# Patient Record
Sex: MALE | Race: WHITE | NOT HISPANIC OR LATINO | Employment: FULL TIME | URBAN - METROPOLITAN AREA
[De-identification: names, ages, dates, MRNs, and addresses within clinical notes are randomized per-mention and may not be internally consistent; named-entity substitution may affect disease eponyms.]

---

## 2017-01-03 ENCOUNTER — PATIENT MESSAGE (OUTPATIENT)
Dept: INTERNAL MEDICINE | Facility: CLINIC | Age: 57
End: 2017-01-03

## 2017-01-05 ENCOUNTER — TELEPHONE (OUTPATIENT)
Dept: INTERNAL MEDICINE | Facility: CLINIC | Age: 57
End: 2017-01-05

## 2017-01-05 RX ORDER — ZOLPIDEM TARTRATE 10 MG/1
TABLET ORAL
Qty: 30 TABLET | Refills: 3 | Status: SHIPPED | OUTPATIENT
Start: 2017-01-05 | End: 2017-05-08 | Stop reason: SDUPTHER

## 2017-01-05 RX ORDER — ZOLPIDEM TARTRATE 10 MG/1
TABLET ORAL
Qty: 30 TABLET | Refills: 3 | Status: CANCELLED | OUTPATIENT
Start: 2017-01-05

## 2017-01-05 NOTE — TELEPHONE ENCOUNTER
----- Message from Norma Guerra sent at 1/5/2017  2:39 PM CST -----  Contact: Self/ [phone]   Type: Rx    Name of medication(s): zolpidem (AMBIEN) 10 mg Tab    Is this a refill? New rx? New     Who prescribed medication? Dr. Estrada     Pharmacy Name, Phone, & Location: [facility] on file     Comments: pt is calling to receive a refill on the medication above. Please call and advise     Thank you

## 2017-01-05 NOTE — TELEPHONE ENCOUNTER
----- Message from Kalen Muir sent at 1/5/2017 10:01 AM CST -----  Contact: Pt [phone]  Pt requesting a call back regarding script zolpidem (AMBIEN) 10 mg Tab. Please call/advise.

## 2017-03-08 ENCOUNTER — PATIENT MESSAGE (OUTPATIENT)
Dept: INTERNAL MEDICINE | Facility: CLINIC | Age: 57
End: 2017-03-08

## 2017-03-08 DIAGNOSIS — E34.9 HYPOTESTOSTERONISM: ICD-10-CM

## 2017-03-08 DIAGNOSIS — Z00.00 ANNUAL PHYSICAL EXAM: Primary | ICD-10-CM

## 2017-03-08 DIAGNOSIS — E78.00 PURE HYPERCHOLESTEROLEMIA: ICD-10-CM

## 2017-03-08 DIAGNOSIS — I10 ESSENTIAL HYPERTENSION: ICD-10-CM

## 2017-03-09 NOTE — TELEPHONE ENCOUNTER
pt is asking if there are any labs or test that need a to be done prior to his apt 3/31/17, if so please order labs so i can schedule pt apt.  please advise.    Thanks

## 2017-03-10 ENCOUNTER — PATIENT MESSAGE (OUTPATIENT)
Dept: INTERNAL MEDICINE | Facility: CLINIC | Age: 57
End: 2017-03-10

## 2017-03-17 ENCOUNTER — LAB VISIT (OUTPATIENT)
Dept: LAB | Facility: HOSPITAL | Age: 57
End: 2017-03-17
Attending: INTERNAL MEDICINE
Payer: COMMERCIAL

## 2017-03-17 DIAGNOSIS — E34.9 HYPOTESTOSTERONISM: ICD-10-CM

## 2017-03-17 DIAGNOSIS — Z00.00 ANNUAL PHYSICAL EXAM: ICD-10-CM

## 2017-03-17 DIAGNOSIS — E78.00 PURE HYPERCHOLESTEROLEMIA: ICD-10-CM

## 2017-03-17 DIAGNOSIS — I10 ESSENTIAL HYPERTENSION: ICD-10-CM

## 2017-03-17 LAB
ALBUMIN SERPL BCP-MCNC: 4 G/DL
ALP SERPL-CCNC: 44 U/L
ALT SERPL W/O P-5'-P-CCNC: 40 U/L
ANION GAP SERPL CALC-SCNC: 10 MMOL/L
AST SERPL-CCNC: 27 U/L
BASOPHILS # BLD AUTO: 0.06 K/UL
BASOPHILS NFR BLD: 1.1 %
BILIRUB SERPL-MCNC: 0.7 MG/DL
BUN SERPL-MCNC: 21 MG/DL
CALCIUM SERPL-MCNC: 9.5 MG/DL
CHLORIDE SERPL-SCNC: 106 MMOL/L
CHOLEST/HDLC SERPL: 5.3 {RATIO}
CO2 SERPL-SCNC: 26 MMOL/L
COMPLEXED PSA SERPL-MCNC: 0.5 NG/ML
CREAT SERPL-MCNC: 1 MG/DL
DIFFERENTIAL METHOD: ABNORMAL
EOSINOPHIL # BLD AUTO: 0.7 K/UL
EOSINOPHIL NFR BLD: 11.8 %
ERYTHROCYTE [DISTWIDTH] IN BLOOD BY AUTOMATED COUNT: 11.6 %
EST. GFR  (AFRICAN AMERICAN): >60 ML/MIN/1.73 M^2
EST. GFR  (NON AFRICAN AMERICAN): >60 ML/MIN/1.73 M^2
GLUCOSE SERPL-MCNC: 96 MG/DL
HCT VFR BLD AUTO: 46.8 %
HDL/CHOLESTEROL RATIO: 19 %
HDLC SERPL-MCNC: 252 MG/DL
HDLC SERPL-MCNC: 48 MG/DL
HGB BLD-MCNC: 16.4 G/DL
LDLC SERPL CALC-MCNC: 151.2 MG/DL
LYMPHOCYTES # BLD AUTO: 1.9 K/UL
LYMPHOCYTES NFR BLD: 34.2 %
MCH RBC QN AUTO: 32.7 PG
MCHC RBC AUTO-ENTMCNC: 35 %
MCV RBC AUTO: 93 FL
MONOCYTES # BLD AUTO: 0.6 K/UL
MONOCYTES NFR BLD: 11.6 %
NEUTROPHILS # BLD AUTO: 2.3 K/UL
NEUTROPHILS NFR BLD: 41.3 %
NONHDLC SERPL-MCNC: 204 MG/DL
PLATELET # BLD AUTO: 173 K/UL
PMV BLD AUTO: 11.4 FL
POTASSIUM SERPL-SCNC: 4.4 MMOL/L
PROT SERPL-MCNC: 7 G/DL
RBC # BLD AUTO: 5.02 M/UL
SODIUM SERPL-SCNC: 142 MMOL/L
TESTOST SERPL-MCNC: 293 NG/DL
TRIGL SERPL-MCNC: 264 MG/DL
TSH SERPL DL<=0.005 MIU/L-ACNC: 2.12 UIU/ML
WBC # BLD AUTO: 5.52 K/UL

## 2017-03-17 PROCEDURE — 84443 ASSAY THYROID STIM HORMONE: CPT

## 2017-03-17 PROCEDURE — 84153 ASSAY OF PSA TOTAL: CPT

## 2017-03-17 PROCEDURE — 84403 ASSAY OF TOTAL TESTOSTERONE: CPT

## 2017-03-17 PROCEDURE — 85025 COMPLETE CBC W/AUTO DIFF WBC: CPT

## 2017-03-17 PROCEDURE — 80053 COMPREHEN METABOLIC PANEL: CPT

## 2017-03-17 PROCEDURE — 36415 COLL VENOUS BLD VENIPUNCTURE: CPT

## 2017-03-17 PROCEDURE — 80061 LIPID PANEL: CPT

## 2017-03-31 ENCOUNTER — OFFICE VISIT (OUTPATIENT)
Dept: INTERNAL MEDICINE | Facility: CLINIC | Age: 57
End: 2017-03-31
Payer: COMMERCIAL

## 2017-03-31 VITALS
HEART RATE: 62 BPM | SYSTOLIC BLOOD PRESSURE: 118 MMHG | HEIGHT: 77 IN | WEIGHT: 276 LBS | DIASTOLIC BLOOD PRESSURE: 82 MMHG | BODY MASS INDEX: 32.59 KG/M2

## 2017-03-31 DIAGNOSIS — I10 ESSENTIAL HYPERTENSION: ICD-10-CM

## 2017-03-31 DIAGNOSIS — R07.2 PRECORDIAL PAIN: ICD-10-CM

## 2017-03-31 DIAGNOSIS — M47.816 SPONDYLOSIS OF LUMBAR REGION WITHOUT MYELOPATHY OR RADICULOPATHY: ICD-10-CM

## 2017-03-31 DIAGNOSIS — E29.1 MALE HYPOGONADISM: ICD-10-CM

## 2017-03-31 DIAGNOSIS — D72.10 EOSINOPHILIA: ICD-10-CM

## 2017-03-31 DIAGNOSIS — E78.2 MIXED HYPERLIPIDEMIA: ICD-10-CM

## 2017-03-31 DIAGNOSIS — R13.10 DYSPHAGIA, UNSPECIFIED TYPE: ICD-10-CM

## 2017-03-31 DIAGNOSIS — J30.2 SEASONAL ALLERGIC RHINITIS, UNSPECIFIED ALLERGIC RHINITIS TRIGGER: ICD-10-CM

## 2017-03-31 DIAGNOSIS — Z00.00 ANNUAL PHYSICAL EXAM: Primary | ICD-10-CM

## 2017-03-31 PROCEDURE — 3074F SYST BP LT 130 MM HG: CPT | Mod: S$GLB,,, | Performed by: INTERNAL MEDICINE

## 2017-03-31 PROCEDURE — 3079F DIAST BP 80-89 MM HG: CPT | Mod: S$GLB,,, | Performed by: INTERNAL MEDICINE

## 2017-03-31 PROCEDURE — 99396 PREV VISIT EST AGE 40-64: CPT | Mod: S$GLB,,, | Performed by: INTERNAL MEDICINE

## 2017-03-31 PROCEDURE — 99999 PR PBB SHADOW E&M-EST. PATIENT-LVL III: CPT | Mod: PBBFAC,,, | Performed by: INTERNAL MEDICINE

## 2017-03-31 RX ORDER — ROSUVASTATIN CALCIUM 10 MG/1
10 TABLET, COATED ORAL DAILY
Qty: 30 TABLET | Refills: 11 | Status: SHIPPED | OUTPATIENT
Start: 2017-03-31 | End: 2017-03-31 | Stop reason: SDUPTHER

## 2017-03-31 RX ORDER — ROSUVASTATIN CALCIUM 10 MG/1
10 TABLET, COATED ORAL DAILY
Qty: 30 TABLET | Refills: 11 | Status: SHIPPED | OUTPATIENT
Start: 2017-03-31 | End: 2017-05-22

## 2017-03-31 NOTE — MR AVS SNAPSHOT
[facility] [address]  [address]  [address]  [facility] [address]  Phone: [phone]                  Stanley Stewart   3/31/2017 8:00 AM   Office Visit    Description:  Male : 1960   Provider:  Kadeem Estrada MD   Department:  [facility] [address]           Reason for Visit     Annual Exam           Diagnoses this Visit        Comments    Annual physical exam    -  Primary     Essential hypertension         Mixed hyperlipidemia         Precordial pain         Dysphagia, unspecified type         Male hypogonadism         Spondylosis of lumbar region without myelopathy or radiculopathy         Seasonal allergic rhinitis, unspecified allergic rhinitis trigger         Eosinophilia                To Do List           Goals (5 Years of Data)     None       These Medications        Disp Refills Start End    rosuvastatin (CRESTOR) 10 MG tablet 30 tablet 11 3/31/2017 3/31/2018    Take 1 tablet (10 mg total) by mouth once daily. - Oral    Pharmacy: [facility] [address] [place] [address] [address] AT [facility] Ph #: [phone]         [facility]     [facility] Nurse Care Line - 24 Assistance  Unless otherwise directed by your provider, please contact [facility], our nurse care line that is available for / assistance.     Registered nurses in the [facility] provide: appointment scheduling, clinical advisement, health education, and other advisory services.  Call: [phone] (toll free)               Medications           Message regarding Medications     Verify the changes and/or additions to your medication regime listed below are the same as discussed with your clinician today.  If any of these changes or additions are incorrect, please notify your healthcare provider.        START taking these NEW medications        Refills    rosuvastatin (CRESTOR) 10 MG tablet 11    Sig: Take 1  "tablet (10 mg total) by mouth once daily.    Class: Normal    Route: Oral      STOP taking these medications     atorvastatin (LIPITOR) 20 MG tablet Take 1 tablet (20 mg total) by mouth once daily.           Verify that the below list of medications is an accurate representation of the medications you are currently taking.  If none reported, the list may be blank. If incorrect, please contact your healthcare provider. Carry this list with you in case of emergency.           Current Medications     CIALIS 5 mg tablet TK 1 T PO  D FOR ERECTILE DYSFUNCTION    lisinopril-hydrochlorothiazide (PRINZIDE,ZESTORETIC) 20-12.5 mg per tablet Take 1 tablet by mouth once daily.    vardenafil (LEVITRA) 20 MG tablet Take 1 tablet (20 mg total) by mouth daily as needed for Erectile Dysfunction. Take 1 hour before intercourse.    zolpidem (AMBIEN) 10 mg Tab TAKE 1 TABLET BY MOUTH EVERY DAY AT BEDTIME AS NEEDED    rosuvastatin (CRESTOR) 10 MG tablet Take 1 tablet (10 mg total) by mouth once daily.           Clinical Reference Information           Your Vitals Were     BP Pulse Height Weight BMI    118/82 62 6' 5" (1.956 m) 125.2 kg (276 lb) 32.73 kg/m2      Blood Pressure          Most Recent Value    BP  118/82      Allergies as of 3/31/2017     Shellfish Containing Products      Immunizations Administered on Date of Encounter - 3/31/2017     None      Orders Placed During Today's Visit      Normal Orders This Visit    Ambulatory Referral to Allergy     Case request GI: ESOPHAGOGASTRODUODENOSCOPY (EGD)     Future Labs/Procedures Expected by Expires    EKG 12-LEAD - Muse  As directed 3/31/2018    Exercise stress echo  As directed 3/31/2018      Language Assistance Services     ATTENTION: Language assistance services are available, free of charge. Please call [phone].      ATENCIÓN: Si habla moe, tiene a bowen disposición servicios gratuitos de asistencia lingüística. Llame al [phone].     COLIN Ý: N?u b?n nói Ti?ng Vi?t, " có các d?ch v? h? tr? ngôn ng? mi?n phí dành cho b?n. G?i s? [phone].         [facility] [address] complies with applicable Federal civil rights laws and does not discriminate on the basis of race, color, national origin, age, disability, or sex.

## 2017-03-31 NOTE — PROGRESS NOTES
REASON FOR VISIT:  This is a 56-year-old male who comes in for annual routine   visit.  There are concerns that he has.  1. He has been feeling a knife-like pain in his left lower chest that will come   on randomly.  At times, it is under circumstances of maybe being stressed. It   will occur in the evening, maybe last 20-40 minutes.  No other symptoms at that   time and the pain is localized.  2. Within a year he had bouts of dysphagia where the food may get stuck in his   lower chest, but eventually it passes.  It does not happen all the time, but   will be with solid food and not liquids.  He is not having heartburn or   indigestion.  No regurgitation.  3. He is being followed by Dr. Keysha Trevino, outside neurosurgeon, regarding   lumbar degenerative disk disease, particularly at L5-S1, where there might be   facet arthropathy.  The use of Mobic however, has kept things in check.    Occasionally, he will have low back pain.  No radicular symptoms.  4. He has been taking the medicine atorvastatin for a while, but then within   this past eight months he noticed that his elbows were hurting for no other   reason.  He then just stopped the Lipitor and afterwards he has not had the   pain.    PAST MEDICAL HISTORY:  Hypertension.  Hyperlipidemia.  Lumbar degenerative disk disease.  Hypotestosterone, followed by Urology.  Appendectomy.  Arthroscopic knee surgery twice on the left and one on the right.  Intussusception at age 6 and then recently in September 2014 where he did   require surgery and a partial colon resection.  Prior diagnosis of fatty liver.  Sleep apnea.    SOCIAL HISTORY:  Tobacco use, none.  Alcohol use: maybe one glass of wine a   night.  , has three children.  He works as a teacher at [employer].  Exercise   is walking a mile and a half every day and occasionally doing a spinning class.    FAMILY HISTORY:  Father is alive, hypertension, hyperlipidemia, coronary artery   disease.  Mother is  , chronic hepatitis.  Two brothers living but one   with a history of heart attack at age 50.    SCREENING:  Normal colonoscopy in year .    MEDICATIONS:  Lisinopril/hydrochlorothiazide 20/12.5 mg a day.  Testosterone pellets every six months.  Ambien 10 mg at night.   The use of Cialis and Levitra as needed.    RECENT LABS:  PSA 0.50.  CBC normal other than eosinophil count being 11.8.    Comprehensive metabolic profile was normal.  Cholesterol 252, triglycerides 264.    TSH normal.    REVIEW OF SYSTEMS:  Overall, his breathing is fine.  No palpitations, no   abdominal pain.  Regular bowel function.  No difficulty urinating.  Nocturia x2.    No arthralgias or headaches.    PHYSICAL EXAMINATION:  VITAL SIGNS:  His weight is 256 pounds, pulse 64, blood pressure today 132/80.  HEENT:  Tympanic membranes normal.  Nasal mucosa is clear.  Oropharynx, no   abnormal findings.  NECK:  No thyromegaly.  LUNGS:  Clear breath sounds.  HEART:  Regular rate and rhythm.  ABDOMEN:  Active bowel sounds, soft, nontender.  No hepatosplenomegaly or   abdominal masses.  Midline and right lower quadrant scar.  PULSES:  2+ carotid, 2+ pedal pulses.  No bruits.  EXTREMITIES:  No edema.  LYMPH GLAND:  No palpable adenopathy.  GENITALIA:  No scrotal masses, no hernias.  MUSCULOSKELETAL:  Bilateral knee crepitation.  RECTAL:  Stool is brown, heme negative.  Prostate feels normal.    IMPRESSION:  1.  General exam.  2.  Hypertension.  3.  Hyperlipidemia.  4.  Chest pain.  5.  Dysphagia.  6.  Hypotestosterone.  7.  Lumbar degenerative disk disease.  8.  Allergic rhinitis with nasal congestion.    PLAN:    We will be arranging for an upper endoscopy and a stress 2D echo.    Take Claritin as needed.   Phone review to follow up.    In addition, we will recommend starting Crestor 10 mg a day.    /ls [id number] review        JAM/HN  dd: 2017 08:50:50 (CDT)  td: 2017 03:39:00 (CDT)  Doc ID   #[id number]  Job ID #[id number]    CC:

## 2017-04-04 ENCOUNTER — TELEPHONE (OUTPATIENT)
Dept: ENDOSCOPY | Facility: HOSPITAL | Age: 57
End: 2017-04-04

## 2017-04-10 RX ORDER — LISINOPRIL AND HYDROCHLOROTHIAZIDE 12.5; 2 MG/1; MG/1
TABLET ORAL
Qty: 90 TABLET | Refills: 3 | Status: SHIPPED | OUTPATIENT
Start: 2017-04-10 | End: 2018-02-24 | Stop reason: SDUPTHER

## 2017-04-16 ENCOUNTER — PATIENT MESSAGE (OUTPATIENT)
Dept: UROLOGY | Facility: CLINIC | Age: 57
End: 2017-04-16

## 2017-04-17 ENCOUNTER — PATIENT MESSAGE (OUTPATIENT)
Dept: UROLOGY | Facility: CLINIC | Age: 57
End: 2017-04-17

## 2017-04-21 ENCOUNTER — ANESTHESIA EVENT (OUTPATIENT)
Dept: ENDOSCOPY | Facility: HOSPITAL | Age: 57
End: 2017-04-21
Payer: COMMERCIAL

## 2017-04-21 ENCOUNTER — HOSPITAL ENCOUNTER (OUTPATIENT)
Dept: CARDIOLOGY | Facility: CLINIC | Age: 57
Discharge: HOME | End: 2017-04-21
Payer: COMMERCIAL

## 2017-04-21 ENCOUNTER — HOSPITAL ENCOUNTER (OUTPATIENT)
Facility: HOSPITAL | Age: 57
Discharge: HOME | End: 2017-04-21
Attending: INTERNAL MEDICINE | Admitting: INTERNAL MEDICINE
Payer: COMMERCIAL

## 2017-04-21 ENCOUNTER — SURGERY (OUTPATIENT)
Age: 57
End: 2017-04-21

## 2017-04-21 ENCOUNTER — ANESTHESIA (OUTPATIENT)
Dept: ENDOSCOPY | Facility: HOSPITAL | Age: 57
End: 2017-04-21
Payer: COMMERCIAL

## 2017-04-21 VITALS
HEIGHT: 77 IN | BODY MASS INDEX: 32.47 KG/M2 | OXYGEN SATURATION: 95 % | HEART RATE: 57 BPM | TEMPERATURE: 99 F | DIASTOLIC BLOOD PRESSURE: 95 MMHG | WEIGHT: 275 LBS | RESPIRATION RATE: 18 BRPM | SYSTOLIC BLOOD PRESSURE: 161 MMHG

## 2017-04-21 VITALS — RESPIRATION RATE: 19 BRPM

## 2017-04-21 DIAGNOSIS — R07.2 PRECORDIAL PAIN: ICD-10-CM

## 2017-04-21 DIAGNOSIS — R13.19 ESOPHAGEAL DYSPHAGIA: Primary | ICD-10-CM

## 2017-04-21 DIAGNOSIS — I10 ESSENTIAL HYPERTENSION: ICD-10-CM

## 2017-04-21 DIAGNOSIS — E78.2 MIXED HYPERLIPIDEMIA: ICD-10-CM

## 2017-04-21 DIAGNOSIS — R13.10 DYSPHAGIA: ICD-10-CM

## 2017-04-21 LAB
DIASTOLIC DYSFUNCTION: NO
ESTIMATED PA SYSTOLIC PRESSURE: 17.81
RETIRED EF AND QEF - SEE NOTES: 60 (ref 55–65)

## 2017-04-21 PROCEDURE — 27201012 HC FORCEPS, HOT/COLD, DISP: Performed by: INTERNAL MEDICINE

## 2017-04-21 PROCEDURE — 93321 DOPPLER ECHO F-UP/LMTD STD: CPT | Mod: S$GLB,,, | Performed by: INTERNAL MEDICINE

## 2017-04-21 PROCEDURE — 63600175 PHARM REV CODE 636 W HCPCS: Performed by: NURSE ANESTHETIST, CERTIFIED REGISTERED

## 2017-04-21 PROCEDURE — 37000009 HC ANESTHESIA EA ADD 15 MINS: Performed by: INTERNAL MEDICINE

## 2017-04-21 PROCEDURE — 37000008 HC ANESTHESIA 1ST 15 MINUTES: Performed by: INTERNAL MEDICINE

## 2017-04-21 PROCEDURE — 88305 TISSUE EXAM BY PATHOLOGIST: CPT | Performed by: PATHOLOGY

## 2017-04-21 PROCEDURE — D9220A PRA ANESTHESIA: Mod: CRNA,,, | Performed by: NURSE ANESTHETIST, CERTIFIED REGISTERED

## 2017-04-21 PROCEDURE — 43239 EGD BIOPSY SINGLE/MULTIPLE: CPT | Mod: ,,, | Performed by: INTERNAL MEDICINE

## 2017-04-21 PROCEDURE — 93000 ELECTROCARDIOGRAM COMPLETE: CPT | Mod: S$GLB,,, | Performed by: INTERNAL MEDICINE

## 2017-04-21 PROCEDURE — 25000003 PHARM REV CODE 250: Performed by: NURSE ANESTHETIST, CERTIFIED REGISTERED

## 2017-04-21 PROCEDURE — 25000003 PHARM REV CODE 250: Performed by: INTERNAL MEDICINE

## 2017-04-21 PROCEDURE — 88305 TISSUE EXAM BY PATHOLOGIST: CPT | Mod: 26,,, | Performed by: PATHOLOGY

## 2017-04-21 PROCEDURE — 43239 EGD BIOPSY SINGLE/MULTIPLE: CPT | Performed by: INTERNAL MEDICINE

## 2017-04-21 PROCEDURE — 93351 STRESS TTE COMPLETE: CPT | Mod: S$GLB,,, | Performed by: INTERNAL MEDICINE

## 2017-04-21 PROCEDURE — D9220A PRA ANESTHESIA: Mod: ANES,,, | Performed by: ANESTHESIOLOGY

## 2017-04-21 RX ORDER — SODIUM CHLORIDE 9 MG/ML
INJECTION, SOLUTION INTRAVENOUS CONTINUOUS
Status: DISCONTINUED | OUTPATIENT
Start: 2017-04-21 | End: 2017-04-21 | Stop reason: HOSPADM

## 2017-04-21 RX ORDER — LIDOCAINE HCL/PF 100 MG/5ML
SYRINGE (ML) INTRAVENOUS
Status: DISCONTINUED | OUTPATIENT
Start: 2017-04-21 | End: 2017-04-21

## 2017-04-21 RX ORDER — PROPOFOL 10 MG/ML
VIAL (ML) INTRAVENOUS
Status: DISCONTINUED | OUTPATIENT
Start: 2017-04-21 | End: 2017-04-21

## 2017-04-21 RX ORDER — PROPOFOL 10 MG/ML
VIAL (ML) INTRAVENOUS CONTINUOUS PRN
Status: DISCONTINUED | OUTPATIENT
Start: 2017-04-21 | End: 2017-04-21

## 2017-04-21 RX ADMIN — PROPOFOL 100 MG: 10 INJECTION, EMULSION INTRAVENOUS at 02:04

## 2017-04-21 RX ADMIN — LIDOCAINE HYDROCHLORIDE 50 MG: 20 INJECTION, SOLUTION INTRAVENOUS at 02:04

## 2017-04-21 RX ADMIN — SODIUM CHLORIDE: 0.9 INJECTION, SOLUTION INTRAVENOUS at 02:04

## 2017-04-21 RX ADMIN — PROPOFOL 175 MCG/KG/MIN: 10 INJECTION, EMULSION INTRAVENOUS at 02:04

## 2017-04-21 NOTE — TRANSFER OF CARE
"Anesthesia Transfer of Care Note    Patient: Stanley Stewart    Procedure(s) Performed: Procedure(s) (LRB):  ESOPHAGOGASTRODUODENOSCOPY (EGD) (N/A)    Patient location: GI    Anesthesia Type: general    Transport from OR: Transported from OR on room air with adequate spontaneous ventilation    Post pain: adequate analgesia    Post assessment: no apparent anesthetic complications and tolerated procedure well    Post vital signs: stable    Level of consciousness: awake, alert and oriented    Nausea/Vomiting: no nausea/vomiting    Complications: none          Last vitals:   Visit Vitals    BP (!) 160/81 (BP Location: Left arm, Patient Position: Lying, BP Method: Automatic)    Pulse 64    Temp 36.9 °C (98.4 °F) (Oral)    Resp 14    Ht 6' 5" (1.956 m)    Wt 124.7 kg (275 lb)    SpO2 99%    BMI 32.61 kg/m2     "

## 2017-04-21 NOTE — H&P (VIEW-ONLY)
REASON FOR VISIT:  This is a 56-year-old male who comes in for annual routine   visit.  There are concerns that he has.  1. He has been feeling a knife-like pain in his left lower chest that will come   on randomly.  At times, it is under circumstances of maybe being stressed. It   will occur in the evening, maybe last 20-40 minutes.  No other symptoms at that   time and the pain is localized.  2. Within a year he had bouts of dysphagia where the food may get stuck in his   lower chest, but eventually it passes.  It does not happen all the time, but   will be with solid food and not liquids.  He is not having heartburn or   indigestion.  No regurgitation.  3. He is being followed by Dr. Keysha Trevino, outside neurosurgeon, regarding   lumbar degenerative disk disease, particularly at L5-S1, where there might be   facet arthropathy.  The use of Mobic however, has kept things in check.    Occasionally, he will have low back pain.  No radicular symptoms.  4. He has been taking the medicine atorvastatin for a while, but then within   this past eight months he noticed that his elbows were hurting for no other   reason.  He then just stopped the Lipitor and afterwards he has not had the   pain.    PAST MEDICAL HISTORY:  Hypertension.  Hyperlipidemia.  Lumbar degenerative disk disease.  Hypotestosterone, followed by Urology.  Appendectomy.  Arthroscopic knee surgery twice on the left and one on the right.  Intussusception at age 6 and then recently in September 2014 where he did   require surgery and a partial colon resection.  Prior diagnosis of fatty liver.  Sleep apnea.    SOCIAL HISTORY:  Tobacco use, none.  Alcohol use: maybe one glass of wine a   night.  , has three children.  He works as a teacher at [employer].  Exercise   is walking a mile and a half every day and occasionally doing a spinning class.    FAMILY HISTORY:  Father is alive, hypertension, hyperlipidemia, coronary artery   disease.  Mother is  , chronic hepatitis.  Two brothers living but one   with a history of heart attack at age 50.    SCREENING:  Normal colonoscopy in year .    MEDICATIONS:  Lisinopril/hydrochlorothiazide 20/12.5 mg a day.  Testosterone pellets every six months.  Ambien 10 mg at night.   The use of Cialis and Levitra as needed.    RECENT LABS:  PSA 0.50.  CBC normal other than eosinophil count being 11.8.    Comprehensive metabolic profile was normal.  Cholesterol 252, triglycerides 264.    TSH normal.    REVIEW OF SYSTEMS:  Overall, his breathing is fine.  No palpitations, no   abdominal pain.  Regular bowel function.  No difficulty urinating.  Nocturia x2.    No arthralgias or headaches.    PHYSICAL EXAMINATION:  VITAL SIGNS:  His weight is 256 pounds, pulse 64, blood pressure today 132/80.  HEENT:  Tympanic membranes normal.  Nasal mucosa is clear.  Oropharynx, no   abnormal findings.  NECK:  No thyromegaly.  LUNGS:  Clear breath sounds.  HEART:  Regular rate and rhythm.  ABDOMEN:  Active bowel sounds, soft, nontender.  No hepatosplenomegaly or   abdominal masses.  Midline and right lower quadrant scar.  PULSES:  2+ carotid, 2+ pedal pulses.  No bruits.  EXTREMITIES:  No edema.  LYMPH GLAND:  No palpable adenopathy.  GENITALIA:  No scrotal masses, no hernias.  MUSCULOSKELETAL:  Bilateral knee crepitation.  RECTAL:  Stool is brown, heme negative.  Prostate feels normal.    IMPRESSION:  1.  General exam.  2.  Hypertension.  3.  Hyperlipidemia.  4.  Chest pain.  5.  Dysphagia.  6.  Hypotestosterone.  7.  Lumbar degenerative disk disease.  8.  Allergic rhinitis with nasal congestion.    PLAN:    We will be arranging for an upper endoscopy and a stress 2D echo.    Take Claritin as needed.   Phone review to follow up.    In addition, we will recommend starting Crestor 10 mg a day.    /ls [id number] review        JAM/HN  dd: 2017 08:50:50 (CDT)  td: 2017 03:39:00 (CDT)  Doc ID   #[id number]  Job ID #[id number]    CC:

## 2017-04-21 NOTE — ANESTHESIA PREPROCEDURE EVALUATION
04/21/2017  Stanley Stewart is a 56 y.o., male.    Anesthesia Evaluation    I have reviewed the Patient Summary Reports.    I have reviewed the Nursing Notes.   I have reviewed the Medications.     Review of Systems      Physical Exam  General:  Obesity    Airway/Jaw/Neck:  Airway Findings: Mouth Opening: Normal Tongue: Normal  General Airway Assessment: Adult  Mallampati: II  TM Distance: Normal, at least 6 cm       Chest/Lungs:  Chest/Lungs Findings: Clear to auscultation, Normal Respiratory Rate     Heart/Vascular:  Heart Findings: Rate: Normal  Rhythm: Regular Rhythm             Anesthesia Plan  Type of Anesthesia, risks & benefits discussed:  Anesthesia Type:  general, MAC  Patient's Preference:   Intra-op Monitoring Plan:   Intra-op Monitoring Plan Comments:   Post Op Pain Control Plan:   Post Op Pain Control Plan Comments:   Induction:   IV  Beta Blocker:  Patient is not currently on a Beta-Blocker (No further documentation required).       Informed Consent: Patient understands risks and agrees with Anesthesia plan.  Questions answered. Anesthesia consent signed with patient.  ASA Score: 2     Day of Surgery Review of History & Physical:            Ready For Surgery From Anesthesia Perspective.     Patient Active Problem List   Diagnosis    Male hypogonadism    Hypertension    Mixed hyperlipidemia    Erectile dysfunction    Dysphagia

## 2017-04-21 NOTE — DISCHARGE INSTRUCTIONS

## 2017-04-21 NOTE — PATIENT INSTRUCTIONS
Discharge Summary/Instructions for after EGD with Biopsy  Patient Name: Stanley Stewart  Patient MRN: [id number]  Patient YOB: 1960 Friday, April 21, 2017    Ramirez Trevino MD  RESTRICTIONS ON ACTIVITY:  - DO NOT drive a car or operate machinery until the day after the   procedure.  - Following Day:  Return to full activities including work.  - Diet:  Eat and drink normally unless instructed otherwise.  TREATMENT FOR COMMON SIDE EFFECTS:  - Sore Throat - treat with throat lozenges, gargle with warm salt water.  - Mild abdominal pain & bloating - rest and take liquids only.  SYMPTOMS TO WATCH FOR AND REPORT TO YOUR PHYSICIAN:  1.  Chills or fever occurring within 24 hours after procedure.  2.  Pain in chest.  3.  SEVERE abdominal pain or bloating.  4.  Rectal bleeding which would show as maroon or black stools.  Your doctor recommends these additional instructions:  If any biopsies were performed, my office will call you in 5 to 6 business   days with any results.  - Patient has a contact number available for emergencies.  The signs and   symptoms of potential delayed complications were discussed with the   patient.  Return to normal activities tomorrow.  Written discharge   instructions were provided to the patient.   - Discharge patient to home.   - Resume previous diet.   - Continue present medications.   - Await pathology results.   - Limit use of ibuprofen, naproxen, or other non-steroidal anti-inflammatory   drugs.   You have a contact number available for emergencies.  The signs and symptoms   of potential delayed complications were discussed with you.  You may return   to normal activities tomorrow.  Written discharge instructions were   provided to you.   You are being discharged to home.   Resume your previous diet.   Continue your present medications.   We are waiting for your pathology results.   Limit use of ibuprofen (including Advil, Motrin or Nuprin), naproxen   (including Aleve), or any  other non-steroidal anti-inflammatory drugs.  None  If you have any questions or problems, please call your physician.  EMERGENCY PHONE NUMBER: [phone]  LAB RESULTS: [phone]         Ramirez Trevino MD  4/21/2017 2:50:12 PM  This report has been verified and signed electronically.

## 2017-04-21 NOTE — INTERVAL H&P NOTE
The patient has been examined and the H&P has been reviewed:    There is no interval changes since last encounter.    EGD: Intermittent solid food dysphagia  Sedation: GA  ASA: Per anesthesia  Mallampati: Per anesthesia    Endoscopy risks, benefits and alternative options discussed and understood by patient/family.          Active Hospital Problems    Diagnosis  POA    Dysphagia [R13.10]  Yes      Resolved Hospital Problems    Diagnosis Date Resolved POA   No resolved problems to display.

## 2017-04-21 NOTE — ANESTHESIA POSTPROCEDURE EVALUATION
"Anesthesia Post Evaluation    Patient: Stanley Stewart    Procedure(s) Performed: Procedure(s) (LRB):  ESOPHAGOGASTRODUODENOSCOPY (EGD) (N/A)    Final Anesthesia Type: general  Patient location during evaluation: PACU  Patient participation: Yes- Able to Participate  Level of consciousness: awake and alert  Post-procedure vital signs: reviewed and stable  Pain management: adequate  Airway patency: patent  PONV status at discharge: No PONV  Anesthetic complications: no      Cardiovascular status: blood pressure returned to baseline  Respiratory status: unassisted  Hydration status: euvolemic  Follow-up not needed.        Visit Vitals    BP (!) 147/73 (BP Location: Left arm, Patient Position: Sitting, BP Method: Automatic)    Pulse 76    Temp 37 °C (98.6 °F) (Oral)    Resp 14    Ht 6' 5" (1.956 m)    Wt 124.7 kg (275 lb)    SpO2 (!) 93%    BMI 32.61 kg/m2       Pain/Rui Score: Pain Assessment Performed: Yes (4/21/2017  2:52 PM)  Presence of Pain: denies (4/21/2017  2:52 PM)  Rui Score: 10 (4/21/2017  2:52 PM)      "

## 2017-04-22 ENCOUNTER — DOCUMENTATION ONLY (OUTPATIENT)
Dept: INTERNAL MEDICINE | Facility: CLINIC | Age: 57
End: 2017-04-22

## 2017-04-22 ENCOUNTER — PATIENT MESSAGE (OUTPATIENT)
Dept: INTERNAL MEDICINE | Facility: CLINIC | Age: 57
End: 2017-04-22

## 2017-04-22 DIAGNOSIS — M47.816 SPONDYLOSIS OF LUMBAR REGION WITHOUT MYELOPATHY OR RADICULOPATHY: ICD-10-CM

## 2017-04-22 DIAGNOSIS — E78.00 PURE HYPERCHOLESTEROLEMIA: Primary | ICD-10-CM

## 2017-04-22 NOTE — PROGRESS NOTES
EGD NOTED    RECOMMEND HOLDING OFF ON MOBIC WHICH HE IS TAKING FOR LOW BACK PAIN    STRESS ECHO IS NORMAL    CONTINUE WITH CRUZ    REFER TO HEALTHY BACK     LIPID IN 3 MONTHS

## 2017-04-24 ENCOUNTER — OFFICE VISIT (OUTPATIENT)
Dept: UROLOGY | Facility: CLINIC | Age: 57
End: 2017-04-24
Payer: COMMERCIAL

## 2017-04-24 VITALS
BODY MASS INDEX: 32.93 KG/M2 | WEIGHT: 278.88 LBS | HEIGHT: 77 IN | DIASTOLIC BLOOD PRESSURE: 89 MMHG | HEART RATE: 56 BPM | SYSTOLIC BLOOD PRESSURE: 130 MMHG

## 2017-04-24 DIAGNOSIS — N52.01 ERECTILE DYSFUNCTION DUE TO ARTERIAL INSUFFICIENCY: ICD-10-CM

## 2017-04-24 DIAGNOSIS — E29.1 MALE HYPOGONADISM: Primary | ICD-10-CM

## 2017-04-24 PROCEDURE — S0189 TESTOSTERONE PELLET 75 MG: HCPCS | Mod: S$GLB,,, | Performed by: UROLOGY

## 2017-04-24 PROCEDURE — 3079F DIAST BP 80-89 MM HG: CPT | Mod: S$GLB,,, | Performed by: UROLOGY

## 2017-04-24 PROCEDURE — 99999 PR PBB SHADOW E&M-EST. PATIENT-LVL III: CPT | Mod: PBBFAC,,, | Performed by: UROLOGY

## 2017-04-24 PROCEDURE — 3075F SYST BP GE 130 - 139MM HG: CPT | Mod: S$GLB,,, | Performed by: UROLOGY

## 2017-04-24 PROCEDURE — 11980 IMPLANT HORMONE PELLET(S): CPT | Mod: S$GLB,,, | Performed by: UROLOGY

## 2017-04-24 PROCEDURE — 1160F RVW MEDS BY RX/DR IN RCRD: CPT | Mod: S$GLB,,, | Performed by: UROLOGY

## 2017-04-24 PROCEDURE — 99214 OFFICE O/P EST MOD 30 MIN: CPT | Mod: 25,S$GLB,, | Performed by: UROLOGY

## 2017-04-24 RX ORDER — VARDENAFIL HYDROCHLORIDE 20 MG/1
20 TABLET ORAL DAILY PRN
Qty: 30 TABLET | Refills: 11 | Status: SHIPPED | OUTPATIENT
Start: 2017-04-24 | End: 2019-06-06

## 2017-04-24 RX ORDER — SILDENAFIL CITRATE 20 MG/1
TABLET ORAL
Qty: 50 TABLET | Refills: 11 | Status: SHIPPED | OUTPATIENT
Start: 2017-04-24 | End: 2017-11-27

## 2017-04-24 RX ORDER — TADALAFIL 5 MG/1
5 TABLET ORAL DAILY
Qty: 30 TABLET | Refills: 11 | Status: SHIPPED | OUTPATIENT
Start: 2017-04-24 | End: 2019-06-06

## 2017-04-24 NOTE — MR AVS SNAPSHOT
[facility] - Urology 4th Floor  [address] [address]  Phone: [phone]                  Stanley Stewart   2017 11:00 AM   Office Visit    Description:  Male : 1960   Provider:  Jaydon Morin MD   Department:  [facility] - Urology 4th Floor           Reason for Visit     Other           Diagnoses this Visit        Comments    Male hypogonadism    -  Primary     Erectile dysfunction due to arterial insufficiency                To Do List           Future Appointments        Provider Department Dept Phone    2017 12:30 PM ANI VARGAS [facility] [phone]    2017 1:00 PM Acacia Ramirez MD [facility] [phone] 9:20 AM ISHA Nick III, MD [facility] - Allergy/ Immunology [phone]    2017 7:10 AM LAB, APPOINTMENT [place] [facility] [phone]      Goals (5 Years of Data)     None       These Medications        Disp Refills Start End    vardenafil (LEVITRA) 20 MG tablet 30 tablet 11 2017     Take 1 tablet (20 mg total) by mouth daily as needed for Erectile Dysfunction. Take 1 hour before intercourse. - Oral    Pharmacy: [facility] [place] [address] AT [address]  Ph #: [phone]       tadalafil (CIALIS) 5 MG tablet 30 tablet 11 2017    Take 1 tablet (5 mg total) by mouth once daily. - Oral    Pharmacy: [facility] [address] AT [address]  Ph #: [phone]       sildenafil (REVATIO) 20 mg Tab 50 tablet 11 2017     Take 5 po 1 hour before sexual activity    Pharmacy: [facility] [address] AT [address]  Ph #: [phone]         [facility]     [facility] -  Assistance  Unless otherwise directed by your provider, please contact [facility],  our nurse care line that is available for  assistance.     Registered nurses in the [facility] provide: appointment scheduling, clinical advisement, health education, and other advisory services.  Call: [phone] (toll free)               Medications           Message regarding Medications     Verify the changes and/or additions to your medication regime listed below are the same as discussed with your clinician today.  If any of these changes or additions are incorrect, please notify your healthcare provider.        START taking these NEW medications        Refills    tadalafil (CIALIS) 5 MG tablet 11    Sig: Take 1 tablet (5 mg total) by mouth once daily.    Class: Normal    Route: Oral    sildenafil (REVATIO) 20 mg Tab 11    Sig: Take 5 po 1 hour before sexual activity    Class: Normal      These medications were administered today        Dose Freq    testosterone Pllt 12 Pellet 12 Pellet Clinic/HOD 1 time    Starting on: 10/1/2017    Si Pellet by Implant route one time.    Class: Normal    Route: Implant           Verify that the below list of medications is an accurate representation of the medications you are currently taking.  If none reported, the list may be blank. If incorrect, please contact your healthcare provider. Carry this list with you in case of emergency.           Current Medications     lisinopril-hydrochlorothiazide (PRINZIDE,ZESTORETIC) 20-12.5 mg per tablet Take 1 tablet by mouth once daily    rosuvastatin (CRESTOR) 10 MG tablet Take 1 tablet (10 mg total) by mouth once daily.    vardenafil (LEVITRA) 20 MG tablet Take 1 tablet (20 mg total) by mouth daily as needed for Erectile Dysfunction. Take 1 hour before intercourse.    zolpidem (AMBIEN) 10 mg Tab TAKE 1 TABLET BY MOUTH EVERY DAY AT BEDTIME AS NEEDED    sildenafil (REVATIO) 20 mg Tab Take 5 po 1 hour before sexual activity    tadalafil (CIALIS) 5 MG tablet Take 1 tablet (5 mg total) by mouth once daily.          "  Clinical Reference Information           Your Vitals Were     BP Pulse Height Weight BMI    130/89 56 6' 5" (1.956 m) 126.5 kg (278 lb 14.1 oz) 33.07 kg/m2      Blood Pressure          Most Recent Value    BP  130/89      Allergies as of 4/24/2017     Shellfish Containing Products      Immunizations Administered on Date of Encounter - 4/24/2017     None      Orders Placed During Today's Visit      Normal Orders This Visit    Prior Authorization Order     Future Labs/Procedures Expected by Expires    CBC with Auto Differential  10/21/2017 6/23/2018    Hepatic Function Panel  10/21/2017 6/23/2018    Lipid Panel  10/21/2017 6/23/2018    PSA  10/21/2017 6/23/2018    Testosterone  10/21/2017 6/23/2018      Administrations This Visit     testosterone Pllt 12 Pellet     Admin Date Action Dose Route Administered By             04/24/2017 Given 12 Pellet Implant Jaydon Morin MD                      Language Assistance Services     ATTENTION: Language assistance services are available, free of charge. Please call [phone].      ATENCIÓN: Si habla español, tiene a bowen disposición servicios gratuitos de asistencia lingüística. Llame al [phone].     CHÚ Ý: N?u b?n nói Ti?ng Vi?t, có các d?ch v? h? tr? ngôn ng? mi?n phí dành cho b?n. G?i s? [phone].         [facility] 4th Floor complies with applicable Federal civil rights laws and does not discriminate on the basis of race, color, national origin, age, disability, or sex.        "

## 2017-04-24 NOTE — PROGRESS NOTES
CHIEF COMPLAINT:    Mr. Stewart is a 56 y.o. male presenting with hypogonadism.    PRESENTING ILLNESS:    Mr. Stewart is a 56 y.o. male with a history of hypogonadism.  This has been present for > 1 year.  He is on Testopel.  He is here for placement.  While on TRT, he has more energy and a stronger libido.    He also has ED.  This has been present for > 1 year.  He has been using Cialis, but it is not effective.  He's currently using levitra with good results.  However, he c/o the cost.  He would like something cheaper.    He voids well.  No hematuria.  No dysuria.    REVIEW OF SYSTEMS:    Patient denies bleeding diathesis, fever, flank pain, frequency or urgency, hematuria, stones, stress or urgency incontinence, TB or genitourinary trauma and urethral discharge.    DANIELITO  1. 2  2. 3  3. 3  4. 3  5. 5     PATIENT HISTORY:    Past Medical History:   Diagnosis Date    Degenerative disc disease     Hyperlipidemia     Hypertension     Male hypogonadism 7/19/2012       Past Surgical History:   Procedure Laterality Date    APPENDECTOMY      arthroscopic knee surg      left X 2, right X 1    INTUSSUSCEPTION REPAIR      KNEE SURGERY         Family History   Problem Relation Age of Onset    Hyperlipidemia Father     Hypertension Father     Heart disease Father     Liver disease Mother     Heart disease Brother     Heart disease Maternal Grandmother     Heart disease Paternal Grandfather        Social History     Social History    Marital status:      Spouse name: N/A    Number of children: 3    Years of education: N/A     Occupational History     [employer]     Social History Main Topics    Smoking status: Former Smoker     Quit date: 7/19/1987    Smokeless tobacco: Never Used    Alcohol use 4.2 oz/week     7 Glasses of wine per week    Drug use: No    Sexual activity: Yes     Partners: Female     Other Topics Concern    Not on file     Social History Narrative        Allergies:  Shellfish containing products    Medications:    Current Outpatient Prescriptions:     CIALIS 5 mg tablet, TK 1 T PO  D FOR ERECTILE DYSFUNCTION, Disp: , Rfl: 11    lisinopril-hydrochlorothiazide (PRINZIDE,ZESTORETIC) 20-12.5 mg per tablet, Take 1 tablet by mouth once daily, Disp: 90 tablet, Rfl: 3    rosuvastatin (CRESTOR) 10 MG tablet, Take 1 tablet (10 mg total) by mouth once daily., Disp: 30 tablet, Rfl: 11    vardenafil (LEVITRA) 20 MG tablet, Take 1 tablet (20 mg total) by mouth daily as needed for Erectile Dysfunction. Take 1 hour before intercourse., Disp: 30 tablet, Rfl: 11    zolpidem (AMBIEN) 10 mg Tab, TAKE 1 TABLET BY MOUTH EVERY DAY AT BEDTIME AS NEEDED, Disp: 30 tablet, Rfl: 3    Current Facility-Administered Medications:     testosterone Pllt 12 Pellet, 12 Pellet, Implant, 1 time in Clinic/HOD, Jaydon Morin MD    PHYSICAL EXAMINATION:    The patient generally appears in good health, is appropriately interactive, and is in no apparent distress.    Skin: No lesions.    Mental: Cooperative with normal affect.    Neuro: Grossly intact.    HEENT: Normal. No evidence of lymphadenopathy.    Heart: Regular rhythm.  No murmurs    Abdomen: BS active. Soft, non-tender. No masses or organomegaly. Bladder is not palpable. No evidence of flank discomfort. No evidence of inguinal hernia.    Genitourinary: The penis is circumcised with no evidence of plaques or induration. The urethral meatus is normal. The testes, epididymides, and cord structures are normal in size and contour bilaterally. The scrotum is normal in size and contour.    Normal anal sphincter tone. No rectal mass.    The prostate is 35 g. Normal landmarks. Lateral sulci. Median furrow intact.  No nodularity or induration. Seminal vesicles are normal.    Extremities: No clubbing, cyanosis, or edema      LABS:    Lab Results   Component Value Date    PSA 0.50 03/17/2017    PSA 0.47 07/18/2013    PSA 0.71 03/15/2013     PSADIAG 0.46 11/03/2016    PSADIAG 0.40 05/16/2016    PSADIAG 0.80 10/16/2015        IMPRESSION:    Encounter Diagnoses   Name Primary?    Male hypogonadism Yes    Erectile dysfunction due to arterial insufficiency    HTN, controlled  Hyperlipidemia, controlled    PLAN:      1. Will try generic sildenafil for his ED to see if it is cheaper. Side effects discussed.  A new Rx was given.  He will also see if Cialis is cheaper.  2. Testopel pellets were placed.  A time out was performed, and the site of testopel marked.  Verbal consent was obtained after discussing the risks and benefits of testopel.  The patient was given the chance to ask questions.  Using standard sterile technique a total of 12 testopel pellets were placed in the patients L gluteal region in the standard fashion.  He can RTC 6 months with T, psa, cbc, hepatic panel lipid panel.        Copy to:

## 2017-04-27 ENCOUNTER — TELEPHONE (OUTPATIENT)
Dept: INTERNAL MEDICINE | Facility: CLINIC | Age: 57
End: 2017-04-27

## 2017-04-27 ENCOUNTER — CLINICAL SUPPORT (OUTPATIENT)
Dept: REHABILITATION | Facility: OTHER | Age: 57
End: 2017-04-27
Attending: PHYSICAL MEDICINE & REHABILITATION
Payer: COMMERCIAL

## 2017-04-27 VITALS
HEART RATE: 66 BPM | BODY MASS INDEX: 33.56 KG/M2 | DIASTOLIC BLOOD PRESSURE: 84 MMHG | HEIGHT: 77 IN | SYSTOLIC BLOOD PRESSURE: 150 MMHG | WEIGHT: 284.19 LBS

## 2017-04-27 DIAGNOSIS — M54.50 CHRONIC RIGHT-SIDED LOW BACK PAIN WITHOUT SCIATICA: ICD-10-CM

## 2017-04-27 DIAGNOSIS — M51.36 DDD (DEGENERATIVE DISC DISEASE), LUMBAR: Primary | ICD-10-CM

## 2017-04-27 DIAGNOSIS — M47.816 SPONDYLOSIS OF LUMBAR REGION WITHOUT MYELOPATHY OR RADICULOPATHY: ICD-10-CM

## 2017-04-27 DIAGNOSIS — G89.29 CHRONIC RIGHT-SIDED LOW BACK PAIN WITHOUT SCIATICA: ICD-10-CM

## 2017-04-27 PROCEDURE — 99204 OFFICE O/P NEW MOD 45 MIN: CPT | Mod: ,,, | Performed by: PHYSICAL MEDICINE & REHABILITATION

## 2017-04-27 RX ORDER — MELOXICAM 15 MG/1
TABLET ORAL
COMMUNITY
Start: 2017-04-26 | End: 2019-02-11 | Stop reason: SDUPTHER

## 2017-04-27 NOTE — PROGRESS NOTES
Subjective:      Patient ID: Stanley Stewart is a 56 y.o. male.    Chief Complaint: No chief complaint on file.    Referred by: Kadeem Estrada MD     HPI Comments: Mr Stewart is a 57 yo male sent by Dr. Estrada for evaluation for the healthy back lumbar program.  he has had low back pain for 15 years, and he feels like it is gradually worsening.  5 years ago, he had an acute flare of left back pain that got better, but has continued to have right sided low back pain.  He is being followed by Dr. Keysha Trevino, outside neurosurgeon, regarding lumbar degenerative disk disease, particularly at L4-5, he has sacralization of L5, where there might be facet arthropathy. The pain is right low back dominant, no leg pain on right.  There is no numbness and no tingling.  The pain stays in one spot.  The pain is constant, ranging from 1-9/10 with mobic, 5-9/10 without mobic.  He is trying to stop due to GI issues.  He does not want to be on it forever.  It is worse with standing, bending, sitting, or lying on his back.  It is worse with changing position, the transition.  It is better, lying on his stomach, but never goes away.  There is no change with walking.  He feels better when he is heavier, and worse when thinner. In the past he has done chiropractor and PT with relief on left 5 years ago.  He felt INO helped in 2012 on left side.  He has discussed surgery with Dr. Trevino.  His goals are to be able to sit, sleep, and move with decreased pain.  Pattern 1    Past Medical History:  No date: Degenerative disc disease  No date: Hyperlipidemia  No date: Hypertension  7/19/2012: Male hypogonadism    Past Surgical History:  No date: APPENDECTOMY  No date: arthroscopic knee surg      Comment: left X 2, right X 1  No date: INTUSSUSCEPTION REPAIR  No date: KNEE SURGERY    Review of patient's family history indicates:    Hyperlipidemia                 Father                    Hypertension                   Father                     Heart disease                  Father                    Liver disease                  Mother                    Heart disease                  Brother                   Heart disease                  Maternal Grandmother      Heart disease                  Paternal Grandfather        Social History    Marital status:              Spouse name:                       Years of education:                 Number of children: 3             Occupational History  Occupation          Employer            Comment                                   [employer]     Social History Main Topics    Smoking status: Former Smoker                                                                Packs/day: 0.00      Years: 0.00           Quit date: 7/19/1987    Smokeless status: Never Used                        Alcohol use: Yes           4.2 oz/week       7 Glasses of wine per week    Drug use: No              Sexual activity: Yes               Partners with: Female        Current Outpatient Prescriptions:  lisinopril-hydrochlorothiazide (PRINZIDE,ZESTORETIC) 20-12.5 mg per tablet, Take 1 tablet by mouth once daily, Disp: 90 tablet, Rfl: 3  meloxicam (MOBIC) 15 MG tablet, , Disp: , Rfl:   rosuvastatin (CRESTOR) 10 MG tablet, Take 1 tablet (10 mg total) by mouth once daily., Disp: 30 tablet, Rfl: 11  sildenafil (REVATIO) 20 mg Tab, Take 5 po 1 hour before sexual activity, Disp: 50 tablet, Rfl: 11  tadalafil (CIALIS) 5 MG tablet, Take 1 tablet (5 mg total) by mouth once daily., Disp: 30 tablet, Rfl: 11  vardenafil (LEVITRA) 20 MG tablet, Take 1 tablet (20 mg total) by mouth daily as needed for Erectile Dysfunction. Take 1 hour before intercourse., Disp: 30 tablet, Rfl: 11  zolpidem (AMBIEN) 10 mg Tab, TAKE 1 TABLET BY MOUTH EVERY DAY AT BEDTIME AS NEEDED, Disp: 30 tablet, Rfl: 3    Current Facility-Administered Medications:  (START ON 10/1/2017) testosterone Pllt 12 Pellet, 12 Pellet, Implant, 1 time in Clinic/HOD, Jaydon L.  MD Mikel        Review of patient's allergies indicates:   -- Shellfish containing products     --  Other reaction(s): Hives          Review of Systems   Constitution: Negative for weight gain and weight loss.   Cardiovascular: Negative for chest pain.   Respiratory: Negative for shortness of breath.    Musculoskeletal: Positive for back pain. Negative for joint pain and joint swelling.   Gastrointestinal: Negative for abdominal pain and bowel incontinence.   Genitourinary: Negative for bladder incontinence.   Neurological: Negative for numbness.           Objective:          General    Vitals reviewed.  Constitutional: He is oriented to person, place, and time. He appears well-developed and well-nourished.   HENT:   Head: Normocephalic and atraumatic.   Pulmonary/Chest: Effort normal.   Neurological: He is alert and oriented to person, place, and time.   Psychiatric: He has a normal mood and affect. His behavior is normal. Judgment and thought content normal.     General Musculoskeletal Exam   Gait: normal     Right Ankle/Foot Exam     Tests   Heel Walk: able to perform  Tiptoe Walk: able to perform    Left Ankle/Foot Exam     Tests   Heel Walk: able to perform  Tiptoe Walk: able to perform  Back (L-Spine & T-Spine) / Neck (C-Spine) Exam     Tenderness Right paramedian tenderness of the Lower L-Spine and Sacrum.     Back (L-Spine & T-Spine) Range of Motion   Extension: 20   Flexion: 70 (with pain)   Lateral Bend Right: 20   Lateral Bend Left: 20   Rotation Right: 40   Rotation Left: 40     Spinal Sensation   Right Side Sensation  C-Spine Level: normal   L-Spine Level: normal  S-Spine Level: normal  Left Side Sensation  C-Spine Level: normal  L-Spine Level: normal  S-Spine Level: normal    Back (L-Spine & T-Spine) Tests   Right Side Tests  Straight leg raise:      Sitting SLR: > 70 degrees      Left Side Tests  Straight leg raise:     Sitting SLR: > 70 degrees          Other He has no scoliosis .  Spinal  Kyphosis:  Absent    Comments:  Pos leatha on right with right back pain.        Muscle Strength   Right Upper Extremity   Biceps: 5/5/5   Deltoid:  5/5  Triceps:  5/5  Wrist Extension: 5/5/5   Finger Flexors:  5/5  Left Upper Extremity  Biceps: 5/5/5   Deltoid:  5/5  Triceps:  5/5  Wrist Extension: 5/5/5   Finger Flexors:  5/5  Right Lower Extremity   Hip Flexion: 5/5   Quadriceps:  5/5   Anterior tibial:  5/5/5  EHL:  5/5  Left Lower Extremity   Hip Flexion: 5/5   Quadriceps:  5/5   Anterior tibial:  5/5/5   EHL:  5/5    Reflexes     Left Side  Biceps:  2+  Triceps:  2+  Brachioradialis:  2+  Quadriceps:  2+  Achilles:  2+  Left Dickson's Sign:  Absent  Babinski Sign:  absent    Right Side   Biceps:  2+  Triceps:  2+  Brachioradialis:  2+  Quadriceps:  2+  Achilles:  2+  Right Dickson's Sign:  absent  Babinski Sign:  absent    Vascular Exam     Right Pulses        Carotid:                  2+    Left Pulses        Carotid:                  2+        Assessment:       Encounter Diagnoses   Name Primary?    DDD (degenerative disc disease), lumbar Yes    Spondylosis of lumbar region without myelopathy or radiculopathy     Chronic right-sided low back pain without sciatica          Plan:       Diagnoses and all orders for this visit:    DDD (degenerative disc disease), lumbar  -     Ambulatory Referral to Physical/Occupational Therapy    Spondylosis of lumbar region without myelopathy or radiculopathy  -     Ambulatory Referral to Physical/Occupational Therapy    Chronic right-sided low back pain without sciatica  -     Ambulatory Referral to Physical/Occupational Therapy       The patient has had a history of low back pain with limitations in there activities of Daily living    Previous treatment has not provided relief.    The situation was discussed at length with the patient.  More than 50% of the total time of 45 minutes was spent in counseling.  We discussed different causes of back pain and different  treatment options.  We discussed the importance of stretching and strengthening.  We discussed posture.  We discussed the pros and cons of further diagnostic testing, alternative treatment and Medications    Based on the history, physical exam, and functional index, an active physical therapy program is recommended.  The goal is to restore the patients function and reduce pain.  A program of progressive resistance exercises, biomechanical, and mobility maneuvers, instructions in proper body mechanics, aerobic conditioning and HEP will be utilized. The program will continue as long as making improvements.    An assessment of patients progress will be made at each visit to document change in status.    The patient will be actively involved in there own treatment, and responsible for appointments and home program    The patient's lumbar isometric strength will be tested and they will be placed in a program of isolated strength training based on 50% of their total functional torque and advanced as clinically appropriate.      Directional preference of pain will further influence the patients active rehabilitation program    The patient was instructed there might be an initial increase in discomfort    They are enrolled with good prognosis  Pattern 1

## 2017-04-27 NOTE — PROGRESS NOTES
Health  met with patient to complete initial outcomes for the Healthy Back lumbar program.  Questions were reviewed with patient and discussed with Dr. Ramirez. The patient will meet with Dr. Ramirez to determine program enrollment.   HealthyBack Questionnaire  4/26/2017   Please select the location of your worst pain:  Low back   Please select the location of any additional pain: (hold down the control key, and click to select multiple responses) Low back   Did any event trigger your pain?  No   If yes, please describe:  DDD at L5 and S1   How long has this pain been going on?  15+ years   Please indicate how the pain is changing.  Worsening   What is the WORST level of pain that you have experienced in the last week?  9   What is the LEAST level of pain that you have experienced in the past week?  1 - with mobic   5 without mobic   If you have any comments about your pain level, please provide below:  Comstant, nagging, worse after sitting on hard benches, etc., depressing   What can you NOT do not that you used to be able to do?  Free movements, of ease of transition of sitting, standing, laying on my back. I travel a lot and plane rides are horrible   Are your limitations mainly due to your pain?  Yes   What are your additional complaints, if any? Weakness   Is there ever a time during the day when your pain stops, even for a brief moment, before returning? Yes   If yes, when your pain stops, does it disappear completely? Is it totally gone? No   Does bending forward make your typical pain worse? Yes   Morning: Worse during   Afternoon: Better during   Evening:  Worse during   Nighttime: Worse during   Standing:  Worse   Lying on stomach: Better   Lying on back: Worse   Sitting:  Worse   Walking: Same   Climbing stairs: Same   In the last seven days, have you had any changes in your bowel and/or bladder habits? No   Have all of your attempts to treat your back/leg pain resulted in failure?  Yes   Do you  "believe your doctor(s) do NOT understand how much pain you have?  No   Do you believe that you have one or more conditions that have not been diagnosed by your doctor(s)?  No   Do you believe that you deserve more understanding from others including your family than you are getting?  No   Do you feel relatively calm about how your back/leg pain has impacted your life?  No   Are you OK with treatment taking a very long time (even years) before you feel relief from your back/leg pain?  Yes   Do you believe that your pain has caused you to be more forgetful?  No   Do you feel that you have not received enough treatment for your pain?  Yes   Do you believe that your doctor(s) do not have the right training to treat your back/leg pain effectively?  No   Do you believe you should not be allowed to work or attend school because of your back/leg pain?  No   When did this pain begin?  10 years ago   Did the pain begin after an injury or an accident? No   Is the pain work related?  No   Please gerri which of the following over-the-counter medicines you take. (hold down the control key, and click to select multiple responses) Ibuprofen   If you chose "other," please specify:  Mobic via prescription   Please gerri which of the following prescription medicines you take. (hold down the control key, and click to select multiple responses) NSAIDS   Emergency department  No   Health care providers (hold down the control key, and click to select multiple responses) Neurosurgeon, Chiropractor   Investigations done (hold down the control key, and click to select multiple responses) MRI   Procedures (hold down the control key, and click to select multiple responses) Epidural steroid injections (INO)   Emergency department  No   Health care providers (hold down the control key, and click to select multiple responses) Neurosurgeon   Investigations done (hold down the control key, and click to select multiple responses) MRI   Procedures " (hold down the control key, and click to select multiple responses) Epidural steroid injections (INO), Chiropractor   Have you had any surgery on your back?  No   Please gerri what you are experiencing. (hold down the control key, and click to select multiple responses) None   First activity you would like to perform better:  Sleeping   Current ability score to perform first activity: 4   Second activity you would like to perform better: Sitting   Current ability score to perform second activity: 2   Third activity you would like to perform better: General movement   Current ability score to perform third activity: 3   Pain intensity:  The pain is severe and does not vary much.   Personal care (washing, dressing, etc.):  I do not normally change my way of washing or dressing even though it causes some pain.   Lifting: Pain prevents me from lifting heavy weights off the floor, but I can manage if conveniently positioned, e.g., a table.   Walking: I have some pain walking, but it does not increase with distance.   Sitting: Pain prevents me from sitting more than one hour.   Standing: I have some pain while standing, but it does not increase with time.   Sleeping: Because of my pain, my normal night sleep is reduced by less than quarter.   Social life: Pain has no significant effect on my social life apart from limiting my more eregetic interests, e.g., dancing.   Traveling: I get extra pain while traveling, but it does not compel me to seek alternative forms of travel.   Changing degree of pain: My pain is neither getting better nor worse.   Do you need any help looking after yourself? I need no help at all.   When doing household tasks, e.g., preparing food, gardening, using the video recorder, radio, telephone, or washing the car: I need no help at all.   Thinking about how easily you can get around your home and community: I get around my home and community by myself without any difficulty.   Because of your health,  your relationships, e.g., your friends, partner or parents, generally: Are very close and warm   Thinking about your relationships with other people: I have plenty of friends and am never lonely.   Thinking about your health and my relationship with my family:  My role in the family is unaffected by my health.   Thinking about your vision, including when using your glasses or contact lenses if needed: I see normally.   Thinking about your hearing, including using your hearing aid if needed: I hear normally.   When you communicate with others, e.g., talking, listening, writing, or signing: I have no trouble speaking to them or understanding what they are saying.   Thinking about how you sleep: My sleep is interrupted most nights, but I am usually able to go back to sleep without difficulty.   Thinking about how you generally feel: I do not feel anxious, worried or depressed.   How much pain or discomfort do you experience? I have moderate pain.   Little interest or pleasure in doing things Not at all   Feeling down, depressed or hopeless Not at all   What is your occupation?     How do you spend your leisure time? What are your hobbies? Walking, TV, visiting family, cooking   What is your highest level of education? Advanced/graduate   What is your work status? Employed   How did you hear about the Healthyback program?  Physician   When did this pain begin?  10 years ago

## 2017-04-28 ENCOUNTER — TELEPHONE (OUTPATIENT)
Dept: ENDOSCOPY | Facility: HOSPITAL | Age: 57
End: 2017-04-28

## 2017-05-02 ENCOUNTER — TELEPHONE (OUTPATIENT)
Dept: GASTROENTEROLOGY | Facility: CLINIC | Age: 57
End: 2017-05-02

## 2017-05-02 NOTE — TELEPHONE ENCOUNTER
----- Message from Ramirez Trevino MD sent at 5/1/2017  1:10 PM CDT -----  Please notify patient, the stomach biopsies did not reveal any H.pylori.

## 2017-05-03 ENCOUNTER — TELEPHONE (OUTPATIENT)
Dept: GASTROENTEROLOGY | Facility: CLINIC | Age: 57
End: 2017-05-03

## 2017-05-03 NOTE — TELEPHONE ENCOUNTER
----- Message from Leelee Marx sent at 5/2/2017  3:19 PM CDT -----  Contact: self - [phone]  Uriel - returning  Your call re test results - please call patient at

## 2017-05-04 ENCOUNTER — OFFICE VISIT (OUTPATIENT)
Dept: ALLERGY | Facility: CLINIC | Age: 57
End: 2017-05-04
Attending: ALLERGY & IMMUNOLOGY
Payer: COMMERCIAL

## 2017-05-04 VITALS
TEMPERATURE: 98 F | DIASTOLIC BLOOD PRESSURE: 88 MMHG | SYSTOLIC BLOOD PRESSURE: 138 MMHG | BODY MASS INDEX: 34.23 KG/M2 | WEIGHT: 281.06 LBS | HEIGHT: 76 IN | RESPIRATION RATE: 20 BRPM

## 2017-05-04 DIAGNOSIS — Z91.018 FOOD ALLERGY: ICD-10-CM

## 2017-05-04 DIAGNOSIS — H10.403 CHRONIC CONJUNCTIVITIS OF BOTH EYES, UNSPECIFIED CHRONIC CONJUNCTIVITIS TYPE: ICD-10-CM

## 2017-05-04 DIAGNOSIS — D72.10 EOSINOPHILIA: ICD-10-CM

## 2017-05-04 DIAGNOSIS — E78.2 MIXED HYPERLIPIDEMIA: ICD-10-CM

## 2017-05-04 DIAGNOSIS — J31.0 CHRONIC RHINITIS: Primary | ICD-10-CM

## 2017-05-04 DIAGNOSIS — I10 ESSENTIAL HYPERTENSION: ICD-10-CM

## 2017-05-04 PROCEDURE — 99999 PR PBB SHADOW E&M-EST. PATIENT-LVL III: CPT | Mod: PBBFAC,,, | Performed by: ALLERGY & IMMUNOLOGY

## 2017-05-04 PROCEDURE — 99244 OFF/OP CNSLTJ NEW/EST MOD 40: CPT | Mod: S$GLB,,, | Performed by: ALLERGY & IMMUNOLOGY

## 2017-05-04 NOTE — LETTER
May 4, 2017      Kadeem Estrada MD  [address]  [facility] [address]           [facility] - Allergy/ Immunology  [address]  [facility] [address]  Phone: [phone]  Fax: [phone]          Patient: Stanley Stewart   MR Number: [id number]   YOB: 1960   Date of Visit: 5/4/2017       Dear Dr. Kadeem Estrada:    Thank you for referring Stanley Stewart to me for evaluation. Attached you will find relevant portions of my assessment and plan of care.    If you have questions, please do not hesitate to call me. I look forward to following Stanley Stewart along with you.    Sincerely,    ISHA Nick III, MD    Enclosure  CC:  No Recipients    If you would like to receive this communication electronically, please contact [email or web address] or [phone] to request more information on [facility] access.    For providers and/or their staff who would like to refer a patient to [facility], please contact us through our one-stop-shop provider referral line, [facility], at [phone].    If you feel you have received this communication in error or would no longer like to receive these types of communications, please e-mail [email or web address]

## 2017-05-04 NOTE — PROGRESS NOTES
Stanley Stewart is referred by Dr. Kadeem Estrada for a consult regarding chronic rhinitis and eosinophilia. He is here alone.    He has had mild recurrent chronic rhinitis and conjunctivitis with pruritus of the nose, eyes, ears, and throat, eye tearing and redness, sneezing, clear rhinorrhea, and postnasal drip off and on for many years. The symptoms are worse in spring. He takes OTC antihistamines and maybe decongestant with improvement. He does not take any topical nasal steroids.    He denies any cough, wheezing, or shortness of breath. He denies any history of asthma.    When he was living in [place], [place] he ate crab in 2007. He developed swelling of his tongue. He has not had since. He also had swelling of the face taking glucosamine and discontinued this.    He can eat crawfish, lobster, and shrimp without any difficulty.    He has had a persistent mild eosinophilia noted on his CBC.    He has recently had some dysphagia and had an EGD done on April 21, 2017. He has had 2 friends that have developed esophageal cancer recently. The pathology was normal without any eosinophilia. He denies any reflux.    He has hypertension and takes lisinopril-hydrochlorothiazide. He thinks he started that on first moving back to [place]. When he was living in [place] he was taking Diovan. He was not on ACE inhibitor.    He takes Crestor for hyperlipidemia.    He has a congenital fusion of L5-S1. He has recurrent back pain and takes Mobic as needed. He is completed the Healthy Back program.    [facility] PEQ ALLERGY QUESTIONNAIRE LONG 5/3/2017   Do you have symptoms in your head, eyes, ears, nose, or sinuses? Yes   Head or facial pain: No symptoms   Eyes: Itching, Tearing, Redness   Do you have difficulty wearing contacts, if applicable?  No   Which kind of eye drops do you use, if applicable? Visine   Ears: No symptoms   Nose: Post nasal drip, Sneezing, Runny nose, Snoring   If you had a blocked nose, was it blocked on  both sides equally? Yes   Throat: No symptoms   Sinuses: No symptoms   Do you have symptoms in your lungs?  No   Lungs: No symptoms   Was your last chest x-ray normal or abnormal, if applicable? Normal   Have you ever has a tuberculosis skin test?  Yes   When did you have a tuberculosis skin test, if applicable? 2010   Was your tuberculosis skin test positive or negative, if applicable? Negative   Have you ever had a lung-function test? No   Have you had a flu shot this year? No   Have you had the pneumonia vaccine?  No   Do you have any known problems with your immune system? No   Do you suspect you may have problems with your immune system? No   Do you have frequent infections? No   Do you have skin symptoms? No   Skin: No symptoms   Body location most commonly affected by swelling: Tongue   Are you taking this medication regularly? No   Have you associated the hives or swelling with any of the following? Not applicable   Have you had any other associated symptoms with the hives or swelling such as: Not applicable   When did these symptoms first occur? In the last few years himanshu noticed i do get scratchy itchy eyes and post nasal drip around high pollen season. Dr Estrada also noted an elevated white blood cell sub set on my March 30 2017 blood work   Are they getting worse or better? Better   How often do these symptoms occur? Seasonal   When do these symptoms occur? Pollen season   Do they occur year round? No   If there is any seasonal variation in your symptoms, when are they worse? No   Is there a particular time of the day or night when the symptoms are worse? No   Is there anything you have identified, which can cause symptoms or make them worse? (such as dust, grass, plant or animal products, mold, heat, cold, strong odors, exercise) Over the counter meds   Is there anything you have identified, which can make symptoms better?  No   What medications have you tried in the past to help control these symptoms?   Over the counter meds   Please list all the vitamins or herbal medications you are taking. One daily mens over 50 one-a-day   Please list all the other medications you are taking, including over-the-counter medications. Lisinpril. Cholestoral. Mobic.    Have you ever seen an allergist for these symptoms? No   Have you ever had skin tests? No   Have you ever had any other type of allergy testing? No   Have you ever had allergy shots? No   Do you have food allergies? Yes   Please list the food(s), type of reaction(s) and last date of reaction(s) Crabs can sometimes cause a swelling (uncomfortable) of the sides of my tongue towards the back of my mouth. This does not happen with any other shell fish (shrimp, lobster, etc)   Do you have drug allergies? Yes   Please list the drug(s), type of reaction(s), last date of reaction(s), and if you have used the medication since discovering you are allergic.  I always list shell fish as a suspected allergy. A few years ago i took a glucocimine supplement following knee orthoscopic surgery. The reaction of swelling tongue was significnat and  gave me steroid shoots which help immediatlely. Yr 2008    Do you have insect allergies? No   Do you have latex allergies? No   Constitution: No symptoms   Cardiovascular: No symptoms   Gastrointestinal: Abdominal bloating   Genital/ urinary No symptoms   Musculoskeletal: No symptoms   Endocrine: No symptoms   Hematologic: No symptoms   Please note which family members have allergies or asthma and specify which they have. Mother - penicillian. Maternal grandmother iodine. Unknown other family members   How long have you lived at your current address? 5 years   Has your residence ever had water or flood damage? No   Is there any evidence of mold in the house? No   Does your house have: Central air conditioning, Gas heat   Does your bedroom have: Ceiling fan   What type of pillow do you have, for example feather, foam and fiberfill?  Foam    Do you have pets? No   Does anyone in the house smoke? No   What is your occupation?    Did you find this questionnaire helpful in addressing your symptoms?  Yes     Physical Examination:  General: Well-developed, well-nourished, no acute distress.  Head: No sinus tenderness.  Eyes: Conjunctivae:  No bulbar or palpebral conjunctival injection.  Ears: EAC's clear.  TM's clear.  No pre-auricular nodes.  Nose: Nasal Mucosa:  Pink.  Septum: No apparent deviation.  Turbinates:  No significant edema.  Polyps/Mass:  None visible.  Teeth/Gums:  No bleeding noted.  Oropharynx: No exudates.  Neck: Supple without thyromegaly. No cervical lymphadenopathy.    Respiratory/Chest: Effort: Good.  Auscultation:  Clear bilaterally.  Cardiovascular:  No murmur, rubs, or gallop heard.   GI:  Non-tender.  No masses.  No organomegaly.  Extremities:  No swelling.  No cyanosis, clubbing, or edema.  Skin: Good turgor.  No urticaria or angioedema.  Neuro/Psych: Oriented x 3.    Laboratory 3/17/2017:  CBC: WBC 5520 with 11.8% eosinophils.    Assessment:  1. Chronic rhinitis, consider allergic.  2. Chronic conjunctivitis, consider allergic.  3. Consider food allergy.  4. Eosinophilia probably secondary to atopic status.  5. Hypertension on lisinopril-hydrochlorothiazide.  6. Hyperlipidemia on Crestor.  7. Congenital fusion L5-S1.    Recommendations:  1. Laboratory as ordered.  2. Consider skin testing off antihistamines if needed.  3. OTC antihistamines as needed.  4. Return to clinic in 3 weeks.

## 2017-05-05 ENCOUNTER — PATIENT MESSAGE (OUTPATIENT)
Dept: INTERNAL MEDICINE | Facility: CLINIC | Age: 57
End: 2017-05-05

## 2017-05-08 RX ORDER — ZOLPIDEM TARTRATE 10 MG/1
TABLET ORAL
Qty: 30 TABLET | Refills: 3 | Status: SHIPPED | OUTPATIENT
Start: 2017-05-08 | End: 2017-08-10 | Stop reason: SDUPTHER

## 2017-05-17 ENCOUNTER — CLINICAL SUPPORT (OUTPATIENT)
Dept: REHABILITATION | Facility: OTHER | Age: 57
End: 2017-05-17
Attending: PHYSICAL MEDICINE & REHABILITATION
Payer: COMMERCIAL

## 2017-05-17 DIAGNOSIS — M51.36 DDD (DEGENERATIVE DISC DISEASE), LUMBAR: Primary | ICD-10-CM

## 2017-05-17 PROCEDURE — 97110 THERAPEUTIC EXERCISES: CPT | Performed by: PHYSICAL MEDICINE & REHABILITATION

## 2017-05-17 PROCEDURE — G8979 MOBILITY GOAL STATUS: HCPCS | Mod: CJ | Performed by: PHYSICAL MEDICINE & REHABILITATION

## 2017-05-17 PROCEDURE — G8978 MOBILITY CURRENT STATUS: HCPCS | Mod: CK | Performed by: PHYSICAL MEDICINE & REHABILITATION

## 2017-05-17 PROCEDURE — 97162 PT EVAL MOD COMPLEX 30 MIN: CPT | Performed by: PHYSICAL MEDICINE & REHABILITATION

## 2017-05-17 NOTE — PROGRESS NOTES
[facility] - PHYSICAL THERAPY EVALUATION     Name: Stanley Stewart  Clinic Number: [id number]    Stanley is a 57 y.o. male evaluated on 05/17/2017.   Time In: 12:30 pm  Time out: 2:00 pm    Diagnosis:   Encounter Diagnosis   Name Primary?    DDD (degenerative disc disease), lumbar Yes     Physician: Acacia Ramirez, *  Treatment Orders: PT Eval and Treat    Past Medical History:   Diagnosis Date    Allergy     Degenerative disc disease     Hyperlipidemia     Hypertension     Male hypogonadism 7/19/2012     Current Outpatient Prescriptions   Medication Sig    lisinopril-hydrochlorothiazide (PRINZIDE,ZESTORETIC) 20-12.5 mg per tablet Take 1 tablet by mouth once daily    meloxicam (MOBIC) 15 MG tablet     rosuvastatin (CRESTOR) 10 MG tablet Take 1 tablet (10 mg total) by mouth once daily.    sildenafil (REVATIO) 20 mg Tab Take 5 po 1 hour before sexual activity    tadalafil (CIALIS) 5 MG tablet Take 1 tablet (5 mg total) by mouth once daily.    vardenafil (LEVITRA) 20 MG tablet Take 1 tablet (20 mg total) by mouth daily as needed for Erectile Dysfunction. Take 1 hour before intercourse.    zolpidem (AMBIEN) 10 mg Tab TAKE 1 TABLET BY MOUTH EVERY DAY AT BEDTIME AS NEEDED     Current Facility-Administered Medications   Medication    [START ON 10/1/2017] testosterone Pllt 12 Pellet     Review of patient's allergies indicates:   Allergen Reactions    Shellfish containing products      Other reaction(s): Hives     Precautions: Blood pressure/syncope     Visit # authorized: 20  Authorization period: 12/31/17  Plan of care Expiration:  Sent 5/17/17  POC due     Eval 5/17/17  Assessment due: 6/17/17      HISTORY   History of Present Illness: Mr Stewart is a 57 yo male sent by Dr. Estrada for evaluation for the healthy back lumbar program. he has had low back pain for 15 years, and he feels like it is gradually worsening. 5 years ago, he had an acute flare of left back pain that got better, but has  continued to have right sided low back pain. He is being followed by Dr. Keysha Trevino, outside neurosurgeon, regarding lumbar degenerative disk disease, particularly at L4-5, he has sacralization of L5, where there might be facet arthropathy. The pain is right low back dominant, no leg pain on right. There is no numbness and no tingling. The pain stays in one spot. The pain is constant, ranging from 1-9/10 with mobic, 5-9/10 without mobic. He is trying to stop due to GI issues. He does not want to be on it forever. It is worse with standing, bending, sitting, or lying on his back. It is worse with changing position, the transition. It is better, lying on his stomach, but never goes away. There is no change with walking. He feels better when he is heavier, and worse when thinner. In the past he has done chiropractor and PT with relief on left 5 years ago. He felt INO helped in 2012 on left side. He has discussed surgery with Dr. Trevino. His goals are to be able to sit, sleep, and move with decreased pain. Pattern 1        Diagnostic Tests: From EPIC no xrays     Pain Scale: Stanley rates pain on a scale of 0-10 to be 9 at worst; 1 currently; 1 at best using VAS.   Pain location: right back    Aggravating factors: . It is worse with standing, bending, sitting, or lying on his back.  It is worse with changing position, the transition.  Yard work.  Sitting in planes.  Easing Factors: . It is better, lying on his stomach, but never goes away.  Disturbed Sleep: no    Pattern of pain questions:  1.  Where is your pain the worst? back  2.  Is your pain constant or intermittent?  constant  3.  Does bending forward make your typical pain worse? yes  4.  Since the start of your back pain, has there been a change in your bowel or bladder? no  5.  What can't you do now that you use to be able to do? 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, plane travel  Leisure: walk 2 miles a day                  Pts goals:  Reduce pain    Red Flag Screening:   Cough  Sneeze  Strain: neg  Bladder/ bowel: neg  Falls: no  General health: good  Night pain: no  Unexplained weight loss: no    OBJECTIVE     POSTURE  Posture Alignment :forward head  Postural examination/scapula alignment: Rounded shoulder and Head forward  Joint integrity: Firm end feeling  Skin integrity: good  Edema: neg  Sitting: poor posture  Correction of posture: better with lumbar roll    MOVEMENT LOSS    ROM Loss   Flexion minimal loss   Extension moderate loss   Side bending Right minimal loss   Side bending Left minimal loss   Rotation Right minimal loss   Rotation Left minimal loss     Lower Extremity Strength  Right LE  Left LE    Hip flexion: 5/5 Hip flexion: 5/5   Hip extension:  5/5 Hip extension: 5/5   Hip abduction: 5/5 Hip abduction: 5/5   Hip adduction:  5/5 Hip adduction:  5/5   Knee Flexion 5/5 Knee Flexion 5/5   Knee Extension 5/5 Knee Extension 5/5   Ankle dorsiflexion: 5/5 Ankle dorsiflexion: 5/5   Ankle plantarflexion: 5/5 Ankle plantarflexion: 5/5       GAIT:  Assistive Device used: no assistive device  Level of Assistance: independent    Special Tests:   Test Name  Test Result   Prone Instability Test (--)   SI Joint Provocation Test (--)   Straight Leg Raise (--)   Neural Tension Test (--)   Crossed Straight Leg Raise (--)   Walking on toes (--)   Walking on heels  (--)       NEUROLOGICAL SCREENING     Sensory deficit: intact including saddle    Reflexes:    Left Right   Patella Tendon 1+ 1+   Achilles Tendon 1+ 1+   Babinski  (--) (--)   Clonus (--) (--)     REPEATED TEST MOVEMENTS:  Repeated Flexion in Standing pain during motion  no worse   Repeated Extension in Standing end range pain  no worse or possibly better   Repeated Flexion in lying end range pain  pain  during motion  no worse   Repeated Extension in lying  end range pain  no effect or possibly better           Baseline Isometric Testing on Med X equipment: Testing administered by PT       [facility] 2017   Visit Number 1   VAS Pain Rating 4   Extension in Lying 10   Extension in Standing 10   Lumbar Extension Seat Pad 0   Femur Restraint 7   Top Dead Center 24   Counterweight 408   Lumbar Flexion 30   Lumbar Extension 0   Lumbar Peak Torque 122   Ice - Z Lie (in min.) 10       Baseline IM Testing Results:   Date of testin17  ROM 0-30   Max Peak Torque 122 ft lbs   Min Peak Torque 28  ft lbs    Flex/Ext Ratio 4.4/1   % below normative data -83%          FOTO: Focus on Therapeutic Outcomes   Category: lumbar   % Impaired: 51 %  Current Score  = CK = at least 40% but < 60% impaired, limited or restricted  Goal at Discharge Score = CJ = at least 20% but < 40% impaired, limited or restricted    Score interpretation is as follows:     TEST SCORE  Modifier  Impairment Limitation Restriction    0/50  CH  0 % impaired, limited or restricted   1-9/50  CI  @ least 1% but less than 20% impaired, limited or restricted   10-19/50  CJ  @ least 20%<40% impaired, limited or restricted   20-29/50  CK  @ least 40%<60% impaired, limited or restricted   30-39/50  CL  @ least 60% <80% impaired, limited or restricted   40-49/50  CM  @ least 80%<100% impaired limited or restricted   50/50  CN  100% impaired, limited or restricted       Treatment   Time In: 1:30 pm  Time Out: 2:00 pm    PT Evaluation Completed? Yes  Discussed Plan of Care with patient: Yes      Written Home Exercises Provided:   Handouts were given to the patient. Pt demo good understanding of the education provided. Stanley demonstrated good return demonstration of activities.     - Patient received education regarding proper posture and body mechanics.    - Ginny roll tried, recommended, and purchase information was provided.    - Patient received  a handout regarding anticipated muscular soreness following the isometric test and strategies for management were reviewed with patient including stretching, using ice and scheduled rest.     HEP  -ext in standing 10 reps 3/day  -ext in lie 2/day 10 reps  -z lie  -continue walking 2 miles/day  -top 10 back care tips handout given    Pt was instructed in and performed the following:   Cardiovascular exercise and therapeutic exercise to improve posture, lumbar/cervical ROM, strength, and muscular endurance as follows:     Stanley received therapeutic exercises to develop/improve posture, lumbar/cervical ROM, strength and muscular endurance for 30 minutes including the following exercises:     Assessment   This is a 57 y.o. male referred to [facility] and presents with a medical diagnosis of   Encounter Diagnosis   Name Primary?    DDD (degenerative disc disease), lumbar Yes    and demonstrates limitations as described below in the problem list. Pt rehab potential is Excellent. Pt presents with low back pain, reduced back movement, poor back strength, poor ergonomics, reduced ability to function comfortably and hesitant to participate in activities    Pain Pattern: 1 PEP, trial ext in standing and lie started -note effect       Patient received education on the Healthy [facility] program, purpose of the isometric test, progression of back strengthening as well as wellness approach and systemic strengthening.  Details of the program were discussed.  Reviewed that patient should feel support/pressure from med ex restraints but no pain or discomfort and patient expressed understanding.    Based on the above history and physical examination an active physical therapy program is recommended.  Pt will continue to benefit from skilled outpatient physical therapy to address the deficits listed below in the chart, provide pt/family education and to maximize pt's level of independence in the home and community environment. .      No environmental, cultural, spiritual, developmental or education needs expressed or noted    Medical necessity is demonstrated by the following problem list.    Pt presents with the following impairments:   History  Co-morbidities and personal factors that may impact the plan of care Examination  Body Structures and Functions, activity limitations and participation restrictions that may impact the plan of care Clinical Presentation   Decision Making/ Complexity Score   Co-morbidities:   Prior knee sx        Recurring back pain   lifestyle Body Regions:   back  lower extremities    Body Systems:   ROM  strength  gross coordinated movement  motor control  motor learning    Activity limitations:   Learning and applying knowledge  no deficits    General Tasks and Commands  no deficits    Communication  no deficits    Mobility  using transportation (bus, train, plane, car)    Self care  caring for body parts (brushing teeth, shaving, grooming)    Domestic Life  doing house work (cleaning house, washing dishes, laundry)    Interactions/Relationships  no deficits    Life Areas  no deficits    Community and Social Life  no deficits    Participation Restrictions:   travel     evolving clinical presentation with changing clinical characteristics   mod         GOALS: Pt is in agreement with the following goals.    Short term goals:  6 weeks or 10 visits   1.  Pt will demonstrate increased lumbar ROM by at least 3 degrees from the initial ROM value with improvements noted in functional ROM and ability to perform ADLs  2.  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  Patient report a reduction in worst pain score by 1-2 points for improved tolerance during work and recreational activities  4.  Pt able to perform HEP correctly with minimal cueing or supervision for therapist      Long term goals: 13 weeks or 20 visits   1. Pt will demonstrate  "increased lumbar ROM by at least 12 degrees from initial ROM value, resulting in improved ability to perform functional fwd bending while standing and sitting.   2. Pt will demonstrate increased maximum isometric torque value by 40% when compared to the initial value resulting in improved ability to perform bending, lifting, and carrying activities safely, confidently.  3. Pt to demonstrate ability to independently control and reduce their pain through posture positioning and mechanical movements throughout a typical day.  4.  Patient will demonstrate improved overall function per FOTO Survey to CJ = at least 20% but < 40% impaired, limited or restricted score or less.      Plan   Outpatient physical therapy 2x week for 13 weeks or 20 visits to include the following:   - Patient education  - Therapeutic exercise  - Manual therapy  - Performance testing   - Neuromuscular Re-education  - Therapeutic activity   - Modalities    Pt may be seen by PTA as part of the rehabilitation team.     Therapist: Abby Dunn, PT  5/17/2017    "I certify the need for these services furnished under this plan of treatment and while under my care."    ____________________________________  Physician/Referring Practitioner    _______________  Date of Signature        "

## 2017-05-17 NOTE — PLAN OF CARE
[facility] - PHYSICAL THERAPY EVALUATION     Name: Stanley Stewart  Clinic Number: [id number]    Stanley is a 57 y.o. male evaluated on 05/17/2017.   Time In: 12:30 pm  Time out: 2:00 pm    Diagnosis:   Encounter Diagnosis   Name Primary?    DDD (degenerative disc disease), lumbar Yes     Physician: Acacia Ramirez, *  Treatment Orders: PT Eval and Treat    Past Medical History:   Diagnosis Date    Allergy     Degenerative disc disease     Hyperlipidemia     Hypertension     Male hypogonadism 7/19/2012     Current Outpatient Prescriptions   Medication Sig    lisinopril-hydrochlorothiazide (PRINZIDE,ZESTORETIC) 20-12.5 mg per tablet Take 1 tablet by mouth once daily    meloxicam (MOBIC) 15 MG tablet     rosuvastatin (CRESTOR) 10 MG tablet Take 1 tablet (10 mg total) by mouth once daily.    sildenafil (REVATIO) 20 mg Tab Take 5 po 1 hour before sexual activity    tadalafil (CIALIS) 5 MG tablet Take 1 tablet (5 mg total) by mouth once daily.    vardenafil (LEVITRA) 20 MG tablet Take 1 tablet (20 mg total) by mouth daily as needed for Erectile Dysfunction. Take 1 hour before intercourse.    zolpidem (AMBIEN) 10 mg Tab TAKE 1 TABLET BY MOUTH EVERY DAY AT BEDTIME AS NEEDED     Current Facility-Administered Medications   Medication    [START ON 10/1/2017] testosterone Pllt 12 Pellet     Review of patient's allergies indicates:   Allergen Reactions    Shellfish containing products      Other reaction(s): Hives     Precautions: Blood pressure/syncope     Visit # authorized: 20  Authorization period: 12/31/17  Plan of care Expiration:  Sent 5/17/17  POC due     Eval 5/17/17  Assessment due: 6/17/17      HISTORY   History of Present Illness: Mr Stewart is a 55 yo male sent by Dr. Estrada for evaluation for the healthy back lumbar program. he has had low back pain for 15 years, and he feels like it is gradually worsening. 5 years ago, he had an acute flare of left back pain that got better, but has  continued to have right sided low back pain. He is being followed by Dr. Keysha Trevino, outside neurosurgeon, regarding lumbar degenerative disk disease, particularly at L4-5, he has sacralization of L5, where there might be facet arthropathy. The pain is right low back dominant, no leg pain on right. There is no numbness and no tingling. The pain stays in one spot. The pain is constant, ranging from 1-9/10 with mobic, 5-9/10 without mobic. He is trying to stop due to GI issues. He does not want to be on it forever. It is worse with standing, bending, sitting, or lying on his back. It is worse with changing position, the transition. It is better, lying on his stomach, but never goes away. There is no change with walking. He feels better when he is heavier, and worse when thinner. In the past he has done chiropractor and PT with relief on left 5 years ago. He felt INO helped in 2012 on left side. He has discussed surgery with Dr. Trevino. His goals are to be able to sit, sleep, and move with decreased pain. Pattern 1        Diagnostic Tests: From EPIC no xrays     Pain Scale: Stanley rates pain on a scale of 0-10 to be 9 at worst; 1 currently; 1 at best using VAS.   Pain location: right back    Aggravating factors: . It is worse with standing, bending, sitting, or lying on his back.  It is worse with changing position, the transition.  Yard work.  Sitting in planes.  Easing Factors: . It is better, lying on his stomach, but never goes away.  Disturbed Sleep: no    Pattern of pain questions:  1.  Where is your pain the worst? back  2.  Is your pain constant or intermittent?  constant  3.  Does bending forward make your typical pain worse? yes  4.  Since the start of your back pain, has there been a change in your bowel or bladder? no  5.  What can't you do now that you use to be able to do? 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, plane travel  Leisure: walk 2 miles a day                  Pts goals:  Reduce pain    Red Flag Screening:   Cough  Sneeze  Strain: neg  Bladder/ bowel: neg  Falls: no  General health: good  Night pain: no  Unexplained weight loss: no    OBJECTIVE     POSTURE  Posture Alignment :forward head  Postural examination/scapula alignment: Rounded shoulder and Head forward  Joint integrity: Firm end feeling  Skin integrity: good  Edema: neg  Sitting: poor posture  Correction of posture: better with lumbar roll    MOVEMENT LOSS    ROM Loss   Flexion minimal loss   Extension moderate loss   Side bending Right minimal loss   Side bending Left minimal loss   Rotation Right minimal loss   Rotation Left minimal loss     Lower Extremity Strength  Right LE  Left LE    Hip flexion: 5/5 Hip flexion: 5/5   Hip extension:  5/5 Hip extension: 5/5   Hip abduction: 5/5 Hip abduction: 5/5   Hip adduction:  5/5 Hip adduction:  5/5   Knee Flexion 5/5 Knee Flexion 5/5   Knee Extension 5/5 Knee Extension 5/5   Ankle dorsiflexion: 5/5 Ankle dorsiflexion: 5/5   Ankle plantarflexion: 5/5 Ankle plantarflexion: 5/5       GAIT:  Assistive Device used: no assistive device  Level of Assistance: independent    Special Tests:   Test Name  Test Result   Prone Instability Test (--)   SI Joint Provocation Test (--)   Straight Leg Raise (--)   Neural Tension Test (--)   Crossed Straight Leg Raise (--)   Walking on toes (--)   Walking on heels  (--)       NEUROLOGICAL SCREENING     Sensory deficit: intact including saddle    Reflexes:    Left Right   Patella Tendon 1+ 1+   Achilles Tendon 1+ 1+   Babinski  (--) (--)   Clonus (--) (--)     REPEATED TEST MOVEMENTS:  Repeated Flexion in Standing pain during motion  no worse   Repeated Extension in Standing end range pain  no worse or possibly better   Repeated Flexion in lying end range pain  pain  during motion  no worse   Repeated Extension in lying  end range pain  no effect or possibly better           Baseline Isometric Testing on Med X equipment: Testing administered by PT       [facility] 2017   Visit Number 1   VAS Pain Rating 4   Extension in Lying 10   Extension in Standing 10   Lumbar Extension Seat Pad 0   Femur Restraint 7   Top Dead Center 24   Counterweight 408   Lumbar Flexion 30   Lumbar Extension 0   Lumbar Peak Torque 122   Ice - Z Lie (in min.) 10       Baseline IM Testing Results:   Date of testin17  ROM 0-30   Max Peak Torque 122 ft lbs   Min Peak Torque 28  ft lbs    Flex/Ext Ratio 4.4/1   % below normative data -83%          FOTO: Focus on Therapeutic Outcomes   Category: lumbar   % Impaired: 51 %  Current Score  = CK = at least 40% but < 60% impaired, limited or restricted  Goal at Discharge Score = CJ = at least 20% but < 40% impaired, limited or restricted    Score interpretation is as follows:     TEST SCORE  Modifier  Impairment Limitation Restriction    0/50  CH  0 % impaired, limited or restricted   1-9/50  CI  @ least 1% but less than 20% impaired, limited or restricted   10-19/50  CJ  @ least 20%<40% impaired, limited or restricted   20-29/50  CK  @ least 40%<60% impaired, limited or restricted   30-39/50  CL  @ least 60% <80% impaired, limited or restricted   40-49/50  CM  @ least 80%<100% impaired limited or restricted   50/50  CN  100% impaired, limited or restricted       Treatment   Time In: 1:30 pm  Time Out: 2:00 pm    PT Evaluation Completed? Yes  Discussed Plan of Care with patient: Yes      Written Home Exercises Provided:   Handouts were given to the patient. Pt demo good understanding of the education provided. Stanley demonstrated good return demonstration of activities.     - Patient received education regarding proper posture and body mechanics.    - Ginny roll tried, recommended, and purchase information was provided.    - Patient received  a handout regarding anticipated muscular soreness following the isometric test and strategies for management were reviewed with patient including stretching, using ice and scheduled rest.     HEP  -ext in standing 10 reps 3/day  -ext in lie 2/day 10 reps  -z lie  -continue walking 2 miles/day  -top 10 back care tips handout given    Pt was instructed in and performed the following:   Cardiovascular exercise and therapeutic exercise to improve posture, lumbar/cervical ROM, strength, and muscular endurance as follows:     Stanley received therapeutic exercises to develop/improve posture, lumbar/cervical ROM, strength and muscular endurance for 30 minutes including the following exercises:     Assessment   This is a 57 y.o. male referred to [facility] and presents with a medical diagnosis of   Encounter Diagnosis   Name Primary?    DDD (degenerative disc disease), lumbar Yes    and demonstrates limitations as described below in the problem list. Pt rehab potential is Excellent. Pt presents with low back pain, reduced back movement, poor back strength, poor ergonomics, reduced ability to function comfortably and hesitant to participate in activities    Pain Pattern: 1 PEP, trial ext in standing and lie started -note effect       Patient received education on the [facility] program, purpose of the isometric test, progression of back strengthening as well as wellness approach and systemic strengthening.  Details of the program were discussed.  Reviewed that patient should feel support/pressure from med ex restraints but no pain or discomfort and patient expressed understanding.    Based on the above history and physical examination an active physical therapy program is recommended.  Pt will continue to benefit from skilled outpatient physical therapy to address the deficits listed below in the chart, provide pt/family education and to maximize pt's level of independence in the home and community environment. .      No environmental, cultural, spiritual, developmental or education needs expressed or noted    Medical necessity is demonstrated by the following problem list.    Pt presents with the following impairments:   History  Co-morbidities and personal factors that may impact the plan of care Examination  Body Structures and Functions, activity limitations and participation restrictions that may impact the plan of care Clinical Presentation   Decision Making/ Complexity Score   Co-morbidities:   Prior knee sx        Recurring back pain   lifestyle Body Regions:   back  lower extremities    Body Systems:   ROM  strength  gross coordinated movement  motor control  motor learning    Activity limitations:   Learning and applying knowledge  no deficits    General Tasks and Commands  no deficits    Communication  no deficits    Mobility  using transportation (bus, train, plane, car)    Self care  caring for body parts (brushing teeth, shaving, grooming)    Domestic Life  doing house work (cleaning house, washing dishes, laundry)    Interactions/Relationships  no deficits    Life Areas  no deficits    Community and Social Life  no deficits    Participation Restrictions:   travel     evolving clinical presentation with changing clinical characteristics   mod         GOALS: Pt is in agreement with the following goals.    Short term goals:  6 weeks or 10 visits   1.  Pt will demonstrate increased lumbar ROM by at least 3 degrees from the initial ROM value with improvements noted in functional ROM and ability to perform ADLs  2.  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  Patient report a reduction in worst pain score by 1-2 points for improved tolerance during work and recreational activities  4.  Pt able to perform HEP correctly with minimal cueing or supervision for therapist      Long term goals: 13 weeks or 20 visits   1. Pt will demonstrate  "increased lumbar ROM by at least 12 degrees from initial ROM value, resulting in improved ability to perform functional fwd bending while standing and sitting.   2. Pt will demonstrate increased maximum isometric torque value by 40% when compared to the initial value resulting in improved ability to perform bending, lifting, and carrying activities safely, confidently.  3. Pt to demonstrate ability to independently control and reduce their pain through posture positioning and mechanical movements throughout a typical day.  4.  Patient will demonstrate improved overall function per FOTO Survey to CJ = at least 20% but < 40% impaired, limited or restricted score or less.      Plan   Outpatient physical therapy 2x week for 13 weeks or 20 visits to include the following:   - Patient education  - Therapeutic exercise  - Manual therapy  - Performance testing   - Neuromuscular Re-education  - Therapeutic activity   - Modalities    Pt may be seen by PTA as part of the rehabilitation team.     Therapist: Abby Dunn, PT  5/17/2017    "I certify the need for these services furnished under this plan of treatment and while under my care."    ____________________________________  Physician/Referring Practitioner    _______________  Date of Signature          "

## 2017-05-22 ENCOUNTER — OFFICE VISIT (OUTPATIENT)
Dept: ALLERGY | Facility: CLINIC | Age: 57
End: 2017-05-22
Payer: COMMERCIAL

## 2017-05-22 ENCOUNTER — CLINICAL SUPPORT (OUTPATIENT)
Dept: REHABILITATION | Facility: OTHER | Age: 57
End: 2017-05-22
Attending: PHYSICAL MEDICINE & REHABILITATION
Payer: COMMERCIAL

## 2017-05-22 VITALS
BODY MASS INDEX: 33.71 KG/M2 | DIASTOLIC BLOOD PRESSURE: 90 MMHG | WEIGHT: 285.5 LBS | SYSTOLIC BLOOD PRESSURE: 130 MMHG | HEIGHT: 77 IN | HEART RATE: 64 BPM

## 2017-05-22 DIAGNOSIS — J30.1 SEASONAL ALLERGIC RHINITIS DUE TO POLLEN: Primary | ICD-10-CM

## 2017-05-22 DIAGNOSIS — E78.2 MIXED HYPERLIPIDEMIA: ICD-10-CM

## 2017-05-22 DIAGNOSIS — H10.13 ALLERGIC CONJUNCTIVITIS, BILATERAL: ICD-10-CM

## 2017-05-22 DIAGNOSIS — I10 ESSENTIAL HYPERTENSION: ICD-10-CM

## 2017-05-22 DIAGNOSIS — M51.36 DDD (DEGENERATIVE DISC DISEASE), LUMBAR: Primary | ICD-10-CM

## 2017-05-22 DIAGNOSIS — D72.10 EOSINOPHILIA: ICD-10-CM

## 2017-05-22 PROCEDURE — 95004 PERQ TESTS W/ALRGNC XTRCS: CPT | Mod: S$GLB,,, | Performed by: ALLERGY & IMMUNOLOGY

## 2017-05-22 PROCEDURE — 3075F SYST BP GE 130 - 139MM HG: CPT | Mod: S$GLB,,, | Performed by: ALLERGY & IMMUNOLOGY

## 2017-05-22 PROCEDURE — 99999 PR PBB SHADOW E&M-EST. PATIENT-LVL III: CPT | Mod: PBBFAC,,, | Performed by: ALLERGY & IMMUNOLOGY

## 2017-05-22 PROCEDURE — 97110 THERAPEUTIC EXERCISES: CPT

## 2017-05-22 PROCEDURE — 1160F RVW MEDS BY RX/DR IN RCRD: CPT | Mod: S$GLB,,, | Performed by: ALLERGY & IMMUNOLOGY

## 2017-05-22 PROCEDURE — 99213 OFFICE O/P EST LOW 20 MIN: CPT | Mod: 25,S$GLB,, | Performed by: ALLERGY & IMMUNOLOGY

## 2017-05-22 PROCEDURE — 3080F DIAST BP >= 90 MM HG: CPT | Mod: S$GLB,,, | Performed by: ALLERGY & IMMUNOLOGY

## 2017-05-22 NOTE — PROGRESS NOTES
[facility] Physical Therapy Treatment      Name: Stanley Stewart  Clinic Number: [id number]  Date of Treatment: 2017   Diagnosis: No diagnosis found.  Physician: Acacia Ramirez, *    Pain pattern determined: 1 PEP  Plan of care signed: 2017  Time in: 4:35  Time Out: 5:35  Total Treatment time: 40  Precautions: Blood pressure/syncope  Visit #: 2      Subjective   Stanley reports improvement of symptoms.  He states that his exercises that he is doing at home are going well.  He states that they have helped and he does not have as much pain as last time.  He states that morning pain has lessened as well.  He states that he did step into a pothole the other day and it really aggravated his symptoms.     Patient reports their pain to be 2/10 on a 0-10 scale with 0 being no pain and 10 being the worst pain imaginable.  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, plane travel  Leisure: walk 2 miles a day  Pt goals: Reduce back pain    Objective     Baseline IM Testing Results:   Date of testin2017  ROM 0-30 deg   Max Peak Torque 122    Min Peak Torque 28    Flex/Ext Ratio 4.4:1   % below normative data -83%       Treatment    Pt was instructed in and performed the following:     Stanley received therapeutic exercises to develop/improved posture, cardiovascular endurance, muscular endurance, lumbar/cervical ROM, strength and muscular endurance for 40 minutes including the following exercises: lumbar med-x machine, torso rotation, knee curl, knee extension, chest press and seated row.     [facility] 2017   Visit Number 2   VAS Pain Rating 2   Treadmill Time (in min.) 10   Speed 2   Extension in Lying 10   Extension in Standing 10   Lumbar Extension Seat Pad -   Femur Restraint -   Top Dead Center -   Counterweight -   Lumbar Flexion -   Lumbar Extension -   Lumbar Peak Torque -   Lumbar Weight 60   Repetitions  15   Rating of Perceived Exertion 2   Ice - Z Lie (in min.) 10       Written Home Exercises Provided:   Handouts were given to the patient. Pt demo good understanding of the education provided. Stanley demonstrated good return demonstration of activities.   EIS 10x  EIL 10x  LTR 10x  Supine HSS c strap 10s/h 3x    Lumbar roll use compliance: yes  Additional exercises taught this treatment session: LTR and supine HSS c strap    Assessment     Patient tolerated tx well with no increases in symptoms throughout.  Patient was able to tolerate new weight on lumbar med-x machine based upon initial strength measurements.  Review HEP with patient.  Patient can perform same weight next visit depending on pain level.  Patient was able to perform exercises with moderate vc's for form.  Patient performed all peripheral resistance machines with no increases in symptoms.  Patient finished with ice in z-lie position.  Patient reported decreased symptoms upon leaving.     Patient is making good progress towards established goals.  Pt will continue to benefit from skilled outpatient physical therapy to address the deficits stated in the impairment chart, provide pt/family education and to maximize pt's level of independence in the home and community environment.       Pt's spiritual, cultural and educational needs considered and pt agreeable to plan of care and goals as stated below:     Medical necessity is demonstrated by the following IMPAIRMENTS/PROBLEMS:  Decreased lumbar strength  Decreased lumbar ROM  Poor posture      Goals:   Short term goals:  6 weeks or 10 visits   1.  Pt will demonstrate increased lumbar ROM by at least 3 degrees from the initial ROM value with improvements noted in functional ROM and ability to perform ADLs (progressing, not met)  2.  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  Patient report a  reduction in worst pain score by 1-2 points for improved tolerance during work and recreational activities (progressing, not met)  4.  Pt able to perform HEP correctly with minimal cueing or supervision for therapist (progressing, not met)        Long term goals: 13 weeks or 20 visits   1. Pt will demonstrate increased lumbar ROM by at least 12 degrees from initial ROM value, resulting in improved ability to perform functional fwd bending while standing and sitting.   2. Pt will demonstrate increased maximum isometric torque value by 40% when compared to the initial value resulting in improved ability to perform bending, lifting, and carrying activities safely, confidently.  3. Pt to demonstrate ability to independently control and reduce their pain through posture positioning and mechanical movements throughout a typical day.  4.  Patient will demonstrate improved overall function per FOTO Survey to CJ = at least 20% but < 40% impaired, limited or restricted score or less.    FOTO: Focus on Therapeutic Outcomes   Category: lumbar   % Impaired: 51 %    Plan   Continue with established Plan of Care towards established PT goals.

## 2017-05-22 NOTE — PROGRESS NOTES
Stanley Stewart returns to clinic today for continued evaluation of chronic rhinitis and eosinophilia. He is here alone. He was last seen on May 4, 2017.    Since his last visit, he has done well.    He has not had any rhinitis or conjunctivitis. He has not had any need for antihistamines.    He has not had any cough, wheezing, or shortness of breath.    He eats crawfish, lobster, and shrimp without any difficulty. He had an episode of tongue swelling associated around the time of crab ingestion. He does not desire to eat this in the future. He was not taking any ACE inhibitor at the time of this episode.    [facility] PEQ ALLERGY QUESTIONNAIRE SHORT 5/22/2017   Are you taking any new medications since your last visit? No   Constitution: No symptoms   Head or facial pain: No symptoms   Eyes: No symptoms   Ears: No symptoms   Nose: No symptoms   Throat: No symptoms   Sinuses: No symptoms   Lungs: No symptoms   Skin: No symptoms   Cardiovascular: High blood pressue   Gastrointestinal: No symptoms   Genital/ urinary No symptoms   Musculoskeletal: No symptoms   Neurologic: No symptoms   Endocrine: No symptoms   Hematologic: No symptoms     Physical Examination:  General: Well-developed, well-nourished, no acute distress.  Head: No sinus tenderness.  Eyes: Conjunctivae:  No bulbar or palpebral conjunctival injection.  Ears: EAC's clear.  TM's clear.  No pre-auricular nodes.  Nose: Nasal Mucosa:  Pink.  Septum: No apparent deviation.  Turbinates:  No significant edema.  Polyps/Mass:  None visible.  Teeth/Gums:  No bleeding noted.  Oropharynx: No exudates.  Neck: Supple without thyromegaly. No cervical lymphadenopathy.    Respiratory/Chest: Effort: Good.  Auscultation:  Clear bilaterally.  Skin: Good turgor.  No urticaria or angioedema.  Neuro/Psych: Oriented x 3.    Laboratory 3/17/2017:  CBC: WBC 5520 with 11.8% eosinophils.    Laboratory 5/4/2017:  IgE level: 36.  ImmunoCAP: Negative.  Shellfish: Negative.    Inhalant and  crustacea skin tests prick #63 5/22/2017:    3+ histamine control.   3+ akhil only. (This was repeated).  Shrimp, crab, and lobster were all negative.    Assessment:  1. Allergic rhinitis.  2. Allergic conjunctivitis.  3. Angioedema, probably not food allergy.  4. Eosinophilia probably secondary to atopic status.  5. Hypertension on lisinopril-hydrochlorothiazide.  6. Hyperlipidemia on Crestor.  7. Congenital fusion L5-S1.    Recommendations:  1. Antihistamines as needed.  2. Return to clinic in February 2018 or sooner if symptoms return.  3. Discuss mechanisms on return to clinic.  4. Monitor eosinophilia for now.

## 2017-05-29 ENCOUNTER — CLINICAL SUPPORT (OUTPATIENT)
Dept: REHABILITATION | Facility: OTHER | Age: 57
End: 2017-05-29
Attending: PHYSICAL MEDICINE & REHABILITATION
Payer: COMMERCIAL

## 2017-05-29 DIAGNOSIS — M51.36 DDD (DEGENERATIVE DISC DISEASE), LUMBAR: Primary | ICD-10-CM

## 2017-05-29 PROCEDURE — 97110 THERAPEUTIC EXERCISES: CPT | Performed by: PHYSICAL THERAPIST

## 2017-05-29 NOTE — PROGRESS NOTES
[facility]      Name: Stanley Stewart  Clinic Number: [id number]  Date of Treatment: 2017   Diagnosis:   Encounter Diagnosis   Name Primary?    DDD (degenerative disc disease), lumbar Yes     Physician: Acacia Ramirez, *    Pain pattern determined: 1 PEP  Plan of care signed: 2017  Time in: 230  Time Out: 330  Total Treatment time: 40  Precautions: Blood pressure/syncope  Visit #: 3      Subjective   Stanley reports improvement of symptoms.  He states that his exercises that he is doing at home are going well.  He states that they have helped and he does not have as much pain as last time.  He states that morning pain has lessened as well.     Patient reports their pain to be 3/10 on a 0-10 scale with 0 being no pain and 10 being the worst pain imaginable.  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, plane travel  Leisure: walk 2 miles a day  Pt goals: Reduce back pain    Objective     Baseline IM Testing Results:   Date of testin2017  ROM 0-30 deg   Max Peak Torque 122    Min Peak Torque 28    Flex/Ext Ratio 4.4:1   % below normative data -83%       Treatment    Pt was instructed in and performed the following:     [facility] 2017   Visit Number 3   VAS Pain Rating 3   Treadmill Time (in min.) 10   Speed 2   Extension in Lying 10   Extension in Standing 10   Lumbar Weight 60   Repetitions 20   Rating of Perceived Exertion 2   Ice - Z Lie (in min.) 10         Stanley received therapeutic exercises to develop/improved posture, cardiovascular endurance, muscular endurance, lumbar/cervical ROM, strength and muscular endurance for 40 minutes including the following exercises: lumbar med-x machine, torso rotation, knee curl, knee extension, chest press and seated row, triceps extension, biceps curls, leg press and hip abduction.     Written Home Exercises Provided:   Handouts  were given to the patient. Pt demo good understanding of the education provided. Stanley demonstrated good return demonstration of activities.   EIS 10x  EIL 10x  LTR 10x  Supine HSS c strap 10s/h 3x    Lumbar roll use compliance: yes  Additional exercises taught this treatment session: LTR and supine HSS c strap    Assessment     Patient tolerated tx well with no increases in symptoms throughout.  Patient was able to tolerate weight on lumbar med-x machine with low RPE.  When pt returns from his trip may be able to do a 10% increase.    Review HEP with patient.  Patient was able to perform exercises with moderate vc's for form.  Patient performed all peripheral resistance machines with no increases in symptoms.  Patient finished with ice in z-lie position.  Patient reported decreased symptoms upon leaving.     Patient is making good progress towards established goals.  Pt will continue to benefit from skilled outpatient physical therapy to address the deficits stated in the impairment chart, provide pt/family education and to maximize pt's level of independence in the home and community environment.       Pt's spiritual, cultural and educational needs considered and pt agreeable to plan of care and goals as stated below:     Medical necessity is demonstrated by the following IMPAIRMENTS/PROBLEMS:  Decreased lumbar strength  Decreased lumbar ROM  Poor posture      Goals:   Short term goals:  6 weeks or 10 visits   1.  Pt will demonstrate increased lumbar ROM by at least 3 degrees from the initial ROM value with improvements noted in functional ROM and ability to perform ADLs (progressing, not met)  2.  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  Patient report a reduction in worst pain score by 1-2 points for improved tolerance during work and recreational activities (progressing, not met)  4.  Pt able to perform  HEP correctly with minimal cueing or supervision for therapist (progressing, not met)        Long term goals: 13 weeks or 20 visits   1. Pt will demonstrate increased lumbar ROM by at least 12 degrees from initial ROM value, resulting in improved ability to perform functional fwd bending while standing and sitting.   2. Pt will demonstrate increased maximum isometric torque value by 40% when compared to the initial value resulting in improved ability to perform bending, lifting, and carrying activities safely, confidently.  3. Pt to demonstrate ability to independently control and reduce their pain through posture positioning and mechanical movements throughout a typical day.  4.  Patient will demonstrate improved overall function per FOTO Survey to CJ = at least 20% but < 40% impaired, limited or restricted score or less.    FOTO: Focus on Therapeutic Outcomes   Category: lumbar   % Impaired: 51 %    Plan   Continue with established Plan of Care towards established PT goals. Pt will be out of town travelling to [place] and will return in 1 and 1/2 weeks

## 2017-06-09 ENCOUNTER — CLINICAL SUPPORT (OUTPATIENT)
Dept: REHABILITATION | Facility: OTHER | Age: 57
End: 2017-06-09
Attending: PHYSICAL MEDICINE & REHABILITATION
Payer: COMMERCIAL

## 2017-06-09 DIAGNOSIS — M51.36 DDD (DEGENERATIVE DISC DISEASE), LUMBAR: Primary | ICD-10-CM

## 2017-06-09 PROCEDURE — 97110 THERAPEUTIC EXERCISES: CPT

## 2017-06-09 NOTE — PROGRESS NOTES
[facility]    Increase weight 10 % next session.      Name: Stanley Stewart  Clinic Number: [id number]  Date of Treatment: 2017   Diagnosis:   Encounter Diagnosis   Name Primary?    DDD (degenerative disc disease), lumbar Yes     Physician: Acacia Ramirez, *    Pain pattern determined: 1 PEP  Plan of care signed: 2017  Time in: 10:00  Time Out: 11:00  Total Treatment time: 40  Precautions: Blood pressure/syncope  Visit #: 4  Face to face conference with Physical Therapist was done with PTA to discuss patients progress with PT intervention and reviewed all matters to change goals or POC.        Subjective   Stanley reports improvement of symptoms.  He states that his exercises that he is doing at home are going well. No LBP today, although did have pain and soreness post last session. He gets pain when he sits to long.     Patient reports their pain to be 0/10 on a 0-10 scale with 0 being no pain and 10 being the worst pain imaginable.  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, plane travel  Leisure: walk 2 miles a day  Pt goals: Reduce back pain    Objective     Baseline IM Testing Results:   Date of testin2017  ROM 0-30 deg   Max Peak Torque 122    Min Peak Torque 28    Flex/Ext Ratio 4.4:1   % below normative data -83%       Treatment    Pt was instructed in and performed the following:     [facility] 2017   Visit Number 4   VAS Pain Rating 0   Treadmill Time (in min.) 10   Speed 2   Extension in Lying 10   Extension in Standing 10   Lumbar Extension Seat Pad -   Femur Restraint -   Top Dead Center -   Counterweight -   Lumbar Flexion -   Lumbar Extension -   Lumbar Peak Torque -   Lumbar Weight 63 increase weight 10% next visit   Repetitions 20   Rating of Perceived Exertion 3   Ice - Z Lie (in min.) 10         Stanley received therapeutic exercises to develop/improved  posture, cardiovascular endurance, muscular endurance, lumbar/cervical ROM, strength and muscular endurance for 40 minutes including the following exercises: lumbar med-x machine, torso rotation, knee curl, knee extension, chest press and seated row, triceps extension, biceps curls, leg press and hip abduction.     Written Home Exercises Provided:   Handouts were given to the patient. Pt demo good understanding of the education provided. Stanley demonstrated good return demonstration of activities.   EIS 10x  EIL 10x  LTR 10x  Supine HSS c strap 10s/h 3x    Lumbar roll use compliance: yes  Additional exercises taught this treatment session: LTR and supine HSS c strap    Assessment     Patient tolerated tx well with no increases in symptoms throughout.  Patient was able to tolerate a weight increase on lumbar med-x machine with no c/o LBP. Increase weight 10 % next session.    Review HEP with patient.  Patient was able to perform exercises with moderate vc's for form.Educate pt to not sit to long and to stand and do standing ext. He understood.   Patient performed all peripheral resistance machines with no increases in symptoms.  Patient finished with ice in z-lie position.  Patient reported decreased symptoms upon leaving.     Patient is making good progress towards established goals.  Pt will continue to benefit from skilled outpatient physical therapy to address the deficits stated in the impairment chart, provide pt/family education and to maximize pt's level of independence in the home and community environment.       Pt's spiritual, cultural and educational needs considered and pt agreeable to plan of care and goals as stated below:     Medical necessity is demonstrated by the following IMPAIRMENTS/PROBLEMS:  Decreased lumbar strength  Decreased lumbar ROM  Poor posture      Goals:   Short term goals:  6 weeks or 10 visits   1.  Pt will demonstrate increased lumbar ROM by at least 3 degrees from the initial ROM  value with improvements noted in functional ROM and ability to perform ADLs (progressing, not met)  2.  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  Patient report a reduction in worst pain score by 1-2 points for improved tolerance during work and recreational activities (progressing, not met)  4.  Pt able to perform HEP correctly with minimal cueing or supervision for therapist (progressing, not met)        Long term goals: 13 weeks or 20 visits   1. Pt will demonstrate increased lumbar ROM by at least 12 degrees from initial ROM value, resulting in improved ability to perform functional fwd bending while standing and sitting.   2. Pt will demonstrate increased maximum isometric torque value by 40% when compared to the initial value resulting in improved ability to perform bending, lifting, and carrying activities safely, confidently.  3. Pt to demonstrate ability to independently control and reduce their pain through posture positioning and mechanical movements throughout a typical day.  4.  Patient will demonstrate improved overall function per FOTO Survey to CJ = at least 20% but < 40% impaired, limited or restricted score or less.    FOTO: Focus on Therapeutic Outcomes   Category: lumbar   % Impaired: 51 %    Plan   Continue with established Plan of Care towards established PT goals.

## 2017-06-12 ENCOUNTER — CLINICAL SUPPORT (OUTPATIENT)
Dept: REHABILITATION | Facility: OTHER | Age: 57
End: 2017-06-12
Attending: PHYSICAL MEDICINE & REHABILITATION
Payer: COMMERCIAL

## 2017-06-12 PROCEDURE — 97110 THERAPEUTIC EXERCISES: CPT

## 2017-06-12 NOTE — PROGRESS NOTES
[facility]    Increase weight 10 % next session.      Name: Stanley Stewart  Clinic Number: [id number]  Date of Treatment: 2017   Diagnosis:   No diagnosis found.  Physician: Acacia Ramirez, *    Pain pattern determined: 1 PEP  Plan of care signed: 2017  Time in: 10:00  Time Out: 11:00  Total Treatment time: 40  Precautions: Blood pressure/syncope  Visit #: 5  Face to face conference with Physical Therapist was done with PTA to discuss patients progress with PT intervention and reviewed all matters to change goals or POC.        Subjective   Stanley reports improvement of symptoms.  He states that his exercises that he is doing at home are going well. No LBP today. No soreness post last session. He gets pain when he sits to long.     Patient reports their pain to be 0/10 on a 0-10 scale with 0 being no pain and 10 being the worst pain imaginable.  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, plane travel  Leisure: walk 2 miles a day  Pt goals: Reduce back pain    Objective     Baseline IM Testing Results:   Date of testin2017  ROM 0-30 deg   Max Peak Torque 122    Min Peak Torque 28    Flex/Ext Ratio 4.4:1   % below normative data -83%       Treatment    Pt was instructed in and performed the following:   [facility] 2017   Visit Number 5   VAS Pain Rating 0   Treadmill Time (in min.) 11   Speed 2   Extension in Lying 10   Extension in Standing 10   Lumbar Weight 73   Repetitions 20   Rating of Perceived Exertion 2   Ice - Z Lie (in min.) 10         Stanley received therapeutic exercises to develop/improved posture, cardiovascular endurance, muscular endurance, lumbar/cervical ROM, strength and muscular endurance for 40 minutes including the following exercises: lumbar med-x machine, torso rotation, knee curl, knee extension, chest press and seated row, triceps extension,  biceps curls, leg press and hip abduction.     Written Home Exercises Provided:   Handouts were given to the patient. Pt demo good understanding of the education provided. Stanley demonstrated good return demonstration of activities.   EIS 10x  EIL 10x  LTR 10x  Supine HSS c strap 10s/h 3x    Lumbar roll use compliance: yes  Additional exercises taught this treatment session: LTR and supine HSS c strap    Assessment     Patient tolerated tx well with no increases in symptoms throughout.  Patient was able to tolerate a 10% weight increase on lumbar med-x machine with no c/o LBP. APE was a 2,so recommend another 10% weight increase next session.    Review HEP with patient.  Patient was able to perform exercises with moderate vc's for form.Educate pt to not sit to long and to stand and do standing ext. He understood.   Patient performed all peripheral resistance machines with no increases in symptoms.  Patient finished with ice in z-lie position.  Patient reported decreased symptoms upon leaving.     Patient is making good progress towards established goals.  Pt will continue to benefit from skilled outpatient physical therapy to address the deficits stated in the impairment chart, provide pt/family education and to maximize pt's level of independence in the home and community environment.       Pt's spiritual, cultural and educational needs considered and pt agreeable to plan of care and goals as stated below:     Medical necessity is demonstrated by the following IMPAIRMENTS/PROBLEMS:  Decreased lumbar strength  Decreased lumbar ROM  Poor posture      Goals:   Short term goals:  6 weeks or 10 visits   1.  Pt will demonstrate increased lumbar ROM by at least 3 degrees from the initial ROM value with improvements noted in functional ROM and ability to perform ADLs (progressing, not met)  2.  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  Patient report a reduction in worst pain score by 1-2 points for improved tolerance during work and recreational activities (progressing, not met)  4.  Pt able to perform HEP correctly with minimal cueing or supervision for therapist (progressing, not met)        Long term goals: 13 weeks or 20 visits   1. Pt will demonstrate increased lumbar ROM by at least 12 degrees from initial ROM value, resulting in improved ability to perform functional fwd bending while standing and sitting.   2. Pt will demonstrate increased maximum isometric torque value by 40% when compared to the initial value resulting in improved ability to perform bending, lifting, and carrying activities safely, confidently.  3. Pt to demonstrate ability to independently control and reduce their pain through posture positioning and mechanical movements throughout a typical day.  4.  Patient will demonstrate improved overall function per FOTO Survey to CJ = at least 20% but < 40% impaired, limited or restricted score or less.    FOTO: Focus on Therapeutic Outcomes   Category: lumbar   % Impaired: 51 %    Plan   Continue with established Plan of Care towards established PT goals.

## 2017-06-14 ENCOUNTER — CLINICAL SUPPORT (OUTPATIENT)
Dept: REHABILITATION | Facility: OTHER | Age: 57
End: 2017-06-14
Attending: PHYSICAL MEDICINE & REHABILITATION
Payer: COMMERCIAL

## 2017-06-14 DIAGNOSIS — M51.36 DDD (DEGENERATIVE DISC DISEASE), LUMBAR: Primary | ICD-10-CM

## 2017-06-14 PROCEDURE — 97110 THERAPEUTIC EXERCISES: CPT | Performed by: PHYSICAL MEDICINE & REHABILITATION

## 2017-06-14 NOTE — PROGRESS NOTES
[facility]          Name: Stanley Stewart  Clinic Number: [id number]  Date of Treatment: 2017   Diagnosis:   No diagnosis found.  Physician: Acacia Ramirez, *    Pain pattern determined: 1 PEP  Plan of care signed: 2017, due 17    Time in: 10:00  Time Out: 11:00    Total Treatment time: 40  Precautions: Blood pressure/syncope    Visit #: 6  Face to face conference with Physical Therapist was done with PTA to discuss patients progress with PT intervention and reviewed all matters to change goals or POC.    Assessment 17  Assessment due: 17    Subjective   Stanley reports improvement of symptoms.   He feels stronger.  He does note he still has pain unless he takes mobic daily.  He stretches at least 2/day.  He sits most of the day.  Encouraged more consistent stretching, especially ext in standing at work.  He uses lumbar roll.   He states that his exercises that he is doing at home are going well. No LBP today. No soreness post last session. He gets pain when he sits to long.     Patient reports their pain to be 0/10 on a 0-10 scale with 0 being no pain and 10 being the worst pain imaginable.  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  Pt goals: Reduce back pain    Objective     Baseline IM Testing Results:   Date of testin2017  ROM 0-30 deg increased to 0-42 and tolerated well visit 6   Max Peak Torque 122    Min Peak Torque 28    Flex/Ext Ratio 4.4:1   % below normative data -83%     ROM back:  17  Fingers 15 inches from floor, reduced lumbar curve and tight hamstrings ( double knee to chest added and he is doing hamstring stretch)  Ext in lie min loss, improved        Treatment    Pt was instructed in and performed the following:     Hamstrings stretching with band 10 reps 5 sec  Flexion in lie added to HEP 2/day 10  reps  Ext in lie 10  Ext in standing 10  LTR 10    [facility] 6/14/2017   Visit Number 6   VAS Pain Rating 0   Treadmill Time (in min.) 11   Speed 2   Extension in Lying 10   Extension in Standing 10   Flexion in Lying 10   Lumbar Extension Seat Pad -   Femur Restraint -   Top Dead Center -   Counterweight -   Lumbar Flexion 42   Lumbar Extension 0   Lumbar Peak Torque -   Lumbar Weight 77   Repetitions 20   Rating of Perceived Exertion 3   Ice - Z Lie (in min.) 10         Stanley received therapeutic exercises to develop/improved posture, cardiovascular endurance, muscular endurance, lumbar/cervical ROM, strength and muscular endurance for 40 minutes including the following exercises: lumbar med-x machine, torso rotation, knee curl, knee extension, chest press and seated row, triceps extension, biceps curls, leg press and hip abduction.     Written Home Exercises Provided:   Handouts were given to the patient. Pt demo good understanding of the education provided. Stanley demonstrated good return demonstration of activities.   EIS 10x  EIL 10x  LTR 10x  Supine HSS c strap 10s/h 3x  Flexion in lie 2/day 10 reps    Lumbar roll use compliance: yes  Additional exercises taught this treatment session:  Flexion in lie    Assessment     Patient tolerated tx well with no  symptoms throughout.  Patient was able to tolerate a  weight increase on lumbar med-x machine with no c/o LBP and increase in ROM on lumbar med ex.  Lumbar exertion was a 3-4,so recommend another 10% weight increase next session.    Review HEP with patient.  Patient was able to perform exercises with moderate vc's for form.Educate pt to not sit to long and to stand and do standing ext. He understood.   Patient performed all peripheral resistance machines with no increases in symptoms.  Patient finished with ice in z-lie position.  Patient no symptoms upon leaving.     Patient is making good progress towards established goals.  Pt will continue to  benefit from skilled outpatient physical therapy to address the deficits stated in the impairment chart, provide pt/family education and to maximize pt's level of independence in the home and community environment.       Pt's spiritual, cultural and educational needs considered and pt agreeable to plan of care and goals as stated below:     Medical necessity is demonstrated by the following IMPAIRMENTS/PROBLEMS:  Decreased lumbar strength  Decreased lumbar ROM  Poor posture      Goals:   Short term goals:  6 weeks or 10 visits   1.  Pt will demonstrate increased lumbar ROM by at least 3 degrees from the initial ROM value with improvements noted in functional ROM and ability to perform ADLs (progressing, not met)  2.  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  Patient report a reduction in worst pain score by 1-2 points for improved tolerance during work and recreational activities (progressing, not met)  4.  Pt able to perform HEP correctly with minimal cueing or supervision for therapist (progressing, not met)        Long term goals: 13 weeks or 20 visits   1. Pt will demonstrate increased lumbar ROM by at least 12 degrees from initial ROM value, resulting in improved ability to perform functional fwd bending while standing and sitting.   2. Pt will demonstrate increased maximum isometric torque value by 40% when compared to the initial value resulting in improved ability to perform bending, lifting, and carrying activities safely, confidently.  3. Pt to demonstrate ability to independently control and reduce their pain through posture positioning and mechanical movements throughout a typical day.  4.  Patient will demonstrate improved overall function per FOTO Survey to CJ = at least 20% but < 40% impaired, limited or restricted score or less.    FOTO: Focus on Therapeutic Outcomes   Category: lumbar   %  Impaired: 51 %    Plan   Continue with established Plan of Care towards established PT goals.

## 2017-06-22 ENCOUNTER — CLINICAL SUPPORT (OUTPATIENT)
Dept: REHABILITATION | Facility: OTHER | Age: 57
End: 2017-06-22
Attending: PHYSICAL MEDICINE & REHABILITATION
Payer: COMMERCIAL

## 2017-06-22 DIAGNOSIS — M51.36 DDD (DEGENERATIVE DISC DISEASE), LUMBAR: Primary | ICD-10-CM

## 2017-06-22 PROCEDURE — 97110 THERAPEUTIC EXERCISES: CPT

## 2017-06-22 NOTE — PROGRESS NOTES
[facility]          Name: Stanley Stewart  Clinic Number: [id number]  Date of Treatment: 2017   Diagnosis:   No diagnosis found.  Physician: Acacia Ramirez, *    Pain pattern determined: 1 PEP  Plan of care signed: 2017, due 17    Time in: 8:00  Time Out: 9:00    Total Treatment time: 45  Precautions: Blood pressure/syncope    Visit #: 7      Assessment 17  Assessment due: 17    Subjective   Stanley reports improvement of symptoms.   He feels stronger.  He does note he still has pain unless he takes mobic daily.  He stretches at least 2/day.  He sits most of the day.  But his apple watch cues him to stretch every hour. Encouraged more consistent stretching, especially ext in standing at work.  He uses lumbar roll.   He states that his exercises that he is doing at home are going well. No LBP today. He notes some mild soreness post last session.     Patient reports their pain to be 0/10 on a 0-10 scale with 0 being no pain and 10 being the worst pain imaginable.  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, plane travel  Leisure: walk 2 miles a day  Pt goals: Reduce back pain    Objective     Baseline IM Testing Results:   Date of testin2017  ROM 0-30 deg increased to 0-42 and tolerated well visit 6   Max Peak Torque 122    Min Peak Torque 28    Flex/Ext Ratio 4.4:1   % below normative data -83%     ROM back:  17  Fingers 15 inches from floor, reduced lumbar curve and tight hamstrings ( double knee to chest added and he is doing hamstring stretch)  Ext in lie min loss, improved    Treatment    Pt was instructed in and performed the following:     Flexion in lie 10x reps (Mild v/c to relax neck and to let go of legs in between stretches with good stretch response)   Ext in lie 10  Ext in standing 10  LTR 10  Supine Active HS stretch 30 sec holdx 4   Seated Active HS  stretch 30 sec holdx 4    [facility] 6/22/2017   Visit Number 7   VAS Pain Rating 0   Treadmill Time (in min.) 12   Speed 2   Extension in Lying -   Extension in Standing 10   Flexion in Lying 10   Lumbar Extension Seat Pad -   Femur Restraint -   Top Dead Center -   Counterweight -   Lumbar Flexion -   Lumbar Extension -   Lumbar Peak Torque -   Min Torque -   Percent From Norm -   Lumbar Weight 82   Repetitions 20   Rating of Perceived Exertion 3   Ice - Z Lie (in min.) 10       Stanley received therapeutic exercises to develop/improved posture, cardiovascular endurance, muscular endurance, lumbar/cervical ROM, strength and muscular endurance for 40 minutes including the following exercises: lumbar med-x machine, torso rotation, knee curl, knee extension, chest press and seated row, triceps extension, biceps curls, leg press and hip abduction.     Written Home Exercises Provided:   Handouts were given to the patient. Pt demo good understanding of the education provided. Stanley demonstrated good return demonstration of activities.   EIS 10x  EIL 10x  LTR 10x  Supine HSS c strap 10s/h 3x  Flexion in lie 2/day 10 reps    Lumbar roll use compliance: yes  Additional exercises taught this treatment session:    Supine Active HS stretch 30 sec holdx 4, 2x daily  Seated Active HS stretch 30 sec holdx 4, 2x daily    Assessment     Patient tolerated tx well with no  symptoms throughout.  Patient was able to tolerate a 10% weight increase on lumbar med-x machine with no c/o LBP and increase in ROM on lumbar med ex.  Lumbar exertion was a 3,so recommend another 10% weight increase next session if pt not too sore after today's session.    Review HEP with patient.  Patient was able to perform exercises with mild- moderate vc's for form. Added seated and supine active HS stretch which pt liked because he doesn't need a strap. Added trial to HEP. Pt educated on safe stretching techniques to point of gentle stretch and not to  the point of strain or discomfort. He understood.   Patient performed all peripheral resistance machines with no increases in symptoms.  Patient finished with ice in z-lie position.  Patient no symptoms upon leaving.     Patient is making good progress towards established goals.  Pt will continue to benefit from skilled outpatient physical therapy to address the deficits stated in the impairment chart, provide pt/family education and to maximize pt's level of independence in the home and community environment.       Pt's spiritual, cultural and educational needs considered and pt agreeable to plan of care and goals as stated below:     Medical necessity is demonstrated by the following IMPAIRMENTS/PROBLEMS:  Decreased lumbar strength  Decreased lumbar ROM  Poor posture      Goals:   Short term goals:  6 weeks or 10 visits   1.  Pt will demonstrate increased lumbar ROM by at least 3 degrees from the initial ROM value with improvements noted in functional ROM and ability to perform ADLs (progressing, not met)  2.  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  Patient report a reduction in worst pain score by 1-2 points for improved tolerance during work and recreational activities (progressing, not met)  4.  Pt able to perform HEP correctly with minimal cueing or supervision for therapist (progressing, not met)        Long term goals: 13 weeks or 20 visits   1. Pt will demonstrate increased lumbar ROM by at least 12 degrees from initial ROM value, resulting in improved ability to perform functional fwd bending while standing and sitting.   2. Pt will demonstrate increased maximum isometric torque value by 40% when compared to the initial value resulting in improved ability to perform bending, lifting, and carrying activities safely, confidently.  3. Pt to demonstrate ability to independently control and reduce  their pain through posture positioning and mechanical movements throughout a typical day.  4.  Patient will demonstrate improved overall function per FOTO Survey to CJ = at least 20% but < 40% impaired, limited or restricted score or less.    FOTO: Focus on Therapeutic Outcomes   Category: lumbar   % Impaired: 51 %    Plan   Continue with established Plan of Care towards established PT goals.

## 2017-06-30 ENCOUNTER — CLINICAL SUPPORT (OUTPATIENT)
Dept: REHABILITATION | Facility: OTHER | Age: 57
End: 2017-06-30
Attending: PHYSICAL MEDICINE & REHABILITATION
Payer: COMMERCIAL

## 2017-06-30 DIAGNOSIS — M51.36 DDD (DEGENERATIVE DISC DISEASE), LUMBAR: Primary | ICD-10-CM

## 2017-06-30 PROCEDURE — 97110 THERAPEUTIC EXERCISES: CPT

## 2017-06-30 NOTE — PROGRESS NOTES
[facility]          Name: Stanley Stewart  Clinic Number: [id number]  Date of Treatment: 2017   Diagnosis:   Encounter Diagnosis   Name Primary?    DDD (degenerative disc disease), lumbar Yes     Physician: Acacia Ramirez, *    Pain pattern determined: 1 PEP  Plan of care signed: 2017, due 17    Time in: 10:30  Time Out: 11:30    Total Treatment time: 45  Precautions: Blood pressure/syncope    Visit #: 8      Assessment 17  Assessment due: 17    Subjective   Stanley reports improvement of symptoms.   He feels stronger, but felt a little tight in her LB today.   He does note he still has pain unless he takes mobic daily.  He stretches at least 2/day.  He sits most of the day.  But his apple watch cues him to stretch every hour. Encouraged more consistent stretching, especially ext in standing at work.  He uses lumbar roll.   He states that his exercises that he is doing at home are going well. No LBP today. He notes some mild soreness post last session.     Patient reports their pain to be 0/10 on a 0-10 scale with 0 being no pain and 10 being the worst pain imaginable.  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, plane travel  Leisure: walk 2 miles a day  Pt goals: Reduce back pain    Objective     Baseline IM Testing Results:   Date of testin2017  ROM 0-30 deg increased to 0-42 and tolerated well visit 6   Max Peak Torque 122    Min Peak Torque 28    Flex/Ext Ratio 4.4:1   % below normative data -83%     ROM back:  17  Fingers 15 inches from floor, reduced lumbar curve and tight hamstrings ( double knee to chest added and he is doing hamstring stretch)  Ext in lie min loss, improved    Treatment    Pt was instructed in and performed the following:     Flexion in lie 10x reps (Mild v/c to relax neck and to let go of legs in between stretches with good stretch  response)   Ext in lie 10  Ext in standing 10  LTR 10  Supine Active HS stretch 30 sec holdx 4   Seated Active HS stretch 30 sec holdx 4    Stanley received therapeutic exercises to develop/improved posture, cardiovascular endurance, muscular endurance, lumbar/cervical ROM, strength and muscular endurance for 40 minutes including the following exercises: lumbar med-x machine, torso rotation, knee curl, knee extension, chest press and seated row, triceps extension, biceps curls, leg press and hip abduction.     Written Home Exercises Provided:   Handouts were given to the patient. Pt demo good understanding of the education provided. Stanley demonstrated good return demonstration of activities.     [facility] 6/30/2017   Visit Number 8   VAS Pain Rating 0   Treadmill Time (in min.) 12   Speed 2   Extension in Lying -   Extension in Standing 10   Flexion in Lying 10   Lumbar Weight 86   Repetitions 20   Rating of Perceived Exertion 3   Ice - Z Lie (in min.) 10       EIS 10x  EIL 10x  LTR 10x  Supine HSS c strap 10s/h 3x  Flexion in lie 2/day 10 reps    Lumbar roll use compliance: yes  Additional exercises taught this treatment session:    Supine Active HS stretch 30 sec holdx 4, 2x daily  Seated Active HS stretch 30 sec holdx 4, 2x daily    Assessment     Patient tolerated tx well with no  symptoms throughout.  Patient was able to tolerate a weight increase on lumbar med-x machine.  Review HEP with patient.  Patient was able to perform exercises with min vc's for form.  Pt educated on safe stretching techniques to point of gentle stretch and not to the point of strain or discomfort. He understood.   Patient performed all peripheral resistance machines with no increases in symptoms.  Patient finished with ice in z-lie position.  Patient no symptoms upon leaving.     Patient is making good progress towards established goals.  Pt will continue to benefit from skilled outpatient physical therapy to address the  deficits stated in the impairment chart, provide pt/family education and to maximize pt's level of independence in the home and community environment.       Pt's spiritual, cultural and educational needs considered and pt agreeable to plan of care and goals as stated below:     Medical necessity is demonstrated by the following IMPAIRMENTS/PROBLEMS:  Decreased lumbar strength  Decreased lumbar ROM  Poor posture      Goals:   Short term goals:  6 weeks or 10 visits   1.  Pt will demonstrate increased lumbar ROM by at least 3 degrees from the initial ROM value with improvements noted in functional ROM and ability to perform ADLs (progressing, not met)  2.  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  Patient report a reduction in worst pain score by 1-2 points for improved tolerance during work and recreational activities (progressing, not met)  4.  Pt able to perform HEP correctly with minimal cueing or supervision for therapist (progressing, not met)        Long term goals: 13 weeks or 20 visits   1. Pt will demonstrate increased lumbar ROM by at least 12 degrees from initial ROM value, resulting in improved ability to perform functional fwd bending while standing and sitting.   2. Pt will demonstrate increased maximum isometric torque value by 40% when compared to the initial value resulting in improved ability to perform bending, lifting, and carrying activities safely, confidently.  3. Pt to demonstrate ability to independently control and reduce their pain through posture positioning and mechanical movements throughout a typical day.  4.  Patient will demonstrate improved overall function per FOTO Survey to CJ = at least 20% but < 40% impaired, limited or restricted score or less.    FOTO: Focus on Therapeutic Outcomes   Category: lumbar   % Impaired: 51 %    Plan   Continue with established Plan of Care  towards established PT goals.

## 2017-07-03 ENCOUNTER — CLINICAL SUPPORT (OUTPATIENT)
Dept: REHABILITATION | Facility: OTHER | Age: 57
End: 2017-07-03
Attending: PHYSICAL MEDICINE & REHABILITATION
Payer: COMMERCIAL

## 2017-07-03 DIAGNOSIS — M51.36 DDD (DEGENERATIVE DISC DISEASE), LUMBAR: Primary | ICD-10-CM

## 2017-07-03 PROCEDURE — 97110 THERAPEUTIC EXERCISES: CPT

## 2017-07-03 NOTE — PROGRESS NOTES
[facility]          Name: Stanley Stewatr  Clinic Number: [id number]  Date of Treatment: 2017   Diagnosis:   Encounter Diagnosis   Name Primary?    DDD (degenerative disc disease), lumbar Yes     Physician: Acacia Ramirez, *    Pain pattern determined: 1 PEP  Plan of care signed: 2017, due 17    Time in: 10:00  Time Out: 11:00    Total Treatment time: 45  Precautions: Blood pressure/syncope    Visit #: 9      Assessment 17  Assessment due: 17    Subjective   Stanley reports improvement of symptoms.   He feels stronger, but still has the same LBP pain.    He does note he still has pain unless he takes mobic daily.  He stretches at least 2/day.  He sits most of the day.  But his apple watch cues him to stretch every hour. Encouraged more consistent stretching, especially ext in standing at work.  He uses lumbar roll.   He notes some mild soreness post last session.      Patient reports their pain to be 3/10 on a 0-10 scale with 0 being no pain and 10 being the worst pain imaginable.  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, plane travel  Leisure: walk 2 miles a day  Pt goals: Reduce back pain    Objective     Baseline IM Testing Results:   Date of testin2017  ROM 0-30 deg increased to 0-42 and tolerated well visit 6   Max Peak Torque 122    Min Peak Torque 28    Flex/Ext Ratio 4.4:1   % below normative data -83%     ROM back:  17  Fingers 15 inches from floor, reduced lumbar curve and tight hamstrings ( double knee to chest added and he is doing hamstring stretch)  Ext in lie min loss, improved    Treatment    Pt was instructed in and performed the following:   [facility] 7/3/2017   Visit Number 9   VAS Pain Rating 3   Treadmill Time (in min.) 12   Speed 2   Extension in Standing 10   Flexion in Lying 10   Lumbar Weight 90   Repetitions 20   Rating of  Perceived Exertion 3   Ice - Z Lie (in min.) 10       Flexion in lie 10x reps (Mild v/c to relax neck and to let go of legs in between stretches with good stretch response)   Ext in lie 10  Ext in standing 10  LTR 10  Supine Active HS stretch 30 sec holdx 4   Seated Active HS stretch 30 sec holdx 4    Stanley received therapeutic exercises to develop/improved posture, cardiovascular endurance, muscular endurance, lumbar/cervical ROM, strength and muscular endurance for 40 minutes including the following exercises: lumbar med-x machine, torso rotation, knee curl, knee extension, chest press and seated row, triceps extension, biceps curls, leg press and hip abduction.     Written Home Exercises Provided:   Handouts were given to the patient. Pt demo good understanding of the education provided. Stanley demonstrated good return demonstration of activities.       EIS 10x  EIL 10x  LTR 10x  Supine HSS c strap 10s/h 3x  Flexion in lie 2/day 10 reps  Supine Active HS stretch 30 sec holdx 4, 2x daily  Seated Active HS stretch 30 sec holdx 4, 2x daily    Lumbar roll use compliance: yes  Additional exercises taught this treatment session:    Tennis ball wall massage daily    Assessment     Patient tolerated tx well with a decrease in his LB symptoms.  Patient was able to tolerate a weight increase on lumbar med-x machine.  Review HEP with patient.  Patient was able to perform exercises with min vc's for form. Introduced wall tennis ball trigger point massage to decrease his LBP. He will do at home.   Patient performed all peripheral resistance machines with no increases in symptoms.  Patient finished with ice in z-lie position.  Patient no symptoms upon leaving. Patient is making good progress towards established goals.  Pt will continue to benefit from skilled outpatient physical therapy to address the deficits stated in the impairment chart, provide pt/family education and to maximize pt's level of independence in the home and  community environment.       Pt's spiritual, cultural and educational needs considered and pt agreeable to plan of care and goals as stated below:     Medical necessity is demonstrated by the following IMPAIRMENTS/PROBLEMS:  Decreased lumbar strength  Decreased lumbar ROM  Poor posture      Goals:   Short term goals:  6 weeks or 10 visits   1.  Pt will demonstrate increased lumbar ROM by at least 3 degrees from the initial ROM value with improvements noted in functional ROM and ability to perform ADLs (progressing, not met)  2.  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  Patient report a reduction in worst pain score by 1-2 points for improved tolerance during work and recreational activities (progressing, not met)  4.  Pt able to perform HEP correctly with minimal cueing or supervision for therapist (progressing, not met)        Long term goals: 13 weeks or 20 visits   1. Pt will demonstrate increased lumbar ROM by at least 12 degrees from initial ROM value, resulting in improved ability to perform functional fwd bending while standing and sitting.   2. Pt will demonstrate increased maximum isometric torque value by 40% when compared to the initial value resulting in improved ability to perform bending, lifting, and carrying activities safely, confidently.  3. Pt to demonstrate ability to independently control and reduce their pain through posture positioning and mechanical movements throughout a typical day.  4.  Patient will demonstrate improved overall function per FOTO Survey to CJ = at least 20% but < 40% impaired, limited or restricted score or less.    FOTO: Focus on Therapeutic Outcomes   Category: lumbar   % Impaired: 51 %    Plan   Continue with established Plan of Care towards established PT goals.

## 2017-07-10 ENCOUNTER — CLINICAL SUPPORT (OUTPATIENT)
Dept: REHABILITATION | Facility: OTHER | Age: 57
End: 2017-07-10
Attending: PHYSICAL MEDICINE & REHABILITATION
Payer: COMMERCIAL

## 2017-07-10 DIAGNOSIS — M51.36 DDD (DEGENERATIVE DISC DISEASE), LUMBAR: ICD-10-CM

## 2017-07-10 PROBLEM — M51.369 DDD (DEGENERATIVE DISC DISEASE), LUMBAR: Status: ACTIVE | Noted: 2017-07-10

## 2017-07-10 PROCEDURE — G8978 MOBILITY CURRENT STATUS: HCPCS | Mod: CK | Performed by: PHYSICAL MEDICINE & REHABILITATION

## 2017-07-10 PROCEDURE — G8979 MOBILITY GOAL STATUS: HCPCS | Mod: CJ | Performed by: PHYSICAL MEDICINE & REHABILITATION

## 2017-07-10 PROCEDURE — 97750 PHYSICAL PERFORMANCE TEST: CPT | Performed by: PHYSICAL MEDICINE & REHABILITATION

## 2017-07-10 PROCEDURE — 97110 THERAPEUTIC EXERCISES: CPT | Performed by: PHYSICAL MEDICINE & REHABILITATION

## 2017-07-10 NOTE — PROGRESS NOTES
[facility]          Name: Stanley Stewart  Clinic Number: [id number]  Date of Treatment: 07/10/2017   Diagnosis:   Encounter Diagnosis   Name Primary?    DDD (degenerative disc disease), lumbar      Physician: Acacia Ramirez, *    Pain pattern determined: 1 PEP  Plan of care signed: 2017, due 17    Time in: 10:00  Time Out: 11:00    Total Treatment time: 45  Precautions: Blood pressure/syncope    Visit #: 10, IM retest      Assessment 17  Assessment due: 17 done 7/10/17  Assessment due: 8/10/17    Subjective   Stanley reports minimal  improvement of symptoms.   He feels stronger, but still has the same LBP pain.    He does note he still has pain unless he takes mobic daily.  He stretches at least 2/day.  He sits most of the day.  But his apple watch cues him to stretch every hour. Encouraged more consistent stretching, especially ext in standing at work.  He uses lumbar roll.       Patient reports their pain to be 3/10 on a 0-10 scale with 0 being no pain and 10 being the worst pain imaginable.  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 Multi -segmental flexion   DN due to reduced lumbar flexion and tight hamstrings -he has flexion stretches for low back and hamstring stretching in HEP  7. Multi -segmental extension  DN due  to strength issues, he is performing program and prone multifidus ex added to HEP today  8. Multi segmental Rotation     FN  9.  Single leg stance                  FN   10.  Over head deep squat     DN due to  tight calves, added calf stretching to HEP      Abbreviations:  FN - functional non painful  FP - functional painful  DP - dysfunctional painful  DN - dysfunctional non painful    Prone multifidus test  Poor contraction contralateral with right leg raise        ROM back:  7/10/17  Fingers 15 inches from floor, reduced lumbar curve and tight hamstrings ( double knee to chest added and he is doing hamstring stretch)  Ext in lie min loss, improved    Treatment    Pt was instructed in and performed the following:     Stanley received therapeutic exercises to develop/improved posture, cardiovascular endurance, muscular endurance, lumbar/cervical ROM, strength and muscular endurance for 40 minutes including the following exercises: lumbar med-x machine,      Flexion in lie 10x reps (Mild v/c to relax neck and to let go of legs in between stretches with good stretch response)   Ext in lie 10  Ext in standing 10  LTR 10  Supine Active HS stretch 30 sec holdx 4   Seated Active HS stretch 30 sec holdx 4    Based on SFMA test and breakdown added:  Calf stretching against wall 5 reps 10 sec 2/day  Prone lie multifidus activation and SLR 10 reps daily    [facility] 7/10/2017   Visit Number 10   VAS Pain Rating 3   Treadmill Time (in min.) 10   Speed 3   Extension in Lying -   Extension in Standing 10   Flexion in Lying 10   Lumbar Extension Seat Pad -   Femur Restraint -   Top Dead Center -   Counterweight -   Lumbar Flexion 48   Lumbar Extension 0   Lumbar Peak Torque 167   Min Torque 95   Percent From Norm 60   Percent Change from Initial 111   Lumbar Weight -   Repetitions -   Rating of Perceived Exertion -   Ice - Z Lie (in min.) 10           Peripheral muscle strengthening which included 1 set of 15-20  repetitions at a slow, controlled 7 second per rep pace focused on strengthening supporting musculature for improved body mechanics and functional mobility.  Pt and therapist focused on proper form during treatment to ensure optimal strengthening of each targeted muscle group.  Machines were utilized including torso rotation, leg extension, leg curl, chest press, upright row, tricep extension, bicep curl, leg press, and hip abduction.       torso rotation, knee curl, knee extension, chest press and seated row, triceps extension, biceps curls, leg press and hip abduction.     Written Home Exercises Provided:   Handouts were given to the patient. Pt demo good understanding of the education provided. Stanley demonstrated good return demonstration of activities.       EIS 10x  EIL 10x  LTR 10x  Supine HSS c strap 10s/h 3x  Flexion in lie 2/day 10 reps  Supine Active HS stretch 30 sec holdx 4, 2x daily  Seated Active HS stretch 30 sec holdx 4, 2x daily    Based on SFMA test and breakdown added:  Calf stretching against wall 5 reps 10 sec 2/day  Prone lie multifidus activation and SLR 10 reps daily      Lumbar roll use compliance: yes  Additional exercises taught this treatment session:      Based on SFMA test and breakdown added:  Calf stretching against wall 5 reps 10 sec 2/day  Prone lie multifidus activation and SLR 10 reps daily      Assessment       Patient has attended 10 visits at [facility] which included MD evaluation, PT evaluation with isometric testing, and physical therapy treatment including HEP instruction, education, aerobic work, dynamic strengthening on med ex equipment for the spine, and whole body strengthening on med ex equipment with increasing weight loads.  Patient  is demonstrating increased ability to reduce symptoms, improved posture, improved lumbar  ROM, and improved   strength on med ex test by  111% average. Reassessment and SFMA breakdown reveals continued lack of back strength,  reduced flexion ROM, tight hamstrings, tight calves, and reduced multifidus contraction.  He will continue to attend therapy to address these issues.  Patient tolerated IM retest and there ex well.   Patient performed all peripheral resistance machines with no increases in symptoms.  Patient finished with ice in z-lie position.  Patient no symptoms upon leaving. Patient is making good progress towards established goals.  Pt will continue to benefit from skilled outpatient physical therapy to address the deficits stated in the impairment chart, provide pt/family education and to maximize pt's level of independence in the home and community environment.       Pt's spiritual, cultural and educational needs considered and pt agreeable to plan of care and goals as stated below:         Patient retested at visit 10  and shows improvement in:  Improved posture, using lumbar roll  Improved lumbar/   ROM,  initially on med ex test 0-30  and   Currently 0-48  Improved strength at each test point on lumbar med ex IM test with 111%  average improvement noted with Reduced pain  Noted by patient  Initial outcome tool score 51% limited  and current outcome tool score 50%        Goals:   Short term goals:  6 weeks or 10 visits   1.  Pt will demonstrate increased lumbar ROM by at least 3 degrees from the initial ROM value with improvements noted in functional ROM and ability to perform ADLs (  met)  2.  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  Patient report a reduction in worst pain score by 1-2 points for improved tolerance during work and recreational activities (progressing, not met)  4.  Pt able to perform HEP correctly with minimal cueing or supervision for therapist (progressing, not met)        Long term goals: 13 weeks or 20 visits   1. Pt will demonstrate increased lumbar ROM by at least 12 degrees from initial ROM value,  resulting in improved ability to perform functional fwd bending while standing and sitting.   2. Pt will demonstrate increased maximum isometric torque value by 40% when compared to the initial value resulting in improved ability to perform bending, lifting, and carrying activities safely, confidently.  3. Pt to demonstrate ability to independently control and reduce their pain through posture positioning and mechanical movements throughout a typical day.  4.  Patient will demonstrate improved overall function per FOTO Survey to CJ = at least 20% but < 40% impaired, limited or restricted score or less.    FOTO: Focus on Therapeutic Outcomes   Category: lumbar   % Impaired: 51 %  % limited 50 %    At visit 10    Plan   Continue with established Plan of Care towards established PT goals.

## 2017-07-13 ENCOUNTER — CLINICAL SUPPORT (OUTPATIENT)
Dept: REHABILITATION | Facility: OTHER | Age: 57
End: 2017-07-13
Attending: PHYSICAL MEDICINE & REHABILITATION
Payer: COMMERCIAL

## 2017-07-13 DIAGNOSIS — M51.36 DDD (DEGENERATIVE DISC DISEASE), LUMBAR: Primary | ICD-10-CM

## 2017-07-13 PROCEDURE — 97110 THERAPEUTIC EXERCISES: CPT

## 2017-07-13 NOTE — PROGRESS NOTES
[facility]          Name: Stanley Stewart  Clinic Number: [id number]  Date of Treatment: 2017   Diagnosis:   Encounter Diagnosis   Name Primary?    DDD (degenerative disc disease), lumbar Yes     Physician: Acacia Ramirez, *    Pain pattern determined: 1 PEP  Plan of care signed: 2017, due 17    Time in: 8:40 (Pt 10 mins late)  Time Out: 9:40    Total Treatment time: 45  Precautions: Blood pressure/syncope    Visit #: 11    Assessment 17  Assessment due: 17 done 7/10/17  Assessment due: 8/10/17    Subjective   Stanley reports minimal  improvement of symptoms since the PT and his wife has done over pressure when he does his EIL.    He feels stronger, but his pain still lingers.    He does note he still has pain unless he takes mobic daily.  He stretches at least 2/day.  He sits most of the day.  But his apple watch cues him to stretch every hour. Encouraged more consistent stretching, especially ext in standing at work.  He uses lumbar roll.       Patient reports their pain to be 3/10 on a 0-10 scale with 0 being no pain and 10 being the worst pain imaginable.  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 Multi -segmental flexion   DN due to reduced lumbar flexion and tight hamstrings -he has flexion stretches for low back  and hamstring stretching in HEP  7. Multi -segmental extension  DN due to strength issues, he is performing program and prone multifidus ex added to HEP today  8. Multi segmental Rotation     FN  9.  Single leg stance                  FN   10.  Over head deep squat     DN due to  tight calves, added calf stretching to HEP      Abbreviations:  FN - functional non painful  FP - functional painful  DP - dysfunctional painful  DN - dysfunctional non painful    Prone multifidus test  Poor contraction contralateral with right leg raise        ROM back:  7/10/17  Fingers 15 inches from floor, reduced lumbar curve and tight hamstrings ( double knee to chest added and he is doing hamstring stretch)  Ext in lie min loss, improved    Treatment    Pt was instructed in and performed the following:     Stanley received therapeutic exercises to develop/improved posture, cardiovascular endurance, muscular endurance, lumbar/cervical ROM, strength and muscular endurance for 40 minutes including the following exercises: lumbar med-x machine,    [facility] 7/13/2017   Visit Number 11   VAS Pain Rating 3   Treadmill Time (in min.) 10   Speed 3   Extension in Lying -   Extension in Standing 10   Flexion in Lying 10   Lumbar Weight 95   Repetitions 20   Rating of Perceived Exertion 3   Ice - Z Lie (in min.) 10       Flexion in lie 10x reps (Mild v/c to relax neck and to let go of legs in between stretches with good stretch response)   Ext in lie 10  Ext in standing 10  LTR 10  Supine Active HS stretch 30 sec holdx 4   Seated Active HS stretch 30 sec holdx 4    Based on SFMA test and breakdown added:  Calf stretching against wall 5 reps 10 sec 2/day  Prone lie multifidus activation and SLR 10 reps daily        Peripheral muscle strengthening which included 1 set of 15-20 repetitions at a slow, controlled 7 second per rep pace focused on strengthening supporting musculature for improved body mechanics and functional mobility.  Pt and  therapist focused on proper form during treatment to ensure optimal strengthening of each targeted muscle group.  Machines were utilized including torso rotation, leg extension, leg curl, chest press, upright row, tricep extension, bicep curl, leg press, and hip abduction.       torso rotation, knee curl, knee extension, chest press and seated row, triceps extension, biceps curls, leg press and hip abduction.     Written Home Exercises Provided:   Handouts were given to the patient. Pt demo good understanding of the education provided. Stanley demonstrated good return demonstration of activities.       EIS 10x  EIL 10x  LTR 10x  Supine HSS c strap 10s/h 3x  Flexion in lie 2/day 10 reps  Supine Active HS stretch 30 sec holdx 4, 2x daily  Seated Active HS stretch 30 sec holdx 4, 2x daily    Based on SFMA test and breakdown added:  Calf stretching against wall 5 reps 10 sec 2/day  Prone lie multifidus activation and SLR 10 reps daily      Lumbar roll use compliance: yes  Additional exercises taught this treatment session:      Based on SFMA test and breakdown added:  Calf stretching against wall 5 reps 10 sec 2/day  Prone lie multifidus activation and SLR 10 reps daily      Assessment       Patient has attended with minimal LBP that did decrease during and post session. Pt performed his HEP well with min (A) with overpressure during EIL. Pt tolerated lumbar medx machine with a weight ^ with no c/o pain.  Patient performed all peripheral resistance machines with no increases in symptoms.  Patient finished with ice in z-lie position.  Patient no symptoms upon leaving. Patient is making good progress towards established goals.  Pt will continue to benefit from skilled outpatient physical therapy to address the deficits stated in the impairment chart, provide pt/family education and to maximize pt's level of independence in the home and community environment.       Pt's spiritual, cultural and educational needs considered  and pt agreeable to plan of care and goals as stated below:         Patient retested at visit 10  and shows improvement in:  Improved posture, using lumbar roll  Improved lumbar/   ROM,  initially on med ex test 0-30  and   Currently 0-48  Improved strength at each test point on lumbar med ex IM test with 111%  average improvement noted with Reduced pain  Noted by patient  Initial outcome tool score 51% limited  and current outcome tool score 50%        Goals:   Short term goals:  6 weeks or 10 visits   1.  Pt will demonstrate increased lumbar ROM by at least 3 degrees from the initial ROM value with improvements noted in functional ROM and ability to perform ADLs (  met)  2.  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  Patient report a reduction in worst pain score by 1-2 points for improved tolerance during work and recreational activities (progressing, not met)  4.  Pt able to perform HEP correctly with minimal cueing or supervision for therapist (progressing, not met)        Long term goals: 13 weeks or 20 visits   1. Pt will demonstrate increased lumbar ROM by at least 12 degrees from initial ROM value, resulting in improved ability to perform functional fwd bending while standing and sitting.   2. Pt will demonstrate increased maximum isometric torque value by 40% when compared to the initial value resulting in improved ability to perform bending, lifting, and carrying activities safely, confidently.  3. Pt to demonstrate ability to independently control and reduce their pain through posture positioning and mechanical movements throughout a typical day.  4.  Patient will demonstrate improved overall function per FOTO Survey to CJ = at least 20% but < 40% impaired, limited or restricted score or less.    FOTO: Focus on Therapeutic Outcomes   Category: lumbar   % Impaired: 51 %  % limited 50 %    At visit 10    Plan    Continue with established Plan of Care towards established PT goals.

## 2017-07-17 ENCOUNTER — CLINICAL SUPPORT (OUTPATIENT)
Dept: REHABILITATION | Facility: OTHER | Age: 57
End: 2017-07-17
Attending: PHYSICAL MEDICINE & REHABILITATION
Payer: COMMERCIAL

## 2017-07-17 DIAGNOSIS — M51.36 DDD (DEGENERATIVE DISC DISEASE), LUMBAR: Primary | ICD-10-CM

## 2017-07-17 PROCEDURE — 97110 THERAPEUTIC EXERCISES: CPT

## 2017-07-17 NOTE — PROGRESS NOTES
[facility]          Name: Stanley Stewart  Clinic Number: [id number]  Date of Treatment: 2017   Diagnosis:   Encounter Diagnosis   Name Primary?    DDD (degenerative disc disease), lumbar Yes     Physician: Acacia Ramirez, *    Pain pattern determined: 1 PEP  Plan of care signed: 2017, due 17    Time in: 1:10 (Pt 10 mins late)  Time Out: 2:10    Total Treatment time: 45  Precautions: Blood pressure/syncope    Visit #: 12    Assessment 17  Assessment due: 17 done 7/10/17  Assessment due: 8/10/17    Subjective   Stanley reports minimal  improvement of symptoms since the PT and his wife has done over pressure when he does his EIL.    He feels stronger, but his pain still lingers.    He does note he still has pain unless he takes mobic daily.  He stretches at least 2/day.  He sits most of the day.  But his apple watch cues him to stretch every hour. Encouraged more consistent stretching, especially ext in standing at work.  He uses lumbar roll.       Patient reports their pain to be 3/10 on a 0-10 scale with 0 being no pain and 10 being the worst pain imaginable.  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 Multi -segmental flexion   DN due to reduced lumbar flexion and tight hamstrings -he has flexion stretches for low back  and hamstring stretching in HEP  7. Multi -segmental extension  DN due to strength issues, he is performing program and prone multifidus ex added to HEP today  8. Multi segmental Rotation     FN  9.  Single leg stance                  FN   10.  Over head deep squat     DN due to  tight calves, added calf stretching to HEP      Abbreviations:  FN - functional non painful  FP - functional painful  DP - dysfunctional painful  DN - dysfunctional non painful    Prone multifidus test  Poor contraction contralateral with right leg raise        ROM back:  7/10/17  Fingers 15 inches from floor, reduced lumbar curve and tight hamstrings ( double knee to chest added and he is doing hamstring stretch)  Ext in lie min loss, improved    Treatment    Pt was instructed in and performed the following:     Stanley received therapeutic exercises to develop/improved posture, cardiovascular endurance, muscular endurance, lumbar/cervical ROM, strength and muscular endurance for 40 minutes including the following exercises: lumbar med-x machine,      [facility] 7/17/2017   Visit Number 12   VAS Pain Rating 3   Treadmill Time (in min.) 10   Speed 3   Extension in Lying -   Extension in Standing 10   Flexion in Lying 10   Lumbar Weight 100   Repetitions 20   Rating of Perceived Exertion 2   Ice - Z Lie (in min.) 10       Flexion in lie 10x reps (Mild v/c to relax neck and to let go of legs in between stretches with good stretch response)   Ext in lie 10  Ext in standing 10  LTR 10  Supine Active HS stretch 30 sec holdx 4   Seated Active HS stretch 30 sec holdx 4    Based on SFMA test and breakdown added:  Calf stretching against wall 5 reps 10 sec 2/day  Prone lie multifidus activation and SLR 10 reps daily        Peripheral muscle strengthening which included 1 set of 15-20 repetitions at a slow, controlled 7 second per rep pace focused on strengthening supporting musculature for improved body mechanics and functional mobility.  Pt  and therapist focused on proper form during treatment to ensure optimal strengthening of each targeted muscle group.  Machines were utilized including torso rotation, leg extension, leg curl, chest press, upright row, tricep extension, bicep curl, leg press, and hip abduction.       torso rotation, knee curl, knee extension, chest press and seated row, triceps extension, biceps curls, leg press and hip abduction.     Written Home Exercises Provided:   Handouts were given to the patient. Pt demo good understanding of the education provided. Stanley demonstrated good return demonstration of activities.       EIS 10x  EIL 10x  LTR 10x  Supine HSS c strap 10s/h 3x  Flexion in lie 2/day 10 reps  Supine Active HS stretch 30 sec holdx 4, 2x daily  Seated Active HS stretch 30 sec holdx 4, 2x daily    Based on SFMA test and breakdown added:  Calf stretching against wall 5 reps 10 sec 2/day  Prone lie multifidus activation and SLR 10 reps daily      Lumbar roll use compliance: yes  Additional exercises taught this treatment session:      Based on SFMA test and breakdown added:  Calf stretching against wall 5 reps 10 sec 2/day  Prone lie multifidus activation and SLR 10 reps daily      Assessment       Patient has attended with minimal LBP that did decrease during and post session. Pt performed his HEP well with min (A) with overpressure during EIL. Pt tolerated lumbar medx machine with a weight ^ with no c/o pain.  Patient performed all peripheral resistance machines with no increases in symptoms.  Patient finished with ice in z-lie position.  Patient no symptoms upon leaving. Patient is making good progress towards established goals.  Pt will continue to benefit from skilled outpatient physical therapy to address the deficits stated in the impairment chart, provide pt/family education and to maximize pt's level of independence in the home and community environment.       Pt's spiritual, cultural and educational needs  considered and pt agreeable to plan of care and goals as stated below:         Patient retested at visit 10  and shows improvement in:  Improved posture, using lumbar roll  Improved lumbar/   ROM,  initially on med ex test 0-30  and   Currently 0-48  Improved strength at each test point on lumbar med ex IM test with 111%  average improvement noted with Reduced pain  Noted by patient  Initial outcome tool score 51% limited  and current outcome tool score 50%        Goals:   Short term goals:  6 weeks or 10 visits   1.  Pt will demonstrate increased lumbar ROM by at least 3 degrees from the initial ROM value with improvements noted in functional ROM and ability to perform ADLs (  met)  2.  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  Patient report a reduction in worst pain score by 1-2 points for improved tolerance during work and recreational activities (progressing, not met)  4.  Pt able to perform HEP correctly with minimal cueing or supervision for therapist (progressing, not met)        Long term goals: 13 weeks or 20 visits   1. Pt will demonstrate increased lumbar ROM by at least 12 degrees from initial ROM value, resulting in improved ability to perform functional fwd bending while standing and sitting.   2. Pt will demonstrate increased maximum isometric torque value by 40% when compared to the initial value resulting in improved ability to perform bending, lifting, and carrying activities safely, confidently.  3. Pt to demonstrate ability to independently control and reduce their pain through posture positioning and mechanical movements throughout a typical day.  4.  Patient will demonstrate improved overall function per FOTO Survey to CJ = at least 20% but < 40% impaired, limited or restricted score or less.    FOTO: Focus on Therapeutic Outcomes   Category: lumbar   % Impaired: 51 %  % limited 50 %    At visit  10    Plan   Continue with established Plan of Care towards established PT goals.

## 2017-07-19 ENCOUNTER — CLINICAL SUPPORT (OUTPATIENT)
Dept: REHABILITATION | Facility: OTHER | Age: 57
End: 2017-07-19
Attending: PHYSICAL MEDICINE & REHABILITATION
Payer: COMMERCIAL

## 2017-07-19 DIAGNOSIS — M51.36 DDD (DEGENERATIVE DISC DISEASE), LUMBAR: Primary | ICD-10-CM

## 2017-07-19 NOTE — PROGRESS NOTES
[facility]          Name: Stanley Stewart  Clinic Number: [id number]  Date of Treatment: 2017   Diagnosis:   Encounter Diagnosis   Name Primary?    DDD (degenerative disc disease), lumbar Yes     Physician: Acacia Ramirez, *    Pain pattern determined: 1 PEP  Plan of care signed: 2017, due 17    Time in: 1:00   Time Out: 2:00    Total Treatment time: 45  Precautions: Blood pressure/syncope    Visit #: 14    Assessment 17  Assessment due: 17 done 7/10/17  Assessment due: 8/10/17    Subjective   Stanley reports minimal  improvement of symptoms since the PT and his wife has done over pressure when he does his EIL.    He feels stronger, but his pain still lingers.    He does note he still has pain unless he takes mobic daily.  He stretches at least 2/day.  He sits most of the day.  But his apple watch cues him to stretch every hour. Encouraged more consistent stretching, especially ext in standing at work.  He uses lumbar roll.       Patient reports their pain to be 3/10 on a 0-10 scale with 0 being no pain and 10 being the worst pain imaginable.  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 Multi -segmental flexion   DN due to reduced lumbar flexion and tight hamstrings -he has flexion stretches for low back and hamstring  stretching in HEP  7. Multi -segmental extension  DN due to strength issues, he is performing program and prone multifidus ex added to HEP today  8. Multi segmental Rotation     FN  9.  Single leg stance                  FN   10.  Over head deep squat     DN due to  tight calves, added calf stretching to HEP      Abbreviations:  FN - functional non painful  FP - functional painful  DP - dysfunctional painful  DN - dysfunctional non painful    Prone multifidus test  Poor contraction contralateral with right leg raise        ROM back:  7/10/17  Fingers 15 inches from floor, reduced lumbar curve and tight hamstrings ( double knee to chest added and he is doing hamstring stretch)  Ext in lie min loss, improved    Treatment    Pt was instructed in and performed the following:     Stanley received therapeutic exercises to develop/improved posture, cardiovascular endurance, muscular endurance, lumbar/cervical ROM, strength and muscular endurance for 40 minutes including the following exercises: lumbar med-x machine,    [facility] 7/19/2017   Visit Number 13   VAS Pain Rating 3   Treadmill Time (in min.) 10   Speed 3   Extension in Lying -   Extension in Standing 10   Flexion in Lying 10   Lumbar Weight 105   Repetitions 20   Rating of Perceived Exertion 3   Ice - Z Lie (in min.) 10           Flexion in lie 10x reps (Mild v/c to relax neck and to let go of legs in between stretches with good stretch response)   Ext in lie 10  Ext in standing 10  LTR 10  Supine Active HS stretch 30 sec holdx 4   Seated Active HS stretch 30 sec holdx 4    Based on SFMA test and breakdown added:  Calf stretching against wall 5 reps 10 sec 2/day  Prone lie multifidus activation and SLR 10 reps daily        Peripheral muscle strengthening which included 1 set of 15-20 repetitions at a slow, controlled 7 second per rep pace focused on strengthening supporting musculature for improved body mechanics and functional mobility.  Pt and  therapist focused on proper form during treatment to ensure optimal strengthening of each targeted muscle group.  Machines were utilized including torso rotation, leg extension, leg curl, chest press, upright row, tricep extension, bicep curl, leg press, and hip abduction.       torso rotation, knee curl, knee extension, chest press and seated row, triceps extension, biceps curls, leg press and hip abduction.     Written Home Exercises Provided:   Handouts were given to the patient. Pt demo good understanding of the education provided. Stanley demonstrated good return demonstration of activities.       EIS 10x  EIL 10x  LTR 10x  Supine HSS c strap 10s/h 3x  Flexion in lie 2/day 10 reps  Supine Active HS stretch 30 sec holdx 4, 2x daily  Seated Active HS stretch 30 sec holdx 4, 2x daily    Based on SFMA test and breakdown added:  Calf stretching against wall 5 reps 10 sec 2/day  Prone lie multifidus activation and SLR 10 reps daily      Lumbar roll use compliance: yes  Additional exercises taught this treatment session:      Based on SFMA test and breakdown added:  Calf stretching against wall 5 reps 10 sec 2/day  Prone lie multifidus activation and SLR 10 reps daily      Assessment       Patient has attended with minimal LBP that did decrease during and post session. Pt performed his HEP well with min (A) with overpressure during EIL. Pt tolerated lumbar medx machine with a weight ^ with no c/o pain.  Patient performed all peripheral resistance machines with no increases in symptoms.He states his strength is not the problem.  Patient finished with ice in z-lie position.  Patient no symptoms upon leaving. Patient is making good progress towards established goals.  Pt will continue to benefit from skilled outpatient physical therapy to address the deficits stated in the impairment chart, provide pt/family education and to maximize pt's level of independence in the home and community environment.       Pt's spiritual,  cultural and educational needs considered and pt agreeable to plan of care and goals as stated below:         Patient retested at visit 10  and shows improvement in:  Improved posture, using lumbar roll  Improved lumbar/   ROM,  initially on med ex test 0-30  and   Currently 0-48  Improved strength at each test point on lumbar med ex IM test with 111%  average improvement noted with Reduced pain  Noted by patient  Initial outcome tool score 51% limited  and current outcome tool score 50%        Goals:   Short term goals:  6 weeks or 10 visits   1.  Pt will demonstrate increased lumbar ROM by at least 3 degrees from the initial ROM value with improvements noted in functional ROM and ability to perform ADLs (  met)  2.  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  Patient report a reduction in worst pain score by 1-2 points for improved tolerance during work and recreational activities (progressing, not met)  4.  Pt able to perform HEP correctly with minimal cueing or supervision for therapist (progressing, not met)        Long term goals: 13 weeks or 20 visits   1. Pt will demonstrate increased lumbar ROM by at least 12 degrees from initial ROM value, resulting in improved ability to perform functional fwd bending while standing and sitting.   2. Pt will demonstrate increased maximum isometric torque value by 40% when compared to the initial value resulting in improved ability to perform bending, lifting, and carrying activities safely, confidently.  3. Pt to demonstrate ability to independently control and reduce their pain through posture positioning and mechanical movements throughout a typical day.  4.  Patient will demonstrate improved overall function per FOTO Survey to CJ = at least 20% but < 40% impaired, limited or restricted score or less.    FOTO: Focus on Therapeutic Outcomes   Category: lumbar   % Impaired: 51 %  %  limited 50 %    At visit 10    Plan   Continue with established Plan of Care towards established PT goals.

## 2017-07-20 ENCOUNTER — PATIENT MESSAGE (OUTPATIENT)
Dept: INTERNAL MEDICINE | Facility: CLINIC | Age: 57
End: 2017-07-20

## 2017-07-20 ENCOUNTER — LAB VISIT (OUTPATIENT)
Dept: LAB | Facility: HOSPITAL | Age: 57
End: 2017-07-20
Attending: INTERNAL MEDICINE
Payer: COMMERCIAL

## 2017-07-20 DIAGNOSIS — M47.816 LUMBAR FACET ARTHROPATHY: Primary | ICD-10-CM

## 2017-07-20 DIAGNOSIS — E78.00 PURE HYPERCHOLESTEROLEMIA: ICD-10-CM

## 2017-07-20 LAB
ALT SERPL W/O P-5'-P-CCNC: 32 U/L
AST SERPL-CCNC: 22 U/L
CHOLEST/HDLC SERPL: 3.1 {RATIO}
HDL/CHOLESTEROL RATIO: 32.7 %
HDLC SERPL-MCNC: 147 MG/DL
HDLC SERPL-MCNC: 48 MG/DL
LDLC SERPL CALC-MCNC: 59.6 MG/DL
NONHDLC SERPL-MCNC: 99 MG/DL
TRIGL SERPL-MCNC: 197 MG/DL

## 2017-07-20 PROCEDURE — 84450 TRANSFERASE (AST) (SGOT): CPT

## 2017-07-20 PROCEDURE — 80061 LIPID PANEL: CPT

## 2017-07-20 PROCEDURE — 84460 ALANINE AMINO (ALT) (SGPT): CPT

## 2017-07-20 PROCEDURE — 36415 COLL VENOUS BLD VENIPUNCTURE: CPT

## 2017-07-24 ENCOUNTER — PATIENT MESSAGE (OUTPATIENT)
Dept: PAIN MEDICINE | Facility: CLINIC | Age: 57
End: 2017-07-24

## 2017-07-25 ENCOUNTER — TELEPHONE (OUTPATIENT)
Dept: INTERNAL MEDICINE | Facility: CLINIC | Age: 57
End: 2017-07-25

## 2017-07-25 ENCOUNTER — CLINICAL SUPPORT (OUTPATIENT)
Dept: REHABILITATION | Facility: OTHER | Age: 57
End: 2017-07-25
Attending: PHYSICAL MEDICINE & REHABILITATION
Payer: COMMERCIAL

## 2017-07-25 DIAGNOSIS — M51.36 DDD (DEGENERATIVE DISC DISEASE), LUMBAR: ICD-10-CM

## 2017-07-25 PROCEDURE — 97110 THERAPEUTIC EXERCISES: CPT

## 2017-07-25 NOTE — PROGRESS NOTES
[facility]          Name: Stanley Stewart  Clinic Number: [id number]  Date of Treatment: 2017   Diagnosis:   Encounter Diagnosis   Name Primary?    DDD (degenerative disc disease), lumbar      Physician: Acacia Ramirez, *    Pain pattern determined: 1 PEP  Plan of care signed: 2017, due 17    Time in: 1:00   Time Out: 2:00    Total Treatment time: 45  Precautions: Blood pressure/syncope    Visit #: 14    Assessment 17  Assessment due: 17 done 7/10/17  Assessment due: 8/10/17    Subjective   Stanley reports no improvements in pain.  He states that he is pleased with the strength gain and the knowledge that he has gained during the time here but he has not had any improvements in pain.  He is compliant with HEP.     Patient reports their pain to be 4/10 on a 0-10 scale with 0 being no pain and 10 being the worst pain imaginable.  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 Multi -segmental flexion   DN due to reduced lumbar flexion and tight hamstrings -he has flexion stretches for low back and hamstring stretching in HEP  7. Multi -segmental extension  DN due to strength issues, he is performing program and prone multifidus ex added to HEP today  8. Multi segmental Rotation     FN  9.  Single leg stance                  FN   10.   Over head deep squat     DN due to  tight calves, added calf stretching to HEP    Abbreviations:  FN - functional non painful  FP - functional painful  DP - dysfunctional painful  DN - dysfunctional non painful    Prone multifidus test  Poor contraction contralateral with right leg raise    ROM back:  7/10/17  Fingers 15 inches from floor, reduced lumbar curve and tight hamstrings ( double knee to chest added and he is doing hamstring stretch)  Ext in lie min loss, improved    Treatment    Pt was instructed in and performed the following:     Stanley received therapeutic exercises to develop/improved posture, cardiovascular endurance, muscular endurance, lumbar/cervical ROM, strength and muscular endurance for 40 minutes including the following exercises: lumbar med-x machine,    [facility] 7/25/2017   Visit Number 14   VAS Pain Rating 4   Treadmill Time (in min.) 10   Speed 3   Extension in Lying 10   Extension in Standing 10   Flexion in Lying -   Lumbar Extension Seat Pad -   Femur Restraint -   Top Dead Center -   Counterweight -   Lumbar Flexion -   Lumbar Extension -   Lumbar Peak Torque -   Min Torque -   Percent From Norm -   Percent Change from Initial -   Lumbar Weight 110   Repetitions 20   Rating of Perceived Exertion 3   Ice - Z Lie (in min.) 10     Prone lie multifidus activation and SLR 10 reps    Based on SFMA test and breakdown added:  Calf stretching against wall 5 reps 10 sec 2/day  Prone lie multifidus activation and SLR 10 reps daily    Peripheral muscle strengthening which included 1 set of 15-20 repetitions at a slow, controlled 7 second per rep pace focused on strengthening supporting musculature for improved body mechanics and functional mobility.  Pt and therapist focused on proper form during treatment to ensure optimal strengthening of each targeted muscle group.  Machines were utilized including torso rotation, leg extension, leg curl, chest press, upright row, tricep extension,  bicep curl, leg press, and hip abduction.    Manual therapy: Manual therapy consisted of IASTM with edge tool.  IASTM was performed to lumbar paraspinals.  Patient reported no increased symptoms following.       Written Home Exercises Provided:   Handouts were given to the patient. Pt demo good understanding of the education provided. Stanley demonstrated good return demonstration of activities.       EIS 10x  EIL 10x  LTR 10x  Supine HSS c strap 10s/h 3x  Flexion in lie 2/day 10 reps  Supine Active HS stretch 30 sec holdx 4, 2x daily  Seated Active HS stretch 30 sec holdx 4, 2x daily    Based on SFMA test and breakdown added:  Calf stretching against wall 5 reps 10 sec 2/day  Prone lie multifidus activation and SLR 10 reps daily    Lumbar roll use compliance: yes  Additional exercises taught this treatment session:    Reviewed HEP    Assessment     Patient has attended with moderate LBP that did decrease with IASTM. Pt performed his HEP well with min (A) with overpressure during EIL. Pt tolerated lumbar medx machine with a weight ^ with no c/o pain.  Patient performed all peripheral resistance machines with no increases in symptoms. Patient finished with ice in z-lie position.  Patient no symptoms upon leaving. Patient is making good progress towards established goals.  Pt will continue to benefit from skilled outpatient physical therapy to address the deficits stated in the impairment chart, provide pt/family education and to maximize pt's level of independence in the home and community environment.       Pt's spiritual, cultural and educational needs considered and pt agreeable to plan of care and goals as stated below:     Patient retested at visit 10  and shows improvement in:  Improved posture, using lumbar roll  Improved lumbar/   ROM,  initially on med ex test 0-30  and   Currently 0-48  Improved strength at each test point on lumbar med ex IM test with 111%  average improvement noted with Reduced pain  Noted  by patient  Initial outcome tool score 51% limited  and current outcome tool score 50%        Goals:   Short term goals:  6 weeks or 10 visits   1.  Pt will demonstrate increased lumbar ROM by at least 3 degrees from the initial ROM value with improvements noted in functional ROM and ability to perform ADLs (  met)  2.  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  Patient report a reduction in worst pain score by 1-2 points for improved tolerance during work and recreational activities (progressing, not met)  4.  Pt able to perform HEP correctly with minimal cueing or supervision for therapist (progressing, not met)        Long term goals: 13 weeks or 20 visits   1. Pt will demonstrate increased lumbar ROM by at least 12 degrees from initial ROM value, resulting in improved ability to perform functional fwd bending while standing and sitting.   2. Pt will demonstrate increased maximum isometric torque value by 40% when compared to the initial value resulting in improved ability to perform bending, lifting, and carrying activities safely, confidently.  3. Pt to demonstrate ability to independently control and reduce their pain through posture positioning and mechanical movements throughout a typical day.  4.  Patient will demonstrate improved overall function per FOTO Survey to CJ = at least 20% but < 40% impaired, limited or restricted score or less.    FOTO: Focus on Therapeutic Outcomes   Category: lumbar   % Impaired: 51 %  % limited 50 %    At visit 10    Plan   Continue with established Plan of Care towards established PT goals.

## 2017-07-31 ENCOUNTER — CLINICAL SUPPORT (OUTPATIENT)
Dept: REHABILITATION | Facility: OTHER | Age: 57
End: 2017-07-31
Attending: PHYSICAL MEDICINE & REHABILITATION
Payer: COMMERCIAL

## 2017-07-31 DIAGNOSIS — M51.36 DDD (DEGENERATIVE DISC DISEASE), LUMBAR: Primary | ICD-10-CM

## 2017-07-31 PROCEDURE — 97110 THERAPEUTIC EXERCISES: CPT

## 2017-07-31 NOTE — PROGRESS NOTES
[facility]          Name: Stanley Stewart  Clinic Number: [id number]  Date of Treatment: 2017   Diagnosis:   Encounter Diagnosis   Name Primary?    DDD (degenerative disc disease), lumbar Yes     Physician: Acacia Ramirez, *    Pain pattern determined: 1 PEP  Plan of care signed: 2017, due 17    Time in: 9:30   Time Out: 10:30    Total Treatment time: 45  Precautions: Blood pressure/syncope    Visit #: 15    Assessment 17  Assessment due: 17 done 7/10/17  Assessment due: 8/10/17    Subjective   Stanley reports no pain at onset of treatment. He continues to report about the same about of pain sometime after prolonged walks. He states that he is pleased with the strength gain and the knowledge that he has gained during the time here but he has not had any improvements in pain.  He is compliant with HEP.     Patient reports their pain to be 0/10 on a 0-10 scale with 0 being no pain and 10 being the worst pain imaginable.  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 Multi -segmental flexion   DN due to reduced lumbar flexion and tight hamstrings -he has flexion stretches for low back and hamstring stretching in HEP  7. Multi -segmental extension  DN due to strength issues, he is performing program and prone multifidus ex added to HEP  today  8. Multi segmental Rotation     FN  9.  Single leg stance                  FN   10.  Over head deep squat     DN due to  tight calves, added calf stretching to HEP    Abbreviations:  FN - functional non painful  FP - functional painful  DP - dysfunctional painful  DN - dysfunctional non painful    Prone multifidus test  Poor contraction contralateral with right leg raise    ROM back:  7/10/17  Fingers 15 inches from floor, reduced lumbar curve and tight hamstrings ( double knee to chest added and he is doing hamstring stretch)  Ext in lie min loss, improved    Treatment    Pt was instructed in and performed the following:     Stanley received therapeutic exercises to develop/improved posture, cardiovascular endurance, muscular endurance, lumbar/cervical ROM, strength and muscular endurance for 40 minutes including the following exercises: lumbar med-x machine,    [facility] 7/31/2017   Visit Number 15   VAS Pain Rating 0   Treadmill Time (in min.) 10   Speed 3   Extension in Lying -   Extension in Standing 10   Flexion in Lying -   Lumbar Extension Seat Pad -   Femur Restraint -   Top Dead Center -   Counterweight -   Lumbar Flexion -   Lumbar Extension -   Lumbar Peak Torque -   Min Torque -   Percent From Norm -   Percent Change from Initial -   Lumbar Weight 116   Repetitions 20   Rating of Perceived Exertion 3   Ice - Z Lie (in min.) 10     Prone lie multifidus activation and SLR 10 reps    Based on SFMA test and breakdown added:  Calf stretching against wall 5 reps 10 sec 2/day  Prone lie multifidus activation and SLR 10 reps daily    Peripheral muscle strengthening which included 1 set of 15-20 repetitions at a slow, controlled 7 second per rep pace focused on strengthening supporting musculature for improved body mechanics and functional mobility.  Pt and therapist focused on proper form during treatment to ensure optimal strengthening of each targeted muscle group.  Machines were utilized  including torso rotation, leg extension, leg curl, chest press, upright row, tricep extension, bicep curl, leg press, and hip abduction.    Manual therapy: None      Written Home Exercises Provided:   Handouts were given to the patient. Pt demo good understanding of the education provided. Stanley demonstrated good return demonstration of activities.       EIS 10x  EIL 10x  LTR 10x  Supine HSS c strap 10s/h 3x  Flexion in lie 2/day 10 reps  Supine Active HS stretch 30 sec holdx 4, 2x daily  Seated Active HS stretch 30 sec holdx 4, 2x daily    Based on SFMA test and breakdown added:  Calf stretching against wall 5 reps 10 sec 2/day  Prone lie multifidus activation 10 reps daily  PPT and SLR 10x bilaterally     Lumbar roll use compliance: yes  Additional exercises taught this treatment session:    Reviewed HEP    Assessment     Patient has attended without LBP that did not change throughout treatment session. Pt performed his HEP well with moderate v/c. Focused on teaching appropriate TA activation during SLR. Also practiced PPT and TA activation in standing before performing treadmill exercise. Pt reported no pain during treadmill and walking activity throughout session. Pt tolerated lumbar medx machine with a weight ^ with no c/o pain.  Patient performed all peripheral resistance machines with no increases in symptoms. Patient finished with ice in z-lie position.  Patient no symptoms upon leaving. Patient is making good progress towards established goals.  Pt will continue to benefit from skilled outpatient physical therapy to address the deficits stated in the impairment chart, provide pt/family education and to maximize pt's level of independence in the home and community environment.       Pt's spiritual, cultural and educational needs considered and pt agreeable to plan of care and goals as stated below:     Patient retested at visit 10  and shows improvement in:  Improved posture, using lumbar roll  Improved  lumbar/   ROM,  initially on med ex test 0-30  and   Currently 0-48  Improved strength at each test point on lumbar med ex IM test with 111%  average improvement noted with Reduced pain  Noted by patient  Initial outcome tool score 51% limited  and current outcome tool score 50%        Goals:   Short term goals:  6 weeks or 10 visits   1.  Pt will demonstrate increased lumbar ROM by at least 3 degrees from the initial ROM value with improvements noted in functional ROM and ability to perform ADLs (  met)  2.  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  Patient report a reduction in worst pain score by 1-2 points for improved tolerance during work and recreational activities (progressing, not met)  4.  Pt able to perform HEP correctly with minimal cueing or supervision for therapist (progressing, not met)        Long term goals: 13 weeks or 20 visits   1. Pt will demonstrate increased lumbar ROM by at least 12 degrees from initial ROM value, resulting in improved ability to perform functional fwd bending while standing and sitting.   2. Pt will demonstrate increased maximum isometric torque value by 40% when compared to the initial value resulting in improved ability to perform bending, lifting, and carrying activities safely, confidently.  3. Pt to demonstrate ability to independently control and reduce their pain through posture positioning and mechanical movements throughout a typical day.  4.  Patient will demonstrate improved overall function per FOTO Survey to CJ = at least 20% but < 40% impaired, limited or restricted score or less.    FOTO: Focus on Therapeutic Outcomes   Category: lumbar   % Impaired: 51 %  % limited 50 %    At visit 10    Plan   Continue with established Plan of Care towards established PT goals.

## 2017-08-10 ENCOUNTER — OFFICE VISIT (OUTPATIENT)
Dept: PAIN MEDICINE | Facility: CLINIC | Age: 57
End: 2017-08-10
Attending: ANESTHESIOLOGY
Payer: COMMERCIAL

## 2017-08-10 ENCOUNTER — HOSPITAL ENCOUNTER (OUTPATIENT)
Dept: RADIOLOGY | Facility: OTHER | Age: 57
Discharge: HOME | End: 2017-08-10
Attending: ANESTHESIOLOGY
Payer: COMMERCIAL

## 2017-08-10 VITALS
WEIGHT: 289 LBS | TEMPERATURE: 98 F | RESPIRATION RATE: 18 BRPM | DIASTOLIC BLOOD PRESSURE: 92 MMHG | BODY MASS INDEX: 34.12 KG/M2 | HEIGHT: 77 IN | SYSTOLIC BLOOD PRESSURE: 140 MMHG | HEART RATE: 67 BPM

## 2017-08-10 DIAGNOSIS — M47.816 FACET ARTHROPATHY, LUMBAR: ICD-10-CM

## 2017-08-10 DIAGNOSIS — M51.36 DDD (DEGENERATIVE DISC DISEASE), LUMBAR: Primary | ICD-10-CM

## 2017-08-10 DIAGNOSIS — M51.36 DDD (DEGENERATIVE DISC DISEASE), LUMBAR: ICD-10-CM

## 2017-08-10 PROCEDURE — 72114 X-RAY EXAM L-S SPINE BENDING: CPT | Mod: 26,,, | Performed by: RADIOLOGY

## 2017-08-10 PROCEDURE — 99204 OFFICE O/P NEW MOD 45 MIN: CPT | Mod: S$GLB,,, | Performed by: ANESTHESIOLOGY

## 2017-08-10 PROCEDURE — 99999 PR PBB SHADOW E&M-EST. PATIENT-LVL III: CPT | Mod: PBBFAC,,, | Performed by: ANESTHESIOLOGY

## 2017-08-10 PROCEDURE — 3080F DIAST BP >= 90 MM HG: CPT | Mod: S$GLB,,, | Performed by: ANESTHESIOLOGY

## 2017-08-10 PROCEDURE — 3077F SYST BP >= 140 MM HG: CPT | Mod: S$GLB,,, | Performed by: ANESTHESIOLOGY

## 2017-08-10 PROCEDURE — 3008F BODY MASS INDEX DOCD: CPT | Mod: S$GLB,,, | Performed by: ANESTHESIOLOGY

## 2017-08-10 PROCEDURE — 72114 X-RAY EXAM L-S SPINE BENDING: CPT | Mod: TC

## 2017-08-10 RX ORDER — CELECOXIB 200 MG/1
CAPSULE ORAL
Qty: 60 CAPSULE | Refills: 0 | Status: SHIPPED | OUTPATIENT
Start: 2017-08-10 | End: 2017-08-10 | Stop reason: SDUPTHER

## 2017-08-10 RX ORDER — CELECOXIB 200 MG/1
CAPSULE ORAL
Qty: 180 CAPSULE | Refills: 0 | Status: ON HOLD | OUTPATIENT
Start: 2017-08-10 | End: 2017-10-11

## 2017-08-10 NOTE — PROGRESS NOTES
Chronic Pain - New Consult    Referring Physician: Kadeem Estrada MD    Chief Complaint:   Chief Complaint   Patient presents with    Back Pain     Right Low Back        SUBJECTIVE: Disclaimer: This note has been generated using voice-recognition software. There may be typographical errors that have been missed during proof-reading    Initial encounter:    Stanley Stewart presents to the clinic for the evaluation of low back pain. The pain started 10 years ago and symptoms have been worsening.    Brief history:    Pain Description:    The pain is located in the right low back area and radiates to the left leg.      At BEST  4/10     At WORST  10/10 on the WORST day.      On average pain is rated as 7/10.     Today the pain is rated as 6/10    The pain is described as aching, dull and constant      Symptoms interfere with daily functioning.     Exacerbating factors: Sitting, Laying, Bending and Getting out of bed/chair.      Mitigating factors medications and physical therapy.     Patient denies night fever/night sweats, urinary incontinence, bowel incontinence, significant weight loss, significant motor weakness and loss of sensations.  Patient denies any suicidal or homicidal ideations    Pain Medications:  Current:  Mobic 15mg / day for 15 years    Tried in Past:  NSAIDs -Mobic  TCA -Never  SNRI -Never  Anti-convulsants -Never  Muscle Relaxants -Never  Opioids-Never    Physical Therapy/Home Exercise: yes       report:  Not applicable    Pain Procedures: history of injections outside of facility - with relief on the left, not on the right    Chiropractor -never  Acupuncture - never  TENS unit -never  Spinal decompression -never  Joint replacement -never    Imaging: none available for review today    Past Medical History:   Diagnosis Date    Allergy     Degenerative disc disease     Hyperlipidemia     Hypertension     Male hypogonadism 7/19/2012     Past Surgical History:   Procedure Laterality Date     APPENDECTOMY      arthroscopic knee surg      left X 2, right X 1    INTUSSUSCEPTION REPAIR      KNEE SURGERY       Social History     Social History    Marital status:      Spouse name: N/A    Number of children: 3    Years of education: N/A     Occupational History     [employer]     Social History Main Topics    Smoking status: Former Smoker     Quit date: 7/19/1987    Smokeless tobacco: Never Used    Alcohol use 4.2 oz/week     7 Glasses of wine per week    Drug use: No    Sexual activity: Yes     Partners: Female     Other Topics Concern    Not on file     Social History Narrative    No narrative on file     Family History   Problem Relation Age of Onset    Hyperlipidemia Father     Hypertension Father     Heart disease Father     Liver disease Mother     Heart disease Brother     Heart disease Maternal Grandmother     Heart disease Paternal Grandfather        Review of patient's allergies indicates:   Allergen Reactions    Shellfish containing products      Other reaction(s): Hives       Current Outpatient Prescriptions   Medication Sig    lisinopril-hydrochlorothiazide (PRINZIDE,ZESTORETIC) 20-12.5 mg per tablet Take 1 tablet by mouth once daily    meloxicam (MOBIC) 15 MG tablet     sildenafil (REVATIO) 20 mg Tab Take 5 po 1 hour before sexual activity    tadalafil (CIALIS) 5 MG tablet Take 1 tablet (5 mg total) by mouth once daily.    vardenafil (LEVITRA) 20 MG tablet Take 1 tablet (20 mg total) by mouth daily as needed for Erectile Dysfunction. Take 1 hour before intercourse.    zolpidem (AMBIEN) 10 mg Tab TAKE 1 TABLET BY MOUTH EVERY DAY AT BEDTIME AS NEEDED    celecoxib (CELEBREX) 200 MG capsule Take 1 tablet a day for 1 week, if not helpful increase to BID     Current Facility-Administered Medications   Medication    [START ON 10/1/2017] testosterone Pllt 12 Pellet       REVIEW OF SYSTEMS:    GENERAL:  No weight loss, malaise or fevers.  HEENT:   No recent  "changes in vision or hearing  NECK:  Negative for lumps, no difficulty with swallowing.  RESPIRATORY:  Negative for cough, wheezing or shortness of breath, patient denies any recent URI.  CARDIOVASCULAR:  Negative for chest pain, leg swelling or palpitations.  GI:  Negative for abdominal discomfort, blood in stools or black stools or change in bowel habits.  MUSCULOSKELETAL:  See HPI.  SKIN:  Negative for lesions, rash, and itching.  PSYCH:  No mood disorder or recent psychosocial stressors.  Patients sleep is not disturbed secondary to pain.  HEMATOLOGY/LYMPHOLOGY:  Negative for prolonged bleeding, bruising easily or swollen nodes.  Patient is not currently taking any anti-coagulants  ENDO: No history of diabetes or thyroid dysfunction  NEURO:   No history of headaches, syncope, paralysis, seizures or tremors.  All other reviewed and negative other than HPI.    OBJECTIVE:    BP (!) 140/92   Pulse 67   Temp 97.6 °F (36.4 °C) (Oral)   Resp 18   Ht 6' 5" (1.956 m)   Wt 131.1 kg (289 lb 0.4 oz)   BMI 34.27 kg/m²     PHYSICAL EXAMINATION:    GENERAL: Well appearing, in no acute distress, alert and oriented x3.  PSYCH:  Mood and affect appropriate.  SKIN: Skin color, texture, turgor normal, no rashes or lesions.  HEAD/FACE:  Normocephalic, atraumatic. Cranial nerves grossly intact.  CV: RRR with palpation of the radial artery.  PULM: No evidence of respiratory difficulty, symmetric chest rise.  BACK: Straight leg raising in the sitting and supine positions is negative to radicular pain. There is pain with palpation over the facet joints of the lumbar spine bilaterally. There is decreased range of motion with extension to 15 degrees, and facet loading maneuvers cause reproducible pain L > R.    EXTREMITIES: Peripheral joint ROM is full and pain free without obvious instability or laxity in all four extremities. No deformities, edema, or skin discoloration. Good capillary refill.  MUSCULOSKELETAL: Hip, and knee " provocative maneuvers are negative.  There is  pain with palpation over the sacroiliac joints bilaterally.  There is no pain to palpation over the greater trochanteric bursa bilaterally.  FABERs test is positive.  FADIRs test is negative.   Bilateral lower extremity strength is normal and symmetric.  No atrophy or tone abnormalities are noted.  NEURO: Bilateral lower extremity coordination and muscle stretch reflexes are physiologic and symmetric.  Plantar response are downgoing. No clonus.  No loss of sensation is noted.  GAIT: normal.        CMP  Sodium   Date Value Ref Range Status   03/17/2017 142 136 - 145 mmol/L Final     Potassium   Date Value Ref Range Status   03/17/2017 4.4 3.5 - 5.1 mmol/L Final     Chloride   Date Value Ref Range Status   03/17/2017 106 95 - 110 mmol/L Final     CO2   Date Value Ref Range Status   03/17/2017 26 23 - 29 mmol/L Final     Glucose   Date Value Ref Range Status   03/17/2017 96 70 - 110 mg/dL Final     BUN, Bld   Date Value Ref Range Status   03/17/2017 21 (H) 6 - 20 mg/dL Final     Creatinine   Date Value Ref Range Status   03/17/2017 1.0 0.5 - 1.4 mg/dL Final     Calcium   Date Value Ref Range Status   03/17/2017 9.5 8.7 - 10.5 mg/dL Final     Total Protein   Date Value Ref Range Status   03/17/2017 7.0 6.0 - 8.4 g/dL Final     Albumin   Date Value Ref Range Status   03/17/2017 4.0 3.5 - 5.2 g/dL Final     Total Bilirubin   Date Value Ref Range Status   03/17/2017 0.7 0.1 - 1.0 mg/dL Final     Comment:     For infants and newborns, interpretation of results should be based  on gestational age, weight and in agreement with clinical  observations.  Premature Infant recommended reference ranges:  Up to 24 hours.............<8.0 mg/dL  Up to 48 hours............<12.0 mg/dL  3-5 days..................<15.0 mg/dL  6-29 days.................<15.0 mg/dL       Alkaline Phosphatase   Date Value Ref Range Status   03/17/2017 44 (L) 55 - 135 U/L Final     AST   Date Value Ref Range  Status   07/20/2017 22 10 - 40 U/L Final     ALT   Date Value Ref Range Status   07/20/2017 32 10 - 44 U/L Final     Anion Gap   Date Value Ref Range Status   03/17/2017 10 8 - 16 mmol/L Final     eGFR if    Date Value Ref Range Status   03/17/2017 >60.0 >60 mL/min/1.73 m^2 Final     eGFR if non    Date Value Ref Range Status   03/17/2017 >60.0 >60 mL/min/1.73 m^2 Final     Comment:     Calculation used to obtain the estimated glomerular filtration  rate (eGFR) is the CKD-EPI equation. Since race is unknown   in our information system, the eGFR values for   -American and Non--American patients are given   for each creatinine result.           ASSESSMENT: 57 y.o. year old male with pain, consistent with     Encounter Diagnoses   Name Primary?    Facet arthropathy, lumbar     DDD (degenerative disc disease), lumbar Yes       PLAN:     Discontinue meloxicam trial Celebrex 200 mg per day for 1 week if ineffective increase to twice a day    Flexion extension x-rays of the lumbar spine to evaluate for facet arthropathy and rule out instability    Depending on results of x-ray the patient may benefit from repeat facet joint injections with steroid versus medial branch nerve blocks to be followed by radiofrequency ablation of diagnostic.    The above plan and management options were discussed at length with patient. Patient is in agreement with the above and verbalized understanding. It will be communicated with the referring physician via electronic record, fax, or mail.    Zachary Salas  08/10/2017

## 2017-08-10 NOTE — LETTER
August 10, 2017      Kadeem Estrada MD  [address]  [facility] [address]           [facility]  [address]  [facility] [address]  Phone: [phone]  Fax: [phone]          Patient: Stanley Stewart   MR Number: [id number]   YOB: 1960   Date of Visit: 8/10/2017       Dear Dr. Kadeem Estrada:    Thank you for referring Stanley Stewart to me for evaluation. Attached you will find relevant portions of my assessment and plan of care.    If you have questions, please do not hesitate to call me. I look forward to following Stanlye Stewart along with you.    Sincerely,    Zachary Salas MD    Enclosure  CC:  No Recipients    If you would like to receive this communication electronically, please contact [email or web address] or [phone] to request more information on [employer] access.    For providers and/or their staff who would like to refer a patient to [facility], please contact us through our one-stop-shop provider referral line, [facility], at [phone].    If you feel you have received this communication in error or would no longer like to receive these types of communications, please e-mail [email or web address]

## 2017-08-11 ENCOUNTER — TELEPHONE (OUTPATIENT)
Dept: PAIN MEDICINE | Facility: CLINIC | Age: 57
End: 2017-08-11

## 2017-08-11 RX ORDER — ZOLPIDEM TARTRATE 10 MG/1
TABLET ORAL
Qty: 30 TABLET | Refills: 3 | Status: SHIPPED | OUTPATIENT
Start: 2017-08-11 | End: 2017-09-08 | Stop reason: SDUPTHER

## 2017-08-11 NOTE — TELEPHONE ENCOUNTER
Spoke with patient regarding xray results and injection orders per Dr Salas, patient verbalized an understanding, message forwarded to schedulers

## 2017-08-23 ENCOUNTER — HOSPITAL ENCOUNTER (OUTPATIENT)
Facility: OTHER | Age: 57
Discharge: HOME | End: 2017-08-23
Attending: ANESTHESIOLOGY | Admitting: ANESTHESIOLOGY
Payer: COMMERCIAL

## 2017-08-23 ENCOUNTER — SURGERY (OUTPATIENT)
Age: 57
End: 2017-08-23

## 2017-08-23 VITALS
HEART RATE: 58 BPM | DIASTOLIC BLOOD PRESSURE: 69 MMHG | BODY MASS INDEX: 33.06 KG/M2 | HEIGHT: 77 IN | TEMPERATURE: 98 F | RESPIRATION RATE: 18 BRPM | OXYGEN SATURATION: 96 % | SYSTOLIC BLOOD PRESSURE: 137 MMHG | WEIGHT: 280 LBS

## 2017-08-23 DIAGNOSIS — M51.36 DDD (DEGENERATIVE DISC DISEASE), LUMBAR: Primary | ICD-10-CM

## 2017-08-23 DIAGNOSIS — M47.816 FACET ARTHROPATHY, LUMBAR: ICD-10-CM

## 2017-08-23 DIAGNOSIS — G89.29 CHRONIC PAIN: ICD-10-CM

## 2017-08-23 PROCEDURE — 25000003 PHARM REV CODE 250: Performed by: ANESTHESIOLOGY

## 2017-08-23 PROCEDURE — 64493 INJ PARAVERT F JNT L/S 1 LEV: CPT | Mod: 50 | Performed by: ANESTHESIOLOGY

## 2017-08-23 PROCEDURE — S0020 INJECTION, BUPIVICAINE HYDRO: HCPCS | Performed by: ANESTHESIOLOGY

## 2017-08-23 PROCEDURE — 64494 INJ PARAVERT F JNT L/S 2 LEV: CPT | Mod: 50,,, | Performed by: ANESTHESIOLOGY

## 2017-08-23 PROCEDURE — 64494 INJ PARAVERT F JNT L/S 2 LEV: CPT | Mod: 50 | Performed by: ANESTHESIOLOGY

## 2017-08-23 PROCEDURE — 64495 INJ PARAVERT F JNT L/S 3 LEV: CPT | Mod: 50,,, | Performed by: ANESTHESIOLOGY

## 2017-08-23 PROCEDURE — 64493 INJ PARAVERT F JNT L/S 1 LEV: CPT | Mod: 50,,, | Performed by: ANESTHESIOLOGY

## 2017-08-23 PROCEDURE — 64495 INJ PARAVERT F JNT L/S 3 LEV: CPT | Mod: 50 | Performed by: ANESTHESIOLOGY

## 2017-08-23 RX ORDER — BUPIVACAINE HYDROCHLORIDE 5 MG/ML
INJECTION, SOLUTION EPIDURAL; INTRACAUDAL
Status: DISCONTINUED | OUTPATIENT
Start: 2017-08-23 | End: 2017-08-23 | Stop reason: HOSPADM

## 2017-08-23 RX ORDER — LIDOCAINE HYDROCHLORIDE 10 MG/ML
INJECTION INFILTRATION; PERINEURAL
Status: DISCONTINUED | OUTPATIENT
Start: 2017-08-23 | End: 2017-08-23 | Stop reason: HOSPADM

## 2017-08-23 RX ORDER — SODIUM CHLORIDE 9 MG/ML
500 INJECTION, SOLUTION INTRAVENOUS CONTINUOUS
Status: DISCONTINUED | OUTPATIENT
Start: 2017-08-23 | End: 2017-08-23 | Stop reason: HOSPADM

## 2017-08-23 RX ADMIN — LIDOCAINE HYDROCHLORIDE 10 ML: 10 INJECTION, SOLUTION INFILTRATION; PERINEURAL at 11:08

## 2017-08-23 RX ADMIN — BUPIVACAINE HYDROCHLORIDE 10 ML: 5 INJECTION, SOLUTION EPIDURAL; INTRACAUDAL; PERINEURAL at 11:08

## 2017-08-23 NOTE — OP NOTE
lUMBAR Medial Branch Block Under Fluoroscopy  Time-out taken to identify patient and procedure side prior to starting the procedure.            08/23/2017                                                         PROCEDURE:  Bilateral medial branch block at the transverse processes at the level of L4, L5, Sacral ala    REASON FOR PROCEDURE: Facet arthritis, degenerative, cervical spine [M47.892]    PHYSICIAN: Zachary Salas MD  ASSISTANTS: None    MEDICATIONS INJECTED: 0.5% bupivicane, 1mL at each level    LOCAL ANESTHETIC USED: Xylocaine 1% 10ml    SEDATION MEDICATIONS: None    ESTIMATED BLOOD LOSS:  None.    COMPLICATIONS:  None.    TECHNIQUE: Laying in a prone position, the patient was prepped and draped in the usual sterile fashion using ChloraPrep and fenestrated drape.  The level was determined under fluoroscopic guidance.  Local anesthetic was given by going down to the hub of the 27-gauge 1.25in needle and raising a wheel.  A 22-gauge 3.5inch needle was introduced to the anatomic local of the medial branch at each of the above levels using fluoroscopy in the AP, oblique, and lateral views.  After negative aspiration, medication was injected slowly. The patient tolerated the procedure well.       The patient was monitored after the procedure.  Patient was given post procedure and discharge instructions to follow at home.  We will see the patient back in two weeks or the patient may call to inform of status. The patient was discharged in a stable condition

## 2017-08-23 NOTE — DISCHARGE SUMMARY
Discharge Note  Short Stay      SUMMARY     Admit Date: 8/23/2017    Attending Physician: Zachary Salas    Discharge Diagnosis: Facet arthritis, degenerative, cervical spine [M47.892]    Discharge Physician: Zachary Salas      Discharge Date: 8/23/2017 12:01 PM       PROCEDURE:  Bilateral medial branch block at the transverse processes at the level of L4, L5, Sacral ala    REASON FOR PROCEDURE: Facet arthritis, degenerative, cervical spine [M47.892]    Disposition: Home or self care    Patient Instructions:   Current Discharge Medication List      CONTINUE these medications which have NOT CHANGED    Details   celecoxib (CELEBREX) 200 MG capsule TAKE 1 CAPSULE BY MOUTH DAILY FOR 1 WEEK AND IF NOT HELPFUL, INCREASE TO TWICE DAILY  Qty: 180 capsule, Refills: 0    Comments: **Patient requests 90 days supply**      lisinopril-hydrochlorothiazide (PRINZIDE,ZESTORETIC) 20-12.5 mg per tablet Take 1 tablet by mouth once daily  Qty: 90 tablet, Refills: 3      meloxicam (MOBIC) 15 MG tablet       sildenafil (REVATIO) 20 mg Tab Take 5 po 1 hour before sexual activity  Qty: 50 tablet, Refills: 11    Associated Diagnoses: Erectile dysfunction due to arterial insufficiency      tadalafil (CIALIS) 5 MG tablet Take 1 tablet (5 mg total) by mouth once daily.  Qty: 30 tablet, Refills: 11    Associated Diagnoses: Erectile dysfunction due to arterial insufficiency      vardenafil (LEVITRA) 20 MG tablet Take 1 tablet (20 mg total) by mouth daily as needed for Erectile Dysfunction. Take 1 hour before intercourse.  Qty: 30 tablet, Refills: 11      zolpidem (AMBIEN) 10 mg Tab TAKE 1 TABLET BY MOUTH EVERY DAY AT BEDTIME AS NEEDED  Qty: 30 tablet, Refills: 3             Resume home diet and activity

## 2017-08-23 NOTE — DISCHARGE INSTRUCTIONS

## 2017-09-05 RX ORDER — CELECOXIB 200 MG/1
CAPSULE ORAL
Qty: 60 CAPSULE | Refills: 0 | Status: ON HOLD | OUTPATIENT
Start: 2017-09-05 | End: 2017-10-11

## 2017-09-06 ENCOUNTER — HOSPITAL ENCOUNTER (OUTPATIENT)
Facility: OTHER | Age: 57
Discharge: HOME | End: 2017-09-06
Attending: ANESTHESIOLOGY | Admitting: ANESTHESIOLOGY
Payer: COMMERCIAL

## 2017-09-06 ENCOUNTER — SURGERY (OUTPATIENT)
Age: 57
End: 2017-09-06

## 2017-09-06 VITALS
WEIGHT: 280 LBS | BODY MASS INDEX: 33.06 KG/M2 | DIASTOLIC BLOOD PRESSURE: 78 MMHG | HEIGHT: 77 IN | SYSTOLIC BLOOD PRESSURE: 130 MMHG | HEART RATE: 62 BPM | OXYGEN SATURATION: 98 % | TEMPERATURE: 98 F | RESPIRATION RATE: 18 BRPM

## 2017-09-06 DIAGNOSIS — M47.816 FACET ARTHROPATHY, LUMBAR: Primary | ICD-10-CM

## 2017-09-06 PROCEDURE — 64495 INJ PARAVERT F JNT L/S 3 LEV: CPT | Mod: 50,,, | Performed by: ANESTHESIOLOGY

## 2017-09-06 PROCEDURE — 64494 INJ PARAVERT F JNT L/S 2 LEV: CPT | Mod: 50,,, | Performed by: ANESTHESIOLOGY

## 2017-09-06 PROCEDURE — 64495 INJ PARAVERT F JNT L/S 3 LEV: CPT | Mod: 50 | Performed by: ANESTHESIOLOGY

## 2017-09-06 PROCEDURE — S0020 INJECTION, BUPIVICAINE HYDRO: HCPCS | Performed by: ANESTHESIOLOGY

## 2017-09-06 PROCEDURE — 64493 INJ PARAVERT F JNT L/S 1 LEV: CPT | Mod: 50 | Performed by: ANESTHESIOLOGY

## 2017-09-06 PROCEDURE — 64494 INJ PARAVERT F JNT L/S 2 LEV: CPT | Mod: 50 | Performed by: ANESTHESIOLOGY

## 2017-09-06 PROCEDURE — 25000003 PHARM REV CODE 250: Performed by: ANESTHESIOLOGY

## 2017-09-06 PROCEDURE — 64493 INJ PARAVERT F JNT L/S 1 LEV: CPT | Mod: 50,,, | Performed by: ANESTHESIOLOGY

## 2017-09-06 RX ORDER — LIDOCAINE HYDROCHLORIDE 10 MG/ML
INJECTION INFILTRATION; PERINEURAL
Status: DISCONTINUED | OUTPATIENT
Start: 2017-09-06 | End: 2017-09-06 | Stop reason: HOSPADM

## 2017-09-06 RX ORDER — BUPIVACAINE HYDROCHLORIDE 5 MG/ML
INJECTION, SOLUTION EPIDURAL; INTRACAUDAL
Status: DISCONTINUED | OUTPATIENT
Start: 2017-09-06 | End: 2017-09-06 | Stop reason: HOSPADM

## 2017-09-06 RX ADMIN — LIDOCAINE HYDROCHLORIDE 10 ML: 10 INJECTION, SOLUTION INFILTRATION; PERINEURAL at 08:09

## 2017-09-06 RX ADMIN — BUPIVACAINE HYDROCHLORIDE 10 ML: 5 INJECTION, SOLUTION EPIDURAL; INTRACAUDAL; PERINEURAL at 08:09

## 2017-09-06 NOTE — DISCHARGE INSTRUCTIONS

## 2017-09-06 NOTE — DISCHARGE SUMMARY
Discharge Note  Short Stay      SUMMARY     Admit Date: 9/6/2017    Attending Physician: Zachary Salas    Discharge Diagnosis: Facet arthritis, degenerative, lumbar spine [M47.896]    Discharge Physician: Zachary Salas      Discharge Date: 9/6/2017 8:28 AM     PROCEDURE:  Bilateral medial branch block at the transverse processes at the level of L4, L5, Sacral ala    REASON FOR PROCEDURE: Facet arthritis, degenerative, lumbar spine [M47.896]    Disposition: Home or self care    Patient Instructions:   Current Discharge Medication List      CONTINUE these medications which have NOT CHANGED    Details   !! celecoxib (CELEBREX) 200 MG capsule TAKE 1 CAPSULE BY MOUTH DAILY FOR 1 WEEK AND IF NOT HELPFUL, INCREASE TO TWICE DAILY  Qty: 180 capsule, Refills: 0    Comments: **Patient requests 90 days supply**      !! celecoxib (CELEBREX) 200 MG capsule TAKE 1 CAPSULE BY MOUTH DAILY FOR 1 WEEK AND IF NOT HELPFUL, INCREASE TO TWICE DAILY  Qty: 60 capsule, Refills: 0      lisinopril-hydrochlorothiazide (PRINZIDE,ZESTORETIC) 20-12.5 mg per tablet Take 1 tablet by mouth once daily  Qty: 90 tablet, Refills: 3      meloxicam (MOBIC) 15 MG tablet       sildenafil (REVATIO) 20 mg Tab Take 5 po 1 hour before sexual activity  Qty: 50 tablet, Refills: 11    Associated Diagnoses: Erectile dysfunction due to arterial insufficiency      tadalafil (CIALIS) 5 MG tablet Take 1 tablet (5 mg total) by mouth once daily.  Qty: 30 tablet, Refills: 11    Associated Diagnoses: Erectile dysfunction due to arterial insufficiency      vardenafil (LEVITRA) 20 MG tablet Take 1 tablet (20 mg total) by mouth daily as needed for Erectile Dysfunction. Take 1 hour before intercourse.  Qty: 30 tablet, Refills: 11      zolpidem (AMBIEN) 10 mg Tab TAKE 1 TABLET BY MOUTH EVERY DAY AT BEDTIME AS NEEDED  Qty: 30 tablet, Refills: 3       !! - Potential duplicate medications found. Please discuss with provider.          Resume home diet and activity

## 2017-09-06 NOTE — OP NOTE
lUMBAR Medial Branch Block Under Fluoroscopy  Time-out taken to identify patient and procedure side prior to starting the procedure.            09/06/2017                                                         PROCEDURE:  Bilateral medial branch block at the transverse processes at the level of L4, L5, Sacral ala    REASON FOR PROCEDURE: Facet arthritis, degenerative, lumbar spine [M47.896]    PHYSICIAN: Zachary Salas MD  ASSISTANTS: None    MEDICATIONS INJECTED: 0.5% bupivicane, 1mL at each level    LOCAL ANESTHETIC USED: Xylocaine 1% 10ml    SEDATION MEDICATIONS: None    ESTIMATED BLOOD LOSS:  None.    COMPLICATIONS:  None.    Interval History: previous MBB > 80% improvement after for approximately 2 hours with gradual return of the pain.  We are setting up for 2nd procedure.    TECHNIQUE: Laying in a prone position, the patient was prepped and draped in the usual sterile fashion using ChloraPrep and fenestrated drape.  The level was determined under fluoroscopic guidance.  Local anesthetic was given by going down to the hub of the 27-gauge 1.25in needle and raising a wheel.  A 22-gauge 3.5inch needle was introduced to the anatomic local of the medial branch at each of the above levels using fluoroscopy in the AP, oblique, and lateral views.  After negative aspiration, medication was injected slowly. The patient tolerated the procedure well.       The patient was monitored after the procedure.  Patient was given post procedure and discharge instructions to follow at home.  We will see the patient back in two weeks or the patient may call to inform of status. The patient was discharged in a stable condition

## 2017-09-12 RX ORDER — ZOLPIDEM TARTRATE 10 MG/1
TABLET ORAL
Qty: 30 TABLET | Refills: 3 | Status: SHIPPED | OUTPATIENT
Start: 2017-09-12 | End: 2018-02-02 | Stop reason: SDUPTHER

## 2017-09-21 ENCOUNTER — OFFICE VISIT (OUTPATIENT)
Dept: PAIN MEDICINE | Facility: CLINIC | Age: 57
End: 2017-09-21
Attending: ANESTHESIOLOGY
Payer: COMMERCIAL

## 2017-09-21 VITALS
HEART RATE: 81 BPM | TEMPERATURE: 98 F | HEIGHT: 77 IN | RESPIRATION RATE: 20 BRPM | WEIGHT: 282.31 LBS | BODY MASS INDEX: 33.33 KG/M2 | DIASTOLIC BLOOD PRESSURE: 79 MMHG | SYSTOLIC BLOOD PRESSURE: 113 MMHG

## 2017-09-21 DIAGNOSIS — M47.816 FACET ARTHROPATHY, LUMBAR: ICD-10-CM

## 2017-09-21 DIAGNOSIS — M51.36 DDD (DEGENERATIVE DISC DISEASE), LUMBAR: ICD-10-CM

## 2017-09-21 DIAGNOSIS — M47.816 LUMBAR SPONDYLOSIS: Primary | ICD-10-CM

## 2017-09-21 PROCEDURE — 3008F BODY MASS INDEX DOCD: CPT | Mod: S$GLB,,, | Performed by: NURSE PRACTITIONER

## 2017-09-21 PROCEDURE — 99213 OFFICE O/P EST LOW 20 MIN: CPT | Mod: S$GLB,,, | Performed by: NURSE PRACTITIONER

## 2017-09-21 PROCEDURE — 99999 PR PBB SHADOW E&M-EST. PATIENT-LVL III: CPT | Mod: PBBFAC,,, | Performed by: NURSE PRACTITIONER

## 2017-09-21 PROCEDURE — 3074F SYST BP LT 130 MM HG: CPT | Mod: S$GLB,,, | Performed by: NURSE PRACTITIONER

## 2017-09-21 PROCEDURE — 3078F DIAST BP <80 MM HG: CPT | Mod: S$GLB,,, | Performed by: NURSE PRACTITIONER

## 2017-09-21 NOTE — PROGRESS NOTES
Chronic Pain - Established Visit    Referring Physician: No ref. provider found    Chief Complaint:   Chief Complaint   Patient presents with    Low-back Pain     RIGHT SIDED        SUBJECTIVE: Disclaimer: This note has been generated using voice-recognition software. There may be typographical errors that have been missed during proof-reading    Interval History 9/21/2017:  The patient returns today for follow up of lower back pain.  The pain mainly remains across the lower back.  He has intermittent tingling to left lateral leg to the knee.  His pain is worsened with prolonged standing.  He is s/p bilateral L3,4,5 MBB x2 completed 9/6/17 with 95% relief for about 6 hours after each procedure.  His pain is now back to baseline.  He is taking Celebrex intermittently.  He believes that it helps more than Mobic but sometimes upsets his stomach.  His pain today is 7/10.  He denies any health changes since previous encounter.    Initial encounter:    Stanley Stewart presents to the clinic for the evaluation of low back pain. The pain started 10 years ago and symptoms have been worsening.    Brief history:    Pain Description:    The pain is located in the right low back area and radiates to the left leg.      At BEST  4/10     At WORST  10/10 on the WORST day.      On average pain is rated as 7/10.     Today the pain is rated as 6/10    The pain is described as aching, dull and constant      Symptoms interfere with daily functioning.     Exacerbating factors: Sitting, Laying, Bending and Getting out of bed/chair.      Mitigating factors medications and physical therapy.     Patient denies night fever/night sweats, urinary incontinence, bowel incontinence, significant weight loss, significant motor weakness and loss of sensations.  Patient denies any suicidal or homicidal ideations    Pain Medications:  Current:  Celebrex 200 mg BID PRN    Tried in Past:  NSAIDs -Mobic  TCA -Never  SNRI -Never  Anti-convulsants  -Never  Muscle Relaxants -Never  Opioids-Never    Physical Therapy/Home Exercise: yes       report:  Not applicable    Pain Procedures: history of injections outside of facility - with relief on the left, not on the right    Chiropractor -never  Acupuncture - never  TENS unit -never  Spinal decompression -never  Joint replacement -never    Imaging:     Lumbar XRAYs 8/10/17    Narrative     Technique: AP, oblique, and lateral views including flexion/extension views of the lumbar spine.    Comparison: None    Findings:   There is a transitional type anatomy at S1. Significant degenerative disc disease at L5-S1. Bilateral facet arthropathy is worse at L5-S1, and milder at 044. No evidence of instability. Vertebral body heights are maintained. Paravertebral soft tissues are unremarkable.   Impression        Degenerative changes as above worst at L5-S1 without instability.         Past Medical History:   Diagnosis Date    Allergy     Degenerative disc disease     Hyperlipidemia     Hypertension     Male hypogonadism 7/19/2012     Past Surgical History:   Procedure Laterality Date    APPENDECTOMY      arthroscopic knee surg      left X 2, right X 1    INTUSSUSCEPTION REPAIR      KNEE SURGERY       Social History     Social History    Marital status:      Spouse name: N/A    Number of children: 3    Years of education: N/A     Occupational History     [employer]     Social History Main Topics    Smoking status: Former Smoker     Quit date: 7/19/1987    Smokeless tobacco: Never Used    Alcohol use 4.2 oz/week     7 Glasses of wine per week    Drug use: No    Sexual activity: Yes     Partners: Female     Other Topics Concern    Not on file     Social History Narrative    No narrative on file     Family History   Problem Relation Age of Onset    Hyperlipidemia Father     Hypertension Father     Heart disease Father     Liver disease Mother     Heart disease Brother     Heart  disease Maternal Grandmother     Heart disease Paternal Grandfather        Review of patient's allergies indicates:   Allergen Reactions    Shellfish containing products      Other reaction(s): Hives       Current Outpatient Prescriptions   Medication Sig    celecoxib (CELEBREX) 200 MG capsule TAKE 1 CAPSULE BY MOUTH DAILY FOR 1 WEEK AND IF NOT HELPFUL, INCREASE TO TWICE DAILY    celecoxib (CELEBREX) 200 MG capsule TAKE 1 CAPSULE BY MOUTH DAILY FOR 1 WEEK AND IF NOT HELPFUL, INCREASE TO TWICE DAILY    lisinopril-hydrochlorothiazide (PRINZIDE,ZESTORETIC) 20-12.5 mg per tablet Take 1 tablet by mouth once daily    meloxicam (MOBIC) 15 MG tablet     sildenafil (REVATIO) 20 mg Tab Take 5 po 1 hour before sexual activity    tadalafil (CIALIS) 5 MG tablet Take 1 tablet (5 mg total) by mouth once daily.    vardenafil (LEVITRA) 20 MG tablet Take 1 tablet (20 mg total) by mouth daily as needed for Erectile Dysfunction. Take 1 hour before intercourse.    zolpidem (AMBIEN) 10 mg Tab TAKE 1 TABLET BY MOUTH EVERY DAY AT BEDTIME AS NEEDED     Current Facility-Administered Medications   Medication    [START ON 10/1/2017] testosterone Pllt 12 Pellet       REVIEW OF SYSTEMS:    GENERAL:  No weight loss, malaise or fevers.  HEENT:   No recent changes in vision or hearing  NECK:  Negative for lumps, no difficulty with swallowing.  RESPIRATORY:  Negative for cough, wheezing or shortness of breath, patient denies any recent URI.  CARDIOVASCULAR:  Negative for chest pain, leg swelling or palpitations. Hypertension.  GI:  Negative for abdominal discomfort, blood in stools or black stools or change in bowel habits.  MUSCULOSKELETAL:  See HPI.  SKIN:  Negative for lesions, rash, and itching.  PSYCH:  No mood disorder or recent psychosocial stressors.  Patients sleep is not disturbed secondary to pain.  HEMATOLOGY/LYMPHOLOGY:  Negative for prolonged bleeding, bruising easily or swollen nodes.  Patient is not currently taking any  "anti-coagulants  ENDO: No history of diabetes or thyroid dysfunction  NEURO:   No history of headaches, syncope, paralysis, seizures or tremors.  All other reviewed and negative other than HPI.    OBJECTIVE:    /79   Pulse 81   Temp 97.7 °F (36.5 °C) (Oral)   Resp 20   Ht 6' 5" (1.956 m)   Wt 128.1 kg (282 lb 4.8 oz)   BMI 33.48 kg/m²     PHYSICAL EXAMINATION:    GENERAL: Well appearing, in no acute distress, alert and oriented x3.  PSYCH:  Mood and affect appropriate.  SKIN: Skin color, texture, turgor normal, no rashes or lesions.  HEAD/FACE:  Normocephalic, atraumatic. Cranial nerves grossly intact.  CV: RRR with palpation of the radial artery.  PULM: No evidence of respiratory difficulty, symmetric chest rise.  BACK: Straight leg raising in the sitting and supine positions is negative to radicular pain. There is pain with palpation over the facet joints of the lumbar spine on the right side. There is decreased range of motion with extension to 15 degrees, and facet loading maneuvers cause reproducible pain bilaterally, R>L.  EXTREMITIES: Peripheral joint ROM is full and pain free without obvious instability or laxity in all four extremities. No deformities, edema, or skin discoloration. Good capillary refill.  MUSCULOSKELETAL: Hip, and knee provocative maneuvers are negative.  There is  pain with palpation over the sacroiliac joints bilaterally.  There is no pain to palpation over the greater trochanteric bursa bilaterally.  FABERs test is positive bilaterally.  FADIRs test is negative.   Bilateral lower extremity strength is normal and symmetric.  No atrophy or tone abnormalities are noted.  NEURO: Bilateral lower extremity coordination and muscle stretch reflexes are physiologic and symmetric.  Plantar response are downgoing. No clonus.  No loss of sensation is noted.  GAIT: normal.        CMP  Sodium   Date Value Ref Range Status   03/17/2017 142 136 - 145 mmol/L Final     Potassium   Date Value " Ref Range Status   03/17/2017 4.4 3.5 - 5.1 mmol/L Final     Chloride   Date Value Ref Range Status   03/17/2017 106 95 - 110 mmol/L Final     CO2   Date Value Ref Range Status   03/17/2017 26 23 - 29 mmol/L Final     Glucose   Date Value Ref Range Status   03/17/2017 96 70 - 110 mg/dL Final     BUN, Bld   Date Value Ref Range Status   03/17/2017 21 (H) 6 - 20 mg/dL Final     Creatinine   Date Value Ref Range Status   03/17/2017 1.0 0.5 - 1.4 mg/dL Final     Calcium   Date Value Ref Range Status   03/17/2017 9.5 8.7 - 10.5 mg/dL Final     Total Protein   Date Value Ref Range Status   03/17/2017 7.0 6.0 - 8.4 g/dL Final     Albumin   Date Value Ref Range Status   03/17/2017 4.0 3.5 - 5.2 g/dL Final     Total Bilirubin   Date Value Ref Range Status   03/17/2017 0.7 0.1 - 1.0 mg/dL Final     Comment:     For infants and newborns, interpretation of results should be based  on gestational age, weight and in agreement with clinical  observations.  Premature Infant recommended reference ranges:  Up to 24 hours.............<8.0 mg/dL  Up to 48 hours............<12.0 mg/dL  3-5 days..................<15.0 mg/dL  6-29 days.................<15.0 mg/dL       Alkaline Phosphatase   Date Value Ref Range Status   03/17/2017 44 (L) 55 - 135 U/L Final     AST   Date Value Ref Range Status   07/20/2017 22 10 - 40 U/L Final     ALT   Date Value Ref Range Status   07/20/2017 32 10 - 44 U/L Final     Anion Gap   Date Value Ref Range Status   03/17/2017 10 8 - 16 mmol/L Final     eGFR if    Date Value Ref Range Status   03/17/2017 >60.0 >60 mL/min/1.73 m^2 Final     eGFR if non    Date Value Ref Range Status   03/17/2017 >60.0 >60 mL/min/1.73 m^2 Final     Comment:     Calculation used to obtain the estimated glomerular filtration  rate (eGFR) is the CKD-EPI equation. Since race is unknown   in our information system, the eGFR values for   -American and Non--American patients are given   for  each creatinine result.           ASSESSMENT: 57 y.o. year old male with lower back pain, consistent with the following diagnoses:    Encounter Diagnoses   Name Primary?    Lumbar spondylosis Yes    Facet arthropathy, lumbar     DDD (degenerative disc disease), lumbar        PLAN:     - Previous imaging was reviewed and discussed with the patient today.    - He had significant short term benefit with bilateral L3,4,5 MBB x2.  Will schedule for right then left L3,4,5 RFA, 2 weeks apart.  The procedure, risks, benefits and options were discussed with patient. There are no contraindications to the procedure. The patient expressed understanding and agreed to proceed.  C    - Continue Celebrex as needed.  I advised him to try it with Pepcid to help with GI upset.    - The patient will continue a home exercise routine to help with pain and strengthening.      - RTC 4 weeks after completion of procedures.    - Dr. Salas was consulted on the patient and agrees with this plan.      The above plan and management options were discussed at length with patient. Patient is in agreement with the above and verbalized understanding.     Delores Aaron  09/21/2017

## 2017-10-11 ENCOUNTER — HOSPITAL ENCOUNTER (OUTPATIENT)
Facility: OTHER | Age: 57
Discharge: HOME | End: 2017-10-11
Attending: ANESTHESIOLOGY | Admitting: ANESTHESIOLOGY
Payer: COMMERCIAL

## 2017-10-11 ENCOUNTER — SURGERY (OUTPATIENT)
Age: 57
End: 2017-10-11

## 2017-10-11 VITALS
SYSTOLIC BLOOD PRESSURE: 127 MMHG | OXYGEN SATURATION: 96 % | TEMPERATURE: 98 F | WEIGHT: 275 LBS | DIASTOLIC BLOOD PRESSURE: 83 MMHG | RESPIRATION RATE: 18 BRPM | HEIGHT: 77 IN | BODY MASS INDEX: 32.47 KG/M2 | HEART RATE: 70 BPM

## 2017-10-11 DIAGNOSIS — M47.816 FACET ARTHROPATHY, LUMBAR: Primary | ICD-10-CM

## 2017-10-11 PROCEDURE — 63600175 PHARM REV CODE 636 W HCPCS: Performed by: ANESTHESIOLOGY

## 2017-10-11 PROCEDURE — 25000003 PHARM REV CODE 250: Performed by: ANESTHESIOLOGY

## 2017-10-11 PROCEDURE — 64636 DESTROY L/S FACET JNT ADDL: CPT | Performed by: ANESTHESIOLOGY

## 2017-10-11 PROCEDURE — 64636 DESTROY L/S FACET JNT ADDL: CPT | Mod: RT,,, | Performed by: ANESTHESIOLOGY

## 2017-10-11 PROCEDURE — 99152 MOD SED SAME PHYS/QHP 5/>YRS: CPT | Mod: ,,, | Performed by: ANESTHESIOLOGY

## 2017-10-11 PROCEDURE — 64635 DESTROY LUMB/SAC FACET JNT: CPT | Performed by: ANESTHESIOLOGY

## 2017-10-11 PROCEDURE — 64635 DESTROY LUMB/SAC FACET JNT: CPT | Mod: RT,,, | Performed by: ANESTHESIOLOGY

## 2017-10-11 RX ORDER — LIDOCAINE HYDROCHLORIDE 10 MG/ML
INJECTION INFILTRATION; PERINEURAL
Status: DISCONTINUED | OUTPATIENT
Start: 2017-10-11 | End: 2017-10-11 | Stop reason: HOSPADM

## 2017-10-11 RX ORDER — SODIUM CHLORIDE 9 MG/ML
INJECTION, SOLUTION INTRAVENOUS CONTINUOUS
Status: DISCONTINUED | OUTPATIENT
Start: 2017-10-11 | End: 2017-10-11 | Stop reason: HOSPADM

## 2017-10-11 RX ORDER — BUPIVACAINE HYDROCHLORIDE 2.5 MG/ML
INJECTION, SOLUTION EPIDURAL; INFILTRATION; INTRACAUDAL
Status: DISCONTINUED | OUTPATIENT
Start: 2017-10-11 | End: 2017-10-11 | Stop reason: HOSPADM

## 2017-10-11 RX ORDER — FENTANYL CITRATE 50 UG/ML
INJECTION, SOLUTION INTRAMUSCULAR; INTRAVENOUS
Status: DISCONTINUED | OUTPATIENT
Start: 2017-10-11 | End: 2017-10-11 | Stop reason: HOSPADM

## 2017-10-11 RX ORDER — MIDAZOLAM HYDROCHLORIDE 1 MG/ML
INJECTION INTRAMUSCULAR; INTRAVENOUS
Status: DISCONTINUED | OUTPATIENT
Start: 2017-10-11 | End: 2017-10-11 | Stop reason: HOSPADM

## 2017-10-11 RX ADMIN — MIDAZOLAM HYDROCHLORIDE 1 MG: 1 INJECTION, SOLUTION INTRAMUSCULAR; INTRAVENOUS at 08:10

## 2017-10-11 RX ADMIN — LIDOCAINE HYDROCHLORIDE 10 ML: 10 INJECTION, SOLUTION INFILTRATION; PERINEURAL at 08:10

## 2017-10-11 RX ADMIN — FENTANYL CITRATE 50 MCG: 50 INJECTION, SOLUTION INTRAMUSCULAR; INTRAVENOUS at 08:10

## 2017-10-11 RX ADMIN — BUPIVACAINE HYDROCHLORIDE 10 ML: 2.5 INJECTION, SOLUTION EPIDURAL; INFILTRATION; INTRACAUDAL; PERINEURAL at 08:10

## 2017-10-11 RX ADMIN — SODIUM CHLORIDE: 0.9 INJECTION, SOLUTION INTRAVENOUS at 08:10

## 2017-10-11 NOTE — OP NOTE
Lumbar Medial nerve branch block radiofrequency ablation Under Fluoroscopy     Time-out taken to identify patient and procedure side prior to starting the procedure.     10/11/2017    PROCEDURE: Right radiofrequency ablation of the the medial branch nerves at the   transverse process of  L4, L5 and sacral ala    2)Conscious sedation provided by MD     REASON FOR PROCEDURE: Lumbar spondylosis [M47.816]     PHYSICIAN: Zachary Salas MD     ASSISTANTS: None     MEDICATIONS INJECTED: 0.25% Bupivicaine total 6mL     LOCAL ANESTHETIC USED: Xylocaine 1% 1mL / site     ESTIMATED BLOOD LOSS: None.     COMPLICATIONS: None.     Interval history: Patient reports that he had complete relief of pain for the day of the procedure, we will proceed with the RFA     TECHNIQUE: Laying in a prone position, the patient was prepped and draped in the usual sterile fashion using ChloraPrep and fenestrated drape. The level was determined under fluoroscopic guidance. Local anesthetic was given by going down to the hub of the 27-gauge 1.25in needle and raising a wheel. A 18-gauge 10mm curved active tip needle was introduced to the anatomic local of the medial branch at each of the above levels using fluoroscopy. Then sensory and motor testing was performed to confirm that the needle tips were in the correct location. Then after negative aspiration, 1 mL of 0.25% bupivacaine was injected into each level. This was followed by thermal lesioning at 80 degrees celsius for 90 seconds.     Conscious sedation provided by M.D   The patient was monitored with continuous pulse oximetry, EKG, and intermittent blood pressure monitors. The patient was hemodynamically stable throughout the entire process was responsive to voice, and breathing spontaneously. Supplemental O2 was provided at 2L/min via nasal cannula. Patient was comfortable for the duration of the procedure. (See nurse documentation and case log for sedation time)    There was a total of 3mg  IV Midazolam and 100mcg Fentanyl titrated for the procedure    The patient tolerated the procedure well. Was able to move their leg at the knee and ankle at the conclusion of the procedure    The patient was monitored after the procedure. Patient was given post procedure and discharge instructions to follow at home. We will see the patient back in two weeks or the patient may call to inform of status. The patient was discharged in a stable condition

## 2017-10-11 NOTE — DISCHARGE INSTRUCTIONS

## 2017-10-11 NOTE — H&P
"HPI: 57 year old here for Right L3, L4, and L5 Medial Branch radiofrequency thermocoagulation under fluroscopic guidance with conscious sedation. No changes in location or distribution of pain.     No recent infections or dental procedures. No blood thinners.     PMHx, PSHx, Allergies, Medications reviewed in epic    ROS negative except pain complaints in HPI    OBJECTIVE:    BP (!) 146/91   Pulse 65   Temp 98.3 °F (36.8 °C) (Oral)   Resp 18   Ht 6' 5" (1.956 m)   Wt 124.7 kg (275 lb)   SpO2 95%   BMI 32.61 kg/m²     PHYSICAL EXAMINATION:    GENERAL: Well appearing, in no acute distress, alert and oriented x3.  PSYCH:  Mood and affect appropriate.  SKIN: Skin color, texture, turgor normal, no rashes or lesions.  CV: RRR with palpation of the radial artery.  PULM: No evidence of respiratory difficulty, symmetric chest rise. Clear to auscultation.  NEURO: Cranial nerves grossly intact.    Plan:    Proceed with procedure as planned    Dalton Rose  10/11/2017      "

## 2017-10-11 NOTE — DISCHARGE SUMMARY
Discharge Note  Short Stay      SUMMARY     Admit Date: 10/11/2017    Attending Physician: Zachary Salas    Discharge Diagnosis: Lumbar spondylosis [M47.816]    Discharge Physician: Zachary Salas      Discharge Date: 10/11/2017 3:26 PM       PROCEDURE: Right radiofrequency ablation of the the medial branch nerves at the   transverse process of  L4, L5 and sacral ala    2)Conscious sedation provided by MD     REASON FOR PROCEDURE: Lumbar spondylosis [M47.816]     Disposition: Home or self care    Patient Instructions:   Discharge Medication List as of 10/11/2017  9:13 AM      CONTINUE these medications which have NOT CHANGED    Details   lisinopril-hydrochlorothiazide (PRINZIDE,ZESTORETIC) 20-12.5 mg per tablet Take 1 tablet by mouth once daily, Normal      meloxicam (MOBIC) 15 MG tablet Starting 4/26/2017, Until Discontinued, Historical Med      sildenafil (REVATIO) 20 mg Tab Take 5 po 1 hour before sexual activity, Normal      tadalafil (CIALIS) 5 MG tablet Take 1 tablet (5 mg total) by mouth once daily., Starting 4/24/2017, Until Tue 4/24/18, Normal      vardenafil (LEVITRA) 20 MG tablet Take 1 tablet (20 mg total) by mouth daily as needed for Erectile Dysfunction. Take 1 hour before intercourse., Starting 4/24/2017, Until Discontinued, Normal      zolpidem (AMBIEN) 10 mg Tab TAKE 1 TABLET BY MOUTH EVERY DAY AT BEDTIME AS NEEDED, Normal             Resume home diet and activity

## 2017-11-01 ENCOUNTER — PATIENT MESSAGE (OUTPATIENT)
Dept: REHABILITATION | Facility: OTHER | Age: 57
End: 2017-11-01

## 2017-11-01 ENCOUNTER — PATIENT MESSAGE (OUTPATIENT)
Dept: UROLOGY | Facility: CLINIC | Age: 57
End: 2017-11-01

## 2017-11-01 ENCOUNTER — HOSPITAL ENCOUNTER (OUTPATIENT)
Facility: OTHER | Age: 57
Discharge: HOME | End: 2017-11-01
Attending: ANESTHESIOLOGY | Admitting: ANESTHESIOLOGY
Payer: COMMERCIAL

## 2017-11-01 ENCOUNTER — SURGERY (OUTPATIENT)
Age: 57
End: 2017-11-01

## 2017-11-01 VITALS
DIASTOLIC BLOOD PRESSURE: 70 MMHG | BODY MASS INDEX: 33.06 KG/M2 | SYSTOLIC BLOOD PRESSURE: 128 MMHG | TEMPERATURE: 98 F | RESPIRATION RATE: 18 BRPM | HEART RATE: 58 BPM | WEIGHT: 280 LBS | HEIGHT: 77 IN | OXYGEN SATURATION: 97 %

## 2017-11-01 DIAGNOSIS — M47.816 FACET ARTHROPATHY, LUMBAR: Primary | ICD-10-CM

## 2017-11-01 PROCEDURE — 64636 DESTROY L/S FACET JNT ADDL: CPT | Performed by: ANESTHESIOLOGY

## 2017-11-01 PROCEDURE — 64636 DESTROY L/S FACET JNT ADDL: CPT | Mod: LT,,, | Performed by: ANESTHESIOLOGY

## 2017-11-01 PROCEDURE — 99152 MOD SED SAME PHYS/QHP 5/>YRS: CPT | Mod: ,,, | Performed by: ANESTHESIOLOGY

## 2017-11-01 PROCEDURE — 64635 DESTROY LUMB/SAC FACET JNT: CPT | Performed by: ANESTHESIOLOGY

## 2017-11-01 PROCEDURE — 25000003 PHARM REV CODE 250: Performed by: ANESTHESIOLOGY

## 2017-11-01 PROCEDURE — 64635 DESTROY LUMB/SAC FACET JNT: CPT | Mod: LT,,, | Performed by: ANESTHESIOLOGY

## 2017-11-01 PROCEDURE — 63600175 PHARM REV CODE 636 W HCPCS: Performed by: ANESTHESIOLOGY

## 2017-11-01 RX ORDER — LIDOCAINE HYDROCHLORIDE 10 MG/ML
INJECTION INFILTRATION; PERINEURAL
Status: DISCONTINUED | OUTPATIENT
Start: 2017-11-01 | End: 2017-11-01 | Stop reason: HOSPADM

## 2017-11-01 RX ORDER — SODIUM CHLORIDE 9 MG/ML
INJECTION, SOLUTION INTRAVENOUS CONTINUOUS
Status: DISCONTINUED | OUTPATIENT
Start: 2017-11-01 | End: 2017-11-01 | Stop reason: HOSPADM

## 2017-11-01 RX ORDER — MIDAZOLAM HYDROCHLORIDE 1 MG/ML
INJECTION INTRAMUSCULAR; INTRAVENOUS
Status: DISCONTINUED | OUTPATIENT
Start: 2017-11-01 | End: 2017-11-01 | Stop reason: HOSPADM

## 2017-11-01 RX ORDER — FENTANYL CITRATE 50 UG/ML
INJECTION, SOLUTION INTRAMUSCULAR; INTRAVENOUS
Status: DISCONTINUED | OUTPATIENT
Start: 2017-11-01 | End: 2017-11-01 | Stop reason: HOSPADM

## 2017-11-01 RX ORDER — BUPIVACAINE HYDROCHLORIDE 2.5 MG/ML
INJECTION, SOLUTION EPIDURAL; INFILTRATION; INTRACAUDAL
Status: DISCONTINUED | OUTPATIENT
Start: 2017-11-01 | End: 2017-11-01 | Stop reason: HOSPADM

## 2017-11-01 RX ADMIN — MIDAZOLAM HYDROCHLORIDE 2 MG: 1 INJECTION, SOLUTION INTRAMUSCULAR; INTRAVENOUS at 09:11

## 2017-11-01 RX ADMIN — SODIUM CHLORIDE: 0.9 INJECTION, SOLUTION INTRAVENOUS at 08:11

## 2017-11-01 RX ADMIN — BUPIVACAINE HYDROCHLORIDE 10 ML: 2.5 INJECTION, SOLUTION EPIDURAL; INFILTRATION; INTRACAUDAL; PERINEURAL at 09:11

## 2017-11-01 RX ADMIN — FENTANYL CITRATE 50 MCG: 50 INJECTION, SOLUTION INTRAMUSCULAR; INTRAVENOUS at 09:11

## 2017-11-01 RX ADMIN — LIDOCAINE HYDROCHLORIDE 10 ML: 10 INJECTION, SOLUTION INFILTRATION; PERINEURAL at 09:11

## 2017-11-01 NOTE — DISCHARGE SUMMARY
Discharge Note  Short Stay      SUMMARY     Admit Date: 11/1/2017    Attending Physician: Zachary Salas    Discharge Diagnosis: Lumbar spondylosis [M47.816]    Discharge Physician: Zachary Salas      Discharge Date: 11/1/2017 10:12 AM     PROCEDURE: Left radiofrequency ablation of the the medial branch nerves at the   transverse process of  L4, L5 and sacral ala    2)Conscious sedation provided by MD     REASON FOR PROCEDURE: Lumbar spondylosis [M47.816]     Disposition: Home or self care    Patient Instructions:   Current Discharge Medication List      CONTINUE these medications which have NOT CHANGED    Details   lisinopril-hydrochlorothiazide (PRINZIDE,ZESTORETIC) 20-12.5 mg per tablet Take 1 tablet by mouth once daily  Qty: 90 tablet, Refills: 3      meloxicam (MOBIC) 15 MG tablet       sildenafil (REVATIO) 20 mg Tab Take 5 po 1 hour before sexual activity  Qty: 50 tablet, Refills: 11    Associated Diagnoses: Erectile dysfunction due to arterial insufficiency      tadalafil (CIALIS) 5 MG tablet Take 1 tablet (5 mg total) by mouth once daily.  Qty: 30 tablet, Refills: 11    Associated Diagnoses: Erectile dysfunction due to arterial insufficiency      vardenafil (LEVITRA) 20 MG tablet Take 1 tablet (20 mg total) by mouth daily as needed for Erectile Dysfunction. Take 1 hour before intercourse.  Qty: 30 tablet, Refills: 11      zolpidem (AMBIEN) 10 mg Tab TAKE 1 TABLET BY MOUTH EVERY DAY AT BEDTIME AS NEEDED  Qty: 30 tablet, Refills: 3             Resume home diet and activity

## 2017-11-01 NOTE — H&P
"HPI  Patient s/p right RFA at L3 - L5 with 90% improvement on the right no complications, here today to perform RFA at the same levels on the left.    PMHx, PSHx, Allergies, Medications reviewed in epic    ROS negative except pain complaints in HPI    OBJECTIVE:    /79 (BP Location: Right arm, Patient Position: Lying)   Pulse 62   Temp 98.1 °F (36.7 °C) (Oral)   Resp 18   Ht 6' 5" (1.956 m)   Wt 127 kg (280 lb)   SpO2 95%   BMI 33.20 kg/m²     PHYSICAL EXAMINATION:    GENERAL: Well appearing, in no acute distress, alert and oriented x3.  PSYCH:  Mood and affect appropriate.  SKIN: Skin color, texture, turgor normal, no rashes or lesions.  CV: RRR with palpation of the radial artery.  PULM: No evidence of respiratory difficulty, symmetric chest rise. Clear to auscultation.  NEURO: Cranial nerves grossly intact.    Plan:    Proceed with procedure as planned    Zachary Salas  11/01/2017    "

## 2017-11-01 NOTE — DISCHARGE INSTRUCTIONS

## 2017-11-01 NOTE — OP NOTE
Lumbar Medial nerve branch block radiofrequency ablation Under Fluoroscopy     Time-out taken to identify patient and procedure side prior to starting the procedure.     11/01/2017    PROCEDURE: Left radiofrequency ablation of the the medial branch nerves at the   transverse process of  L4, L5 and sacral ala    2)Conscious sedation provided by MD     REASON FOR PROCEDURE: Lumbar spondylosis [M47.816]     PHYSICIAN: Zachary Salas MD     ASSISTANTS: None     MEDICATIONS INJECTED: 0.25% Bupivicaine total 6mL     LOCAL ANESTHETIC USED: Xylocaine 1% 1mL / site     ESTIMATED BLOOD LOSS: None.     COMPLICATIONS: None.     Interval history: Patient reports that he had complete relief of pain for the day of the procedure, we will proceed with the RFA     TECHNIQUE: Laying in a prone position, the patient was prepped and draped in the usual sterile fashion using ChloraPrep and fenestrated drape. The level was determined under fluoroscopic guidance. Local anesthetic was given by going down to the hub of the 27-gauge 1.25in needle and raising a wheel. A 18-gauge 10mm curved active tip needle was introduced to the anatomic local of the medial branch at each of the above levels using fluoroscopy. Then sensory and motor testing was performed to confirm that the needle tips were in the correct location. Then after negative aspiration, 1 mL of 0.25% bupivacaine was injected into each level. This was followed by thermal lesioning at 80 degrees celsius for 90 seconds.     Conscious sedation provided by M.D   The patient was monitored with continuous pulse oximetry, EKG, and intermittent blood pressure monitors. The patient was hemodynamically stable throughout the entire process was responsive to voice, and breathing spontaneously. Supplemental O2 was provided at 2L/min via nasal cannula. Patient was comfortable for the duration of the procedure. (See nurse documentation and case log for sedation time)    There was a total of 2 mg  IV Midazolam and 100mcg Fentanyl titrated for the procedure    The patient tolerated the procedure well. Was able to move their leg at the knee and ankle at the conclusion of the procedure    The patient was monitored after the procedure. Patient was given post procedure and discharge instructions to follow at home. We will see the patient back in two weeks or the patient may call to inform of status. The patient was discharged in a stable condition

## 2017-11-03 ENCOUNTER — LAB VISIT (OUTPATIENT)
Dept: LAB | Facility: HOSPITAL | Age: 57
End: 2017-11-03
Attending: UROLOGY
Payer: COMMERCIAL

## 2017-11-03 ENCOUNTER — TELEPHONE (OUTPATIENT)
Dept: UROLOGY | Facility: HOSPITAL | Age: 57
End: 2017-11-03

## 2017-11-03 DIAGNOSIS — E29.1 MALE HYPOGONADISM: ICD-10-CM

## 2017-11-03 LAB
ALBUMIN SERPL BCP-MCNC: 4.1 G/DL
ALP SERPL-CCNC: 57 U/L
ALT SERPL W/O P-5'-P-CCNC: 38 U/L
AST SERPL-CCNC: 28 U/L
BASOPHILS # BLD AUTO: 0.07 K/UL
BASOPHILS NFR BLD: 1.2 %
BILIRUB DIRECT SERPL-MCNC: 0.3 MG/DL
BILIRUB SERPL-MCNC: 0.9 MG/DL
CHOLEST SERPL-MCNC: 179 MG/DL
CHOLEST/HDLC SERPL: 3.8 {RATIO}
COMPLEXED PSA SERPL-MCNC: 0.5 NG/ML
DIFFERENTIAL METHOD: ABNORMAL
EOSINOPHIL # BLD AUTO: 0.7 K/UL
EOSINOPHIL NFR BLD: 11.8 %
ERYTHROCYTE [DISTWIDTH] IN BLOOD BY AUTOMATED COUNT: 12.3 %
HCT VFR BLD AUTO: 42 %
HDLC SERPL-MCNC: 47 MG/DL
HDLC SERPL: 26.3 %
HGB BLD-MCNC: 14 G/DL
IMM GRANULOCYTES # BLD AUTO: 0.02 K/UL
IMM GRANULOCYTES NFR BLD AUTO: 0.3 %
LDLC SERPL CALC-MCNC: 69 MG/DL
LYMPHOCYTES # BLD AUTO: 1.7 K/UL
LYMPHOCYTES NFR BLD: 29.9 %
MCH RBC QN AUTO: 31 PG
MCHC RBC AUTO-ENTMCNC: 33.3 G/DL
MCV RBC AUTO: 93 FL
MONOCYTES # BLD AUTO: 0.5 K/UL
MONOCYTES NFR BLD: 8.5 %
NEUTROPHILS # BLD AUTO: 2.8 K/UL
NEUTROPHILS NFR BLD: 48.3 %
NONHDLC SERPL-MCNC: 132 MG/DL
NRBC BLD-RTO: 0 /100 WBC
PLATELET # BLD AUTO: 214 K/UL
PMV BLD AUTO: 10.7 FL
PROT SERPL-MCNC: 7.8 G/DL
RBC # BLD AUTO: 4.51 M/UL
TESTOST SERPL-MCNC: 217 NG/DL
TRIGL SERPL-MCNC: 315 MG/DL
WBC # BLD AUTO: 5.75 K/UL

## 2017-11-03 PROCEDURE — 85025 COMPLETE CBC W/AUTO DIFF WBC: CPT

## 2017-11-03 PROCEDURE — 36415 COLL VENOUS BLD VENIPUNCTURE: CPT

## 2017-11-03 PROCEDURE — 84403 ASSAY OF TOTAL TESTOSTERONE: CPT

## 2017-11-03 PROCEDURE — 84153 ASSAY OF PSA TOTAL: CPT

## 2017-11-03 PROCEDURE — 80076 HEPATIC FUNCTION PANEL: CPT

## 2017-11-03 PROCEDURE — 80061 LIPID PANEL: CPT

## 2017-11-04 ENCOUNTER — PATIENT MESSAGE (OUTPATIENT)
Dept: INTERNAL MEDICINE | Facility: CLINIC | Age: 57
End: 2017-11-04

## 2017-11-08 ENCOUNTER — CLINICAL SUPPORT (OUTPATIENT)
Dept: REHABILITATION | Facility: OTHER | Age: 57
End: 2017-11-08
Attending: PHYSICAL MEDICINE & REHABILITATION
Payer: COMMERCIAL

## 2017-11-08 VITALS
SYSTOLIC BLOOD PRESSURE: 130 MMHG | BODY MASS INDEX: 32.47 KG/M2 | HEART RATE: 72 BPM | WEIGHT: 275 LBS | HEIGHT: 77 IN | DIASTOLIC BLOOD PRESSURE: 90 MMHG

## 2017-11-08 DIAGNOSIS — G89.29 CHRONIC RIGHT-SIDED LOW BACK PAIN WITHOUT SCIATICA: ICD-10-CM

## 2017-11-08 DIAGNOSIS — M54.50 CHRONIC RIGHT-SIDED LOW BACK PAIN WITHOUT SCIATICA: ICD-10-CM

## 2017-11-08 DIAGNOSIS — M47.816 SPONDYLOSIS OF LUMBAR REGION WITHOUT MYELOPATHY OR RADICULOPATHY: Primary | ICD-10-CM

## 2017-11-08 PROCEDURE — 99214 OFFICE O/P EST MOD 30 MIN: CPT | Mod: ,,, | Performed by: PHYSICAL MEDICINE & REHABILITATION

## 2017-11-08 NOTE — PROGRESS NOTES
Health  met with patient to complete initial outcomes for the Healthy Back lumbar program.  Questions were reviewed with patient and discussed with Dr. Ramirez. The patient will meet with Dr. Ramirez to determine program enrollment.     HealthyBack Questionnaire  11/8/2017   Please select the location of your worst pain:  Low back   Please select the location of any additional pain: (hold down the control key, and click to select multiple responses) Leg- Left   Did any event trigger your pain?  No   If yes, please describe:  -   How long has this pain been going on?  little over 10 years   Please indicate how the pain is changing.  Improving   What is the WORST level of pain that you have experienced in the last week?  0   What is the LEAST level of pain that you have experienced in the past week?  3   If you have any comments about your pain level, please provide below:  -   What can you NOT do not that you used to be able to do?  NA   Are your limitations mainly due to your pain?  No   What are your additional complaints, if any? Tingling, Weakness   Is there ever a time during the day when your pain stops, even for a brief moment, before returning? Yes   If yes, when your pain stops, does it disappear completely? Is it totally gone? Yes   Does bending forward make your typical pain worse? No   Morning: Worse during   Afternoon: Better during   Evening:  Same   Nighttime: Same   Standing:  Better   Lying on stomach: Better   Lying on back: Worse   Sitting:  Worse   Walking: Same   Climbing stairs: Same   In the last seven days, have you had any changes in your bowel and/or bladder habits? No   Have all of your attempts to treat your back/leg pain resulted in failure?  No   Do you believe your doctor(s) do NOT understand how much pain you have?  No   Do you believe that you have one or more conditions that have not been diagnosed by your doctor(s)?  No   Do you believe that you deserve more understanding from  "others including your family than you are getting?  No   Do you feel relatively calm about how your back/leg pain has impacted your life?  Yes   Are you OK with treatment taking a very long time (even years) before you feel relief from your back/leg pain?  Yes   Do you believe that your pain has caused you to be more forgetful?  No   Do you feel that you have not received enough treatment for your pain?  No   Do you believe that your doctor(s) do not have the right training to treat your back/leg pain effectively?  No   Do you believe you should not be allowed to work or attend school because of your back/leg pain?  No   When did this pain begin?  little over 10 years    Did the pain begin after an injury or an accident? No   Is the pain work related?  No   Please gerri which of the following over-the-counter medicines you take. (hold down the control key, and click to select multiple responses) Ibuprofen   If you chose "other," please specify:  -   Please gerri which of the following prescription medicines you take. (hold down the control key, and click to select multiple responses) Other   Emergency department  No   Health care providers (hold down the control key, and click to select multiple responses) Pain management, Chiropractor, Physical therapist   Investigations done (hold down the control key, and click to select multiple responses) X-ray, MRI   Procedures (hold down the control key, and click to select multiple responses) Epidural steroid injections (INO)   Emergency department  No   Health care providers (hold down the control key, and click to select multiple responses) None   Investigations done (hold down the control key, and click to select multiple responses) None   Procedures (hold down the control key, and click to select multiple responses) None   Have you had any surgery on your back?  No   Please gerri what you are experiencing. (hold down the control key, and click to select multiple responses) " None   First activity you would like to perform better:  be able to strengthen without guarding    Current ability score to perform first activity: 3   Second activity you would like to perform better: walking   Current ability score to perform second activity: 3   Third activity you would like to perform better: biking    Current ability score to perform third activity: 3   Pain intensity:  The pain comes and goes and is very mild.   Personal care (washing, dressing, etc.):  Washing and dressing increase the pain, but I manage not to change my way of doing it.   Lifting: I can lift heavy weights, but it causes pain.   Walking: I have some pain walking, but it does not increase with distance.   Sitting: I can sit only in my favorite chair as long as I like.   Standing: I have some pain while standing, but it does not increase with time.   Sleeping: I get pain in bed, but it does not prevent me from sleeping well.   Social life: My social life is normal but increases the degree of pain.   Traveling: I get extra pain while traveling, but it does not compel me to seek alternative forms of travel.   Changing degree of pain: My pain is rapidly getting better.   Do you need any help looking after yourself? I need no help at all.   When doing household tasks, e.g., preparing food, gardening, using the video recorder, radio, telephone, or washing the car: I need no help at all.   Thinking about how easily you can get around your home and community: I get around my home and community by myself without any difficulty.   Because of your health, your relationships, e.g., your friends, partner or parents, generally: Are very close and warm   Thinking about your relationships with other people: I have plenty of friends and am never lonely.   Thinking about your health and your relationship with your  family:  There are some parts of my family role I cannot carry out.   Thinking about your vision, including when using your glasses  or contact lenses if needed: I see normally.   Thinking about your hearing, including using your hearing aid if needed: I hear normally.   When you communicate with others, e.g., talking, listening, writing, or signing: I have no trouble speaking to them or understanding what they are saying.   Thinking about how you sleep: My sleep is interrupted most nights, but I am usually able to go back to sleep without difficulty.   Thinking about how you generally feel: I do not feel anxious, worried or depressed.   How much pain or discomfort do you experience? I have moderate pain.   Little interest or pleasure in doing things Not at all   Feeling down, depressed or hopeless Not at all   What is your occupation?     How do you spend your leisure time? What are your hobbies? walking, spending time with children   How do you spend your leisure time? What are your hobbies? walking, spending time with children   What is your highest level of education? Advanced/graduate   What is your work status? Employed   How did you hear about the Healthyback program?  Physician   When did this pain begin?  little over 10 years    Do you need any help looking after yourself? I need no help at all.   When doing household tasks, e.g., preparing food, gardening, using the video recorder, radio, telephone, or washing the car: I need no help at all.   Thinking about how easily you can get around your home and community: I get around my home and community by myself without any difficulty.   Because of your health, your relationships, e.g., your friends, partner or parents, generally: Are very close and warm   Thinking about your relationships with other people: I have plenty of friends and am never lonely.   Thinking about your health and your relationship with your  family:  There are some parts of my family role I cannot carry out.   Thinking about your vision, including when using your glasses or contact lenses if needed:  I see normally.   Thinking about your hearing, including using your hearing aid if needed: I hear normally.   When you communicate with others, e.g., talking, listening, writing, or signing: I have no trouble speaking to them or understanding what they are saying.   Thinking about how you sleep: My sleep is interrupted most nights, but I am usually able to go back to sleep without difficulty.   Thinking about how you generally feel: I do not feel anxious, worried or depressed.   How much pain or discomfort do you experience? I have moderate pain.   Little interest or pleasure in doing things Not at all   Feeling down, depressed or hopeless Not at all

## 2017-11-08 NOTE — PROGRESS NOTES
Subjective:      Patient ID: Stanley Stewart is a 57 y.o. male.    Chief Complaint: No chief complaint on file.    Referred by: No ref. provider found     Mr Stewart is a 57 yo male sent by Dr. Estrada for evaluation for the healthy back lumbar program.  he has had low back pain for 15 years, and he feels like it is gradually worsening.  5 years ago, he had an acute flare of left back pain that got better, but has continued to have right sided low back pain.  He is being followed by Dr. Keysha Trevino, outside neurosurgeon, regarding lumbar degenerative disk disease, particularly at L4-5, he has sacralization of L5, where there might be facet arthropathy. He was initially seen on 4/27/2017 and was enrolled in healthy back.  He went to 15 visits, but noticed some increased pain, so decided to pursue RFA, he had MBBB bilaterally followed by RFA bilaterally which helped a lot.   He did feel like he made progress with PT and strengthening, and the pain was better, but still bad at times.  The pain is right low back dominant, but present on the left and into left anterior thigh with lying flat.  It is numbness and burning that is not constant.  The pain is intermittent ranging from 0-3/10.  The pain is a throbbing achy pain.  It is worse with lying on his back, sitting, and walking for long periods.  It is better with stretching and HEP.  He does take advil occasionally.  He also feels better standing. His goals are to be able to strengthen without being guarded, sit and walk.   In the past he has done chiropractor and PT with relief on left 5 years ago.  Pattern 1        Past Medical History:  No date: Degenerative disc disease  No date: Hyperlipidemia  No date: Hypertension  7/19/2012: Male hypogonadism    Past Surgical History:  No date: APPENDECTOMY  No date: arthroscopic knee surg      Comment: left X 2, right X 1  No date: INTUSSUSCEPTION REPAIR  No date: KNEE SURGERY    Review of patient's family history indicates:     Hyperlipidemia                 Father                    Hypertension                   Father                    Heart disease                  Father                    Liver disease                  Mother                    Heart disease                  Brother                   Heart disease                  Maternal Grandmother      Heart disease                  Paternal Grandfather        Social History    Marital status:              Spouse name:                       Years of education:                 Number of children: 3             Occupational History  Occupation          Employer            Comment                                   [employer]     Social History Main Topics    Smoking status: Former Smoker                                                                Packs/day: 0.00      Years: 0.00           Quit date: 7/19/1987    Smokeless status: Never Used                        Alcohol use: Yes           4.2 oz/week       7 Glasses of wine per week    Drug use: No              Sexual activity: Yes               Partners with: Female        Current Outpatient Prescriptions:  lisinopril-hydrochlorothiazide (PRINZIDE,ZESTORETIC) 20-12.5 mg per tablet, Take 1 tablet by mouth once daily, Disp: 90 tablet, Rfl: 3  meloxicam (MOBIC) 15 MG tablet, , Disp: , Rfl:   rosuvastatin (CRESTOR) 10 MG tablet, Take 1 tablet (10 mg total) by mouth once daily., Disp: 30 tablet, Rfl: 11  sildenafil (REVATIO) 20 mg Tab, Take 5 po 1 hour before sexual activity, Disp: 50 tablet, Rfl: 11  tadalafil (CIALIS) 5 MG tablet, Take 1 tablet (5 mg total) by mouth once daily., Disp: 30 tablet, Rfl: 11  vardenafil (LEVITRA) 20 MG tablet, Take 1 tablet (20 mg total) by mouth daily as needed for Erectile Dysfunction. Take 1 hour before intercourse., Disp: 30 tablet, Rfl: 11  zolpidem (AMBIEN) 10 mg Tab, TAKE 1 TABLET BY MOUTH EVERY DAY AT BEDTIME AS NEEDED, Disp: 30 tablet, Rfl: 3    Current Facility-Administered  Medications:  (START ON 10/1/2017) testosterone Pllt 12 Pellet, 12 Pellet, Implant, 1 time in Clinic/HOD, Jaydon Morin MD        Review of patient's allergies indicates:   -- Shellfish containing products     --  Other reaction(s): Hives                Review of Systems   Constitution: Negative for weight gain and weight loss.   Cardiovascular: Negative for chest pain.   Respiratory: Negative for shortness of breath.    Musculoskeletal: Positive for back pain. Negative for joint pain and joint swelling.   Gastrointestinal: Negative for abdominal pain and bowel incontinence.   Genitourinary: Negative for bladder incontinence.   Neurological: Negative for numbness.           Objective:          General    Vitals reviewed.  Constitutional: He is oriented to person, place, and time. He appears well-developed and well-nourished.   HENT:   Head: Normocephalic and atraumatic.   Pulmonary/Chest: Effort normal.   Neurological: He is alert and oriented to person, place, and time.   Psychiatric: He has a normal mood and affect. His behavior is normal. Judgment and thought content normal.     General Musculoskeletal Exam   Gait: normal     Right Ankle/Foot Exam     Tests   Heel Walk: able to perform  Tiptoe Walk: able to perform    Left Ankle/Foot Exam     Tests   Heel Walk: able to perform  Tiptoe Walk: able to perform  Back (L-Spine & T-Spine) / Neck (C-Spine) Exam     Tenderness Right paramedian tenderness of the Sacrum.     Back (L-Spine & T-Spine) Range of Motion   Extension: 20   Flexion: 80   Lateral Bend Right: 20   Lateral Bend Left: 20   Rotation Right: 40   Rotation Left: 40     Spinal Sensation   Right Side Sensation  C-Spine Level: normal   L-Spine Level: normal  S-Spine Level: normal  Left Side Sensation  C-Spine Level: normal  L-Spine Level: normal  S-Spine Level: normal    Back (L-Spine & T-Spine) Tests   Right Side Tests  Straight leg raise:      Sitting SLR: > 70 degrees      Left Side Tests  Straight leg  raise:     Sitting SLR: > 70 degrees          Other He has no scoliosis .  Spinal Kyphosis:  Absent    Comments:         Muscle Strength   Right Upper Extremity   Biceps: 5/5/5   Deltoid:  5/5  Triceps:  5/5  Wrist Extension: 5/5/5   Finger Flexors:  5/5  Left Upper Extremity  Biceps: 5/5/5   Deltoid:  5/5  Triceps:  5/5  Wrist Extension: 5/5/5   Finger Flexors:  5/5  Right Lower Extremity   Hip Flexion: 5/5   Quadriceps:  5/5   Anterior tibial:  5/5/5  EHL:  5/5  Left Lower Extremity   Hip Flexion: 5/5   Quadriceps:  5/5   Anterior tibial:  5/5/5   EHL:  5/5    Reflexes     Left Side  Biceps:  2+  Triceps:  2+  Brachioradialis:  2+  Quadriceps:  2+  Achilles:  2+  Left Dickson's Sign:  Absent  Babinski Sign:  absent    Right Side   Biceps:  2+  Triceps:  2+  Brachioradialis:  2+  Quadriceps:  2+  Achilles:  2+  Right Dickson's Sign:  absent  Babinski Sign:  absent    Vascular Exam     Right Pulses        Carotid:                  2+    Left Pulses        Carotid:                  2+        Assessment:       Encounter Diagnoses   Name Primary?    Spondylosis of lumbar region without myelopathy or radiculopathy Yes    Chronic right-sided low back pain without sciatica          Plan:       Diagnoses and all orders for this visit:    Spondylosis of lumbar region without myelopathy or radiculopathy  -     Ambulatory Referral to Physical/Occupational Therapy    Chronic right-sided low back pain without sciatica  -     Ambulatory Referral to Physical/Occupational Therapy         The patient has had a history of low back pain with limitations in there activities of Daily living    Previous treatment has not provided relief.    The situation was discussed at length with the patient. He previously did 15 visits with good relief, but still having significant pain so had RFA.  We discussed the importance of continuing strengthening.  He has continued his HEP from last visit.  He did feel the benefits of strengthening and  would like to get stronger to support his back.  He does feel like he will be able to strengthen better, now that his pain is better.     We discussed the importance of stretching and strengthening.  We discussed posture.  We discussed the pros and cons of further diagnostic testing, alternative treatment and Medications    Based on the history, physical exam, and functional index, an active physical therapy program is recommended.  The goal is to restore the patients function and reduce pain.  A program of progressive resistance exercises, biomechanical, and mobility maneuvers, instructions in proper body mechanics, aerobic conditioning and HEP will be utilized. The program will continue as long as making improvements.    An assessment of patients progress will be made at each visit to document change in status.    The patient will be actively involved in there own treatment, and responsible for appointments and home program    The patient's lumbar isometric strength will be tested and they will be placed in a program of isolated strength training based on 50% of their total functional torque and advanced as clinically appropriate.      Directional preference of pain will further influence the patients active rehabilitation program    The patient was instructed there might be an initial increase in discomfort    They are enrolled with good prognosis  Pattern 1

## 2017-11-13 ENCOUNTER — PATIENT MESSAGE (OUTPATIENT)
Dept: INTERNAL MEDICINE | Facility: CLINIC | Age: 57
End: 2017-11-13

## 2017-11-13 ENCOUNTER — PATIENT MESSAGE (OUTPATIENT)
Dept: UROLOGY | Facility: CLINIC | Age: 57
End: 2017-11-13

## 2017-11-17 ENCOUNTER — TELEPHONE (OUTPATIENT)
Dept: INTERNAL MEDICINE | Facility: CLINIC | Age: 57
End: 2017-11-17

## 2017-11-17 DIAGNOSIS — I10 ESSENTIAL HYPERTENSION: Primary | ICD-10-CM

## 2017-11-17 RX ORDER — ROSUVASTATIN CALCIUM 10 MG/1
TABLET, COATED ORAL
Refills: 11 | COMMUNITY
Start: 2017-10-17 | End: 2017-11-17 | Stop reason: SDUPTHER

## 2017-11-17 RX ORDER — ROSUVASTATIN CALCIUM 20 MG/1
20 TABLET, COATED ORAL DAILY
Qty: 90 TABLET | Refills: 3 | Status: SHIPPED | OUTPATIENT
Start: 2017-11-17 | End: 2018-01-11 | Stop reason: SDUPTHER

## 2017-11-17 RX ORDER — METHYLPREDNISOLONE 4 MG/1
TABLET ORAL
Qty: 1 PACKAGE | Refills: 0 | Status: SHIPPED | OUTPATIENT
Start: 2017-11-17 | End: 2019-06-06 | Stop reason: CLARIF

## 2017-11-17 NOTE — TELEPHONE ENCOUNTER
Good morning. I was following up on a lipid panel Dr. Morin ordered two weeks ago that showed my triglycerides were 315. Thank you

## 2017-11-17 NOTE — TELEPHONE ENCOUNTER
----- Message from Shayna Cooley sent at 11/17/2017 12:38 PM CST -----  Contact: pt [phone]  Pt would like a call from the nurse in regards to some e-mails about his medical condition,please advise pt

## 2017-11-21 ENCOUNTER — PATIENT MESSAGE (OUTPATIENT)
Dept: ADMINISTRATIVE | Facility: OTHER | Age: 57
End: 2017-11-21

## 2017-11-27 ENCOUNTER — OFFICE VISIT (OUTPATIENT)
Dept: UROLOGY | Facility: CLINIC | Age: 57
End: 2017-11-27
Payer: COMMERCIAL

## 2017-11-27 VITALS
BODY MASS INDEX: 33.06 KG/M2 | DIASTOLIC BLOOD PRESSURE: 88 MMHG | HEIGHT: 77 IN | WEIGHT: 280 LBS | SYSTOLIC BLOOD PRESSURE: 143 MMHG | HEART RATE: 63 BPM

## 2017-11-27 DIAGNOSIS — E29.1 MALE HYPOGONADISM: Primary | ICD-10-CM

## 2017-11-27 DIAGNOSIS — I10 ESSENTIAL HYPERTENSION: ICD-10-CM

## 2017-11-27 DIAGNOSIS — E78.2 MIXED HYPERLIPIDEMIA: ICD-10-CM

## 2017-11-27 DIAGNOSIS — N52.01 ERECTILE DYSFUNCTION DUE TO ARTERIAL INSUFFICIENCY: ICD-10-CM

## 2017-11-27 PROCEDURE — 11980 IMPLANT HORMONE PELLET(S): CPT | Mod: S$GLB,,, | Performed by: UROLOGY

## 2017-11-27 PROCEDURE — 99999 PR PBB SHADOW E&M-EST. PATIENT-LVL III: CPT | Mod: PBBFAC,,, | Performed by: UROLOGY

## 2017-11-27 PROCEDURE — 99214 OFFICE O/P EST MOD 30 MIN: CPT | Mod: 25,S$GLB,, | Performed by: UROLOGY

## 2017-11-27 PROCEDURE — S0189 TESTOSTERONE PELLET 75 MG: HCPCS | Mod: S$GLB,,, | Performed by: UROLOGY

## 2017-11-27 RX ORDER — SILDENAFIL CITRATE 20 MG/1
TABLET ORAL
Qty: 50 TABLET | Refills: 11 | Status: SHIPPED | OUTPATIENT
Start: 2017-11-27 | End: 2018-05-28 | Stop reason: SDUPTHER

## 2017-11-27 NOTE — PROGRESS NOTES
CHIEF COMPLAINT:    Mr. Stewart is a 57 y.o. male presenting with hypogonadism.    PRESENTING ILLNESS:    Mr. Stewart is a 57 y.o. male with a history of hypogonadism.  This has been present for > 1 year.  He is on Testopel.  He is here for placement.  While on TRT, he has more energy and a stronger libido.    He also has ED.  This has been present for > 1 year.  He has been using Cialis, but it is not effective.  He's currently using levitra with good results.  However, he c/o the cost.  He would like something cheaper.    He voids well.  No hematuria.  No dysuria.    REVIEW OF SYSTEMS:    Patient denies any history of headache, blurred vision, fever, nausea, vomiting, chills, flank discomfort, abdominal pain, bleeding per rectum, cough, SOB, recent loss of consciousness, recent mental status changes, seizures, dizziness, or upper or lower extremity weakness.    DANIELITO  1. 2  2. 3  3. 3  4. 3  5. 5     PATIENT HISTORY:    Past Medical History:   Diagnosis Date    Allergy     Degenerative disc disease     Hyperlipidemia     Hypertension     Male hypogonadism 7/19/2012       Past Surgical History:   Procedure Laterality Date    APPENDECTOMY      arthroscopic knee surg      left X 2, right X 1    INTUSSUSCEPTION REPAIR      KNEE SURGERY         Family History   Problem Relation Age of Onset    Hyperlipidemia Father     Hypertension Father     Heart disease Father     Liver disease Mother     Heart disease Brother     Heart disease Maternal Grandmother     Heart disease Paternal Grandfather        Social History     Social History    Marital status:      Spouse name: N/A    Number of children: 3    Years of education: N/A     Occupational History     [employer]     Social History Main Topics    Smoking status: Former Smoker     Quit date: 7/19/1987    Smokeless tobacco: Never Used    Alcohol use 4.2 oz/week     7 Glasses of wine per week    Drug use: No    Sexual activity: Yes      Partners: Female     Other Topics Concern    Not on file     Social History Narrative    No narrative on file       Allergies:  Shellfish containing products    Medications:    Current Outpatient Prescriptions:     lisinopril-hydrochlorothiazide (PRINZIDE,ZESTORETIC) 20-12.5 mg per tablet, Take 1 tablet by mouth once daily, Disp: 90 tablet, Rfl: 3    meloxicam (MOBIC) 15 MG tablet, , Disp: , Rfl:     methylPREDNISolone (MEDROL, JONY,) 4 mg tablet, use as directed, Disp: 1 Package, Rfl: 0    rosuvastatin (CRESTOR) 20 MG tablet, Take 1 tablet (20 mg total) by mouth once daily., Disp: 90 tablet, Rfl: 3    sildenafil (REVATIO) 20 mg Tab, Take 5 po 1 hour before sexual activity, Disp: 50 tablet, Rfl: 11    tadalafil (CIALIS) 5 MG tablet, Take 1 tablet (5 mg total) by mouth once daily., Disp: 30 tablet, Rfl: 11    vardenafil (LEVITRA) 20 MG tablet, Take 1 tablet (20 mg total) by mouth daily as needed for Erectile Dysfunction. Take 1 hour before intercourse., Disp: 30 tablet, Rfl: 11    zolpidem (AMBIEN) 10 mg Tab, TAKE 1 TABLET BY MOUTH EVERY DAY AT BEDTIME AS NEEDED, Disp: 30 tablet, Rfl: 3    Current Facility-Administered Medications:     testosterone Pllt 12 Pellet, 12 Pellet, Implant, 1 time in Clinic/HOD, Jaydon Morin MD    PHYSICAL EXAMINATION:    The patient generally appears in good health, is appropriately interactive, and is in no apparent distress.     Eyes: anicteric sclerae, moist conjunctivae; no lid-lag; PERRLA     HENT: Atraumatic; oropharynx clear with moist mucous membranes and no mucosal ulcerations;normal hard and soft palate. No evidence of lymphadenopathy.    Neck: Trachea midline.  No thyromegaly.    Musculoskeletal: No abnormal gait.    Skin: No lesions.    Mental: Cooperative with normal affect.  Is oriented to time, place, and person.    Neuro: Grossly intact.    Chest: Normal inspiratory effort.   No accessory muscles.  No audible wheezes.  Respirations symmetric on inspiration  and expiration.    Heart: Regular rhythm.      Abdomen:  Soft, non-tender. No masses or organomegaly. Bladder is not palpable. No evidence of flank discomfort. No evidence of inguinal hernia.    Genitourinary: The penis is circumcised with no evidence of plaques or induration. The urethral meatus is normal. The testes, epididymides, and cord structures are normal in size and contour bilaterally. The scrotum is normal in size and contour.    Normal anal sphincter tone. No rectal mass.    The prostate is 35 g. Normal landmarks. Lateral sulci. Median furrow intact.  No nodularity or induration. Seminal vesicles are normal.    Extremities: No clubbing, cyanosis, or edema      LABS:    Lab Results   Component Value Date    PSA 0.50 03/17/2017    PSA 0.47 07/18/2013    PSA 0.71 03/15/2013    PSADIAG 0.50 11/03/2017    PSADIAG 0.46 11/03/2016    PSADIAG 0.40 05/16/2016        IMPRESSION:    Encounter Diagnoses   Name Primary?    Male hypogonadism Yes    Erectile dysfunction due to arterial insufficiency     Mixed hyperlipidemia     Essential hypertension    HTN, controlled  Hyperlipidemia, controlled    PLAN:      1. Will try generic sildenafil for his ED to see if it is cheaper. He didn't fill it last time.  2. Procedure Report:Testopel Insertion    A Time Out was performed with the patient and nurse in the room.   The site was marked with a yes. Verbal consent was obtained. The risks and benefits of testosterone replacement were explained. The alternatives of observation and treatment with injections and gel therapy were discussed.     The risks of testopel explained to the patient include but are not limited to worsening of BPH, edema with or without congestive heart failure, gynecomastia, cellulitis and abscess, venous thromboembolic events, testicular atrophy, possible cardiac sequelae and with high dose testosterone the risk of liver function elevation (peliosis hepatitis) and hepatocellular carcinoma. Peliosis  hepatitis can be a fatal complication.     R gluteal region prepped and draped in sterile fashion.  20 cc of 2% lidocaine used for local analgesia.  Small incision made with a 15 blade.  Standard trocars used for subcutaneous insertion of 12 pellets total in a V shaped tract.  No active bleeding noted.  Area cleaned and dried. Steri strips applied. Dressing applied.      He can RTC 6 months with T, psa, cbc, hepatic panel lipid panel.          Copy to:

## 2017-12-04 ENCOUNTER — OFFICE VISIT (OUTPATIENT)
Dept: PAIN MEDICINE | Facility: CLINIC | Age: 57
End: 2017-12-04
Payer: COMMERCIAL

## 2017-12-04 ENCOUNTER — PATIENT MESSAGE (OUTPATIENT)
Dept: ADMINISTRATIVE | Facility: OTHER | Age: 57
End: 2017-12-04

## 2017-12-04 VITALS
SYSTOLIC BLOOD PRESSURE: 107 MMHG | DIASTOLIC BLOOD PRESSURE: 71 MMHG | HEIGHT: 77 IN | RESPIRATION RATE: 20 BRPM | HEART RATE: 67 BPM | TEMPERATURE: 97 F | WEIGHT: 282.19 LBS | BODY MASS INDEX: 33.32 KG/M2

## 2017-12-04 DIAGNOSIS — M51.36 DDD (DEGENERATIVE DISC DISEASE), LUMBAR: ICD-10-CM

## 2017-12-04 DIAGNOSIS — G89.4 CHRONIC PAIN SYNDROME: ICD-10-CM

## 2017-12-04 DIAGNOSIS — M47.816 LUMBAR SPONDYLOSIS: Primary | ICD-10-CM

## 2017-12-04 DIAGNOSIS — M47.816 FACET ARTHROPATHY, LUMBAR: ICD-10-CM

## 2017-12-04 PROCEDURE — 99999 PR PBB SHADOW E&M-EST. PATIENT-LVL III: CPT | Mod: PBBFAC,,, | Performed by: NURSE PRACTITIONER

## 2017-12-04 PROCEDURE — 99213 OFFICE O/P EST LOW 20 MIN: CPT | Mod: S$GLB,,, | Performed by: NURSE PRACTITIONER

## 2017-12-04 NOTE — PROGRESS NOTES
Chronic Pain - Established Visit    Referring Physician: No ref. provider found    Chief Complaint:   Chief Complaint   Patient presents with    Low-back Pain     right sided        SUBJECTIVE: Disclaimer: This note has been generated using voice-recognition software. There may be typographical errors that have been missed during proof-reading.    Interval History 12/4/2017:  The patient presents today for follow up of back pain.  He is s/p right then left L3,4,5 RFAs completed on 11/1/17.  He is reporting 100% relief of left side pain and 80% of right side.  His pain is tolerable at this time.  He is no longer taking oral NSAIDs.  He would like to repeat [facility] to obtain long term benefit, as he previously had to stop due to pain.  He is scheduled for an evaluation later this month.  He does still have intermittent numbness to his left leg, mainly when he lays flat.  His pain today is 2/10.  The patient denies any bowel or bladder incontinence or signs of saddle paresthesia.  The patient denies any major medical changes since last office visit.    Interval History 9/21/2017:  The patient returns today for follow up of lower back pain.  The pain mainly remains across the lower back.  He has intermittent tingling to left lateral leg to the knee.  His pain is worsened with prolonged standing.  He is s/p bilateral L3,4,5 MBB x2 completed 9/6/17 with 95% relief for about 6 hours after each procedure.  His pain is now back to baseline.  He is taking Celebrex intermittently.  He believes that it helps more than Mobic but sometimes upsets his stomach.  His pain today is 7/10.  He denies any health changes since previous encounter.    Initial encounter:    Stanley Stewart presents to the clinic for the evaluation of low back pain. The pain started 10 years ago and symptoms have been worsening.    Brief history:    Pain Description:    The pain is located in the right low back area and radiates to the left leg.      At  BEST  4/10     At WORST  10/10 on the WORST day.      On average pain is rated as 7/10.     Today the pain is rated as 6/10    The pain is described as aching, dull and constant      Symptoms interfere with daily functioning.     Exacerbating factors: Sitting, Laying, Bending and Getting out of bed/chair.      Mitigating factors medications and physical therapy.     Patient denies night fever/night sweats, urinary incontinence, bowel incontinence, significant weight loss, significant motor weakness and loss of sensations.  Patient denies any suicidal or homicidal ideations    Pain Medications:  Current:  None    Tried in Past:  NSAIDs -Mobic and Celebrex  TCA -Never  SNRI -Never  Anti-convulsants -Never  Muscle Relaxants -Never  Opioids-Never    Physical Therapy/Home Exercise: yes       report:  Not applicable    Pain Procedures: history of injections outside of facility - with relief on the left, not on the right  8/23/17 Bilateral L3,4,5 MBB- significant benefit  9/6/17 Bilateral L3,4,5 MBB- significant benefit  10/11/17 Right L3,4,5 RFA- 80% relief  11/1/17 Left L2,3,4,5 RFA- 100% relief    Chiropractor -never  Acupuncture - never  TENS unit -never  Spinal decompression -never  Joint replacement -never    Imaging:     Lumbar XRAYs 8/10/17    Narrative     Technique: AP, oblique, and lateral views including flexion/extension views of the lumbar spine.    Comparison: None    Findings:   There is a transitional type anatomy at S1. Significant degenerative disc disease at L5-S1. Bilateral facet arthropathy is worse at L5-S1, and milder at 044. No evidence of instability. Vertebral body heights are maintained. Paravertebral soft tissues are unremarkable.   Impression        Degenerative changes as above worst at L5-S1 without instability.         Past Medical History:   Diagnosis Date    Allergy     Degenerative disc disease     Gout     Hyperlipidemia     Hypertension     Male hypogonadism 7/19/2012     Past  Surgical History:   Procedure Laterality Date    APPENDECTOMY      arthroscopic knee surg      left X 2, right X 1    INTUSSUSCEPTION REPAIR      KNEE SURGERY       Social History     Social History    Marital status:      Spouse name: N/A    Number of children: 3    Years of education: N/A     Occupational History     [employer]     Social History Main Topics    Smoking status: Former Smoker     Quit date: 7/19/1987    Smokeless tobacco: Never Used    Alcohol use 4.2 oz/week     7 Glasses of wine per week    Drug use: No    Sexual activity: Yes     Partners: Female     Other Topics Concern    Not on file     Social History Narrative    No narrative on file     Family History   Problem Relation Age of Onset    Hyperlipidemia Father     Hypertension Father     Heart disease Father     Liver disease Mother     Heart disease Brother     Heart disease Maternal Grandmother     Heart disease Paternal Grandfather        Review of patient's allergies indicates:   Allergen Reactions    Shellfish containing products      Other reaction(s): Hives       Current Outpatient Prescriptions   Medication Sig    lisinopril-hydrochlorothiazide (PRINZIDE,ZESTORETIC) 20-12.5 mg per tablet Take 1 tablet by mouth once daily    meloxicam (MOBIC) 15 MG tablet     methylPREDNISolone (MEDROL, JONY,) 4 mg tablet use as directed    rosuvastatin (CRESTOR) 20 MG tablet Take 1 tablet (20 mg total) by mouth once daily.    sildenafil (REVATIO) 20 mg Tab Take 5 po 1 hour before sexual activity    tadalafil (CIALIS) 5 MG tablet Take 1 tablet (5 mg total) by mouth once daily.    vardenafil (LEVITRA) 20 MG tablet Take 1 tablet (20 mg total) by mouth daily as needed for Erectile Dysfunction. Take 1 hour before intercourse.    zolpidem (AMBIEN) 10 mg Tab TAKE 1 TABLET BY MOUTH EVERY DAY AT BEDTIME AS NEEDED     Current Facility-Administered Medications   Medication    [START ON 5/6/2018] testosterone Pllt 12  "Pellet       REVIEW OF SYSTEMS:    GENERAL:  No weight loss, malaise or fevers.  HEENT:   No recent changes in vision or hearing  NECK:  Negative for lumps, no difficulty with swallowing.  RESPIRATORY:  Negative for cough, wheezing or shortness of breath, patient denies any recent URI.  CARDIOVASCULAR:  Negative for chest pain, leg swelling or palpitations. Hypertension.  GI:  Negative for abdominal discomfort, blood in stools or black stools or change in bowel habits.  MUSCULOSKELETAL:  See HPI.  SKIN:  Negative for lesions, rash, and itching.  PSYCH:  No mood disorder or recent psychosocial stressors.  Patients sleep is not disturbed secondary to pain.  HEMATOLOGY/LYMPHOLOGY:  Negative for prolonged bleeding, bruising easily or swollen nodes.  Patient is not currently taking any anti-coagulants  ENDO: No history of diabetes or thyroid dysfunction  NEURO:   No history of headaches, syncope, paralysis, seizures or tremors.  All other reviewed and negative other than HPI.    OBJECTIVE:    /71   Pulse 67   Temp 97.2 °F (36.2 °C) (Oral)   Resp 20   Ht 6' 5" (1.956 m)   Wt 128 kg (282 lb 3.2 oz)   BMI 33.46 kg/m²     PHYSICAL EXAMINATION:    GENERAL: Well appearing, in no acute distress, alert and oriented x3.  PSYCH:  Mood and affect appropriate.  SKIN: Skin color, texture, turgor normal, no rashes or lesions.  HEAD/FACE:  Normocephalic, atraumatic. Cranial nerves grossly intact.  CV: RRR with palpation of the radial artery.  PULM: No evidence of respiratory difficulty, symmetric chest rise.  BACK: Straight leg raising in the sitting and supine positions is negative to radicular pain. There is no pain with palpation over the facet joints of the lumbar spine. There is full ROM.  Mild right sided facet loading.  EXTREMITIES: Peripheral joint ROM is full and pain free without obvious instability or laxity in all four extremities. No deformities, edema, or skin discoloration. Good capillary " refill.  MUSCULOSKELETAL: Hip, and knee provocative maneuvers are negative.  There is pain with palpation over the sacroiliac joints bilaterally.  There is no pain to palpation over the greater trochanteric bursa bilaterally.  FABERs test is negative bilaterally.  FADIRs test is negative.   Bilateral lower extremity strength is normal and symmetric.  No atrophy or tone abnormalities are noted.  NEURO: Bilateral lower extremity coordination and muscle stretch reflexes are physiologic and symmetric.  Plantar response are downgoing. No clonus.  No loss of sensation is noted.  GAIT: Normal.        CMP  Sodium   Date Value Ref Range Status   03/17/2017 142 136 - 145 mmol/L Final     Potassium   Date Value Ref Range Status   03/17/2017 4.4 3.5 - 5.1 mmol/L Final     Chloride   Date Value Ref Range Status   03/17/2017 106 95 - 110 mmol/L Final     CO2   Date Value Ref Range Status   03/17/2017 26 23 - 29 mmol/L Final     Glucose   Date Value Ref Range Status   03/17/2017 96 70 - 110 mg/dL Final     BUN, Bld   Date Value Ref Range Status   03/17/2017 21 (H) 6 - 20 mg/dL Final     Creatinine   Date Value Ref Range Status   03/17/2017 1.0 0.5 - 1.4 mg/dL Final     Calcium   Date Value Ref Range Status   03/17/2017 9.5 8.7 - 10.5 mg/dL Final     Total Protein   Date Value Ref Range Status   11/03/2017 7.8 6.0 - 8.4 g/dL Final     Albumin   Date Value Ref Range Status   11/03/2017 4.1 3.5 - 5.2 g/dL Final     Total Bilirubin   Date Value Ref Range Status   11/03/2017 0.9 0.1 - 1.0 mg/dL Final     Comment:     For infants and newborns, interpretation of results should be based  on gestational age, weight and in agreement with clinical  observations.  Premature Infant recommended reference ranges:  Up to 24 hours.............<8.0 mg/dL  Up to 48 hours............<12.0 mg/dL  3-5 days..................<15.0 mg/dL  6-29 days.................<15.0 mg/dL       Alkaline Phosphatase   Date Value Ref Range Status   11/03/2017 57 55 -  135 U/L Final     AST   Date Value Ref Range Status   11/03/2017 28 10 - 40 U/L Final     ALT   Date Value Ref Range Status   11/03/2017 38 10 - 44 U/L Final     Anion Gap   Date Value Ref Range Status   03/17/2017 10 8 - 16 mmol/L Final     eGFR if    Date Value Ref Range Status   03/17/2017 >60.0 >60 mL/min/1.73 m^2 Final     eGFR if non    Date Value Ref Range Status   03/17/2017 >60.0 >60 mL/min/1.73 m^2 Final     Comment:     Calculation used to obtain the estimated glomerular filtration  rate (eGFR) is the CKD-EPI equation. Since race is unknown   in our information system, the eGFR values for   -American and Non--American patients are given   for each creatinine result.           ASSESSMENT: 57 y.o. year old male with lower back pain, consistent with the following diagnoses:    Encounter Diagnoses   Name Primary?    Lumbar spondylosis Yes    Facet arthropathy, lumbar     DDD (degenerative disc disease), lumbar     Chronic pain syndrome        PLAN:     - Previous imaging was reviewed and discussed with the patient today.    - He is s/p right then left L3,4,5 RFAs with significant benefit.    - He has an evaluation for [facility] later this month.    - We discussed lumbar INO if left leg symptoms worsen in the future.    - The patient will continue a home exercise routine to help with pain and strengthening.      - RTC as needed.  He would like to call when he needs to come back in.    - Dr. Salas was consulted on the patient and agrees with this plan.      The above plan and management options were discussed at length with patient. Patient is in agreement with the above and verbalized understanding.     Delores Aaron  12/04/2017

## 2017-12-11 PROBLEM — G89.29 CHRONIC RIGHT-SIDED LOW BACK PAIN WITHOUT SCIATICA: Status: ACTIVE | Noted: 2017-12-11

## 2017-12-11 PROBLEM — M54.50 CHRONIC RIGHT-SIDED LOW BACK PAIN WITHOUT SCIATICA: Status: ACTIVE | Noted: 2017-12-11

## 2017-12-11 PROBLEM — M51.369 DDD (DEGENERATIVE DISC DISEASE), LUMBAR: Status: RESOLVED | Noted: 2017-07-10 | Resolved: 2017-12-11

## 2017-12-11 PROBLEM — M51.36 DDD (DEGENERATIVE DISC DISEASE), LUMBAR: Status: RESOLVED | Noted: 2017-07-10 | Resolved: 2017-12-11

## 2017-12-12 ENCOUNTER — CLINICAL SUPPORT (OUTPATIENT)
Dept: REHABILITATION | Facility: OTHER | Age: 57
End: 2017-12-12
Attending: PHYSICAL MEDICINE & REHABILITATION
Payer: COMMERCIAL

## 2017-12-12 DIAGNOSIS — M54.50 CHRONIC RIGHT-SIDED LOW BACK PAIN WITHOUT SCIATICA: ICD-10-CM

## 2017-12-12 DIAGNOSIS — G89.29 CHRONIC RIGHT-SIDED LOW BACK PAIN WITHOUT SCIATICA: ICD-10-CM

## 2017-12-12 PROCEDURE — 97110 THERAPEUTIC EXERCISES: CPT

## 2017-12-12 PROCEDURE — G8979 MOBILITY GOAL STATUS: HCPCS | Mod: CJ

## 2017-12-12 PROCEDURE — G8978 MOBILITY CURRENT STATUS: HCPCS | Mod: CJ

## 2017-12-12 PROCEDURE — 97162 PT EVAL MOD COMPLEX 30 MIN: CPT

## 2017-12-12 NOTE — PLAN OF CARE
" [facility] - PHYSICAL THERAPY EVALUATION     Name: Stanley Stewart  Clinic Number: [id number]    Stanley is a 57 y.o. male evaluated on 12/11/2017.   Time In: 1:00  Time out: 2:30    Diagnosis:   Encounter Diagnosis   Name Primary?    Chronic right-sided low back pain without sciatica      Physician: Acacia Ramirez, *  Treatment Orders: PT Eval and Treat    Past Medical History:   Diagnosis Date    Allergy     Degenerative disc disease     Gout     Hyperlipidemia     Hypertension     Male hypogonadism 7/19/2012     Current Outpatient Prescriptions   Medication Sig    lisinopril-hydrochlorothiazide (PRINZIDE,ZESTORETIC) 20-12.5 mg per tablet Take 1 tablet by mouth once daily    meloxicam (MOBIC) 15 MG tablet     methylPREDNISolone (MEDROL, JONY,) 4 mg tablet use as directed    rosuvastatin (CRESTOR) 20 MG tablet Take 1 tablet (20 mg total) by mouth once daily.    sildenafil (REVATIO) 20 mg Tab Take 5 po 1 hour before sexual activity    tadalafil (CIALIS) 5 MG tablet Take 1 tablet (5 mg total) by mouth once daily.    vardenafil (LEVITRA) 20 MG tablet Take 1 tablet (20 mg total) by mouth daily as needed for Erectile Dysfunction. Take 1 hour before intercourse.    zolpidem (AMBIEN) 10 mg Tab TAKE 1 TABLET BY MOUTH EVERY DAY AT BEDTIME AS NEEDED     Current Facility-Administered Medications   Medication    [START ON 5/6/2018] testosterone Pllt 12 Pellet     Review of patient's allergies indicates:   Allergen Reactions    Shellfish containing products      Other reaction(s): Hives     Precautions: HTN, gout, chronic back pain, RFA     Visit # authorized: 20  Authorization period: 12/31/17  Plan of care Expiration: Sent 12/12/2017      HISTORY   History of Present Illness: The pain is right low back dominant, but present on the left and into left anterior lateral thigh with lying flat. Describes as achy. Pt had 2 RFAs since last PT session. His low back on the left side since is "perfect". Right " side was also significantly better but has increased over the past few weeks. He finds it is much better throughout day but finds it hurts in the morning and at end of day. He is not taking as much medication as he used to for his pain.     Diagnostic Tests: From EPIC Radiographs  Findings:   There is a transitional type anatomy at S1. Significant degenerative disc disease at L5-S1. Bilateral facet arthropathy is worse at L5-S1, and milder at 044. No evidence of instability. Vertebral body heights are maintained. Paravertebral soft tissues are unremarkable.   Impression        Degenerative changes as above worst at L5-S1 without instability.       Pain Scale: Stanley rates pain on a scale of 0-10 to be 4 at worst; 1 currently; 0 at best using VAS.   Pain location: RIght low back and into anterior thigh.     Aggravating factors:  lying on his back, sitting unsupported, and walking on unlevel ground (stumbling with jolting), waking and at end of day. Standing with slight bend like with gardening work.   Easing Factors: stretching and HEP, standing  Disturbed Sleep: Yes, sleeps on stomach. Notices it feels better when sleeping without pillow between legs. Notices tempurepedic may not be so supportive.     Pattern of pain questions:  1.  Where is your pain the worst? Low back  2.  Is your pain constant or intermittent?  Intermittent  3.  Does bending forward make your typical pain worse? Yes   4.  Since the start of your back pain, has there been a change in your bowel or bladder? No   5.  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     Prior Treatment: Previously participated in [facility] (Not as consistent,   Prior functional status: Independent  DME owned/used: none  Occupation:  Professor    Leisure: Walking around park, riding bike                      Pts goals:  strengthen without being guarded, sit and walk    Red Flag Screening:   Cough  Sneeze  Strain: No  Bladder/ bowel: No  Falls:  No  General health: Good   Night pain: No   Unexplained weight loss: No     OBJECTIVE     POSTURE  Posture Alignment :forward head  Postural examination/scapula alignment: Rounded shoulder, Increased kyphosis and MIld lordosis   Joint integrity: Hypermobile throughout L3-L5 as seen through spring testing  Sitting: Fair  Standing: Fair   Correction of posture: Added slimline roll, Improved posture in sitting.     SLS: Right 10 seconds, Left 10 seconds  Trendelenburg: Right +, Left -  Functional squat: Fair mechanics Tight calves, reduced knee flexion, forward trunk lean      MOVEMENT LOSS    ROM Loss   Flexion moderate loss and fingertips to shins   Extension moderate loss and ERPcentral    Side bending Right minimal loss   Side bending Left minimal loss   Rotation Right within functional limits   Rotation Left within functional limits     Hip Flexiblity:   Supine Flexion:              Right moderate loss   Left moderate loss  Supine Internal rotation: Right minimal loss Left minimal loss  Hamstrings 90/90:          Right minimal loss Left moderate loss  Prone Quadriceps:         Right moderate loss Left moderate loss  Prone Extension:            Right minimal loss Left moderate loss    Right SLR increased LBP   Lower Extremity Strength   Right LE  Left LE   Hip flexion: 5/5 Hip flexion: 5/5   Hip extension:  4+/5 Hip extension: 4-/5  Increased right back pain    Hip abduction: 4+/5 Hip abduction: 5/5   Hip adduction:  5/5 Hip adduction:  5/5   Hip Internal rotation   5/5 Hip Internal rotation 5/5   Knee Flexion 5/5 Knee Flexion 5/5   Knee Extension 5/5 Knee Extension 5/5   Ankle dorsiflexion: 5/5 Ankle dorsiflexion: 5/5   Ankle plantarflexion: 5/5 Ankle plantarflexion: 5/5         GAIT:  Assistive Device used: none  Level of Assistance: independent  Patient displays the following gait deviations: no gait deviations observed.     Special Tests:   Test Name  Test Result   Prone Instability Test (+)   SI Joint  Provocation Test (--)   Straight Leg Raise (+) RIght    Neural Tension Test (--)   Crossed Straight Leg Raise (--)   Walking on toes (--)   Walking on heels  (--)       NEUROLOGICAL SCREENING     Sensory deficit: LE intact to light touch     Reflexes:    Left Right   Patella Tendon 2+ 2+   Achilles Tendon 2+ 2+   Babinski NT NT   Clonus - -     REPEATED TEST MOVEMENTS:  Repeated Flexion in Standing end range pain  worse  right LBP   Repeated Extension in Standing end range pain  no effect  Central LBP   Repeated Flexion in lying Sharp pain, worse when performing without ball. Less pain with ball but worse overall   Repeated Extension in lying  better       Baseline Isometric Testing on Med X equipment: Testing administered by PT    Baseline IM Testing Results:   Date of testin2017  ROM 24-0 deg   Max Peak Torque 107    Min Peak Torque 46    Flex/Ext Ratio 2.3   % below normative data -74       FOTO: Focus on Therapeutic Outcomes   Category: lumbar   % Impaired: 37  Current Score  = CJ = at least 20% but < 40% impaired, limited or restricted  Goal at Discharge Score = CJ = at least 20% but < 40% impaired, limited or restricted    Score interpretation is as follows:     TEST SCORE  Modifier  Impairment Limitation Restriction    0/50  CH  0 % impaired, limited or restricted   1-9/50  CI  @ least 1% but less than 20% impaired, limited or restricted   10-19/50  CJ  @ least 20%<40% impaired, limited or restricted   20-29/50  CK  @ least 40%<60% impaired, limited or restricted   30-39/50  CL  @ least 60% <80% impaired, limited or restricted   40-49/50  CM  @ least 80%<100% impaired limited or restricted   50/50  CN  100% impaired, limited or restricted       Treatment   Time In: 1:50  Time Out: 2:30    PT Evaluation Completed? Yes  Discussed Plan of Care with patient: Yes      Written Home Exercises Provided:   Handouts were given to the patient. Pt demo good understanding of the education provided. Stanley  demonstrated good return demonstration of activities.     - Patient received education regarding proper posture and body mechanics.    - Ginny roll tried, recommended, and purchase information was provided.    - Patient received a handout regarding anticipated muscular soreness following the isometric test and strategies for management were reviewed with patient including stretching, using ice and scheduled rest.     HEP as follows     Single knee to chest 10x, 2xdaily  Extension in lying 10x, 3x/daily  Supine pelvic tilts 5-10 s/h 5x, 2x daily  Z-lie 10-20 min, 2x daily    Pt was instructed in and performed the following:   Cardiovascular exercise and therapeutic exercise to improve posture, lumbar/cervical ROM, strength, and muscular endurance as follows:     Stanley received therapeutic exercises to develop/improve posture, lumbar/cervical ROM, strength and muscular endurance for 30 minutes including the following exercises: med-x lumbar machine testing    [facility] 12/12/2017   Visit Number 1   VAS Pain Rating 1   Treadmill Time (in min.) -   Speed -   Extension in Lying -   Extension in Standing -   Flexion in Lying -   Lumbar Extension Seat Pad 0   Femur Restraint 7   Top Dead Center 24   Counterweight 611   Lumbar Flexion 24   Lumbar Extension 0   Lumbar Peak Torque 107   Min Torque -   Percent From Norm -   Percent Change from Initial -   Lumbar Weight 60   Repetitions -   Rating of Perceived Exertion -   Ice - Z Lie (in min.) 10       Stanley received the following manual therapy techniques: None  Assessment   This is a 57 y.o. male referred to [facility] and presents with a medical diagnosis of   Encounter Diagnosis   Name Primary?    Chronic right-sided low back pain without sciatica     and demonstrates limitations as described below in the problem list. Pt rehab potential is Good. Pt presents with lumbar dysfunction, decreased lumbar strength and ROM compared to norms, decreased LE  ROM, decreased LE strength, decreased hip flexibility, fair postural alignment, poor body mechanics, trunk motor control deficits, + prone instability test.     Pain Pattern: 1 PEP        Patient received education on the [facility] program, purpose of the isometric test, progression of back strengthening as well as wellness approach and systemic strengthening.  Details of the program were discussed.  Reviewed that patient should feel support/pressure from med ex restraints but no pain or discomfort and patient expressed understanding.    Based on the above history and physical examination an active physical therapy program is recommended.  Pt will continue to benefit from skilled outpatient physical therapy to address the deficits listed below in the chart, provide pt/family education and to maximize pt's level of independence in the home and community environment. .     No environmental, cultural, spiritual, developmental or education needs expressed or noted    Medical necessity is demonstrated by the following problem list.    Pt presents with the following impairments:   History  Co-morbidities and personal factors that may impact the plan of care Examination  Body Structures and Functions, activity limitations and participation restrictions that may impact the plan of care Clinical Presentation   Decision Making/ Complexity Score   Co-morbidities:   HTN, gout, chronic back pain, RFA        Personal Factors:   lifestyle Body Regions:   back  lower extremities    Body Systems:   gross symmetry  ROM  strength  gross coordinated movement  motor control    Activity limitations:   Learning and applying knowledge  no deficits    General Tasks and Commands  no deficits    Communication  no deficits    Mobility  lifting and carrying objects  walking    Self care  no deficits    Domestic Life  Yard work    Interactions/Relationships  no deficits    Life Areas  no deficits    Community and Social Life  community  life  recreation and leisure    Participation Restrictions:   None     evolving clinical presentation with changing clinical characteristics   Worsening over the past few weeks   Moderate         GOALS: Pt is in agreement with the following goals.    Short term goals:  6 weeks or 10 visits   1.  Pt will demonstrate increased lumbar ROM by at least 3 degrees from the initial ROM value with improvements noted in functional ROM and ability to perform ADLs  2.  Pt will demonstrate increased maximum isometric torque value by 10% when compared to the initial value resulting in improved ability to perform bending, lifting, and carrying activities safely, confidently.  3.  Patient report a reduction in worst pain score by 1-2 points for improved tolerance during work and recreational activities  4.  Pt able to perform HEP correctly with minimal cueing or supervision for therapist    Long term goals: 13 weeks or 20 visits   1. Pt will demonstrate increased lumbar ROM by at least 6 degrees from initial ROM value, resulting in improved ability to perform functional fwd bending while standing and sitting.   2. Pt will demonstrate increased maximum isometric torque value by 30% when compared to the initial value resulting in improved ability to perform bending, lifting, and carrying activities safely, confidently.  3. Pt to demonstrate ability to independently control and reduce their pain through posture positioning and mechanical movements throughout a typical day.  4.  Patient will demonstrate improved overall function per FOTO Survey to CJ = at least 20% but < 40% impaired, limited or restricted score or less.  5. Pt will report ability to walk on uneven surface without increased symptoms.       Plan   Outpatient physical therapy 2x week for 13 weeks or 20 visits to include the following:   - Patient education  - Therapeutic exercise  - Manual therapy  - Performance testing   - Neuromuscular Re-education  - Therapeutic  "activity   - Modalities    Pt may be seen by PTA as part of the rehabilitation team.     Therapist: Leatha Garcia, PT  12/11/2017    "I certify the need for these services furnished under this plan of treatment and while under my care."    ____________________________________  Physician/Referring Practitioner    _______________  Date of Signature                "

## 2017-12-14 ENCOUNTER — CLINICAL SUPPORT (OUTPATIENT)
Dept: REHABILITATION | Facility: OTHER | Age: 57
End: 2017-12-14
Attending: PHYSICAL MEDICINE & REHABILITATION
Payer: COMMERCIAL

## 2017-12-14 DIAGNOSIS — M54.50 CHRONIC RIGHT-SIDED LOW BACK PAIN WITHOUT SCIATICA: ICD-10-CM

## 2017-12-14 DIAGNOSIS — G89.29 CHRONIC RIGHT-SIDED LOW BACK PAIN WITHOUT SCIATICA: ICD-10-CM

## 2017-12-14 PROCEDURE — 97110 THERAPEUTIC EXERCISES: CPT

## 2017-12-14 NOTE — PROGRESS NOTES
[facility] Physical Therapy Treatment    Increase pts weight 10% next visit due to RPE a 2.      Name: Stanley Stewart  Clinic Number: [id number]  Date of Treatment: 2017   Diagnosis:   Encounter Diagnosis   Name Primary?    Chronic right-sided low back pain without sciatica      Physician: Acacia Ramirez, *    Pain pattern determined: 1 PEP  Plan of care signed: 17   Time in: 4:00  Time Out: 5:00  Total Treatment time: 40  Precautions: HTN, gout, chronic back pain, RFA  Visit #: 2    POC due:3/12/18  Reassessment due:18    Face to Face discussion of patient was done between PT and PTA.     Subjective   Stanley reports soreness post last test. Minimal dull pain in LB today.      Patient reports their pain to be 2/10 on a 0-10 scale with 0 being no pain and 10 being the worst pain imaginable.    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, riding bike                      Pts goals:  strengthen without being guarded, sit and walk         Objective   Baseline IM Testing Results:   Date of testin2017  ROM 24-0 deg   Max Peak Torque 107    Min Peak Torque 46    Flex/Ext Ratio 2.3   % below normative data -74         FOTO: Focus on Therapeutic Outcomes   Category: lumbar   % Impaired: 37  Current Score  = CJ = at least 20% but < 40% impaired, limited or restricted  Goal at Discharge Score = CJ = at least 20% but < 40% impaired, limited or restricted      Treatment    Pt was instructed in and performed the following:     Stanley received therapeutic exercises to develop/improved posture, cardiovascular endurance, muscular endurance, lumbar/cervical ROM, strength and muscular endurance for 40 minutes including the following exercises:   [facility] 2017   Visit Number 2   VAS Pain Rating 2   Treadmill Time (in min.) 10   Speed 2   Extension in Lying 10   Extension in Standing -   Flexion in Lying 10   Lumbar Extension Seat  Pad -   Femur Restraint -   Top Dead Center -   Counterweight -   Lumbar Flexion -   Lumbar Extension -   Lumbar Peak Torque -   Min Torque -   Percent From Norm -   Percent Change from Initial -   Lumbar Weight 53   Repetitions 20   Rating of Perceived Exertion 2   Ice - Z Lie (in min.) 10       PT 10x  LTR 10x    Peripheral muscle strengthening which included 1 set of 15-20 repetitions at a slow, controlled 7 second per rep pace focused on strengthening supporting musculature for improved body mechanics and functional mobility.  Pt and therapist focused on proper form during treatment to ensure optimal strengthening of each targeted muscle group.  Machines were utilized including torso rotation, leg extension, leg curl, chest press, upright row, Tricep extension, bicep curl, leg press, and hip abduction.      Stanley received the following manual therapy techniques: n/a were applied to the: n/a for 0 minutes.       Home Exercise Program as follows:   Single knee to chest 10x, 2xdaily  Extension in lying 10x, 3x/daily  Supine pelvic tilts 5-10 s/h 5x, 2x daily  TLR 10x  Z-lie 10-20 min, 2x daily     Handouts were given to the patient. Pt demo good understanding of the education provided. Stanley demonstrated good return demonstration of activities.     Lumbar roll use compliance: unknown  Additional exercises taught this treatment session: none    Assessment     Pt with minimal LBP that did decrease a little during and post session. Reviewed HEP with min vc for tech.  Pt demo well. Pt tolerated lumbar medx machine with starting weight 50% of pts max peak torque with no c/o pain. Pt tolerated the all the medx machines today due to he has been to therapy here before with no c/o increased LB pain or any limb pain. Increase pts weight 10% next visit due to RPE a 2.        Patient is making good progress towards established goals.  Pt will continue to benefit from skilled outpatient physical therapy to address the deficits  stated in the impairment chart, provide pt/family education and to maximize pt's level of independence in the home and community environment.       Pt's spiritual, cultural and educational needs considered and pt agreeable to plan of care and goals as stated below:     Medical necessity is demonstrated by the following IMPAIRMENTS/PROBLEMS:      History  Co-morbidities and personal factors that may impact the plan of care Examination  Body Structures and Functions, activity limitations and participation restrictions that may impact the plan of care Clinical Presentation Decision Making/ Complexity Score   Co-morbidities:   HTN, gout, chronic back pain, RFA           Personal Factors:   lifestyle Body Regions:   back  lower extremities     Body Systems:   gross symmetry  ROM  strength  gross coordinated movement  motor control     Activity limitations:   Learning and applying knowledge  no deficits     General Tasks and Commands  no deficits     Communication  no deficits     Mobility  lifting and carrying objects  walking     Self care  no deficits     Domestic Life  Yard work     Interactions/Relationships  no deficits     Life Areas  no deficits     Community and Social Life  community life  recreation and leisure     Participation Restrictions:   None    evolving clinical presentation with changing clinical characteristics   Worsening over the past few weeks    Moderate            GOALS: Pt is in agreement with the following goals.     Short term goals:  6 weeks or 10 visits   1.  Pt will demonstrate increased lumbar ROM by at least 3 degrees from the initial ROM value with improvements noted in functional ROM and ability to perform ADLs  2.  Pt will demonstrate increased maximum isometric torque value by 10% when compared to the initial value resulting in improved ability to perform bending, lifting, and carrying activities safely, confidently.  3.  Patient report a reduction in worst pain score by 1-2 points  for improved tolerance during work and recreational activities  4.  Pt able to perform HEP correctly with minimal cueing or supervision for therapist     Long term goals: 13 weeks or 20 visits   1. Pt will demonstrate increased lumbar ROM by at least 6 degrees from initial ROM value, resulting in improved ability to perform functional fwd bending while standing and sitting.   2. Pt will demonstrate increased maximum isometric torque value by 30% when compared to the initial value resulting in improved ability to perform bending, lifting, and carrying activities safely, confidently.  3. Pt to demonstrate ability to independently control and reduce their pain through posture positioning and mechanical movements throughout a typical day.  4.  Patient will demonstrate improved overall function per FOTO Survey to CJ = at least 20% but < 40% impaired, limited or restricted score or less.  5. Pt will report ability to walk on uneven surface without increased symptoms.     Plan   Continue with established Plan of Care towards established PT goals.

## 2017-12-20 ENCOUNTER — CLINICAL SUPPORT (OUTPATIENT)
Dept: REHABILITATION | Facility: OTHER | Age: 57
End: 2017-12-20
Attending: PHYSICAL MEDICINE & REHABILITATION
Payer: COMMERCIAL

## 2017-12-20 DIAGNOSIS — G89.29 CHRONIC RIGHT-SIDED LOW BACK PAIN WITHOUT SCIATICA: ICD-10-CM

## 2017-12-20 DIAGNOSIS — M54.50 CHRONIC RIGHT-SIDED LOW BACK PAIN WITHOUT SCIATICA: ICD-10-CM

## 2017-12-20 PROCEDURE — 97110 THERAPEUTIC EXERCISES: CPT

## 2017-12-20 NOTE — PROGRESS NOTES
[facility]    Increase pts weight 10% next visit due to RPE a 2.      Name: Stanley Stewart  Clinic Number: [id number]  Date of Treatment: 2017   Diagnosis:   Encounter Diagnosis   Name Primary?    Chronic right-sided low back pain without sciatica      Physician: Acacia Ramirez, *    Pain pattern determined: 1 PEP  Plan of care signed: 17   Time in: 9:30  Time Out: 10:30  Total Treatment time: 40  Precautions: HTN, gout, chronic back pain, RFA  Visit #: 4  ( Increase weight 10% next visit.)    POC due:3/12/18  Reassessment due:18    Face to Face discussion of patient was done between PT and PTA.     Subjective   Stanley reports soreness post/pain this week and he feels it's from starting the workout here again.     Patient reports their pain to be 4/10 on a 0-10 scale with 0 being no pain and 10 being the worst pain imaginable.    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, riding bike                      Pts goals:  strengthen without being guarded, sit and walk         Objective   Baseline IM Testing Results:   Date of testin2017  ROM 24-0 deg   Max Peak Torque 107    Min Peak Torque 46    Flex/Ext Ratio 2.3   % below normative data -74         FOTO: Focus on Therapeutic Outcomes   Category: lumbar   % Impaired: 37  Current Score  = CJ = at least 20% but < 40% impaired, limited or restricted  Goal at Discharge Score = CJ = at least 20% but < 40% impaired, limited or restricted      Treatment    Pt was instructed in and performed the following:     Stanley received therapeutic exercises to develop/improved posture, cardiovascular endurance, muscular endurance, lumbar/cervical ROM, strength and muscular endurance for 40 minutes including the following exercises:       [facility] 2017   Visit Number 3   VAS Pain Rating 2   Treadmill Time (in min.) 10   Speed 2   Extension in Lying 10    Extension in Standing -   Flexion in Lying 10   Lumbar Weight 58   Repetitions 20   Rating of Perceived Exertion 3   Ice - Z Lie (in min.) 10   PPT 10x  LTR 10x    Peripheral muscle strengthening which included 1 set of 15-20 repetitions at a slow, controlled 7 second per rep pace focused on strengthening supporting musculature for improved body mechanics and functional mobility.  Pt and therapist focused on proper form during treatment to ensure optimal strengthening of each targeted muscle group.  Machines were utilized including torso rotation, leg extension, leg curl, chest press, upright row, Tricep extension, bicep curl, leg press, and hip abduction.      Stanley received the following manual therapy techniques: n/a were applied to the: n/a for 0 minutes.       Home Exercise Program as follows:   Single knee to chest 10x, 2xdaily  Extension in lying 10x, 3x/daily  Supine pelvic tilts 5-10 s/h 5x, 2x daily  TLR 10x  Z-lie 10-20 min, 2x daily     Handouts were given to the patient. Pt demo good understanding of the education provided. Stanley demonstrated good return demonstration of activities.     Lumbar roll use compliance: unknown  Additional exercises taught this treatment session: none    Assessment     Pt with minimal LBP that did decrease a little during and post session. Reviewed HEP with min vc for tech.  Pt demo well. Pt tolerated lumbar medx machine with a weight increase with no c/o pain.  RPE a 2. Increase weight 10% next visit.Pt tolerated the all the medx machines today with no c/o increased LB pain or any limb pain. Pt not 100% complaint with his HEP.         Patient is making good progress towards established goals.  Pt will continue to benefit from skilled outpatient physical therapy to address the deficits stated in the impairment chart, provide pt/family education and to maximize pt's level of independence in the home and community environment.       Pt's spiritual, cultural and educational  needs considered and pt agreeable to plan of care and goals as stated below:     Medical necessity is demonstrated by the following IMPAIRMENTS/PROBLEMS:      History  Co-morbidities and personal factors that may impact the plan of care Examination  Body Structures and Functions, activity limitations and participation restrictions that may impact the plan of care Clinical Presentation Decision Making/ Complexity Score   Co-morbidities:   HTN, gout, chronic back pain, RFA           Personal Factors:   lifestyle Body Regions:   back  lower extremities     Body Systems:   gross symmetry  ROM  strength  gross coordinated movement  motor control     Activity limitations:   Learning and applying knowledge  no deficits     General Tasks and Commands  no deficits     Communication  no deficits     Mobility  lifting and carrying objects  walking     Self care  no deficits     Domestic Life  Yard work     Interactions/Relationships  no deficits     Life Areas  no deficits     Community and Social Life  community life  recreation and leisure     Participation Restrictions:   None    evolving clinical presentation with changing clinical characteristics   Worsening over the past few weeks    Moderate            GOALS: Pt is in agreement with the following goals.     Short term goals:  6 weeks or 10 visits   1.  Pt will demonstrate increased lumbar ROM by at least 3 degrees from the initial ROM value with improvements noted in functional ROM and ability to perform ADLs  2.  Pt will demonstrate increased maximum isometric torque value by 10% when compared to the initial value resulting in improved ability to perform bending, lifting, and carrying activities safely, confidently.  3.  Patient report a reduction in worst pain score by 1-2 points for improved tolerance during work and recreational activities  4.  Pt able to perform HEP correctly with minimal cueing or supervision for therapist     Long term goals: 13 weeks or 20  visits   1. Pt will demonstrate increased lumbar ROM by at least 6 degrees from initial ROM value, resulting in improved ability to perform functional fwd bending while standing and sitting.   2. Pt will demonstrate increased maximum isometric torque value by 30% when compared to the initial value resulting in improved ability to perform bending, lifting, and carrying activities safely, confidently.  3. Pt to demonstrate ability to independently control and reduce their pain through posture positioning and mechanical movements throughout a typical day.  4.  Patient will demonstrate improved overall function per FOTO Survey to CJ = at least 20% but < 40% impaired, limited or restricted score or less.  5. Pt will report ability to walk on uneven surface without increased symptoms.     Plan   Continue with established Plan of Care towards established PT goals.

## 2017-12-27 ENCOUNTER — CLINICAL SUPPORT (OUTPATIENT)
Dept: REHABILITATION | Facility: OTHER | Age: 57
End: 2017-12-27
Attending: PHYSICAL MEDICINE & REHABILITATION
Payer: COMMERCIAL

## 2017-12-27 DIAGNOSIS — M54.50 CHRONIC RIGHT-SIDED LOW BACK PAIN WITHOUT SCIATICA: ICD-10-CM

## 2017-12-27 DIAGNOSIS — G89.29 CHRONIC RIGHT-SIDED LOW BACK PAIN WITHOUT SCIATICA: ICD-10-CM

## 2017-12-27 PROCEDURE — 97110 THERAPEUTIC EXERCISES: CPT

## 2017-12-27 NOTE — PROGRESS NOTES
[facility]    Increase pts weight 10% next visit due to RPE a 2.      Name: Stanley Stewart  Clinic Number: [id number]  Date of Treatment: 2017   Diagnosis:   Encounter Diagnosis   Name Primary?    Chronic right-sided low back pain without sciatica      Physician: Acacia Ramirez, *    Pain pattern determined: 1 PEP  Plan of care signed: 17   Time in: 2:30  Time Out: 3:30  Total Treatment time: 40  Precautions: HTN, gout, chronic back pain, RFA  Visit #: 4  ( Increase weight 10% next visit.)    POC due: 3/12/18  Reassessment done: 17  Reassessment due:18    Face to Face discussion of patient was done between PT and PTA.     Subjective   Stanley reports some soreness post/pain this week secondary to traveling during the holidays. He has not been as consistent with HEP but is feeling better overall.     Patient reports their pain to be 2/10 on a 0-10 scale with 0 being no pain and 10 being the worst pain imaginable.    Pain Location: LB     Work and leisure: Occupation:  [employer]    Leisure: Walking around park, riding bike                      Pts goals:  strengthen without being guarded, sit and walk         Objective   Baseline IM Testing Results:   Date of testin2017  ROM 24-0 deg (Increased to 36-0 17)   Max Peak Torque 107    Min Peak Torque 46    Flex/Ext Ratio 2.3   % below normative data -74         FOTO: Focus on Therapeutic Outcomes   Category: lumbar   % Impaired: 37  Current Score  = CJ = at least 20% but < 40% impaired, limited or restricted  Goal at Discharge Score = CJ = at least 20% but < 40% impaired, limited or restricted      Treatment    Pt was instructed in and performed the following:     Stanley received therapeutic exercises to develop/improved posture, cardiovascular endurance, muscular endurance, lumbar/cervical ROM, strength and muscular endurance for 40 minutes including the following exercises:   [facility]  12/27/2017   Visit Number 4   VAS Pain Rating 2   Treadmill Time (in min.) 10   Speed 2   Extension in Lying 10   Extension in Standing 10   Flexion in Lying 10   Lumbar Extension Seat Pad -   Femur Restraint -   Top Dead Center -   Counterweight -   Lumbar Flexion 36   Lumbar Extension 0   Lumbar Peak Torque -   Min Torque -   Percent From Norm -   Percent Change from Initial -   Lumbar Weight 61   Repetitions 20   Rating of Perceived Exertion 2   Ice - Z Lie (in min.) 10     PPT 10x (supine and seated with mild v/c)  Seated quad stretch 20 s/h 4x   LTR 10x    Peripheral muscle strengthening which included 1 set of 15-20 repetitions at a slow, controlled 7 second per rep pace focused on strengthening supporting musculature for improved body mechanics and functional mobility.  Pt and therapist focused on proper form during treatment to ensure optimal strengthening of each targeted muscle group.  Machines were utilized including torso rotation, leg extension, leg curl, chest press, upright row, Tricep extension, bicep curl, leg press, and hip abduction.      Stanley received the following manual therapy techniques: n/a were applied to the: n/a for 0 minutes.       Home Exercise Program as follows:   Single knee to chest 10x, 2xdaily  Extension in lying 10x, 3x/daily  Supine and seated pelvic tilts 5-10 s/h 5x, 2x daily  TLR 10x  Seated quad stretch 20 s/h 4x   Z-lie 10-20 min, 2x daily     Handouts were given to the patient. Pt demo good understanding of the education provided. Stanley demonstrated good return demonstration of activities.     Lumbar roll use compliance: unknown  Additional exercises taught this treatment session:   Seated quad stretch 20 s/h 4x     Assessment     Pt with minimal LBP that did decrease a little during and post session. Reviewed HEP with min vc for tech.  Pt demo well. Added seated PPT and seated quad stretch with positive pt response.Pt tolerated lumbar medx machine with improved ORM to  36-0 and 5% weight increase with no c/o pain.  RPE a 2. Increase weight 10% next visit. Pt tolerated the all the medx machines today with no c/o increased LB pain or any limb pain.     Patient is making good progress towards established goals.  Pt will continue to benefit from skilled outpatient physical therapy to address the deficits stated in the impairment chart, provide pt/family education and to maximize pt's level of independence in the home and community environment.       Pt's spiritual, cultural and educational needs considered and pt agreeable to plan of care and goals as stated below:     Medical necessity is demonstrated by the following IMPAIRMENTS/PROBLEMS:      History  Co-morbidities and personal factors that may impact the plan of care Examination  Body Structures and Functions, activity limitations and participation restrictions that may impact the plan of care Clinical Presentation Decision Making/ Complexity Score   Co-morbidities:   HTN, gout, chronic back pain, RFA           Personal Factors:   lifestyle Body Regions:   back  lower extremities     Body Systems:   gross symmetry  ROM  strength  gross coordinated movement  motor control     Activity limitations:   Learning and applying knowledge  no deficits     General Tasks and Commands  no deficits     Communication  no deficits     Mobility  lifting and carrying objects  walking     Self care  no deficits     Domestic Life  Yard work     Interactions/Relationships  no deficits     Life Areas  no deficits     Community and Social Life  community life  recreation and leisure     Participation Restrictions:   None    evolving clinical presentation with changing clinical characteristics   Worsening over the past few weeks    Moderate            GOALS: Pt is in agreement with the following goals.     Short term goals:  6 weeks or 10 visits   1.  Pt will demonstrate increased lumbar ROM by at least 3 degrees from the initial ROM value with  improvements noted in functional ROM and ability to perform ADLs. Met 12/27/17  2.  Pt will demonstrate increased maximum isometric torque value by 10% when compared to the initial value resulting in improved ability to perform bending, lifting, and carrying activities safely, confidently.  3.  Patient report a reduction in worst pain score by 1-2 points for improved tolerance during work and recreational activities  4.  Pt able to perform HEP correctly with minimal cueing or supervision for therapist     Long term goals: 13 weeks or 20 visits   1. Pt will demonstrate increased lumbar ROM by at least 6 degrees from initial ROM value, resulting in improved ability to perform functional fwd bending while standing and sitting. Met 12/27/17  2. Pt will demonstrate increased maximum isometric torque value by 30% when compared to the initial value resulting in improved ability to perform bending, lifting, and carrying activities safely, confidently.  3. Pt to demonstrate ability to independently control and reduce their pain through posture positioning and mechanical movements throughout a typical day.  4.  Patient will demonstrate improved overall function per FOTO Survey to CJ = at least 20% but < 40% impaired, limited or restricted score or less.  5. Pt will report ability to walk on uneven surface without increased symptoms.     Plan   Continue with established Plan of Care towards established PT goals.

## 2018-01-03 RX ORDER — AMLODIPINE BESYLATE 10 MG/1
TABLET ORAL
Qty: 90 TABLET | Refills: 1 | Status: SHIPPED | OUTPATIENT
Start: 2018-01-03 | End: 2019-06-06

## 2018-01-03 RX ORDER — ATORVASTATIN CALCIUM 20 MG/1
TABLET, FILM COATED ORAL
Qty: 90 TABLET | Refills: 1 | Status: SHIPPED | OUTPATIENT
Start: 2018-01-03 | End: 2018-05-18

## 2018-01-10 ENCOUNTER — CLINICAL SUPPORT (OUTPATIENT)
Dept: REHABILITATION | Facility: OTHER | Age: 58
End: 2018-01-10
Attending: PHYSICAL MEDICINE & REHABILITATION
Payer: COMMERCIAL

## 2018-01-10 DIAGNOSIS — M54.50 CHRONIC RIGHT-SIDED LOW BACK PAIN WITHOUT SCIATICA: ICD-10-CM

## 2018-01-10 DIAGNOSIS — G89.29 CHRONIC RIGHT-SIDED LOW BACK PAIN WITHOUT SCIATICA: ICD-10-CM

## 2018-01-10 PROCEDURE — 97110 THERAPEUTIC EXERCISES: CPT

## 2018-01-10 NOTE — PROGRESS NOTES
[facility]    Increase pts weight 10% next visit due to RPE a 2.      Name: Stanley Stewart  Clinic Number: [id number]  Date of Treatment: 01/10/2018   Diagnosis:   Encounter Diagnosis   Name Primary?    Chronic right-sided low back pain without sciatica      Physician: Acacia Ramirez, *    Pain pattern determined: 1 PEP  Plan of care signed: 17   Time in: 2:30  Time Out: 3:30  Total Treatment time: 40  Precautions: HTN, gout, chronic back pain, RFA  Visit #: 4  ( Increase weight 10% next visit.)    POC due: 3/12/18  Reassessment done: 17  Reassessment due:2/10/18    Face to Face discussion of patient was done between PT and PTA.     Subjective   Stanley reports that his back is started to feel the same as it did before he got the radiofrequency ablation to his low back, it only lasted about 3 months. He is back to taking Mobic everyday and the pain usually goes away with the mobic, without it he can feel some pain. He has been walking 2-4 miles daily and also doing cycling classes, he has lost weight. Most pain with prolonged sitting and lying on his back. Pain is primarily on the R side    Patient reports their pain to be 2/10 on a 0-10 scale with 0 being no pain and 10 being the worst pain imaginable.    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, riding bike                      Pts goals:  strengthen without being guarded, sit and walk         Objective   Baseline IM Testing Results:   Date of testin2017  ROM 24-0 deg (Increased to 36-0 17)   Max Peak Torque 107    Min Peak Torque 46    Flex/Ext Ratio 2.3   % below normative data -74         FOTO: Focus on Therapeutic Outcomes   Category: lumbar   % Impaired: 37  Current Score  = CJ = at least 20% but < 40% impaired, limited or restricted  Goal at Discharge Score = CJ = at least 20% but < 40% impaired, limited or restricted      Treatment    Pt  was instructed in and performed the following:     Stanley received therapeutic exercises to develop/improved posture, cardiovascular endurance, muscular endurance, lumbar/cervical ROM, strength and muscular endurance for 40 minutes including the following exercises:      [facility] 1/10/2018   Visit Number 5   VAS Pain Rating 3   Treadmill Time (in min.) 10   Speed 2   Extension in Lying 10   Extension in Standing -   Flexion in Lying 10   Manual Therapy 5   Lumbar Weight 67   Repetitions 20   Rating of Perceived Exertion 2   Ice - Z Lie (in min.) 10           PPT 10x (supine and seated with mild v/c)  Seated quad stretch 20 s/h 4x   LTR 10x    Peripheral muscle strengthening which included 1 set of 15-20 repetitions at a slow, controlled 7 second per rep pace focused on strengthening supporting musculature for improved body mechanics and functional mobility.  Pt and therapist focused on proper form during treatment to ensure optimal strengthening of each targeted muscle group.  Machines were utilized including torso rotation, leg extension, leg curl, chest press, upright row, Tricep extension, bicep curl, leg press, and hip abduction.      Stanley received the following manual therapy techniques: long axis distraction at the R hip and hooklying distraction 5'       Home Exercise Program as follows:   Single knee to chest 10x, 2xdaily  Extension in lying 10x, 3x/daily  Supine and seated pelvic tilts 5-10 s/h 5x, 2x daily  TLR 10x  Seated quad stretch 20 s/h 4x   Z-lie 10-20 min, 2x daily     Handouts were given to the patient. Pt demo good understanding of the education provided. Stanley demonstrated good return demonstration of activities.     Lumbar roll use compliance: unknown  Additional exercises taught this treatment session:   Seated quad stretch 20 s/h 4x     Assessment     Pt with minimal LBP that did decrease a little during and post session. Pt was able to complete 20 repetitions with good pace on the  Lumbar MedX without increase in discomfort and a 2 on RPE. Increase weight 10% next visit. Pt tolerated the all the medx machines today with no c/o increased LB pain or any limb pain. Pt had decrease in R side discomfort with manual distraction.    Patient is making good progress towards established goals.  Pt will continue to benefit from skilled outpatient physical therapy to address the deficits stated in the impairment chart, provide pt/family education and to maximize pt's level of independence in the home and community environment.       Pt's spiritual, cultural and educational needs considered and pt agreeable to plan of care and goals as stated below:     Medical necessity is demonstrated by the following IMPAIRMENTS/PROBLEMS:      History  Co-morbidities and personal factors that may impact the plan of care Examination  Body Structures and Functions, activity limitations and participation restrictions that may impact the plan of care Clinical Presentation Decision Making/ Complexity Score   Co-morbidities:   HTN, gout, chronic back pain, RFA           Personal Factors:   lifestyle Body Regions:   back  lower extremities     Body Systems:   gross symmetry  ROM  strength  gross coordinated movement  motor control     Activity limitations:   Learning and applying knowledge  no deficits     General Tasks and Commands  no deficits     Communication  no deficits     Mobility  lifting and carrying objects  walking     Self care  no deficits     Domestic Life  Yard work     Interactions/Relationships  no deficits     Life Areas  no deficits     Community and Social Life  community life  recreation and leisure     Participation Restrictions:   None    evolving clinical presentation with changing clinical characteristics   Worsening over the past few weeks    Moderate            GOALS: Pt is in agreement with the following goals.     Short term goals:  6 weeks or 10 visits   1.  Pt will demonstrate increased lumbar  ROM by at least 3 degrees from the initial ROM value with improvements noted in functional ROM and ability to perform ADLs. Met 12/27/17  2.  Pt will demonstrate increased maximum isometric torque value by 10% when compared to the initial value resulting in improved ability to perform bending, lifting, and carrying activities safely, confidently.  3.  Patient report a reduction in worst pain score by 1-2 points for improved tolerance during work and recreational activities  4.  Pt able to perform HEP correctly with minimal cueing or supervision for therapist     Long term goals: 13 weeks or 20 visits   1. Pt will demonstrate increased lumbar ROM by at least 6 degrees from initial ROM value, resulting in improved ability to perform functional fwd bending while standing and sitting. Met 12/27/17  2. Pt will demonstrate increased maximum isometric torque value by 30% when compared to the initial value resulting in improved ability to perform bending, lifting, and carrying activities safely, confidently.  3. Pt to demonstrate ability to independently control and reduce their pain through posture positioning and mechanical movements throughout a typical day.  4.  Patient will demonstrate improved overall function per FOTO Survey to CJ = at least 20% but < 40% impaired, limited or restricted score or less.  5. Pt will report ability to walk on uneven surface without increased symptoms.     Plan   Continue with established Plan of Care towards established PT goals.

## 2018-01-11 RX ORDER — ROSUVASTATIN CALCIUM 20 MG/1
20 TABLET, COATED ORAL DAILY
Qty: 90 TABLET | Refills: 3 | Status: SHIPPED | OUTPATIENT
Start: 2018-01-11 | End: 2018-12-14 | Stop reason: SDUPTHER

## 2018-01-12 ENCOUNTER — PATIENT MESSAGE (OUTPATIENT)
Dept: INTERNAL MEDICINE | Facility: CLINIC | Age: 58
End: 2018-01-12

## 2018-01-15 ENCOUNTER — CLINICAL SUPPORT (OUTPATIENT)
Dept: REHABILITATION | Facility: OTHER | Age: 58
End: 2018-01-15
Attending: PHYSICAL MEDICINE & REHABILITATION
Payer: COMMERCIAL

## 2018-01-15 DIAGNOSIS — G89.29 CHRONIC RIGHT-SIDED LOW BACK PAIN WITHOUT SCIATICA: ICD-10-CM

## 2018-01-15 DIAGNOSIS — M54.50 CHRONIC RIGHT-SIDED LOW BACK PAIN WITHOUT SCIATICA: ICD-10-CM

## 2018-01-15 PROCEDURE — 97110 THERAPEUTIC EXERCISES: CPT

## 2018-01-15 NOTE — PROGRESS NOTES
[facility]    Increase pts weight 10% next visit due to RPE a 2.      Name: Stanley Stewart  Clinic Number: [id number]  Date of Treatment: 01/15/2018   Diagnosis:   Encounter Diagnosis   Name Primary?    Chronic right-sided low back pain without sciatica      Physician: Acacia Ramirez, *    Pain pattern determined: 1 PEP  Plan of care signed: 17   Time in: 2:30  Time Out: 3:25  Total Treatment time: 40  Precautions: HTN, gout, chronic back pain, RFA  Visit #: 5  ( Increase weight 10% next visit.)    POC due: 3/12/18  Reassessment done: 17  Reassessment due:2/10/18    Face to Face discussion of patient was done between PT and PTA.     Subjective   Stanley reports no change in pain at this time.    Patient reports their pain to be 2/10 on a 0-10 scale with 0 being no pain and 10 being the worst pain imaginable.    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, riding bike                      Pts goals:  strengthen without being guarded, sit and walk         Objective   Baseline IM Testing Results:   Date of testin2017  ROM 24-0 deg (Increased to 36-0 17)   Max Peak Torque 107    Min Peak Torque 46    Flex/Ext Ratio 2.3   % below normative data -74         FOTO: Focus on Therapeutic Outcomes   Category: lumbar   % Impaired: 37  Current Score  = CJ = at least 20% but < 40% impaired, limited or restricted  Goal at Discharge Score = CJ = at least 20% but < 40% impaired, limited or restricted      Treatment    Pt was instructed in and performed the following:     Stanley received therapeutic exercises to develop/improved posture, cardiovascular endurance, muscular endurance, lumbar/cervical ROM, strength and muscular endurance for 40 minutes including the following exercises:    [facility] 1/15/2018   Visit Number 6   VAS Pain Rating 3   Treadmill Time (in min.) 10   Speed 3   Extension in Lying -    Extension in Standing 10   Flexion in Lying 10   Lumbar Weight 74   Repetitions 20   Rating of Perceived Exertion 2   Ice - Z Lie (in min.) 10         PPT 10x (supine and seated with mild v/c)  Seated quad stretch 20 s/h 4x   LTR 10x    Peripheral muscle strengthening which included 1 set of 15-20 repetitions at a slow, controlled 7 second per rep pace focused on strengthening supporting musculature for improved body mechanics and functional mobility.  Pt and therapist focused on proper form during treatment to ensure optimal strengthening of each targeted muscle group.  Machines were utilized including torso rotation, leg extension, leg curl, chest press, upright row, Tricep extension, bicep curl, leg press, and hip abduction.      Stanley received the following manual therapy techniques: long axis distraction at the R hip and hooklying distraction 5'       Home Exercise Program as follows:   Single knee to chest 10x, 2xdaily  Extension in lying 10x, 3x/daily  Supine and seated pelvic tilts 5-10 s/h 5x, 2x daily  TLR 10x  Seated quad stretch 20 s/h 4x   Z-lie 10-20 min, 2x daily     Handouts were given to the patient. Pt demo good understanding of the education provided. Stanley demonstrated good return demonstration of activities.     Lumbar roll use compliance: unknown  Additional exercises taught this treatment session:   Seated quad stretch 20 s/h 4x     Assessment     Pt with minimal LBP that did decrease a little during and post session. Pt was able to complete 20 repetitions with good pace on the Lumbar MedX without increase in discomfort and a 2 on RPE. Increase weight 10% next visit. Pt tolerated the all the medx machines today with no c/o increased LB pain or any limb pain. Pt had decrease in R side discomfort with manual distraction.    Patient is making good progress towards established goals.  Pt will continue to benefit from skilled outpatient physical therapy to address the deficits stated in the  impairment chart, provide pt/family education and to maximize pt's level of independence in the home and community environment.       Pt's spiritual, cultural and educational needs considered and pt agreeable to plan of care and goals as stated below:     Medical necessity is demonstrated by the following IMPAIRMENTS/PROBLEMS:      History  Co-morbidities and personal factors that may impact the plan of care Examination  Body Structures and Functions, activity limitations and participation restrictions that may impact the plan of care Clinical Presentation Decision Making/ Complexity Score   Co-morbidities:   HTN, gout, chronic back pain, RFA           Personal Factors:   lifestyle Body Regions:   back  lower extremities     Body Systems:   gross symmetry  ROM  strength  gross coordinated movement  motor control     Activity limitations:   Learning and applying knowledge  no deficits     General Tasks and Commands  no deficits     Communication  no deficits     Mobility  lifting and carrying objects  walking     Self care  no deficits     Domestic Life  Yard work     Interactions/Relationships  no deficits     Life Areas  no deficits     Community and Social Life  community life  recreation and leisure     Participation Restrictions:   None    evolving clinical presentation with changing clinical characteristics   Worsening over the past few weeks    Moderate            GOALS: Pt is in agreement with the following goals.     Short term goals:  6 weeks or 10 visits   1.  Pt will demonstrate increased lumbar ROM by at least 3 degrees from the initial ROM value with improvements noted in functional ROM and ability to perform ADLs. Met 12/27/17  2.  Pt will demonstrate increased maximum isometric torque value by 10% when compared to the initial value resulting in improved ability to perform bending, lifting, and carrying activities safely, confidently.  3.  Patient report a reduction in worst pain score by 1-2 points  for improved tolerance during work and recreational activities  4.  Pt able to perform HEP correctly with minimal cueing or supervision for therapist     Long term goals: 13 weeks or 20 visits   1. Pt will demonstrate increased lumbar ROM by at least 6 degrees from initial ROM value, resulting in improved ability to perform functional fwd bending while standing and sitting. Met 12/27/17  2. Pt will demonstrate increased maximum isometric torque value by 30% when compared to the initial value resulting in improved ability to perform bending, lifting, and carrying activities safely, confidently.  3. Pt to demonstrate ability to independently control and reduce their pain through posture positioning and mechanical movements throughout a typical day.  4.  Patient will demonstrate improved overall function per FOTO Survey to CJ = at least 20% but < 40% impaired, limited or restricted score or less.  5. Pt will report ability to walk on uneven surface without increased symptoms.     Plan   Continue with established Plan of Care towards established PT goals.

## 2018-01-17 ENCOUNTER — CLINICAL SUPPORT (OUTPATIENT)
Dept: REHABILITATION | Facility: OTHER | Age: 58
End: 2018-01-17
Attending: PHYSICAL MEDICINE & REHABILITATION
Payer: COMMERCIAL

## 2018-01-17 DIAGNOSIS — G89.29 CHRONIC RIGHT-SIDED LOW BACK PAIN WITHOUT SCIATICA: ICD-10-CM

## 2018-01-17 DIAGNOSIS — M54.50 CHRONIC RIGHT-SIDED LOW BACK PAIN WITHOUT SCIATICA: ICD-10-CM

## 2018-01-17 PROCEDURE — 97110 THERAPEUTIC EXERCISES: CPT

## 2018-01-17 NOTE — PROGRESS NOTES
[facility]    Increase pts weight 5-10% secondary low PRE      Name: Stanley Stewart  Clinic Number: [id number]  Date of Treatment: 2018   Diagnosis:   Encounter Diagnosis   Name Primary?    Chronic right-sided low back pain without sciatica      Physician: Acacia Ramirez, *    Pain pattern determined: 1 PEP  Plan of care signed: 17   Time in: 2:30  Time Out: 3:20  Total Treatment time: 60  Precautions: HTN, gout, chronic back pain, RFA  Visit #: 6  ( Increase weight 10% next visit.)    POC due: 3/12/18  Reassessment done: 17  Reassessment due:2/10/18    Face to Face discussion of patient was done between PT and PTA.     Subjective   Stanley reports no change in pain at this time.    Patient reports their pain to be 0/10 on a 0-10 scale with 0 being no pain and 10 being the worst pain imaginable.    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, riding bike                      Pts goals:  strengthen without being guarded, sit and walk         Objective   Baseline IM Testing Results:   Date of testin2017  ROM 24-0 deg (Increased to 36-0 17)   Max Peak Torque 107    Min Peak Torque 46    Flex/Ext Ratio 2.3   % below normative data -74         FOTO: Focus on Therapeutic Outcomes   Category: lumbar   % Impaired: 37  Current Score  = CJ = at least 20% but < 40% impaired, limited or restricted  Goal at Discharge Score = CJ = at least 20% but < 40% impaired, limited or restricted      Treatment    Pt was instructed in and performed the following:     Stanley received therapeutic exercises to develop/improved posture, cardiovascular endurance, muscular endurance, lumbar/cervical ROM, strength and muscular endurance for 40 minutes including the following exercises:  [facility] 2018   Visit Number 7   VAS Pain Rating 0   Treadmill Time (in min.) 10   Speed 3   Extension in Lying -   Extension in  Standing 10   Flexion in Lying 10   Manual Therapy -   Lumbar Extension Seat Pad -   Femur Restraint -   Top Dead Center -   Counterweight -   Lumbar Flexion -   Lumbar Extension -   Lumbar Peak Torque -   Min Torque -   Percent From Norm -   Percent Change from Initial -   Lumbar Weight 81   Repetitions 20   Rating of Perceived Exertion 2   Ice - Z Lie (in min.) 10           PPT 10x (supine and seated with mild v/c)  Seated quad stretch 20 s/h 4x   LTR 10x    Peripheral muscle strengthening which included 1 set of 15-20 repetitions at a slow, controlled 7 second per rep pace focused on strengthening supporting musculature for improved body mechanics and functional mobility.  Pt and therapist focused on proper form during treatment to ensure optimal strengthening of each targeted muscle group.  Machines were utilized including torso rotation, leg extension, leg curl, chest press, upright row, Tricep extension, bicep curl, leg press, and hip abduction.      Stanley received the following manual therapy techniques: long axis distraction at the R hip and hooklying distraction 0       Home Exercise Program as follows:   Single knee to chest 10x, 2xdaily  Extension in lying 10x, 3x/daily  Supine and seated pelvic tilts 5-10 s/h 5x, 2x daily  TLR 10x  Seated quad stretch 20 s/h 4x   Z-lie 10-20 min, 2x daily     Handouts were given to the patient. Pt demo good understanding of the education provided. Stanley demonstrated good return demonstration of activities.     Lumbar roll use compliance: unknown  Additional exercises taught this treatment session:   Seated quad stretch 20 s/h 4x     Assessment     Pt tolerated treatment well without increased discomfort.  Increased Med X machine weights to 81 # with exertion level of 2/10. Recommend 5-10% increase next visit as well.  Patient is making good progress towards established goals.  Pt will continue to benefit from skilled outpatient physical therapy to address the deficits  stated in the impairment chart, provide pt/family education and to maximize pt's level of independence in the home and community environment.       Pt's spiritual, cultural and educational needs considered and pt agreeable to plan of care and goals as stated below:     Medical necessity is demonstrated by the following IMPAIRMENTS/PROBLEMS:      History  Co-morbidities and personal factors that may impact the plan of care Examination  Body Structures and Functions, activity limitations and participation restrictions that may impact the plan of care Clinical Presentation Decision Making/ Complexity Score   Co-morbidities:   HTN, gout, chronic back pain, RFA           Personal Factors:   lifestyle Body Regions:   back  lower extremities     Body Systems:   gross symmetry  ROM  strength  gross coordinated movement  motor control     Activity limitations:   Learning and applying knowledge  no deficits     General Tasks and Commands  no deficits     Communication  no deficits     Mobility  lifting and carrying objects  walking     Self care  no deficits     Domestic Life  Yard work     Interactions/Relationships  no deficits     Life Areas  no deficits     Community and Social Life  community life  recreation and leisure     Participation Restrictions:   None    evolving clinical presentation with changing clinical characteristics   Worsening over the past few weeks    Moderate            GOALS: Pt is in agreement with the following goals.     Short term goals:  6 weeks or 10 visits   1.  Pt will demonstrate increased lumbar ROM by at least 3 degrees from the initial ROM value with improvements noted in functional ROM and ability to perform ADLs. Met 12/27/17  2.  Pt will demonstrate increased maximum isometric torque value by 10% when compared to the initial value resulting in improved ability to perform bending, lifting, and carrying activities safely, confidently.  3.  Patient report a reduction in worst pain score by  1-2 points for improved tolerance during work and recreational activities  4.  Pt able to perform HEP correctly with minimal cueing or supervision for therapist     Long term goals: 13 weeks or 20 visits   1. Pt will demonstrate increased lumbar ROM by at least 6 degrees from initial ROM value, resulting in improved ability to perform functional fwd bending while standing and sitting. Met 12/27/17  2. Pt will demonstrate increased maximum isometric torque value by 30% when compared to the initial value resulting in improved ability to perform bending, lifting, and carrying activities safely, confidently.  3. Pt to demonstrate ability to independently control and reduce their pain through posture positioning and mechanical movements throughout a typical day.  4.  Patient will demonstrate improved overall function per FOTO Survey to CJ = at least 20% but < 40% impaired, limited or restricted score or less.  5. Pt will report ability to walk on uneven surface without increased symptoms.     Plan   Continue with established Plan of Care towards established PT goals.

## 2018-01-23 RX ORDER — ZOLPIDEM TARTRATE 10 MG/1
TABLET ORAL
Qty: 30 TABLET | Refills: 3 | Status: CANCELLED | OUTPATIENT
Start: 2018-01-23

## 2018-01-24 ENCOUNTER — CLINICAL SUPPORT (OUTPATIENT)
Dept: REHABILITATION | Facility: OTHER | Age: 58
End: 2018-01-24
Attending: PHYSICAL MEDICINE & REHABILITATION
Payer: COMMERCIAL

## 2018-01-24 DIAGNOSIS — G89.29 CHRONIC RIGHT-SIDED LOW BACK PAIN WITHOUT SCIATICA: ICD-10-CM

## 2018-01-24 DIAGNOSIS — M54.50 CHRONIC RIGHT-SIDED LOW BACK PAIN WITHOUT SCIATICA: ICD-10-CM

## 2018-01-24 PROCEDURE — 97110 THERAPEUTIC EXERCISES: CPT

## 2018-01-24 NOTE — PROGRESS NOTES
[facility]    Increase pts weight 5-10% secondary low PRE      Name: Stanley Stewart  Clinic Number: [id number]  Date of Treatment: 2018   Diagnosis:   Encounter Diagnosis   Name Primary?    Chronic right-sided low back pain without sciatica      Physician: Acacia Ramirez, *    Pain pattern determined: 1 PEP  Plan of care signed: 17   Time in: 2:30  Time Out: 3:20  Total Treatment time: 60  Precautions: HTN, gout, chronic back pain, RFA  Visit #: 6  ( Increase weight 10% next visit.)    POC due: 3/12/18  Reassessment done: 17  Reassessment due:2/10/18    Face to Face discussion of patient was done between PT and PTA.     Subjective   Stanley reports no change in pain at this time. Reports that he has lost some weight and that has helped decrease his pain    Patient reports their pain to be 0/10 on a 0-10 scale with 0 being no pain and 10 being the worst pain imaginable.    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, riding bike                      Pts goals:  strengthen without being guarded, sit and walk         Objective   Baseline IM Testing Results:   Date of testin2017  ROM 24-0 deg (Increased to 36-0 17)   Max Peak Torque 107    Min Peak Torque 46    Flex/Ext Ratio 2.3   % below normative data -74         FOTO: Focus on Therapeutic Outcomes   Category: lumbar   % Impaired: 37  Current Score  = CJ = at least 20% but < 40% impaired, limited or restricted  Goal at Discharge Score = CJ = at least 20% but < 40% impaired, limited or restricted      Treatment    Pt was instructed in and performed the following:     Stanley received therapeutic exercises to develop/improved posture, cardiovascular endurance, muscular endurance, lumbar/cervical ROM, strength and muscular endurance for 40 minutes including the following exercises:    [facility] 2018   Visit Number 8   VAS Pain Rating 0    Treadmill Time (in min.) 10   Speed 3   Extension in Lying -   Extension in Standing 10   Flexion in Lying 10   Manual Therapy -   Lumbar Extension Seat Pad -   Femur Restraint -   Top Dead Center -   Counterweight -   Lumbar Flexion -   Lumbar Extension -   Lumbar Peak Torque -   Min Torque -   Percent From Norm -   Percent Change from Initial -   Lumbar Weight 88   Repetitions 20   Rating of Perceived Exertion 2   Ice - Z Lie (in min.) 10       PPT with march 10x  Wall dead bug 10x  Bridge 10x  PPT 10x (supine and seated with mild v/c)  Seated quad stretch 20 s/h 4x   LTR 10x    Peripheral muscle strengthening which included 1 set of 15-20 repetitions at a slow, controlled 7 second per rep pace focused on strengthening supporting musculature for improved body mechanics and functional mobility.  Pt and therapist focused on proper form during treatment to ensure optimal strengthening of each targeted muscle group.  Machines were utilized including torso rotation, leg extension, leg curl, chest press, upright row, Tricep extension, bicep curl, leg press, and hip abduction.      Stanley received the following manual therapy techniques: long axis distraction at the R hip and hooklying distraction 0       Home Exercise Program as follows:   Single knee to chest 10x, 2xdaily  Extension in lying 10x, 3x/daily  Supine and seated pelvic tilts 5-10 s/h 5x, 2x daily  TLR 10x  Seated quad stretch 20 s/h 4x   Z-lie 10-20 min, 2x daily     Handouts were given to the patient. Pt demo good understanding of the education provided. Stanley demonstrated good return demonstration of activities.     Lumbar roll use compliance: unknown  Additional exercises taught this treatment session:   Wall dead bug, PPT with march, bridges    Assessment     Pt tolerated treatment well without increased discomfort.  Increased Med X machine weights to 88 # with exertion level of 2/10. Recommend 5-10% increase next visit as well.  Patient is making good  progress towards established goals. Added core bracing exercises.  Pt will continue to benefit from skilled outpatient physical therapy to address the deficits stated in the impairment chart, provide pt/family education and to maximize pt's level of independence in the home and community environment.       Pt's spiritual, cultural and educational needs considered and pt agreeable to plan of care and goals as stated below:     Medical necessity is demonstrated by the following IMPAIRMENTS/PROBLEMS:      History  Co-morbidities and personal factors that may impact the plan of care Examination  Body Structures and Functions, activity limitations and participation restrictions that may impact the plan of care Clinical Presentation Decision Making/ Complexity Score   Co-morbidities:   HTN, gout, chronic back pain, RFA           Personal Factors:   lifestyle Body Regions:   back  lower extremities     Body Systems:   gross symmetry  ROM  strength  gross coordinated movement  motor control     Activity limitations:   Learning and applying knowledge  no deficits     General Tasks and Commands  no deficits     Communication  no deficits     Mobility  lifting and carrying objects  walking     Self care  no deficits     Domestic Life  Yard work     Interactions/Relationships  no deficits     Life Areas  no deficits     Community and Social Life  community life  recreation and leisure     Participation Restrictions:   None    evolving clinical presentation with changing clinical characteristics   Worsening over the past few weeks    Moderate            GOALS: Pt is in agreement with the following goals.     Short term goals:  6 weeks or 10 visits   1.  Pt will demonstrate increased lumbar ROM by at least 3 degrees from the initial ROM value with improvements noted in functional ROM and ability to perform ADLs. Met 12/27/17  2.  Pt will demonstrate increased maximum isometric torque value by 10% when compared to the initial  value resulting in improved ability to perform bending, lifting, and carrying activities safely, confidently.  3.  Patient report a reduction in worst pain score by 1-2 points for improved tolerance during work and recreational activities  4.  Pt able to perform HEP correctly with minimal cueing or supervision for therapist     Long term goals: 13 weeks or 20 visits   1. Pt will demonstrate increased lumbar ROM by at least 6 degrees from initial ROM value, resulting in improved ability to perform functional fwd bending while standing and sitting. Met 12/27/17  2. Pt will demonstrate increased maximum isometric torque value by 30% when compared to the initial value resulting in improved ability to perform bending, lifting, and carrying activities safely, confidently.  3. Pt to demonstrate ability to independently control and reduce their pain through posture positioning and mechanical movements throughout a typical day.  4.  Patient will demonstrate improved overall function per FOTO Survey to CJ = at least 20% but < 40% impaired, limited or restricted score or less.  5. Pt will report ability to walk on uneven surface without increased symptoms.     Plan   Continue with established Plan of Care towards established PT goals.

## 2018-01-29 ENCOUNTER — OFFICE VISIT (OUTPATIENT)
Dept: URGENT CARE | Facility: CLINIC | Age: 58
End: 2018-01-29
Payer: COMMERCIAL

## 2018-01-29 VITALS
TEMPERATURE: 99 F | WEIGHT: 280 LBS | HEART RATE: 93 BPM | BODY MASS INDEX: 33.06 KG/M2 | RESPIRATION RATE: 18 BRPM | SYSTOLIC BLOOD PRESSURE: 147 MMHG | OXYGEN SATURATION: 95 % | HEIGHT: 77 IN | DIASTOLIC BLOOD PRESSURE: 92 MMHG

## 2018-01-29 DIAGNOSIS — R05.9 COUGH: ICD-10-CM

## 2018-01-29 DIAGNOSIS — B34.9 VIRAL ILLNESS: Primary | ICD-10-CM

## 2018-01-29 LAB
CTP QC/QA: YES
FLUAV AG NPH QL: NEGATIVE
FLUBV AG NPH QL: NEGATIVE

## 2018-01-29 PROCEDURE — 87804 INFLUENZA ASSAY W/OPTIC: CPT | Mod: QW,S$GLB,, | Performed by: NURSE PRACTITIONER

## 2018-01-29 PROCEDURE — 99203 OFFICE O/P NEW LOW 30 MIN: CPT | Mod: SA,S$GLB,, | Performed by: NURSE PRACTITIONER

## 2018-01-29 RX ORDER — PROMETHAZINE HYDROCHLORIDE AND DEXTROMETHORPHAN HYDROBROMIDE 6.25; 15 MG/5ML; MG/5ML
5 SYRUP ORAL
Qty: 118 ML | Refills: 0 | Status: SHIPPED | OUTPATIENT
Start: 2018-01-29 | End: 2019-06-06

## 2018-01-29 RX ORDER — OSELTAMIVIR PHOSPHATE 75 MG/1
75 CAPSULE ORAL 2 TIMES DAILY
Qty: 10 CAPSULE | Refills: 0 | Status: SHIPPED | OUTPATIENT
Start: 2018-01-29 | End: 2018-02-03

## 2018-01-29 NOTE — PROGRESS NOTES
"Subjective:       Patient ID: Stanley Stewart is a 57 y.o. male.    Vitals:  height is 6' 5" (1.956 m) and weight is 127 kg (280 lb). His oral temperature is 99.1 °F (37.3 °C). His blood pressure is 147/92 (abnormal) and his pulse is 93. His respiration is 18 and oxygen saturation is 95%.     Chief Complaint: Cough    Symptoms started 1/27      Cough   This is a new problem. The current episode started in the past 7 days. The problem has been gradually worsening. The cough is non-productive. Associated symptoms include a fever, headaches, myalgias, nasal congestion and a sore throat. Pertinent negatives include no chest pain, chills, ear pain, eye redness, shortness of breath or wheezing. He has tried OTC cough suppressant for the symptoms. The treatment provided mild relief. His past medical history is significant for environmental allergies.     Review of Systems   Constitution: Positive for fever. Negative for chills and malaise/fatigue.   HENT: Positive for congestion and sore throat. Negative for ear pain and hoarse voice.    Eyes: Negative for discharge and redness.   Cardiovascular: Negative for chest pain, dyspnea on exertion and leg swelling.   Respiratory: Positive for cough. Negative for shortness of breath, sputum production and wheezing.    Musculoskeletal: Positive for myalgias.   Gastrointestinal: Negative for abdominal pain and nausea.   Neurological: Positive for headaches.   Allergic/Immunologic: Positive for environmental allergies.       Objective:      Physical Exam   Constitutional: He is oriented to person, place, and time. He appears well-developed and well-nourished. He is cooperative.  Non-toxic appearance. He does not appear ill. No distress.   HENT:   Head: Normocephalic and atraumatic.   Right Ear: Hearing, tympanic membrane, external ear and ear canal normal.   Left Ear: Hearing, tympanic membrane, external ear and ear canal normal.   Nose: Mucosal edema and rhinorrhea present. No nasal " deformity. No epistaxis. Right sinus exhibits no maxillary sinus tenderness and no frontal sinus tenderness. Left sinus exhibits no maxillary sinus tenderness and no frontal sinus tenderness.   Mouth/Throat: Uvula is midline, oropharynx is clear and moist and mucous membranes are normal. No trismus in the jaw. Normal dentition. No uvula swelling. No posterior oropharyngeal erythema.   Eyes: Lids are normal. Right conjunctiva is injected. Left conjunctiva is injected. No scleral icterus.   Sclera clear bilat   Neck: Trachea normal, full passive range of motion without pain and phonation normal. Neck supple.   Cardiovascular: Normal rate, regular rhythm, normal heart sounds, intact distal pulses and normal pulses.    Pulmonary/Chest: Effort normal and breath sounds normal. No respiratory distress.   Abdominal: Soft. Normal appearance and bowel sounds are normal. He exhibits no distension. There is no tenderness.   Musculoskeletal: Normal range of motion. He exhibits no edema or deformity.   Neurological: He is alert and oriented to person, place, and time. He exhibits normal muscle tone. Coordination normal.   Skin: Skin is warm, dry and intact. He is not diaphoretic. No pallor.   Psychiatric: He has a normal mood and affect. His speech is normal and behavior is normal. Judgment and thought content normal. Cognition and memory are normal.   Nursing note and vitals reviewed.      Office Visit on 01/29/2018   Component Date Value Ref Range Status    Rapid Influenza A Ag 01/29/2018 Negative  Negative Final    Rapid Influenza B Ag 01/29/2018 Negative  Negative Final     Acceptable 01/29/2018 Yes   Final     Assessment:       1. Viral illness    2. Cough        Plan:         Viral illness  -     promethazine-dextromethorphan (PROMETHAZINE-DM) 6.25-15 mg/5 mL Syrp; Take 5 mLs by mouth every 4 to 6 hours as needed (COUGH).  Dispense: 118 mL; Refill: 0  -     oseltamivir (TAMIFLU) 75 MG capsule; Take 1  capsule (75 mg total) by mouth 2 (two) times daily.  Dispense: 10 capsule; Refill: 0    Cough  -     POCT Influenza A/B      Patient Instructions     Please drink plenty of fluids.  Please get plenty of rest.  Please return here or go to the Emergency Department for any concerns or worsening of condition.  Tamiflu prescription has been discussed and if prescribed, please take to completion unless you cannot tolerate the side effects.   If you were prescribed a narcotic medication, do not drive or operate heavy equipment or machinery while taking these medications.  If you were given a steroid shot in the clinic and have also been given a prescription for a steroid such as Prednisone or a Medrol Dose Pack, please begin taking them tomorrow.  If you do not have Hypertension or any history of palpitations, it is ok to take over the counter Sudafed or Mucinex D or Allegra-D or Claritin-D or Zyrtec-D.  If you do take one of the above, it is ok to combine that with plain over the counter Mucinex or Allegra or Claritin or Zyrtec.  If for example you are taking Zyrtec -D, you can combine that with Mucinex, but not Mucinex-D.  If you are taking Mucinex-D, you can combine that with plain Allegra or Claritin or Zyrtec.   If you do have Hypertension or palpitations, it is safe to take Coricidin HBP for relief of sinus symptoms.  If not allergic, please take over the counter Tylenol (Acetaminophen) and/or Motrin (Ibuprofen) as directed for control of pain and/or fever.  Please follow up with your primary care doctor or specialist as needed.    If you  smoke, please stop smoking.  The Flu (Influenza)     The virus that causes the flu spreads through the air in droplets when someone who has the flu coughs, sneezes, laughs, or talks.   The flu (influenza) is an infection that affects your respiratory tract. This tract is made up of your mouth, nose, and lungs, and the passages between them. Unlike a cold, the flu can make you very  ill. And it can lead to pneumonia, a serious lung infection. The flu can have serious complications and even cause death.  Who is at risk for the flu?  Anyone can get the flu. But you are more likely to become infected if you:  · Have a weakened immune system  · Work in a healthcare setting where you may be exposed to flu germs  · Live or work with someone who has the flu  · Havent had an annual flu shot  How does the flu spread?  The flu is caused by a virus. The virus spreads through the air in droplets when someone who has the flu coughs, sneezes, laughs, or talks. You can become infected when you inhale these viruses directly. You can also become infected when you touch a surface on which the droplets have landed and then transfer the germs to your eyes, nose, or mouth. Touching used tissues, or sharing utensils, drinking glasses, or a toothbrush from an infected person can expose you to flu viruses, too.  What are the symptoms of the flu?  Flu symptoms tend to come on quickly and may last a few days to a few weeks. They include:  · Fever usually higher than 100.4°F  (38°C) and chills  · Sore throat and headache  · Dry cough  · Runny nose  · Tiredness and weakness  · Muscle aches  Who is at risk for flu complications?  For some people, the flu can be very serious. The risk for complications is greater for:  · Children younger than age 5  · Adults ages 65 and older  · People with a chronic illness such as diabetes or heart, kidney, or lung disease  · People who live in a nursing home or long-term care facility   How is the flu treated?  The flu usually gets better after 7 days or so. In some cases, your healthcare provider may prescribe an antiviral medicine. This may help you get well a little sooner. For the medicine to help, you need to take it as soon as possible (ideally within 48 hours) after your symptoms start. If you develop pneumonia or other serious illness, you may need to stay in the  hospital.  Easing flu symptoms  · Drink lots of fluids such as water, juice, and warm soup. A good rule is to drink enough so that you urinate your normal amount.  · Get plenty of rest.  · Ask your healthcare provider what to take for fever and pain.  · Call your provider if your fever is 100.4°F (38°C) or higher, or you become dizzy, lightheaded, or short of breath.  Taking steps to protect others  · Wash your hands often, especially after coughing or sneezing. Or clean your hands with an alcohol-based hand  containing at least 60% alcohol.  · Cough or sneeze into a tissue. Then throw the tissue away and wash your hands. If you dont have a tissue, cough and sneeze into your elbow.  · Stay home until at least 24 hours after you no longer have a fever or chills. Be sure the fever isnt being hidden by fever-reducing medicine.  · Dont share food, utensils, drinking glasses, or a toothbrush with others.  · Ask your healthcare provider if others in your household should get antiviral medicine to help them avoid infection.  How can the flu be prevented?  · One of the best ways to avoid the flu is to get a flu vaccine each year. The virus that causes the flu changes from year to year. For that reason, healthcare providers recommend getting the flu vaccine each year, as soon as it's available in your area. The vaccine is given as a shot. Your healthcare provider can tell you which vaccine is right for you. A nasal spray is also available but is not recommended for the 5169-9704 flu season. The CDC says this is because the nasal spray did not seem to protect against the flu over the last several flu seasons. In the past, it was meant for people ages 2 to 49.  · Wash your hands often. Frequent handwashing is a proven way to help prevent infection.  · Carry an alcohol-based hand gel containing at least 60% alcohol. Use it when you can't use soap and water. Then wash your hands as soon as you can.  · Avoid touching  your eyes, nose, and mouth.  · At home and work, clean phones, computer keyboards, and toys often with disinfectant wipes.  · If possible, avoid close contact with others who have the flu or symptoms of the flu.  Handwashing tips  Handwashing is one of the best ways to prevent many common infections. If you are caring for or visiting someone with the flu, wash your hands each time you enter and leave the room. Follow these steps:  · Use warm water and plenty of soap. Rub your hands together well.  · Clean the whole hand, including under your nails, between your fingers, and up the wrists.  · Wash for at least 15 seconds.  · Rinse, letting the water run down your fingers, not up your wrists.  · Dry your hands well. Use a paper towel to turn off the faucet and open the door.  Using alcohol-based hand   Alcohol-based hand  are also a good choice. Use them when you can't use soap and water. Follow these steps:  · Squeeze about a tablespoon of gel into the palm of one hand.  · Rub your hands together briskly, cleaning the backs of your hands, the palms, between your fingers, and up the wrists.  · Rub until the gel is gone and your hands are completely dry.  Preventing the flu in healthcare settings  The flu is a special concern for people in hospitals and long-term care facilities. To help prevent the spread of flu, many hospitals and nursing homes take these steps:  · Healthcare providers wash their hands or use an alcohol-based hand  before and after treating each patient.  · People with the flu have private rooms and bathrooms or share a room with someone with the same infection.  · People who are at high risk for the flu but don't have it are encouraged to get the flu and pneumonia vaccines.  · All healthcare workers are encouraged or required to get flu shots.   Date Last Reviewed: 12/1/2016  © 7247-8751 [employer]. [address] [place] [address], [place] [address]. All rights reserved.  This information is not intended as a substitute for professional medical care. Always follow your healthcare professional's instructions.

## 2018-01-29 NOTE — PATIENT INSTRUCTIONS
Please drink plenty of fluids.  Please get plenty of rest.  Please return here or go to the Emergency Department for any concerns or worsening of condition.  Tamiflu prescription has been discussed and if prescribed, please take to completion unless you cannot tolerate the side effects.   If you were prescribed a narcotic medication, do not drive or operate heavy equipment or machinery while taking these medications.  If you were given a steroid shot in the clinic and have also been given a prescription for a steroid such as Prednisone or a Medrol Dose Pack, please begin taking them tomorrow.  If you do not have Hypertension or any history of palpitations, it is ok to take over the counter Sudafed or Mucinex D or Allegra-D or Claritin-D or Zyrtec-D.  If you do take one of the above, it is ok to combine that with plain over the counter Mucinex or Allegra or Claritin or Zyrtec.  If for example you are taking Zyrtec -D, you can combine that with Mucinex, but not Mucinex-D.  If you are taking Mucinex-D, you can combine that with plain Allegra or Claritin or Zyrtec.   If you do have Hypertension or palpitations, it is safe to take Coricidin HBP for relief of sinus symptoms.  If not allergic, please take over the counter Tylenol (Acetaminophen) and/or Motrin (Ibuprofen) as directed for control of pain and/or fever.  Please follow up with your primary care doctor or specialist as needed.    If you  smoke, please stop smoking.  The Flu (Influenza)     The virus that causes the flu spreads through the air in droplets when someone who has the flu coughs, sneezes, laughs, or talks.   The flu (influenza) is an infection that affects your respiratory tract. This tract is made up of your mouth, nose, and lungs, and the passages between them. Unlike a cold, the flu can make you very ill. And it can lead to pneumonia, a serious lung infection. The flu can have serious complications and even cause death.  Who is at risk for the  flu?  Anyone can get the flu. But you are more likely to become infected if you:  · Have a weakened immune system  · Work in a healthcare setting where you may be exposed to flu germs  · Live or work with someone who has the flu  · Havent had an annual flu shot  How does the flu spread?  The flu is caused by a virus. The virus spreads through the air in droplets when someone who has the flu coughs, sneezes, laughs, or talks. You can become infected when you inhale these viruses directly. You can also become infected when you touch a surface on which the droplets have landed and then transfer the germs to your eyes, nose, or mouth. Touching used tissues, or sharing utensils, drinking glasses, or a toothbrush from an infected person can expose you to flu viruses, too.  What are the symptoms of the flu?  Flu symptoms tend to come on quickly and may last a few days to a few weeks. They include:  · Fever usually higher than 100.4°F  (38°C) and chills  · Sore throat and headache  · Dry cough  · Runny nose  · Tiredness and weakness  · Muscle aches  Who is at risk for flu complications?  For some people, the flu can be very serious. The risk for complications is greater for:  · Children younger than age 5  · Adults ages 65 and older  · People with a chronic illness such as diabetes or heart, kidney, or lung disease  · People who live in a nursing home or long-term care facility   How is the flu treated?  The flu usually gets better after 7 days or so. In some cases, your healthcare provider may prescribe an antiviral medicine. This may help you get well a little sooner. For the medicine to help, you need to take it as soon as possible (ideally within 48 hours) after your symptoms start. If you develop pneumonia or other serious illness, you may need to stay in the hospital.  Easing flu symptoms  · Drink lots of fluids such as water, juice, and warm soup. A good rule is to drink enough so that you urinate your normal  amount.  · Get plenty of rest.  · Ask your healthcare provider what to take for fever and pain.  · Call your provider if your fever is 100.4°F (38°C) or higher, or you become dizzy, lightheaded, or short of breath.  Taking steps to protect others  · Wash your hands often, especially after coughing or sneezing. Or clean your hands with an alcohol-based hand  containing at least 60% alcohol.  · Cough or sneeze into a tissue. Then throw the tissue away and wash your hands. If you dont have a tissue, cough and sneeze into your elbow.  · Stay home until at least 24 hours after you no longer have a fever or chills. Be sure the fever isnt being hidden by fever-reducing medicine.  · Dont share food, utensils, drinking glasses, or a toothbrush with others.  · Ask your healthcare provider if others in your household should get antiviral medicine to help them avoid infection.  How can the flu be prevented?  · One of the best ways to avoid the flu is to get a flu vaccine each year. The virus that causes the flu changes from year to year. For that reason, healthcare providers recommend getting the flu vaccine each year, as soon as it's available in your area. The vaccine is given as a shot. Your healthcare provider can tell you which vaccine is right for you. A nasal spray is also available but is not recommended for the 1780-3790 flu season. The CDC says this is because the nasal spray did not seem to protect against the flu over the last several flu seasons. In the past, it was meant for people ages 2 to 49.  · Wash your hands often. Frequent handwashing is a proven way to help prevent infection.  · Carry an alcohol-based hand gel containing at least 60% alcohol. Use it when you can't use soap and water. Then wash your hands as soon as you can.  · Avoid touching your eyes, nose, and mouth.  · At home and work, clean phones, computer keyboards, and toys often with disinfectant wipes.  · If possible, avoid close  contact with others who have the flu or symptoms of the flu.  Handwashing tips  Handwashing is one of the best ways to prevent many common infections. If you are caring for or visiting someone with the flu, wash your hands each time you enter and leave the room. Follow these steps:  · Use warm water and plenty of soap. Rub your hands together well.  · Clean the whole hand, including under your nails, between your fingers, and up the wrists.  · Wash for at least 15 seconds.  · Rinse, letting the water run down your fingers, not up your wrists.  · Dry your hands well. Use a paper towel to turn off the faucet and open the door.  Using alcohol-based hand   Alcohol-based hand  are also a good choice. Use them when you can't use soap and water. Follow these steps:  · Squeeze about a tablespoon of gel into the palm of one hand.  · Rub your hands together briskly, cleaning the backs of your hands, the palms, between your fingers, and up the wrists.  · Rub until the gel is gone and your hands are completely dry.  Preventing the flu in healthcare settings  The flu is a special concern for people in hospitals and long-term care facilities. To help prevent the spread of flu, many hospitals and nursing homes take these steps:  · Healthcare providers wash their hands or use an alcohol-based hand  before and after treating each patient.  · People with the flu have private rooms and bathrooms or share a room with someone with the same infection.  · People who are at high risk for the flu but don't have it are encouraged to get the flu and pneumonia vaccines.  · All healthcare workers are encouraged or required to get flu shots.   Date Last Reviewed: 12/1/2016  © 8552-5880 [employer]. [address] [place] [address], [place] [address]. All rights reserved. This information is not intended as a substitute for professional medical care. Always follow your healthcare professional's instructions.

## 2018-02-01 ENCOUNTER — PATIENT MESSAGE (OUTPATIENT)
Dept: INTERNAL MEDICINE | Facility: CLINIC | Age: 58
End: 2018-02-01

## 2018-02-02 ENCOUNTER — PATIENT MESSAGE (OUTPATIENT)
Dept: INTERNAL MEDICINE | Facility: CLINIC | Age: 58
End: 2018-02-02

## 2018-02-02 RX ORDER — ZOLPIDEM TARTRATE 10 MG/1
TABLET ORAL
Qty: 30 TABLET | Refills: 3 | Status: SHIPPED | OUTPATIENT
Start: 2018-02-02 | End: 2018-06-04 | Stop reason: SDUPTHER

## 2018-02-05 ENCOUNTER — CLINICAL SUPPORT (OUTPATIENT)
Dept: REHABILITATION | Facility: OTHER | Age: 58
End: 2018-02-05
Attending: PHYSICAL MEDICINE & REHABILITATION
Payer: COMMERCIAL

## 2018-02-05 DIAGNOSIS — M54.50 CHRONIC RIGHT-SIDED LOW BACK PAIN WITHOUT SCIATICA: ICD-10-CM

## 2018-02-05 DIAGNOSIS — G89.29 CHRONIC RIGHT-SIDED LOW BACK PAIN WITHOUT SCIATICA: ICD-10-CM

## 2018-02-05 PROCEDURE — 97110 THERAPEUTIC EXERCISES: CPT

## 2018-02-05 NOTE — PROGRESS NOTES
[facility]    Increase pts weight 5-10% secondary low PRE      Name: Stanley Stewart  Clinic Number: [id number]  Date of Treatment: 2018   Diagnosis:   Encounter Diagnosis   Name Primary?    Chronic right-sided low back pain without sciatica      Physician: Acacia Ramirez, *    Pain pattern determined: 1 PEP  Plan of care signed: 17   Time in: 2:30  Time Out: 3:20  Total Treatment time: 60  Precautions: HTN, gout, chronic back pain, RFA  Visit #: 6  ( Increase weight 10% next visit.)    POC due: 3/12/18  Reassessment done: 17  Reassessment due:2/10/18    Face to Face discussion of patient was done between PT and PTA.     Subjective   Stanley reports no change in pain at this time. Reports that he has lost some weight and that has helped decrease his pain.     Patient reports their pain to be 0/10 on a 0-10 scale with 0 being no pain and 10 being the worst pain imaginable.    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, riding bike                      Pts goals:  strengthen without being guarded, sit and walk         Objective   Baseline IM Testing Results:   Date of testin2017  ROM 24-0 deg (Increased to 36-0 17)   Max Peak Torque 107    Min Peak Torque 46    Flex/Ext Ratio 2.3   % below normative data -74         FOTO: Focus on Therapeutic Outcomes   Category: lumbar   % Impaired: 37  Current Score  = CJ = at least 20% but < 40% impaired, limited or restricted  Goal at Discharge Score = CJ = at least 20% but < 40% impaired, limited or restricted      Treatment    Pt was instructed in and performed the following:     Stanley received therapeutic exercises to develop/improved posture, cardiovascular endurance, muscular endurance, lumbar/cervical ROM, strength and muscular endurance for 40 minutes including the following exercises:  [facility] 2018   Visit Number 9   VAS Pain Rating 0    Treadmill Time (in min.) 10   Speed 3   Extension in Lying 10   Extension in Standing 10   Flexion in Lying 10   Manual Therapy -   Lumbar Extension Seat Pad -   Femur Restraint -   Top Dead Center -   Counterweight -   Lumbar Flexion -   Lumbar Extension -   Lumbar Peak Torque -   Min Torque -   Percent From Norm -   Percent Change from Initial -   Lumbar Weight 97   Repetitions 20   Rating of Perceived Exertion 2   Ice - Z Lie (in min.) 10         Side bridges 10x   PPT with march 10x  Wall dead bug 10x NT  Bridge 10x  PPT 10x (supine and seated with mild v/c)  Seated quad stretch 20 s/h 4x   LTR 10x    Peripheral muscle strengthening which included 1 set of 15-20 repetitions at a slow, controlled 7 second per rep pace focused on strengthening supporting musculature for improved body mechanics and functional mobility.  Pt and therapist focused on proper form during treatment to ensure optimal strengthening of each targeted muscle group.  Machines were utilized including torso rotation, leg extension, leg curl, chest press, upright row, Tricep extension, bicep curl, leg press, and hip abduction.      Stanley received the following manual therapy techniques: long axis distraction at the R hip and hooklying distraction 0       Home Exercise Program as follows:   Single knee to chest 10x, 2xdaily  Extension in lying 10x, 3x/daily  Supine and seated pelvic tilts 5-10 s/h 5x, 2x daily  TLR 10x  Seated quad stretch 20 s/h 4x   Supine PPT + bridges 10x, 3x weekly   Side bridges (planks) 10x, 3x weekly   Z-lie 10-20 min, 2x daily     Handouts were given to the patient. Pt demo good understanding of the education provided. Stanley demonstrated good return demonstration of activities.     Lumbar roll use compliance: unknown  Additional exercises taught this treatment session:       Assessment     Pt tolerated treatment well without increased discomfort.  Increased Med X machine weights 10% to 97 # with exertion level of 2/10.  Recommend 10% increase next visit as well.  Patient is making good progress towards established goals. Added core bracing exercises of side plank with positive pt response. Also attempted EIS with towel overpressure with improved low back stretch response. Please review next session.   Pt will continue to benefit from skilled outpatient physical therapy to address the deficits stated in the impairment chart, provide pt/family education and to maximize pt's level of independence in the home and community environment.       Pt's spiritual, cultural and educational needs considered and pt agreeable to plan of care and goals as stated below:     Medical necessity is demonstrated by the following IMPAIRMENTS/PROBLEMS:      History  Co-morbidities and personal factors that may impact the plan of care Examination  Body Structures and Functions, activity limitations and participation restrictions that may impact the plan of care Clinical Presentation Decision Making/ Complexity Score   Co-morbidities:   HTN, gout, chronic back pain, RFA           Personal Factors:   lifestyle Body Regions:   back  lower extremities     Body Systems:   gross symmetry  ROM  strength  gross coordinated movement  motor control     Activity limitations:   Learning and applying knowledge  no deficits     General Tasks and Commands  no deficits     Communication  no deficits     Mobility  lifting and carrying objects  walking     Self care  no deficits     Domestic Life  Yard work     Interactions/Relationships  no deficits     Life Areas  no deficits     Community and Social Life  community life  recreation and leisure     Participation Restrictions:   None    evolving clinical presentation with changing clinical characteristics   Worsening over the past few weeks    Moderate            GOALS: Pt is in agreement with the following goals.     Short term goals:  6 weeks or 10 visits   1.  Pt will demonstrate increased lumbar ROM by at least 3  degrees from the initial ROM value with improvements noted in functional ROM and ability to perform ADLs. Met 12/27/17  2.  Pt will demonstrate increased maximum isometric torque value by 10% when compared to the initial value resulting in improved ability to perform bending, lifting, and carrying activities safely, confidently.  3.  Patient report a reduction in worst pain score by 1-2 points for improved tolerance during work and recreational activities  4.  Pt able to perform HEP correctly with minimal cueing or supervision for therapist     Long term goals: 13 weeks or 20 visits   1. Pt will demonstrate increased lumbar ROM by at least 6 degrees from initial ROM value, resulting in improved ability to perform functional fwd bending while standing and sitting. Met 12/27/17  2. Pt will demonstrate increased maximum isometric torque value by 30% when compared to the initial value resulting in improved ability to perform bending, lifting, and carrying activities safely, confidently.  3. Pt to demonstrate ability to independently control and reduce their pain through posture positioning and mechanical movements throughout a typical day.  4.  Patient will demonstrate improved overall function per FOTO Survey to CJ = at least 20% but < 40% impaired, limited or restricted score or less.  5. Pt will report ability to walk on uneven surface without increased symptoms.     Plan   Continue with established Plan of Care towards established PT goals.

## 2018-02-24 ENCOUNTER — PATIENT MESSAGE (OUTPATIENT)
Dept: INTERNAL MEDICINE | Facility: CLINIC | Age: 58
End: 2018-02-24

## 2018-02-24 NOTE — TELEPHONE ENCOUNTER
Lisinopril-HCTZ 20-12.5 is empty and needs to be refilled and with new pharmacy.   Please move to 90 day script with my new pharmacy [facility] as you've already done with the Rosuvastatin 20mg.   Thank you!

## 2018-02-25 RX ORDER — LISINOPRIL AND HYDROCHLOROTHIAZIDE 12.5; 2 MG/1; MG/1
1 TABLET ORAL DAILY
Qty: 90 TABLET | Refills: 3 | Status: SHIPPED | OUTPATIENT
Start: 2018-02-25 | End: 2019-02-07 | Stop reason: SDUPTHER

## 2018-02-26 ENCOUNTER — CLINICAL SUPPORT (OUTPATIENT)
Dept: REHABILITATION | Facility: OTHER | Age: 58
End: 2018-02-26
Attending: PHYSICAL MEDICINE & REHABILITATION
Payer: COMMERCIAL

## 2018-02-26 DIAGNOSIS — M54.50 CHRONIC RIGHT-SIDED LOW BACK PAIN WITHOUT SCIATICA: ICD-10-CM

## 2018-02-26 DIAGNOSIS — G89.29 CHRONIC RIGHT-SIDED LOW BACK PAIN WITHOUT SCIATICA: ICD-10-CM

## 2018-02-26 PROCEDURE — 97110 THERAPEUTIC EXERCISES: CPT

## 2018-02-26 NOTE — PROGRESS NOTES
[facility]    Increase pts weight 5-10% secondary low PRE      Name: Stanley Stewart  Clinic Number: [id number]  Date of Treatment: 2018   Diagnosis:   Encounter Diagnosis   Name Primary?    Chronic right-sided low back pain without sciatica      Physician: Acacia Ramirez, *    Pain pattern determined: 1 PEP  Plan of care signed: 17   Time in: 2:30  Time Out: 3:20  Total Treatment time: 60  Precautions: HTN, gout, chronic back pain, RFA  Visit #: 10    POC due: 3/12/18    Reassessment due:3/28/18    Face to Face discussion of patient was done between PT and PTA.     Subjective   Stanley reports that the pain is not bad at this time, he states that he lost some weight and that helps. Pt states that he took a 2 day trip to [place] so he was on the plane a lot and it did not bother his back. The most pain he gets is when he is in a static flexed position such at house an yard work.    Patient reports their pain to be 0/10 on a 0-10 scale with 0 being no pain and 10 being the worst pain imaginable.    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, riding bike                      Pts goals:  strengthen without being guarded, sit and walk         Objective   Baseline IM Testing Results:   Date of testin2017  ROM 24-0 deg (Increased to 36-0 17)   Max Peak Torque 107    Min Peak Torque 46    Flex/Ext Ratio 2.3   % below normative data -74      [facility] IM Testing Results:   Date of testin2018  ROM 0-36   Max Peak Torque 96   Min Peak Torque 24   Flex/Ext Ratio 4:1   % below normative data 85       FOTO: Focus on Therapeutic Outcomes   Category: lumbar   % Impaired: 37  Current Score  = CJ = at least 20% but < 40% impaired, limited or restricted  Goal at Discharge Score = CJ = at least 20% but < 40% impaired, limited or restricted  Visit 10: 31%      Treatment    Pt was instructed in and performed  the following:     Stanley received therapeutic exercises to develop/improved posture, cardiovascular endurance, muscular endurance, lumbar/cervical ROM, strength and muscular endurance for 40 minutes including the following exercises:    [facility] 2/26/2018   Visit Number 10   VAS Pain Rating 0   Treadmill Time (in min.) 10   Speed 3   Extension in Lying -   Extension in Standing -   Flexion in Lying -   Manual Therapy -   Lumbar Extension Seat Pad -   Femur Restraint -   Top Dead Center -   Counterweight -   Lumbar Flexion 36   Lumbar Extension 0   Lumbar Peak Torque 96   Min Torque 24   Percent From Norm 85   Percent Change from Initial -53.33   Lumbar Weight -   Repetitions -   Rating of Perceived Exertion -   Ice - Z Lie (in min.) -       Standing trunk flexion, ball roll out 10x  Hook lying TA isometric 10x  Side bridges 10x - NT  PPT with march 10x  Wall dead bug 10x   Bridge 10x - NT  PPT 10x (supine and seated with mild v/c)  Seated quad stretch 20 s/h 4x - NT  LTR 10x    Peripheral muscle strengthening which included 1 set of 15-20 repetitions at a slow, controlled 7 second per rep pace focused on strengthening supporting musculature for improved body mechanics and functional mobility.  Pt and therapist focused on proper form during treatment to ensure optimal strengthening of each targeted muscle group.  Machines were utilized including torso rotation, leg extension, leg curl, chest press, upright row, Tricep extension, bicep curl, leg press, and hip abduction.      Stanley received the following manual therapy techniques: long axis distraction at the R hip and hooklying distraction 0       Home Exercise Program as follows:   Single knee to chest 10x, 2xdaily  Extension in lying 10x, 3x/daily  Supine and seated pelvic tilts 5-10 s/h 5x, 2x daily  TLR 10x  Seated quad stretch 20 s/h 4x   Supine PPT + bridges 10x, 3x weekly   Side bridges (planks) 10x, 3x weekly   Z-lie 10-20 min, 2x  daily     Handouts were given to the patient. Pt demo good understanding of the education provided. Stanley demonstrated good return demonstration of activities.     Lumbar roll use compliance: unknown  Additional exercises taught this treatment session:       Assessment     Patient has attended 10 visits at [facility] which included MD evaluation, PT evaluation with isometric testing, and physical therapy treatment including HEP instruction, education, aerobic work, dynamic strengthening on med ex equipment for the spine, and whole body strengthening on med ex equipment with increasing weight loads.  Patient  is demonstrating increased ability to reduce symptoms, improved posture, no change in ROM, and decreased lumbar strength on med ex test. Pt expressed that he did not know exactly what he was supposed to be feeling but he did not use his legs at all on the test and he was probably using his legs more at the initial test    Pt will continue to benefit from skilled outpatient physical therapy to address the deficits stated in the impairment chart, provide pt/family education and to maximize pt's level of independence in the home and community environment.       Pt's spiritual, cultural and educational needs considered and pt agreeable to plan of care and goals as stated below:     Medical necessity is demonstrated by the following IMPAIRMENTS/PROBLEMS:      History  Co-morbidities and personal factors that may impact the plan of care Examination  Body Structures and Functions, activity limitations and participation restrictions that may impact the plan of care Clinical Presentation Decision Making/ Complexity Score   Co-morbidities:   HTN, gout, chronic back pain, RFA           Personal Factors:   lifestyle Body Regions:   back  lower extremities     Body Systems:   gross symmetry  ROM  strength  gross coordinated movement  motor control     Activity limitations:   Learning and applying knowledge  no  deficits     General Tasks and Commands  no deficits     Communication  no deficits     Mobility  lifting and carrying objects  walking     Self care  no deficits     Domestic Life  Yard work     Interactions/Relationships  no deficits     Life Areas  no deficits     Community and Social Life  community life  recreation and leisure     Participation Restrictions:   None    evolving clinical presentation with changing clinical characteristics   Worsening over the past few weeks    Moderate            GOALS: Pt is in agreement with the following goals.     Short term goals:  6 weeks or 10 visits   1.  Pt will demonstrate increased lumbar ROM by at least 3 degrees from the initial ROM value with improvements noted in functional ROM and ability to perform ADLs. Met 12/27/17  2.  Pt will demonstrate increased maximum isometric torque value by 10% when compared to the initial value resulting in improved ability to perform bending, lifting, and carrying activities safely, confidently.  3.  Patient report a reduction in worst pain score by 1-2 points for improved tolerance during work and recreational activities Met 2/26/2018  4.  Pt able to perform HEP correctly with minimal cueing or supervision for therapist     Long term goals: 13 weeks or 20 visits   1. Pt will demonstrate increased lumbar ROM by at least 6 degrees from initial ROM value, resulting in improved ability to perform functional fwd bending while standing and sitting. Met 12/27/17  2. Pt will demonstrate increased maximum isometric torque value by 30% when compared to the initial value resulting in improved ability to perform bending, lifting, and carrying activities safely, confidently.  3. Pt to demonstrate ability to independently control and reduce their pain through posture positioning and mechanical movements throughout a typical day.  4.  Patient will demonstrate improved overall function per FOTO Survey to CJ = at least 20% but < 40% impaired,  limited or restricted score or less. Met 2/26/2018  5. Pt will report ability to walk on uneven surface without increased symptoms.     Plan   Continue with established Plan of Care towards established PT goals.

## 2018-02-28 ENCOUNTER — CLINICAL SUPPORT (OUTPATIENT)
Dept: REHABILITATION | Facility: OTHER | Age: 58
End: 2018-02-28
Attending: PHYSICAL MEDICINE & REHABILITATION
Payer: COMMERCIAL

## 2018-02-28 DIAGNOSIS — G89.29 CHRONIC RIGHT-SIDED LOW BACK PAIN WITHOUT SCIATICA: ICD-10-CM

## 2018-02-28 DIAGNOSIS — M54.50 CHRONIC RIGHT-SIDED LOW BACK PAIN WITHOUT SCIATICA: ICD-10-CM

## 2018-02-28 PROCEDURE — 97110 THERAPEUTIC EXERCISES: CPT

## 2018-03-14 ENCOUNTER — CLINICAL SUPPORT (OUTPATIENT)
Dept: REHABILITATION | Facility: OTHER | Age: 58
End: 2018-03-14
Attending: PHYSICAL MEDICINE & REHABILITATION
Payer: COMMERCIAL

## 2018-03-14 DIAGNOSIS — M54.50 CHRONIC RIGHT-SIDED LOW BACK PAIN WITHOUT SCIATICA: ICD-10-CM

## 2018-03-14 DIAGNOSIS — G89.29 CHRONIC RIGHT-SIDED LOW BACK PAIN WITHOUT SCIATICA: ICD-10-CM

## 2018-03-14 PROCEDURE — 97110 THERAPEUTIC EXERCISES: CPT

## 2018-03-14 NOTE — PROGRESS NOTES
[facility]    Increase pts weight 5-10% secondary low PRE      Name: Stanley Stewart  Clinic Number: [id number]  Date of Treatment: 2018   Diagnosis:   Encounter Diagnosis   Name Primary?    Chronic right-sided low back pain without sciatica      Physician: Acacia Ramirez, *    Pain pattern determined: 1 PEP  Plan of care signed: 17   Time in: 9:30am  Time Out: 10:35 am  Total Treatment time: 65  Precautions: HTN, gout, chronic back pain, RFA  Visit #: 12    POC due: 3/12/18    Reassessment due:3/28/18    Face to Face discussion of patient was done between PT and PTA.     Subjective   Stanley  Reports his back is good.  Pt states he has been traveling a lot in the last couple of weeks and reports if he performs standing extension pain decreases.  reports their pain to be 0/10 on a 0-10 scale with 0 being no pain and 10 being the worst pain imaginable.    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, riding bike                      Pts goals:  strengthen without being guarded, sit and walk         Objective   Baseline IM Testing Results:   Date of testin2017  ROM 24-0 deg (Increased to 36-0 17)   Max Peak Torque 107    Min Peak Torque 46    Flex/Ext Ratio 2.3   % below normative data -74      [facility] IM Testing Results:   Date of testin2018  ROM 0-36   Max Peak Torque 96   Min Peak Torque 24   Flex/Ext Ratio 4:1   % below normative data 85       FOTO: Focus on Therapeutic Outcomes   Category: lumbar   % Impaired: 37  Current Score  = CJ = at least 20% but < 40% impaired, limited or restricted  Goal at Discharge Score = CJ = at least 20% but < 40% impaired, limited or restricted  Visit 10: 31%      Treatment    Pt was instructed in and performed the following:     Stanley received therapeutic exercises to develop/improved posture, cardiovascular endurance, muscular endurance, lumbar/cervical ROM,  strength and muscular endurance for 40 minutes including the following exercises:  [facility] 3/14/2018   Visit Number 12   VAS Pain Rating 0   Treadmill Time (in min.) 10   Speed 3   Extension in Lying 10   Extension in Standing 10   Flexion in Lying 10   Manual Therapy -   Lumbar Extension Seat Pad -   Femur Restraint -   Top Dead Center -   Counterweight -   Lumbar Flexion -   Lumbar Extension -   Lumbar Peak Torque -   Min Torque -   Percent From Norm -   Percent Change from Initial -   Lumbar Weight 112   Repetitions 20   Rating of Perceived Exertion 2   Ice - Z Lie (in min.) 10           Standing trunk flexion, ball roll out 10x  Hook lying TA isometric 10x  Side bridges 10x - NT  PPT with march 10x  Wall dead bug 10x   Bridge 10x - NT  PPT 10x (supine and seated with mild v/c)  Seated quad stretch 20 s/h 4x - NT  LTR 10x    Peripheral muscle strengthening which included 1 set of 15-20 repetitions at a slow, controlled 7 second per rep pace focused on strengthening supporting musculature for improved body mechanics and functional mobility.  Pt and therapist focused on proper form during treatment to ensure optimal strengthening of each targeted muscle group.  Machines were utilized including torso rotation, leg extension, leg curl, chest press, upright row, Tricep extension, bicep curl, leg press, and hip abduction.      Stanley received the following manual therapy techniques: long axis distraction at the R hip and hooklying distraction 0       Home Exercise Program as follows:   Single knee to chest 10x, 2xdaily  Extension in lying 10x, 3x/daily  Supine and seated pelvic tilts 5-10 s/h 5x, 2x daily  TLR 10x  Seated quad stretch 20 s/h 4x   Supine PPT + bridges 10x, 3x weekly   Side bridges (planks) 10x, 3x weekly   Z-lie 10-20 min, 2x daily     Handouts were given to the patient. Pt demo good understanding of the education provided. Stanley demonstrated good return demonstration of activities.      Lumbar roll use compliance: unknown  Additional exercises taught this treatment session:   Reviewed PPT    Assessment     Pt tolerated treatment well without increase LBP.  Pt increased 10% on Med X weights with exertion of 2/10, completing 20 reps. Pt has positive stretch response to standing extension, which he performs if he is seated too long and begins to experience pain.  Increase 10% again next visit to increase challenge.    Pt will continue to benefit from skilled outpatient physical therapy to address the deficits stated in the impairment chart, provide pt/family education and to maximize pt's level of independence in the home and community environment.       Pt's spiritual, cultural and educational needs considered and pt agreeable to plan of care and goals as stated below:     Medical necessity is demonstrated by the following IMPAIRMENTS/PROBLEMS:      History  Co-morbidities and personal factors that may impact the plan of care Examination  Body Structures and Functions, activity limitations and participation restrictions that may impact the plan of care Clinical Presentation Decision Making/ Complexity Score   Co-morbidities:   HTN, gout, chronic back pain, RFA           Personal Factors:   lifestyle Body Regions:   back  lower extremities     Body Systems:   gross symmetry  ROM  strength  gross coordinated movement  motor control     Activity limitations:   Learning and applying knowledge  no deficits     General Tasks and Commands  no deficits     Communication  no deficits     Mobility  lifting and carrying objects  walking     Self care  no deficits     Domestic Life  Yard work     Interactions/Relationships  no deficits     Life Areas  no deficits     Community and Social Life  community life  recreation and leisure     Participation Restrictions:   None    evolving clinical presentation with changing clinical characteristics   Worsening over the past few weeks    Moderate            GOALS:  Pt is in agreement with the following goals.     Short term goals:  6 weeks or 10 visits   1.  Pt will demonstrate increased lumbar ROM by at least 3 degrees from the initial ROM value with improvements noted in functional ROM and ability to perform ADLs. Met 12/27/17  2.  Pt will demonstrate increased maximum isometric torque value by 10% when compared to the initial value resulting in improved ability to perform bending, lifting, and carrying activities safely, confidently.  3.  Patient report a reduction in worst pain score by 1-2 points for improved tolerance during work and recreational activities Met 2/26/2018  4.  Pt able to perform HEP correctly with minimal cueing or supervision for therapist     Long term goals: 13 weeks or 20 visits   1. Pt will demonstrate increased lumbar ROM by at least 6 degrees from initial ROM value, resulting in improved ability to perform functional fwd bending while standing and sitting. Met 12/27/17  2. Pt will demonstrate increased maximum isometric torque value by 30% when compared to the initial value resulting in improved ability to perform bending, lifting, and carrying activities safely, confidently.  3. Pt to demonstrate ability to independently control and reduce their pain through posture positioning and mechanical movements throughout a typical day.  4.  Patient will demonstrate improved overall function per FOTO Survey to CJ = at least 20% but < 40% impaired, limited or restricted score or less. Met 2/26/2018  5. Pt will report ability to walk on uneven surface without increased symptoms.     Plan   Continue with established Plan of Care towards established PT goals.

## 2018-04-24 ENCOUNTER — PATIENT MESSAGE (OUTPATIENT)
Dept: INTERNAL MEDICINE | Facility: CLINIC | Age: 58
End: 2018-04-24

## 2018-05-10 ENCOUNTER — LAB VISIT (OUTPATIENT)
Dept: LAB | Facility: HOSPITAL | Age: 58
End: 2018-05-10
Attending: UROLOGY
Payer: COMMERCIAL

## 2018-05-10 DIAGNOSIS — E29.1 MALE HYPOGONADISM: ICD-10-CM

## 2018-05-10 LAB
ALBUMIN SERPL BCP-MCNC: 4.2 G/DL
ALP SERPL-CCNC: 48 U/L
ALT SERPL W/O P-5'-P-CCNC: 39 U/L
AST SERPL-CCNC: 28 U/L
BASOPHILS # BLD AUTO: 0.07 K/UL
BASOPHILS NFR BLD: 1.3 %
BILIRUB DIRECT SERPL-MCNC: 0.2 MG/DL
BILIRUB SERPL-MCNC: 0.5 MG/DL
CHOLEST SERPL-MCNC: 140 MG/DL
CHOLEST/HDLC SERPL: 2.9 {RATIO}
COMPLEXED PSA SERPL-MCNC: 0.37 NG/ML
DIFFERENTIAL METHOD: ABNORMAL
EOSINOPHIL # BLD AUTO: 0.8 K/UL
EOSINOPHIL NFR BLD: 15.7 %
ERYTHROCYTE [DISTWIDTH] IN BLOOD BY AUTOMATED COUNT: 12.7 %
HCT VFR BLD AUTO: 40.4 %
HDLC SERPL-MCNC: 48 MG/DL
HDLC SERPL: 34.3 %
HGB BLD-MCNC: 13.9 G/DL
LDLC SERPL CALC-MCNC: 64.6 MG/DL
LYMPHOCYTES # BLD AUTO: 2 K/UL
LYMPHOCYTES NFR BLD: 37.3 %
MCH RBC QN AUTO: 31.9 PG
MCHC RBC AUTO-ENTMCNC: 34.4 G/DL
MCV RBC AUTO: 93 FL
MONOCYTES # BLD AUTO: 0.5 K/UL
MONOCYTES NFR BLD: 9.5 %
NEUTROPHILS # BLD AUTO: 1.9 K/UL
NEUTROPHILS NFR BLD: 36 %
NONHDLC SERPL-MCNC: 92 MG/DL
PLATELET # BLD AUTO: 182 K/UL
PMV BLD AUTO: 10.4 FL
PROT SERPL-MCNC: 6.8 G/DL
RBC # BLD AUTO: 4.36 M/UL
TESTOST SERPL-MCNC: 158 NG/DL
TRIGL SERPL-MCNC: 137 MG/DL
WBC # BLD AUTO: 5.36 K/UL

## 2018-05-10 PROCEDURE — 80061 LIPID PANEL: CPT

## 2018-05-10 PROCEDURE — 36415 COLL VENOUS BLD VENIPUNCTURE: CPT

## 2018-05-10 PROCEDURE — 84153 ASSAY OF PSA TOTAL: CPT

## 2018-05-10 PROCEDURE — 80076 HEPATIC FUNCTION PANEL: CPT

## 2018-05-10 PROCEDURE — 85025 COMPLETE CBC W/AUTO DIFF WBC: CPT

## 2018-05-10 PROCEDURE — 84403 ASSAY OF TOTAL TESTOSTERONE: CPT

## 2018-05-18 ENCOUNTER — PATIENT MESSAGE (OUTPATIENT)
Dept: INTERNAL MEDICINE | Facility: CLINIC | Age: 58
End: 2018-05-18

## 2018-05-18 NOTE — TELEPHONE ENCOUNTER
Dr PASCUAL. As part of my twice annual Testopil treatment, Dr Morin orders complete blood test. This results were posted last week. Reading the results it appears the new cholesterol and trigcyride meds seem to be working very well. Wanted to advise you and await your thoughts.

## 2018-05-28 ENCOUNTER — OFFICE VISIT (OUTPATIENT)
Dept: UROLOGY | Facility: CLINIC | Age: 58
End: 2018-05-28
Payer: COMMERCIAL

## 2018-05-28 VITALS
HEIGHT: 77 IN | BODY MASS INDEX: 33.32 KG/M2 | SYSTOLIC BLOOD PRESSURE: 118 MMHG | HEART RATE: 69 BPM | DIASTOLIC BLOOD PRESSURE: 76 MMHG | WEIGHT: 282.19 LBS

## 2018-05-28 DIAGNOSIS — E29.1 MALE HYPOGONADISM: Primary | ICD-10-CM

## 2018-05-28 DIAGNOSIS — N52.01 ERECTILE DYSFUNCTION DUE TO ARTERIAL INSUFFICIENCY: ICD-10-CM

## 2018-05-28 DIAGNOSIS — E78.2 MIXED HYPERLIPIDEMIA: ICD-10-CM

## 2018-05-28 DIAGNOSIS — N13.8 BPH WITH URINARY OBSTRUCTION: ICD-10-CM

## 2018-05-28 DIAGNOSIS — I10 ESSENTIAL HYPERTENSION: ICD-10-CM

## 2018-05-28 DIAGNOSIS — N40.1 BPH WITH URINARY OBSTRUCTION: ICD-10-CM

## 2018-05-28 PROCEDURE — 99214 OFFICE O/P EST MOD 30 MIN: CPT | Mod: 25,S$GLB,ICN, | Performed by: UROLOGY

## 2018-05-28 PROCEDURE — 3008F BODY MASS INDEX DOCD: CPT | Mod: CPTII,S$GLB,ICN, | Performed by: UROLOGY

## 2018-05-28 PROCEDURE — 99999 PR PBB SHADOW E&M-EST. PATIENT-LVL III: CPT | Mod: PBBFAC,,, | Performed by: UROLOGY

## 2018-05-28 PROCEDURE — 11980 IMPLANT HORMONE PELLET(S): CPT | Mod: S$GLB,,, | Performed by: UROLOGY

## 2018-05-28 PROCEDURE — 3078F DIAST BP <80 MM HG: CPT | Mod: CPTII,S$GLB,ICN, | Performed by: UROLOGY

## 2018-05-28 PROCEDURE — 3074F SYST BP LT 130 MM HG: CPT | Mod: CPTII,S$GLB,ICN, | Performed by: UROLOGY

## 2018-05-28 PROCEDURE — S0189 TESTOSTERONE PELLET 75 MG: HCPCS | Mod: S$GLB,,, | Performed by: UROLOGY

## 2018-05-28 RX ORDER — SILDENAFIL CITRATE 20 MG/1
TABLET ORAL
Qty: 50 TABLET | Refills: 11 | Status: SHIPPED | OUTPATIENT
Start: 2018-05-28 | End: 2019-07-01 | Stop reason: SDUPTHER

## 2018-05-28 NOTE — PROGRESS NOTES
CHIEF COMPLAINT:    Mr. Stewart is a 58 y.o. male presenting with hypogonadism.    PRESENTING ILLNESS:    Mr. Stewart is a 58 y.o. male with a history of hypogonadism.  This has been present for > 1 year.  He is on Testopel.  He is here for placement.  While on TRT, he has more energy and a stronger libido.    He also has ED.  This has been present for > 1 year.  He has been using Cialis, but it is not effective.  He's currently using levitra with good results.  However, he c/o the cost.  He's using sildenafil with good results.    He voids well.  No hematuria.  No dysuria.    REVIEW OF SYSTEMS:    Patient denies any history of headache, blurred vision, fever, nausea, vomiting, chills, flank discomfort, abdominal pain, bleeding per rectum, cough, SOB, recent loss of consciousness, recent mental status changes, seizures, dizziness, or upper or lower extremity weakness.    DANIELITO  1. 2  2. 4  3. 4  4. 3  5. 5     PATIENT HISTORY:    Past Medical History:   Diagnosis Date    Allergy     Degenerative disc disease     Gout     Hyperlipidemia     Hypertension     Male hypogonadism 7/19/2012       Past Surgical History:   Procedure Laterality Date    APPENDECTOMY      arthroscopic knee surg      left X 2, right X 1    INTUSSUSCEPTION REPAIR      KNEE SURGERY         Family History   Problem Relation Age of Onset    Hyperlipidemia Father     Hypertension Father     Heart disease Father     Liver disease Mother     Heart disease Brother     Heart disease Maternal Grandmother     Heart disease Paternal Grandfather        Social History     Social History    Marital status:      Spouse name: N/A    Number of children: 3    Years of education: N/A     Occupational History     [employer]     Social History Main Topics    Smoking status: Former Smoker     Quit date: 7/19/1987    Smokeless tobacco: Never Used    Alcohol use 4.2 oz/week     7 Glasses of wine per week    Drug use: No     Sexual activity: Yes     Partners: Female     Other Topics Concern    Not on file     Social History Narrative    No narrative on file       Allergies:  Shellfish containing products    Medications:    Current Outpatient Prescriptions:     amLODIPine (NORVASC) 10 MG tablet, TAKE 1 TABLET(10 MG) BY MOUTH EVERY DAY, Disp: 90 tablet, Rfl: 1    lisinopril-hydrochlorothiazide (PRINZIDE,ZESTORETIC) 20-12.5 mg per tablet, Take 1 tablet by mouth once daily., Disp: 90 tablet, Rfl: 3    meloxicam (MOBIC) 15 MG tablet, , Disp: , Rfl:     rosuvastatin (CRESTOR) 20 MG tablet, Take 1 tablet (20 mg total) by mouth once daily., Disp: 90 tablet, Rfl: 3    sildenafil (REVATIO) 20 mg Tab, Take 5 po 1 hour before sexual activity, Disp: 50 tablet, Rfl: 11    SITagliptin (JANUVIA) 50 MG Tab, Take 1 tablet (50 mg total) by mouth once daily., Disp: 90 tablet, Rfl: 3    vardenafil (LEVITRA) 20 MG tablet, Take 1 tablet (20 mg total) by mouth daily as needed for Erectile Dysfunction. Take 1 hour before intercourse., Disp: 30 tablet, Rfl: 11    zolpidem (AMBIEN) 10 mg Tab, TAKE 1 TABLET BY MOUTH EVERY DAY AT BEDTIME AS NEEDED, Disp: 30 tablet, Rfl: 3    methylPREDNISolone (MEDROL, JONY,) 4 mg tablet, use as directed, Disp: 1 Package, Rfl: 0    promethazine-dextromethorphan (PROMETHAZINE-DM) 6.25-15 mg/5 mL Syrp, Take 5 mLs by mouth every 4 to 6 hours as needed (COUGH)., Disp: 118 mL, Rfl: 0    tadalafil (CIALIS) 5 MG tablet, Take 1 tablet (5 mg total) by mouth once daily., Disp: 30 tablet, Rfl: 11    Current Facility-Administered Medications:     testosterone Pllt 12 Pellet, 12 Pellet, Implant, 1 time in Clinic/HOD, Jaydon Morin MD    PHYSICAL EXAMINATION:    The patient generally appears in good health, is appropriately interactive, and is in no apparent distress.     Eyes: anicteric sclerae, moist conjunctivae; no lid-lag; PERRLA     HENT: Atraumatic; oropharynx clear with moist mucous membranes and no mucosal  ulcerations;normal hard and soft palate. No evidence of lymphadenopathy.    Neck: Trachea midline.  No thyromegaly.    Musculoskeletal: No abnormal gait.    Skin: No lesions.    Mental: Cooperative with normal affect.  Is oriented to time, place, and person.    Neuro: Grossly intact.    Chest: Normal inspiratory effort.   No accessory muscles.  No audible wheezes.  Respirations symmetric on inspiration and expiration.    Heart: Regular rhythm.      Abdomen:  Soft, non-tender. No masses or organomegaly. Bladder is not palpable. No evidence of flank discomfort. No evidence of inguinal hernia.    Genitourinary: The penis is circumcised with no evidence of plaques or induration. The urethral meatus is normal. The testes, epididymides, and cord structures are normal in size and contour bilaterally. The scrotum is normal in size and contour.    Normal anal sphincter tone. No rectal mass.    The prostate is 35 g. Normal landmarks. Lateral sulci. Median furrow intact.  No nodularity or induration. Seminal vesicles are normal.    Extremities: No clubbing, cyanosis, or edema      LABS:    Lab Results   Component Value Date    PSA 0.50 03/17/2017    PSA 0.47 07/18/2013    PSA 0.71 03/15/2013    PSADIAG 0.37 05/10/2018    PSADIAG 0.50 11/03/2017    PSADIAG 0.46 11/03/2016        IMPRESSION:    Encounter Diagnoses   Name Primary?    Male hypogonadism Yes    Erectile dysfunction due to arterial insufficiency     BPH with urinary obstruction     Essential hypertension     Mixed hyperlipidemia    HTN, controlled  Hyperlipidemia, controlled    PLAN:      1. Will try generic sildenafil for his ED to see if it is cheaper. He didn't fill it last time.  2. Procedure Report:Testopel Insertion    A Time Out was performed with the patient and nurse in the room.   The site was marked with a yes. Verbal consent was obtained. The risks and benefits of testosterone replacement were explained. The alternatives of observation and treatment  with injections and gel therapy were discussed.     The risks of testopel explained to the patient include but are not limited to worsening of BPH, edema with or without congestive heart failure, gynecomastia, cellulitis and abscess, venous thromboembolic events, testicular atrophy, possible cardiac sequelae and with high dose testosterone the risk of liver function elevation (peliosis hepatitis) and hepatocellular carcinoma. Peliosis hepatitis can be a fatal complication.     L gluteal region prepped and draped in sterile fashion.  20 cc of 2% lidocaine used for local analgesia.  Small incision made with a 15 blade.  Standard trocars used for subcutaneous insertion of 12 pellets total in a V shaped tract.  No active bleeding noted.  Area cleaned and dried. Steri strips applied. Dressing applied.      He can RTC 6 months with T, psa, cbc, hepatic panel lipid panel.          Copy to:

## 2018-06-03 ENCOUNTER — PATIENT MESSAGE (OUTPATIENT)
Dept: INTERNAL MEDICINE | Facility: CLINIC | Age: 58
End: 2018-06-03

## 2018-06-04 RX ORDER — ZOLPIDEM TARTRATE 10 MG/1
TABLET ORAL
Qty: 30 TABLET | Refills: 3 | Status: SHIPPED | OUTPATIENT
Start: 2018-06-04 | End: 2018-10-08 | Stop reason: SDUPTHER

## 2018-06-28 NOTE — TELEPHONE ENCOUNTER
Refer to pain clinic  Dr Salas (Routing comment)     Appt schedule for Pain Mgmt, mail out to home address on file.  
Full range of motion of upper and lower extremities, no joint tenderness/swelling.

## 2018-07-17 ENCOUNTER — DOCUMENTATION ONLY (OUTPATIENT)
Dept: REHABILITATION | Facility: OTHER | Age: 58
End: 2018-07-17

## 2018-07-17 NOTE — PROGRESS NOTES
DC NOTE FOR [facility]    Pt was treated 12 times from 12/12/17 to 3/17/18.  Treatments consisted of streptching and strengthening exercises for the lumbar spine.  Goals of PT partially met.  Pt did not return for any further follow up.  DC from [facility] as pt did not continue care.

## 2018-10-08 ENCOUNTER — PATIENT MESSAGE (OUTPATIENT)
Dept: INTERNAL MEDICINE | Facility: CLINIC | Age: 58
End: 2018-10-08

## 2018-10-09 RX ORDER — ZOLPIDEM TARTRATE 10 MG/1
TABLET ORAL
Qty: 30 TABLET | Refills: 3 | Status: SHIPPED | OUTPATIENT
Start: 2018-10-09 | End: 2019-03-05 | Stop reason: SDUPTHER

## 2018-10-12 ENCOUNTER — PATIENT MESSAGE (OUTPATIENT)
Dept: UROLOGY | Facility: CLINIC | Age: 58
End: 2018-10-12

## 2018-10-15 ENCOUNTER — PATIENT MESSAGE (OUTPATIENT)
Dept: UROLOGY | Facility: CLINIC | Age: 58
End: 2018-10-15

## 2018-10-15 DIAGNOSIS — E29.1 MALE HYPOGONADISM: Primary | ICD-10-CM

## 2018-10-17 ENCOUNTER — TELEPHONE (OUTPATIENT)
Dept: UROLOGY | Facility: CLINIC | Age: 58
End: 2018-10-17

## 2018-10-17 ENCOUNTER — LAB VISIT (OUTPATIENT)
Dept: LAB | Facility: HOSPITAL | Age: 58
End: 2018-10-17
Attending: UROLOGY
Payer: COMMERCIAL

## 2018-10-17 DIAGNOSIS — E29.1 MALE HYPOGONADISM: ICD-10-CM

## 2018-10-17 LAB
ALBUMIN SERPL BCP-MCNC: 4 G/DL
ALP SERPL-CCNC: 47 U/L
ALT SERPL W/O P-5'-P-CCNC: 48 U/L
AST SERPL-CCNC: 41 U/L
BASOPHILS # BLD AUTO: 0.06 K/UL
BASOPHILS NFR BLD: 1.2 %
BILIRUB DIRECT SERPL-MCNC: 0.2 MG/DL
BILIRUB SERPL-MCNC: 0.6 MG/DL
CHOLEST SERPL-MCNC: 154 MG/DL
CHOLEST/HDLC SERPL: 2.7 {RATIO}
COMPLEXED PSA SERPL-MCNC: 0.25 NG/ML
DIFFERENTIAL METHOD: ABNORMAL
EOSINOPHIL # BLD AUTO: 0.4 K/UL
EOSINOPHIL NFR BLD: 8.3 %
ERYTHROCYTE [DISTWIDTH] IN BLOOD BY AUTOMATED COUNT: 11.9 %
HCT VFR BLD AUTO: 44.7 %
HDLC SERPL-MCNC: 58 MG/DL
HDLC SERPL: 37.7 %
HGB BLD-MCNC: 14.3 G/DL
IMM GRANULOCYTES # BLD AUTO: 0.01 K/UL
IMM GRANULOCYTES NFR BLD AUTO: 0.2 %
LDLC SERPL CALC-MCNC: 60.6 MG/DL
LYMPHOCYTES # BLD AUTO: 1.9 K/UL
LYMPHOCYTES NFR BLD: 36.8 %
MCH RBC QN AUTO: 31 PG
MCHC RBC AUTO-ENTMCNC: 32 G/DL
MCV RBC AUTO: 97 FL
MONOCYTES # BLD AUTO: 0.6 K/UL
MONOCYTES NFR BLD: 10.6 %
NEUTROPHILS # BLD AUTO: 2.2 K/UL
NEUTROPHILS NFR BLD: 42.9 %
NONHDLC SERPL-MCNC: 96 MG/DL
NRBC BLD-RTO: 0 /100 WBC
PLATELET # BLD AUTO: 192 K/UL
PMV BLD AUTO: 11.2 FL
PROT SERPL-MCNC: 6.8 G/DL
RBC # BLD AUTO: 4.61 M/UL
TESTOST SERPL-MCNC: 105 NG/DL
TRIGL SERPL-MCNC: 177 MG/DL
WBC # BLD AUTO: 5.17 K/UL

## 2018-10-17 PROCEDURE — 84153 ASSAY OF PSA TOTAL: CPT

## 2018-10-17 PROCEDURE — 85025 COMPLETE CBC W/AUTO DIFF WBC: CPT

## 2018-10-17 PROCEDURE — 84403 ASSAY OF TOTAL TESTOSTERONE: CPT

## 2018-10-17 PROCEDURE — 80061 LIPID PANEL: CPT

## 2018-10-17 PROCEDURE — 36415 COLL VENOUS BLD VENIPUNCTURE: CPT | Mod: PN

## 2018-10-17 PROCEDURE — 80076 HEPATIC FUNCTION PANEL: CPT

## 2018-10-17 NOTE — TELEPHONE ENCOUNTER
His T is definitely low.  It's time for testopel.     Please notify his lipids are high.  He should see his PCP regarding this. Liver enzymes are mildly elevated as well.

## 2018-10-17 NOTE — TELEPHONE ENCOUNTER
Pt notified of results by patient portal. Pt confirmed message read. Offered earlier appt for testopel. Pt unable to come for offered time. Will keep him scheduled on 11/1/18 and will call if there is any available appts sooner.

## 2018-11-01 ENCOUNTER — OFFICE VISIT (OUTPATIENT)
Dept: UROLOGY | Facility: CLINIC | Age: 58
End: 2018-11-01
Payer: COMMERCIAL

## 2018-11-01 VITALS
HEIGHT: 77 IN | HEART RATE: 74 BPM | BODY MASS INDEX: 32.8 KG/M2 | SYSTOLIC BLOOD PRESSURE: 120 MMHG | WEIGHT: 277.75 LBS | DIASTOLIC BLOOD PRESSURE: 78 MMHG

## 2018-11-01 DIAGNOSIS — E78.2 MIXED HYPERLIPIDEMIA: ICD-10-CM

## 2018-11-01 DIAGNOSIS — E29.1 MALE HYPOGONADISM: Primary | ICD-10-CM

## 2018-11-01 DIAGNOSIS — N40.1 BPH WITH URINARY OBSTRUCTION: ICD-10-CM

## 2018-11-01 DIAGNOSIS — N13.8 BPH WITH URINARY OBSTRUCTION: ICD-10-CM

## 2018-11-01 DIAGNOSIS — N52.01 ERECTILE DYSFUNCTION DUE TO ARTERIAL INSUFFICIENCY: ICD-10-CM

## 2018-11-01 DIAGNOSIS — I10 ESSENTIAL HYPERTENSION: ICD-10-CM

## 2018-11-01 PROCEDURE — 99999 PR PBB SHADOW E&M-EST. PATIENT-LVL III: CPT | Mod: PBBFAC,,, | Performed by: UROLOGY

## 2018-11-01 PROCEDURE — 11980 IMPLANT HORMONE PELLET(S): CPT | Mod: S$GLB,,, | Performed by: UROLOGY

## 2018-11-01 PROCEDURE — 3074F SYST BP LT 130 MM HG: CPT | Mod: CPTII,S$GLB,, | Performed by: UROLOGY

## 2018-11-01 PROCEDURE — 3078F DIAST BP <80 MM HG: CPT | Mod: CPTII,S$GLB,, | Performed by: UROLOGY

## 2018-11-01 PROCEDURE — S0189 TESTOSTERONE PELLET 75 MG: HCPCS | Mod: 59,S$GLB,, | Performed by: UROLOGY

## 2018-11-01 PROCEDURE — 3008F BODY MASS INDEX DOCD: CPT | Mod: CPTII,S$GLB,, | Performed by: UROLOGY

## 2018-11-01 PROCEDURE — 99214 OFFICE O/P EST MOD 30 MIN: CPT | Mod: 25,S$GLB,, | Performed by: UROLOGY

## 2018-11-01 NOTE — PROGRESS NOTES
CHIEF COMPLAINT:    Mr. Stewart is a 58 y.o. male presenting with hypogonadism.    PRESENTING ILLNESS:    Mr. Stewart is a 58 y.o. male with a history of hypogonadism.  This has been present for > 1 year.  He is on Testopel.  He is here for placement.  While on TRT, he has more energy and a stronger libido.    He also has ED.  This has been present for > 1 year.  He has been using Cialis, but it is not effective.  He's was using levitra with good results.  However, he c/o the cost.  He's using sildenafil with good results. Using 60-80 mg.    He voids well.  No hematuria.  No dysuria.    REVIEW OF SYSTEMS:    Patient denies any history of headache, blurred vision, fever, nausea, vomiting, chills, flank discomfort, abdominal pain, bleeding per rectum, cough, SOB, recent loss of consciousness, recent mental status changes, seizures, dizziness, or upper or lower extremity weakness.    DANIELITO  1. 3  2. 4  3. 4  4. 4  5. 4     PATIENT HISTORY:    Past Medical History:   Diagnosis Date    Allergy     Degenerative disc disease     Gout     Hyperlipidemia     Hypertension     Male hypogonadism 7/19/2012       Past Surgical History:   Procedure Laterality Date    APPENDECTOMY      arthroscopic knee surg      left X 2, right X 1    BLOCK-NERVE-MEDIAL BRANCH-LUMBAR Bilateral 9/6/2017    [facility]    BLOCK-NERVE-MEDIAL BRANCH-LUMBAR Bilateral 8/23/2017    [facility]    ESOPHAGOGASTRODUODENOSCOPY (EGD) N/A 4/21/2017    [facility] ([facility]    INTUSSUSCEPTION REPAIR      KNEE SURGERY      RADIOFREQUENCY THERMOCOAGULATION (RFTC)-NERVE-MEDIAN BRANCH-LUMBAR Left 11/1/2017    [facility]    RADIOFREQUENCY THERMOCOAGULATION (RFTC)-NERVE-MEDIAN BRANCH-LUMBAR Right 10/11/2017    [facility] at [facility]       Family History   Problem Relation  Age of Onset    Hyperlipidemia Father     Hypertension Father     Heart disease Father     Liver disease Mother     Heart disease Brother     Heart disease Maternal Grandmother     Heart disease Paternal Grandfather        Social History     Socioeconomic History    Marital status:      Spouse name: Not on file    Number of children: 3    Years of education: Not on file    Highest education level: Not on file   Social Needs    Financial resource strain: Not on file    Food insecurity - worry: Not on file    Food insecurity - inability: Not on file    Transportation needs - medical: Not on file    Transportation needs - non-medical: Not on file   Occupational History     [employer]er[employer]   Tobacco Use    Smoking status: Former Smoker     Last attempt to quit: 1987     Years since quittin.3    Smokeless tobacco: Never Used   Substance and Sexual Activity    Alcohol use: Yes     Alcohol/week: 4.2 oz     Types: 7 Glasses of wine per week    Drug use: No    Sexual activity: Yes     Partners: Female   Other Topics Concern    Not on file   Social History Narrative    Not on file       Allergies:  Shellfish containing products    Medications:    Current Outpatient Medications:     amLODIPine (NORVASC) 10 MG tablet, TAKE 1 TABLET(10 MG) BY MOUTH EVERY DAY, Disp: 90 tablet, Rfl: 1    lisinopril-hydrochlorothiazide (PRINZIDE,ZESTORETIC) 20-12.5 mg per tablet, Take 1 tablet by mouth once daily., Disp: 90 tablet, Rfl: 3    meloxicam (MOBIC) 15 MG tablet, , Disp: , Rfl:     methylPREDNISolone (MEDROL, OJNY,) 4 mg tablet, use as directed, Disp: 1 Package, Rfl: 0    promethazine-dextromethorphan (PROMETHAZINE-DM) 6.25-15 mg/5 mL Syrp, Take 5 mLs by mouth every 4 to 6 hours as needed (COUGH)., Disp: 118 mL, Rfl: 0    rosuvastatin (CRESTOR) 20 MG tablet, Take 1 tablet (20 mg total) by mouth once daily., Disp: 90 tablet, Rfl: 3    sildenafil (REVATIO) 20 mg Tab, Take 5  tablets by mouth 1 hour before sexual activity, Disp: 50 tablet, Rfl: 11    SITagliptin (JANUVIA) 50 MG Tab, Take 1 tablet (50 mg total) by mouth once daily., Disp: 90 tablet, Rfl: 3    tadalafil (CIALIS) 5 MG tablet, Take 1 tablet (5 mg total) by mouth once daily., Disp: 30 tablet, Rfl: 11    vardenafil (LEVITRA) 20 MG tablet, Take 1 tablet (20 mg total) by mouth daily as needed for Erectile Dysfunction. Take 1 hour before intercourse., Disp: 30 tablet, Rfl: 11    zolpidem (AMBIEN) 10 mg Tab, TAKE 1 TABLET BY MOUTH EVERY DAY AT BEDTIME AS NEEDED, Disp: 30 tablet, Rfl: 3    Current Facility-Administered Medications:     [START ON 11/4/2018] testosterone Pllt 12 Pellet, 12 Pellet, Implant, 1 time in Clinic/HOD, Jaydon Morin MD    PHYSICAL EXAMINATION:    The patient generally appears in good health, is appropriately interactive, and is in no apparent distress.     Eyes: anicteric sclerae, moist conjunctivae; no lid-lag; PERRLA     HENT: Atraumatic; oropharynx clear with moist mucous membranes and no mucosal ulcerations;normal hard and soft palate. No evidence of lymphadenopathy.    Neck: Trachea midline.  No thyromegaly.    Musculoskeletal: No abnormal gait.    Skin: No lesions.    Mental: Cooperative with normal affect.  Is oriented to time, place, and person.    Neuro: Grossly intact.    Chest: Normal inspiratory effort.   No accessory muscles.  No audible wheezes.  Respirations symmetric on inspiration and expiration.    Heart: Regular rhythm.      Abdomen:  Soft, non-tender. No masses or organomegaly. Bladder is not palpable. No evidence of flank discomfort. No evidence of inguinal hernia.    Genitourinary: The penis is circumcised with no evidence of plaques or induration. The urethral meatus is normal. The testes, epididymides, and cord structures are normal in size and contour bilaterally. The scrotum is normal in size and contour.    Normal anal sphincter tone. No rectal mass.    The prostate is 35  g. Normal landmarks. Lateral sulci. Median furrow intact.  No nodularity or induration. Seminal vesicles are normal.    Extremities: No clubbing, cyanosis, or edema      LABS:    Lab Results   Component Value Date    PSA 0.50 03/17/2017    PSA 0.47 07/18/2013    PSA 0.71 03/15/2013    PSADIAG 0.25 10/17/2018    PSADIAG 0.37 05/10/2018    PSADIAG 0.50 11/03/2017        IMPRESSION:    Encounter Diagnoses   Name Primary?    Male hypogonadism Yes    Erectile dysfunction due to arterial insufficiency     BPH with urinary obstruction     Essential hypertension     Mixed hyperlipidemia    HTN, controlled  Hyperlipidemia, controlled    PLAN:      1. Will try generic sildenafil for his ED to see if it is cheaper. He didn't fill it last time.  2. Procedure Report:Testopel Insertion    A Time Out was performed with the patient and nurse in the room.   The site was marked with a yes. Verbal consent was obtained. The risks and benefits of testosterone replacement were explained. The alternatives of observation and treatment with injections and gel therapy were discussed.     The risks of testopel explained to the patient include but are not limited to worsening of BPH, edema with or without congestive heart failure, gynecomastia, cellulitis and abscess, venous thromboembolic events, testicular atrophy, possible cardiac sequelae and with high dose testosterone the risk of liver function elevation (peliosis hepatitis) and hepatocellular carcinoma. Peliosis hepatitis can be a fatal complication.     R gluteal region prepped and draped in sterile fashion.  20 cc of 2% lidocaine used for local analgesia.  Small incision made with a 15 blade.  Standard trocars used for subcutaneous insertion of 12 pellets total in a V shaped tract.  No active bleeding noted.  Area cleaned and dried. Steri strips applied. Dressing applied.      He can RTC 4 months with T, psa, cbc, hepatic panel lipid panel.          Copy to:

## 2018-12-14 RX ORDER — ROSUVASTATIN CALCIUM 20 MG/1
TABLET, COATED ORAL
Qty: 90 TABLET | Refills: 3 | Status: SHIPPED | OUTPATIENT
Start: 2018-12-14 | End: 2019-12-09 | Stop reason: SDUPTHER

## 2019-02-07 RX ORDER — LISINOPRIL AND HYDROCHLOROTHIAZIDE 12.5; 2 MG/1; MG/1
TABLET ORAL
Qty: 90 TABLET | Refills: 3 | Status: SHIPPED | OUTPATIENT
Start: 2019-02-07 | End: 2020-03-02 | Stop reason: SDUPTHER

## 2019-02-09 ENCOUNTER — PATIENT MESSAGE (OUTPATIENT)
Dept: PAIN MEDICINE | Facility: CLINIC | Age: 59
End: 2019-02-09

## 2019-02-11 ENCOUNTER — TELEPHONE (OUTPATIENT)
Dept: PAIN MEDICINE | Facility: CLINIC | Age: 59
End: 2019-02-11

## 2019-02-11 ENCOUNTER — PATIENT MESSAGE (OUTPATIENT)
Dept: SPINE | Facility: CLINIC | Age: 59
End: 2019-02-11

## 2019-02-11 ENCOUNTER — PATIENT MESSAGE (OUTPATIENT)
Dept: REHABILITATION | Facility: OTHER | Age: 59
End: 2019-02-11

## 2019-02-11 DIAGNOSIS — M51.36 DEGENERATIVE DISC DISEASE, LUMBAR: Primary | ICD-10-CM

## 2019-02-11 RX ORDER — MELOXICAM 15 MG/1
15 TABLET ORAL DAILY
Qty: 90 TABLET | Refills: 2 | Status: SHIPPED | OUTPATIENT
Start: 2019-02-11 | End: 2019-05-12

## 2019-02-28 ENCOUNTER — LAB VISIT (OUTPATIENT)
Dept: LAB | Facility: HOSPITAL | Age: 59
End: 2019-02-28
Attending: UROLOGY
Payer: COMMERCIAL

## 2019-02-28 DIAGNOSIS — E29.1 MALE HYPOGONADISM: ICD-10-CM

## 2019-02-28 LAB
ALBUMIN SERPL BCP-MCNC: 3.8 G/DL
ALP SERPL-CCNC: 45 U/L
ALT SERPL W/O P-5'-P-CCNC: 43 U/L
AST SERPL-CCNC: 31 U/L
BASOPHILS # BLD AUTO: 0.06 K/UL
BASOPHILS NFR BLD: 1 %
BILIRUB DIRECT SERPL-MCNC: 0.3 MG/DL
BILIRUB SERPL-MCNC: 0.7 MG/DL
CHOLEST SERPL-MCNC: 159 MG/DL
CHOLEST/HDLC SERPL: 3.4 {RATIO}
COMPLEXED PSA SERPL-MCNC: 0.32 NG/ML
DIFFERENTIAL METHOD: ABNORMAL
EOSINOPHIL # BLD AUTO: 0.5 K/UL
EOSINOPHIL NFR BLD: 8 %
ERYTHROCYTE [DISTWIDTH] IN BLOOD BY AUTOMATED COUNT: 11.2 %
HCT VFR BLD AUTO: 47.3 %
HDLC SERPL-MCNC: 47 MG/DL
HDLC SERPL: 29.6 %
HGB BLD-MCNC: 15.8 G/DL
IMM GRANULOCYTES # BLD AUTO: 0.01 K/UL
IMM GRANULOCYTES NFR BLD AUTO: 0.2 %
LDLC SERPL CALC-MCNC: 72.6 MG/DL
LYMPHOCYTES # BLD AUTO: 1.7 K/UL
LYMPHOCYTES NFR BLD: 29.4 %
MCH RBC QN AUTO: 31.7 PG
MCHC RBC AUTO-ENTMCNC: 33.4 G/DL
MCV RBC AUTO: 95 FL
MONOCYTES # BLD AUTO: 0.6 K/UL
MONOCYTES NFR BLD: 10.4 %
NEUTROPHILS # BLD AUTO: 3 K/UL
NEUTROPHILS NFR BLD: 51 %
NONHDLC SERPL-MCNC: 112 MG/DL
NRBC BLD-RTO: 0 /100 WBC
PLATELET # BLD AUTO: 168 K/UL
PMV BLD AUTO: 11.9 FL
PROT SERPL-MCNC: 6.5 G/DL
RBC # BLD AUTO: 4.99 M/UL
TESTOST SERPL-MCNC: 186 NG/DL
TRIGL SERPL-MCNC: 197 MG/DL
WBC # BLD AUTO: 5.78 K/UL

## 2019-02-28 PROCEDURE — 80076 HEPATIC FUNCTION PANEL: CPT

## 2019-02-28 PROCEDURE — 36415 COLL VENOUS BLD VENIPUNCTURE: CPT | Mod: PN

## 2019-02-28 PROCEDURE — 84403 ASSAY OF TOTAL TESTOSTERONE: CPT

## 2019-02-28 PROCEDURE — 80061 LIPID PANEL: CPT

## 2019-02-28 PROCEDURE — 84153 ASSAY OF PSA TOTAL: CPT

## 2019-02-28 PROCEDURE — 85025 COMPLETE CBC W/AUTO DIFF WBC: CPT

## 2019-03-06 ENCOUNTER — OFFICE VISIT (OUTPATIENT)
Dept: UROLOGY | Facility: CLINIC | Age: 59
End: 2019-03-06
Payer: COMMERCIAL

## 2019-03-06 VITALS
HEART RATE: 73 BPM | SYSTOLIC BLOOD PRESSURE: 128 MMHG | DIASTOLIC BLOOD PRESSURE: 89 MMHG | BODY MASS INDEX: 34.36 KG/M2 | WEIGHT: 291 LBS | HEIGHT: 77 IN

## 2019-03-06 DIAGNOSIS — N13.8 BPH WITH URINARY OBSTRUCTION: ICD-10-CM

## 2019-03-06 DIAGNOSIS — E78.2 MIXED HYPERLIPIDEMIA: ICD-10-CM

## 2019-03-06 DIAGNOSIS — N52.01 ERECTILE DYSFUNCTION DUE TO ARTERIAL INSUFFICIENCY: ICD-10-CM

## 2019-03-06 DIAGNOSIS — E29.1 MALE HYPOGONADISM: Primary | ICD-10-CM

## 2019-03-06 DIAGNOSIS — I10 ESSENTIAL HYPERTENSION: ICD-10-CM

## 2019-03-06 DIAGNOSIS — N40.1 BPH WITH URINARY OBSTRUCTION: ICD-10-CM

## 2019-03-06 PROCEDURE — S0189 TESTOSTERONE PELLET 75 MG: HCPCS | Mod: S$GLB,,, | Performed by: UROLOGY

## 2019-03-06 PROCEDURE — 3079F DIAST BP 80-89 MM HG: CPT | Mod: CPTII,S$GLB,, | Performed by: UROLOGY

## 2019-03-06 PROCEDURE — S0189 PR TESTOSTERONE PELLET 75 MG: ICD-10-PCS | Mod: S$GLB,,, | Performed by: UROLOGY

## 2019-03-06 PROCEDURE — 99214 PR OFFICE/OUTPT VISIT, EST, LEVL IV, 30-39 MIN: ICD-10-PCS | Mod: 25,S$GLB,, | Performed by: UROLOGY

## 2019-03-06 PROCEDURE — 3008F BODY MASS INDEX DOCD: CPT | Mod: CPTII,S$GLB,, | Performed by: UROLOGY

## 2019-03-06 PROCEDURE — 3074F PR MOST RECENT SYSTOLIC BLOOD PRESSURE < 130 MM HG: ICD-10-PCS | Mod: CPTII,S$GLB,, | Performed by: UROLOGY

## 2019-03-06 PROCEDURE — 99999 PR PBB SHADOW E&M-EST. PATIENT-LVL III: CPT | Mod: PBBFAC,,, | Performed by: UROLOGY

## 2019-03-06 PROCEDURE — 3008F PR BODY MASS INDEX (BMI) DOCUMENTED: ICD-10-PCS | Mod: CPTII,S$GLB,, | Performed by: UROLOGY

## 2019-03-06 PROCEDURE — 99999 PR PBB SHADOW E&M-EST. PATIENT-LVL III: ICD-10-PCS | Mod: PBBFAC,,, | Performed by: UROLOGY

## 2019-03-06 PROCEDURE — 3079F PR MOST RECENT DIASTOLIC BLOOD PRESSURE 80-89 MM HG: ICD-10-PCS | Mod: CPTII,S$GLB,, | Performed by: UROLOGY

## 2019-03-06 PROCEDURE — 3074F SYST BP LT 130 MM HG: CPT | Mod: CPTII,S$GLB,, | Performed by: UROLOGY

## 2019-03-06 PROCEDURE — 99214 OFFICE O/P EST MOD 30 MIN: CPT | Mod: 25,S$GLB,, | Performed by: UROLOGY

## 2019-03-06 PROCEDURE — 11980 IMPLANT HORMONE PELLET(S): CPT | Mod: S$GLB,,, | Performed by: UROLOGY

## 2019-03-06 PROCEDURE — 11980 PR IMPLANT,HORMONE,SUBCUTANEOUS: ICD-10-PCS | Mod: S$GLB,,, | Performed by: UROLOGY

## 2019-03-06 RX ORDER — ZOLPIDEM TARTRATE 10 MG/1
TABLET ORAL
Qty: 30 TABLET | Refills: 3 | Status: SHIPPED | OUTPATIENT
Start: 2019-03-06 | End: 2019-07-21 | Stop reason: SDUPTHER

## 2019-03-06 NOTE — PROGRESS NOTES
CHIEF COMPLAINT:    Mr. Stewart is a 58 y.o. male presenting with hypogonadism.    PRESENTING ILLNESS:    Mr. Stewart is a 58 y.o. male with a history of hypogonadism.  This has been present for > 1 year.  He is on Testopel.  He is here for placement.  While on TRT, he has more energy and a stronger libido.    He also has ED.  This has been present for > 1 year.  He has been using Cialis, but it is not effective.  He's was using levitra with good results.  However, he c/o the cost.  He's using sildenafil with good results. Using 60-80 mg.    He voids well.  No hematuria.  No dysuria.    REVIEW OF SYSTEMS:    Patient denies any history of headache, blurred vision, fever, nausea, vomiting, chills, flank discomfort, abdominal pain, bleeding per rectum, cough, SOB, recent loss of consciousness, recent mental status changes, seizures, dizziness, or upper or lower extremity weakness.    DANIELITO  1. 2  2. 3  3. 4  4. 3  5. 4     PATIENT HISTORY:    Past Medical History:   Diagnosis Date    Allergy     Degenerative disc disease     Gout     Hyperlipidemia     Hypertension     Male hypogonadism 7/19/2012       Past Surgical History:   Procedure Laterality Date    APPENDECTOMY      arthroscopic knee surg      left X 2, right X 1    BLOCK-NERVE-MEDIAL BRANCH-LUMBAR Bilateral 9/6/2017    [facility]    BLOCK-NERVE-MEDIAL BRANCH-LUMBAR Bilateral 8/23/2017    [facility]    ESOPHAGOGASTRODUODENOSCOPY (EGD) N/A 4/21/2017    [facility] (4[facility])    INTUSSUSCEPTION REPAIR      KNEE SURGERY      RADIOFREQUENCY THERMOCOAGULATION (RFTC)-NERVE-MEDIAN BRANCH-LUMBAR Left 11/1/2017    [facility]    RADIOFREQUENCY THERMOCOAGULATION (RFTC)-NERVE-MEDIAN BRANCH-LUMBAR Right 10/11/2017    [facility] at [facility]       Family History   Problem Relation  Age of Onset    Hyperlipidemia Father     Hypertension Father     Heart disease Father     Liver disease Mother     Heart disease Brother     Heart disease Maternal Grandmother     Heart disease Paternal Grandfather        Social History     Socioeconomic History    Marital status:      Spouse name: Not on file    Number of children: 3    Years of education: Not on file    Highest education level: Not on file   Social Needs    Financial resource strain: Not on file    Food insecurity - worry: Not on file    Food insecurity - inability: Not on file    Transportation needs - medical: Not on file    Transportation needs - non-medical: Not on file   Occupational History     [employer]er: [employer]   Tobacco Use    Smoking status: Former Smoker     Last attempt to quit: 1987     Years since quittin.6    Smokeless tobacco: Never Used   Substance and Sexual Activity    Alcohol use: Yes     Alcohol/week: 4.2 oz     Types: 7 Glasses of wine per week    Drug use: No    Sexual activity: Yes     Partners: Female   Other Topics Concern    Not on file   Social History Narrative    Not on file       Allergies:  Shellfish containing products    Medications:    Current Outpatient Medications:     amLODIPine (NORVASC) 10 MG tablet, TAKE 1 TABLET(10 MG) BY MOUTH EVERY DAY, Disp: 90 tablet, Rfl: 1    lisinopril-hydrochlorothiazide (PRINZIDE,ZESTORETIC) 20-12.5 mg per tablet, TAKE 1 TABLET DAILY, Disp: 90 tablet, Rfl: 3    meloxicam (MOBIC) 15 MG tablet, Take 1 tablet (15 mg total) by mouth once daily., Disp: 90 tablet, Rfl: 2    methylPREDNISolone (MEDROL, JONY,) 4 mg tablet, use as directed, Disp: 1 Package, Rfl: 0    promethazine-dextromethorphan (PROMETHAZINE-DM) 6.25-15 mg/5 mL Syrp, Take 5 mLs by mouth every 4 to 6 hours as needed (COUGH)., Disp: 118 mL, Rfl: 0    rosuvastatin (CRESTOR) 20 MG tablet, TAKE 1 TABLET DAILY, Disp: 90 tablet, Rfl: 3    sildenafil (REVATIO) 20 mg Tab,  Take 5 tablets by mouth 1 hour before sexual activity, Disp: 50 tablet, Rfl: 11    tadalafil (CIALIS) 5 MG tablet, Take 1 tablet (5 mg total) by mouth once daily., Disp: 30 tablet, Rfl: 11    vardenafil (LEVITRA) 20 MG tablet, Take 1 tablet (20 mg total) by mouth daily as needed for Erectile Dysfunction. Take 1 hour before intercourse., Disp: 30 tablet, Rfl: 11    zolpidem (AMBIEN) 10 mg Tab, TAKE 1 TABLET BY MOUTH EVERY DAY AT BEDTIME AS NEEDED, Disp: 30 tablet, Rfl: 3    Current Facility-Administered Medications:     [START ON 4/10/2019] testosterone Pllt 12 Pellet, 12 Pellet, Implant, 1 time in Clinic/HOD, Jaydon Morin MD    PHYSICAL EXAMINATION:    The patient generally appears in good health, is appropriately interactive, and is in no apparent distress.     Eyes: anicteric sclerae, moist conjunctivae; no lid-lag; PERRLA     HENT: Atraumatic; oropharynx clear with moist mucous membranes and no mucosal ulcerations;normal hard and soft palate. No evidence of lymphadenopathy.    Neck: Trachea midline.  No thyromegaly.    Musculoskeletal: No abnormal gait.    Skin: No lesions.    Mental: Cooperative with normal affect.  Is oriented to time, place, and person.    Neuro: Grossly intact.    Chest: Normal inspiratory effort.   No accessory muscles.  No audible wheezes.  Respirations symmetric on inspiration and expiration.    Heart: Regular rhythm.      Abdomen:  Soft, non-tender. No masses or organomegaly. Bladder is not palpable. No evidence of flank discomfort. No evidence of inguinal hernia.    Genitourinary: The penis is circumcised with no evidence of plaques or induration. The urethral meatus is normal. The testes, epididymides, and cord structures are normal in size and contour bilaterally. The scrotum is normal in size and contour.    Normal anal sphincter tone. No rectal mass.    The prostate is 35 g. Normal landmarks. Lateral sulci. Median furrow intact.  No nodularity or induration. Seminal vesicles  are normal.    Extremities: No clubbing, cyanosis, or edema      LABS:    Lab Results   Component Value Date    PSA 0.50 03/17/2017    PSA 0.47 07/18/2013    PSA 0.71 03/15/2013    PSADIAG 0.32 02/28/2019    PSADIAG 0.25 10/17/2018    PSADIAG 0.37 05/10/2018        IMPRESSION:    Encounter Diagnoses   Name Primary?    Male hypogonadism Yes    Erectile dysfunction due to arterial insufficiency     BPH with urinary obstruction     Essential hypertension     Mixed hyperlipidemia    HTN, controlled  Hyperlipidemia, controlled    PLAN:      1. Will try generic sildenafil for his ED to see if it is cheaper.   2. Procedure Report:Testopel Insertion    A Time Out was performed with the patient and nurse in the room.   The site was marked with a yes. Verbal consent was obtained. The risks and benefits of testosterone replacement were explained. The alternatives of observation and treatment with injections and gel therapy were discussed.     The risks of testopel explained to the patient include but are not limited to worsening of BPH, edema with or without congestive heart failure, gynecomastia, cellulitis and abscess, venous thromboembolic events, testicular atrophy, possible cardiac sequelae and with high dose testosterone the risk of liver function elevation (peliosis hepatitis) and hepatocellular carcinoma. Peliosis hepatitis can be a fatal complication.     L gluteal region prepped and draped in sterile fashion.  20 cc of 2% lidocaine used for local analgesia.  Small incision made with a 15 blade.  Standard trocars used for subcutaneous insertion of 12 pellets total in a V shaped tract.  No active bleeding noted.  Area cleaned and dried. Steri strips applied. Dressing applied.      He can RTC 4 months with T, psa, cbc, hepatic panel lipid panel.          Copy to:

## 2019-05-17 RX ORDER — ATORVASTATIN CALCIUM 20 MG/1
20 TABLET, FILM COATED ORAL DAILY
COMMUNITY
End: 2019-06-06

## 2019-05-17 RX ORDER — ATORVASTATIN CALCIUM 20 MG/1
20 TABLET, FILM COATED ORAL DAILY
Status: CANCELLED | OUTPATIENT
Start: 2019-05-17

## 2019-05-18 ENCOUNTER — PATIENT MESSAGE (OUTPATIENT)
Dept: INTERNAL MEDICINE | Facility: CLINIC | Age: 59
End: 2019-05-18

## 2019-06-06 ENCOUNTER — OFFICE VISIT (OUTPATIENT)
Dept: INTERNAL MEDICINE | Facility: CLINIC | Age: 59
End: 2019-06-06
Payer: COMMERCIAL

## 2019-06-06 VITALS
WEIGHT: 282 LBS | OXYGEN SATURATION: 96 % | HEIGHT: 77 IN | DIASTOLIC BLOOD PRESSURE: 72 MMHG | HEART RATE: 82 BPM | BODY MASS INDEX: 33.3 KG/M2 | SYSTOLIC BLOOD PRESSURE: 122 MMHG

## 2019-06-06 DIAGNOSIS — E29.1 MALE HYPOGONADISM: ICD-10-CM

## 2019-06-06 DIAGNOSIS — M47.816 FACET ARTHROPATHY, LUMBAR: ICD-10-CM

## 2019-06-06 DIAGNOSIS — Z00.00 ANNUAL PHYSICAL EXAM: Primary | ICD-10-CM

## 2019-06-06 DIAGNOSIS — E78.2 MIXED HYPERLIPIDEMIA: ICD-10-CM

## 2019-06-06 DIAGNOSIS — Z12.11 SCREEN FOR COLON CANCER: ICD-10-CM

## 2019-06-06 DIAGNOSIS — I10 ESSENTIAL HYPERTENSION: ICD-10-CM

## 2019-06-06 PROCEDURE — 3074F PR MOST RECENT SYSTOLIC BLOOD PRESSURE < 130 MM HG: ICD-10-PCS | Mod: CPTII,S$GLB,, | Performed by: INTERNAL MEDICINE

## 2019-06-06 PROCEDURE — 99999 PR PBB SHADOW E&M-EST. PATIENT-LVL IV: ICD-10-PCS | Mod: PBBFAC,,, | Performed by: INTERNAL MEDICINE

## 2019-06-06 PROCEDURE — 3078F PR MOST RECENT DIASTOLIC BLOOD PRESSURE < 80 MM HG: ICD-10-PCS | Mod: CPTII,S$GLB,, | Performed by: INTERNAL MEDICINE

## 2019-06-06 PROCEDURE — 99396 PR PREVENTIVE VISIT,EST,40-64: ICD-10-PCS | Mod: S$GLB,,, | Performed by: INTERNAL MEDICINE

## 2019-06-06 PROCEDURE — 99396 PREV VISIT EST AGE 40-64: CPT | Mod: S$GLB,,, | Performed by: INTERNAL MEDICINE

## 2019-06-06 PROCEDURE — 3074F SYST BP LT 130 MM HG: CPT | Mod: CPTII,S$GLB,, | Performed by: INTERNAL MEDICINE

## 2019-06-06 PROCEDURE — 99999 PR PBB SHADOW E&M-EST. PATIENT-LVL IV: CPT | Mod: PBBFAC,,, | Performed by: INTERNAL MEDICINE

## 2019-06-06 PROCEDURE — 3078F DIAST BP <80 MM HG: CPT | Mod: CPTII,S$GLB,, | Performed by: INTERNAL MEDICINE

## 2019-06-06 NOTE — PROGRESS NOTES
PAST MEDICAL HISTORY:  Hypertension.  Hyperlipidemia.  Lumbar degenerative disk disease.  Hypotestosterone, followed by Urology.  Appendectomy.  Arthroscopic knee surgery twice on the left and one on the right.  Intussusception at age 6 and then recently in 2014 where he did   require surgery and a partial colon resection.  Prior diagnosis of fatty liver.  Sleep apnea.     SOCIAL HISTORY:  Tobacco use, none.  Alcohol use: maybe one glass of wine a night.  , has three children.  He works as a teacher at [employer].  Exercise is walking a mile and a half every day and occasionally doing a spinning class.     FAMILY HISTORY:  Father is alive, hypertension, hyperlipidemia, coronary artery disease.  Mother is , chronic hepatitis.  Two brothers living but one with a history of heart attack at age 50.     SCREENING:  Normal colonoscopy in year .     MEDICATIONS:  Lisinopril/hydrochlorothiazide 20/12.5 mg a day.  Testosterone pellets every six months.  Ambien 10 mg at night.         REASON FOR VISIT:  A 59-year-old male who comes in for an annual routine visit.    It has been a couple of years.  Overall in general, he has been feeling well.    Every five months, he gets testosterone pellets placed, which has been very   effective for him.  He can tell there is a big difference in how he feels.    Because of such, he gets periodic labs, which includes PSA, CBC, hepatic   function panel which is last time has been fine and the cholesterol is that of   159, triglycerides 197, LDL 72 on the last test, which was in .  He   later this month will have upcoming testing.    REVIEW OF SYSTEMS:  No chest pain, palpitations, shortness of breath, or   abdominal pain.  The patient's bowel function is regular.  There has been no   difficulty urinating.  Urine flow is fine.  No urgency, nocturia x1 to 2.    Currently, no arthralgias or headaches.    PHYSICAL EXAMINATION:  VITAL SIGNS:  Weight is 282,  pulse 80, blood pressure 122/72.  HEENT:  Tympanic membranes normal.  Nasal mucosa is clear.  Oropharynx, no   abnormal findings.  NECK:  No thyromegaly.  No masses.  LUNGS:  Clear breath sounds, good effort.  HEART:  Regular rate and rhythm.  ABDOMEN:  Active bowel sounds, soft, nontender.  No hepatosplenomegaly or   abdominal masses.  PULSES:  2+ carotid pulses.  2+ pedal pulses.  EXTREMITIES:  No edema.  LYMPH GLAND:  No palpable adenopathy.    IMPRESSION:  1. General examination.  2. Hypertension.  3. Hyperlipidemia.  4. Hypotestosterone.  5. Lumbar degenerative disk disease.    PLAN:  On the next set of blood tests the only thing that had will be a basic   metabolic profile, but continue with attention to diet and physical activity and   this year, we will go and do a FIT test.  Next year, he will be scheduled for   colonoscopy.        JAM/HN  dd: 06/06/2019 10:07:42 (CDT)  td: 06/07/2019 00:33:51 (CDT)  Doc ID   #[id number]  Job ID #[id number]    CC:

## 2019-06-07 ENCOUNTER — LAB VISIT (OUTPATIENT)
Dept: LAB | Facility: HOSPITAL | Age: 59
End: 2019-06-07
Attending: INTERNAL MEDICINE
Payer: COMMERCIAL

## 2019-06-07 DIAGNOSIS — Z12.11 SCREEN FOR COLON CANCER: ICD-10-CM

## 2019-06-07 PROCEDURE — 82274 ASSAY TEST FOR BLOOD FECAL: CPT

## 2019-06-13 ENCOUNTER — PATIENT MESSAGE (OUTPATIENT)
Dept: UROLOGY | Facility: CLINIC | Age: 59
End: 2019-06-13

## 2019-06-14 LAB — HEMOCCULT STL QL IA: NEGATIVE

## 2019-06-21 ENCOUNTER — PATIENT MESSAGE (OUTPATIENT)
Dept: UROLOGY | Facility: CLINIC | Age: 59
End: 2019-06-21

## 2019-06-23 ENCOUNTER — PATIENT MESSAGE (OUTPATIENT)
Dept: UROLOGY | Facility: CLINIC | Age: 59
End: 2019-06-23

## 2019-06-24 ENCOUNTER — PATIENT MESSAGE (OUTPATIENT)
Dept: UROLOGY | Facility: CLINIC | Age: 59
End: 2019-06-24

## 2019-06-28 ENCOUNTER — APPOINTMENT (OUTPATIENT)
Dept: LAB | Facility: HOSPITAL | Age: 59
End: 2019-06-28
Attending: UROLOGY
Payer: COMMERCIAL

## 2019-06-28 ENCOUNTER — LAB VISIT (OUTPATIENT)
Dept: LAB | Facility: HOSPITAL | Age: 59
End: 2019-06-28
Attending: INTERNAL MEDICINE
Payer: COMMERCIAL

## 2019-06-28 ENCOUNTER — TELEPHONE (OUTPATIENT)
Dept: INTERNAL MEDICINE | Facility: CLINIC | Age: 59
End: 2019-06-28

## 2019-06-28 ENCOUNTER — PATIENT MESSAGE (OUTPATIENT)
Dept: INTERNAL MEDICINE | Facility: CLINIC | Age: 59
End: 2019-06-28

## 2019-06-28 DIAGNOSIS — I10 ESSENTIAL HYPERTENSION: ICD-10-CM

## 2019-06-28 DIAGNOSIS — Z00.00 ANNUAL PHYSICAL EXAM: ICD-10-CM

## 2019-06-28 DIAGNOSIS — I10 ESSENTIAL HYPERTENSION: Primary | ICD-10-CM

## 2019-06-28 LAB
ANION GAP SERPL CALC-SCNC: 9 MMOL/L (ref 8–16)
BUN SERPL-MCNC: 27 MG/DL (ref 6–20)
CALCIUM SERPL-MCNC: 9.5 MG/DL (ref 8.7–10.5)
CHLORIDE SERPL-SCNC: 109 MMOL/L (ref 95–110)
CO2 SERPL-SCNC: 22 MMOL/L (ref 23–29)
CREAT SERPL-MCNC: 1 MG/DL (ref 0.5–1.4)
EST. GFR  (AFRICAN AMERICAN): >60 ML/MIN/1.73 M^2
EST. GFR  (NON AFRICAN AMERICAN): >60 ML/MIN/1.73 M^2
GLUCOSE SERPL-MCNC: 99 MG/DL (ref 70–110)
POTASSIUM SERPL-SCNC: 4.3 MMOL/L (ref 3.5–5.1)
SODIUM SERPL-SCNC: 140 MMOL/L (ref 136–145)

## 2019-06-28 PROCEDURE — 36415 COLL VENOUS BLD VENIPUNCTURE: CPT | Mod: PN

## 2019-06-28 PROCEDURE — 80048 BASIC METABOLIC PNL TOTAL CA: CPT

## 2019-07-01 ENCOUNTER — OFFICE VISIT (OUTPATIENT)
Dept: UROLOGY | Facility: CLINIC | Age: 59
End: 2019-07-01
Payer: COMMERCIAL

## 2019-07-01 ENCOUNTER — PATIENT MESSAGE (OUTPATIENT)
Dept: UROLOGY | Facility: CLINIC | Age: 59
End: 2019-07-01

## 2019-07-01 ENCOUNTER — TELEPHONE (OUTPATIENT)
Dept: UROLOGY | Facility: CLINIC | Age: 59
End: 2019-07-01

## 2019-07-01 VITALS
HEIGHT: 77 IN | DIASTOLIC BLOOD PRESSURE: 86 MMHG | SYSTOLIC BLOOD PRESSURE: 125 MMHG | WEIGHT: 286.63 LBS | HEART RATE: 75 BPM | BODY MASS INDEX: 33.84 KG/M2

## 2019-07-01 DIAGNOSIS — I10 ESSENTIAL HYPERTENSION: ICD-10-CM

## 2019-07-01 DIAGNOSIS — E29.1 MALE HYPOGONADISM: Primary | ICD-10-CM

## 2019-07-01 DIAGNOSIS — N40.1 BPH WITH URINARY OBSTRUCTION: ICD-10-CM

## 2019-07-01 DIAGNOSIS — N52.01 ERECTILE DYSFUNCTION DUE TO ARTERIAL INSUFFICIENCY: ICD-10-CM

## 2019-07-01 DIAGNOSIS — N13.8 BPH WITH URINARY OBSTRUCTION: ICD-10-CM

## 2019-07-01 DIAGNOSIS — E78.2 MIXED HYPERLIPIDEMIA: ICD-10-CM

## 2019-07-01 PROCEDURE — 99214 OFFICE O/P EST MOD 30 MIN: CPT | Mod: 25,S$GLB,ICN, | Performed by: UROLOGY

## 2019-07-01 PROCEDURE — 3074F PR MOST RECENT SYSTOLIC BLOOD PRESSURE < 130 MM HG: ICD-10-PCS | Mod: CPTII,S$GLB,ICN, | Performed by: UROLOGY

## 2019-07-01 PROCEDURE — 99214 PR OFFICE/OUTPT VISIT, EST, LEVL IV, 30-39 MIN: ICD-10-PCS | Mod: 25,S$GLB,ICN, | Performed by: UROLOGY

## 2019-07-01 PROCEDURE — 3079F PR MOST RECENT DIASTOLIC BLOOD PRESSURE 80-89 MM HG: ICD-10-PCS | Mod: CPTII,S$GLB,ICN, | Performed by: UROLOGY

## 2019-07-01 PROCEDURE — 3074F SYST BP LT 130 MM HG: CPT | Mod: CPTII,S$GLB,ICN, | Performed by: UROLOGY

## 2019-07-01 PROCEDURE — 99999 PR PBB SHADOW E&M-EST. PATIENT-LVL III: CPT | Mod: PBBFAC,,, | Performed by: UROLOGY

## 2019-07-01 PROCEDURE — 11980 PR IMPLANT,HORMONE,SUBCUTANEOUS: ICD-10-PCS | Mod: S$GLB,ICN,, | Performed by: UROLOGY

## 2019-07-01 PROCEDURE — S0189 TESTOSTERONE PELLET 75 MG: HCPCS | Mod: S$GLB,ICN,, | Performed by: UROLOGY

## 2019-07-01 PROCEDURE — 99999 PR PBB SHADOW E&M-EST. PATIENT-LVL III: ICD-10-PCS | Mod: PBBFAC,,, | Performed by: UROLOGY

## 2019-07-01 PROCEDURE — 3008F BODY MASS INDEX DOCD: CPT | Mod: CPTII,S$GLB,ICN, | Performed by: UROLOGY

## 2019-07-01 PROCEDURE — 3008F PR BODY MASS INDEX (BMI) DOCUMENTED: ICD-10-PCS | Mod: CPTII,S$GLB,ICN, | Performed by: UROLOGY

## 2019-07-01 PROCEDURE — S0189 PR TESTOSTERONE PELLET 75 MG: ICD-10-PCS | Mod: S$GLB,ICN,, | Performed by: UROLOGY

## 2019-07-01 PROCEDURE — 3079F DIAST BP 80-89 MM HG: CPT | Mod: CPTII,S$GLB,ICN, | Performed by: UROLOGY

## 2019-07-01 PROCEDURE — 11980 IMPLANT HORMONE PELLET(S): CPT | Mod: S$GLB,ICN,, | Performed by: UROLOGY

## 2019-07-01 RX ORDER — SILDENAFIL CITRATE 20 MG/1
TABLET ORAL
Qty: 50 TABLET | Refills: 11 | Status: SHIPPED | OUTPATIENT
Start: 2019-07-01 | End: 2019-11-07 | Stop reason: SDUPTHER

## 2019-07-01 RX ORDER — SILDENAFIL CITRATE 20 MG/1
TABLET ORAL
Qty: 50 TABLET | Refills: 11 | Status: SHIPPED | OUTPATIENT
Start: 2019-07-01 | End: 2019-07-01 | Stop reason: SDUPTHER

## 2019-07-01 NOTE — PROGRESS NOTES
CHIEF COMPLAINT:    Mr. Stewart is a 59 y.o. male presenting with hypogonadism.    PRESENTING ILLNESS:    Mr. Stewart is a 59 y.o. male with a history of hypogonadism.  This has been present for > 1 year.  He is on Testopel.  He is here for placement.  While on TRT, he has more energy and a stronger libido.    He also has ED.  This has been present for > 1 year.  He has been using Cialis, but it is not effective.  He's was using levitra with good results.  However, he c/o the cost.  He's using sildenafil with good results. Using 60-80 mg.    He voids well.  No hematuria.  No dysuria.    REVIEW OF SYSTEMS:    Patient denies any history of headache, blurred vision, fever, nausea, vomiting, chills, flank discomfort, abdominal pain, bleeding per rectum, cough, SOB, recent loss of consciousness, recent mental status changes, seizures, dizziness, or upper or lower extremity weakness.    DANIELITO  1. 3  2. 4  3. 4  4. 3  5. 4     PATIENT HISTORY:    Past Medical History:   Diagnosis Date    Allergy     Degenerative disc disease     Gout     Hyperlipidemia     Hypertension     Male hypogonadism 7/19/2012       Past Surgical History:   Procedure Laterality Date    APPENDECTOMY      arthroscopic knee surg      left X 2, right X 1    BLOCK-NERVE-MEDIAL BRANCH-LUMBAR Bilateral 9/6/2017    [facility]    BLOCK-NERVE-MEDIAL BRANCH-LUMBAR Bilateral 8/23/2017    [facility]    ESOPHAGOGASTRODUODENOSCOPY (EGD) N/A 4/21/2017    [facility]formed [facility] (4TH FLR)    INTUSSUSCEPTION REPAIR      KNEE SURGERY      RADIOFREQUENCY THERMOCOAGULATION (RFTC)-NERVE-MEDIAN BRANCH-LUMBAR Left 11/1/2017    [facility]    RADIOFREQUENCY THERMOCOAGULATION (RFTC)-NERVE-MEDIAN BRANCH-LUMBAR Right 10/11/2017    [facility]       Family History   Problem Relation  Age of Onset    Hyperlipidemia Father     Hypertension Father     Heart disease Father     Liver disease Mother     Heart disease Brother     Heart disease Maternal Grandmother     Heart disease Paternal Grandfather        Social History     Socioeconomic History    Marital status:      Spouse name: Not on file    Number of children: 3    Years of education: Not on file    Highest education level: Not on file   Occupational History     Employer: [employer]   Social Needs    Financial resource strain: Not hard at all    Food insecurity:     Worry: Never true     Inability: Never true    Transportation needs:     Medical: No     Non-medical: No   Tobacco Use    Smoking status: Former Smoker     Last attempt to quit: 1987     Years since quittin.9    Smokeless tobacco: Never Used   Substance and Sexual Activity    Alcohol use: Yes     Alcohol/week: 4.2 oz     Types: 7 Glasses of wine per week     Frequency: 2-3 times a week     Drinks per session: 1 or 2     Binge frequency: Less than monthly    Drug use: No    Sexual activity: Yes     Partners: Female   Lifestyle    Physical activity:     Days per week: 3 days     Minutes per session: 50 min    Stress: Only a little   Relationships    Social connections:     Talks on phone: More than three times a week     Gets together: Once a week     Attends [employer] service: Not on file     Active member of club or organization: No     Attends meetings of clubs or organizations: Never     Relationship status:    Other Topics Concern    Not on file   Social History Narrative    Not on file       Allergies:  Shellfish containing products    Medications:    Current Outpatient Medications:     lisinopril-hydrochlorothiazide (PRINZIDE,ZESTORETIC) 20-12.5 mg per tablet, TAKE 1 TABLET DAILY, Disp: 90 tablet, Rfl: 3    rosuvastatin (CRESTOR) 20 MG tablet, TAKE 1 TABLET DAILY, Disp: 90 tablet, Rfl: 3    sildenafil (REVATIO) 20  mg Tab, Take 5 tablets by mouth 1 hour before sexual activity, Disp: 50 tablet, Rfl: 11    zolpidem (AMBIEN) 10 mg Tab, TAKE 1 TABLET BY MOUTH EVERY DAY AT BEDTIME AS NEEDED, Disp: 30 tablet, Rfl: 3    Current Facility-Administered Medications:     [START ON 8/13/2019] testosterone Pllt 12 Pellet, 12 Pellet, Implant, 1 time in Clinic/HOD, Jaydon Morin MD    PHYSICAL EXAMINATION:    The patient generally appears in good health, is appropriately interactive, and is in no apparent distress.     Eyes: anicteric sclerae, moist conjunctivae; no lid-lag; PERRLA     HENT: Atraumatic; oropharynx clear with moist mucous membranes and no mucosal ulcerations;normal hard and soft palate. No evidence of lymphadenopathy.    Neck: Trachea midline.  No thyromegaly.    Musculoskeletal: No abnormal gait.    Skin: No lesions.    Mental: Cooperative with normal affect.  Is oriented to time, place, and person.    Neuro: Grossly intact.    Chest: Normal inspiratory effort.   No accessory muscles.  No audible wheezes.  Respirations symmetric on inspiration and expiration.    Heart: Regular rhythm.      Abdomen:  Soft, non-tender. No masses or organomegaly. Bladder is not palpable. No evidence of flank discomfort. No evidence of inguinal hernia.    Genitourinary: The penis is circumcised with no evidence of plaques or induration. The urethral meatus is normal. The testes, epididymides, and cord structures are normal in size and contour bilaterally. The scrotum is normal in size and contour.    Normal anal sphincter tone. No rectal mass.    The prostate is 35 g. Normal landmarks. Lateral sulci. Median furrow intact.  No nodularity or induration. Seminal vesicles are normal.    Extremities: No clubbing, cyanosis, or edema      LABS:    Lab Results   Component Value Date    PSA 0.50 03/17/2017    PSA 0.47 07/18/2013    PSA 0.71 03/15/2013    PSADIAG 0.39 06/28/2019    PSADIAG 0.32 02/28/2019    PSADIAG 0.25 10/17/2018         IMPRESSION:    Encounter Diagnoses   Name Primary?    Male hypogonadism Yes    BPH with urinary obstruction     Erectile dysfunction due to arterial insufficiency     Mixed hyperlipidemia     Essential hypertension    HTN, controlled  Hyperlipidemia, controlled    PLAN:      1. Continue generic sildenafil for the ED.  2. Procedure Report:Testopel Insertion    A Time Out was performed with the patient and nurse in the room.   The site was marked with a yes. Verbal consent was obtained. The risks and benefits of testosterone replacement were explained. The alternatives of observation and treatment with injections and gel therapy were discussed.     The risks of testopel explained to the patient include but are not limited to worsening of BPH, edema with or without congestive heart failure, gynecomastia, cellulitis and abscess, venous thromboembolic events, testicular atrophy, possible cardiac sequelae and with high dose testosterone the risk of liver function elevation (peliosis hepatitis) and hepatocellular carcinoma. Peliosis hepatitis can be a fatal complication.     R gluteal region prepped and draped in sterile fashion.  20 cc of 2% lidocaine used for local analgesia.  Small incision made with a 15 blade.  Standard trocars used for subcutaneous insertion of 12 pellets total in a V shaped tract.  No active bleeding noted.  Area cleaned and dried. Steri strips applied. Dressing applied.      He can RTC 4 months with T, psa, cbc, hepatic panel lipid panel.          Copy to:

## 2019-07-23 RX ORDER — ZOLPIDEM TARTRATE 10 MG/1
TABLET ORAL
Qty: 30 TABLET | Refills: 3 | Status: SHIPPED | OUTPATIENT
Start: 2019-07-23 | End: 2019-12-04 | Stop reason: SDUPTHER

## 2019-10-30 ENCOUNTER — LAB VISIT (OUTPATIENT)
Dept: LAB | Facility: HOSPITAL | Age: 59
End: 2019-10-30
Attending: UROLOGY
Payer: COMMERCIAL

## 2019-10-30 DIAGNOSIS — E29.1 MALE HYPOGONADISM: ICD-10-CM

## 2019-10-30 LAB
ALBUMIN SERPL BCP-MCNC: 4 G/DL (ref 3.5–5.2)
ALP SERPL-CCNC: 42 U/L (ref 55–135)
ALT SERPL W/O P-5'-P-CCNC: 35 U/L (ref 10–44)
AST SERPL-CCNC: 27 U/L (ref 10–40)
BASOPHILS # BLD AUTO: 0.09 K/UL (ref 0–0.2)
BASOPHILS NFR BLD: 1.5 % (ref 0–1.9)
BILIRUB DIRECT SERPL-MCNC: 0.2 MG/DL (ref 0.1–0.3)
BILIRUB SERPL-MCNC: 0.5 MG/DL (ref 0.1–1)
CHOLEST SERPL-MCNC: 158 MG/DL (ref 120–199)
CHOLEST/HDLC SERPL: 3.2 {RATIO} (ref 2–5)
COMPLEXED PSA SERPL-MCNC: 0.43 NG/ML (ref 0–4)
DIFFERENTIAL METHOD: ABNORMAL
EOSINOPHIL # BLD AUTO: 0.7 K/UL (ref 0–0.5)
EOSINOPHIL NFR BLD: 11.9 % (ref 0–8)
ERYTHROCYTE [DISTWIDTH] IN BLOOD BY AUTOMATED COUNT: 11.9 % (ref 11.5–14.5)
HCT VFR BLD AUTO: 46.2 % (ref 40–54)
HDLC SERPL-MCNC: 49 MG/DL (ref 40–75)
HDLC SERPL: 31 % (ref 20–50)
HGB BLD-MCNC: 15.8 G/DL (ref 14–18)
IMM GRANULOCYTES # BLD AUTO: 0.02 K/UL (ref 0–0.04)
IMM GRANULOCYTES NFR BLD AUTO: 0.3 % (ref 0–0.5)
LDLC SERPL CALC-MCNC: 77.2 MG/DL (ref 63–159)
LYMPHOCYTES # BLD AUTO: 1.7 K/UL (ref 1–4.8)
LYMPHOCYTES NFR BLD: 28.6 % (ref 18–48)
MCH RBC QN AUTO: 32.3 PG (ref 27–31)
MCHC RBC AUTO-ENTMCNC: 34.2 G/DL (ref 32–36)
MCV RBC AUTO: 95 FL (ref 82–98)
MONOCYTES # BLD AUTO: 0.6 K/UL (ref 0.3–1)
MONOCYTES NFR BLD: 10.7 % (ref 4–15)
NEUTROPHILS # BLD AUTO: 2.8 K/UL (ref 1.8–7.7)
NEUTROPHILS NFR BLD: 47 % (ref 38–73)
NONHDLC SERPL-MCNC: 109 MG/DL
NRBC BLD-RTO: 0 /100 WBC
PLATELET # BLD AUTO: 161 K/UL (ref 150–350)
PMV BLD AUTO: 11.9 FL (ref 9.2–12.9)
PROT SERPL-MCNC: 6.7 G/DL (ref 6–8.4)
RBC # BLD AUTO: 4.89 M/UL (ref 4.6–6.2)
TESTOST SERPL-MCNC: 281 NG/DL (ref 304–1227)
TRIGL SERPL-MCNC: 159 MG/DL (ref 30–150)
WBC # BLD AUTO: 5.98 K/UL (ref 3.9–12.7)

## 2019-10-30 PROCEDURE — 85025 COMPLETE CBC W/AUTO DIFF WBC: CPT

## 2019-10-30 PROCEDURE — 80061 LIPID PANEL: CPT

## 2019-10-30 PROCEDURE — 84403 ASSAY OF TOTAL TESTOSTERONE: CPT

## 2019-10-30 PROCEDURE — 36415 COLL VENOUS BLD VENIPUNCTURE: CPT | Mod: PN

## 2019-10-30 PROCEDURE — 80076 HEPATIC FUNCTION PANEL: CPT

## 2019-10-30 PROCEDURE — 84153 ASSAY OF PSA TOTAL: CPT

## 2019-11-07 ENCOUNTER — OFFICE VISIT (OUTPATIENT)
Dept: UROLOGY | Facility: CLINIC | Age: 59
End: 2019-11-07
Payer: COMMERCIAL

## 2019-11-07 VITALS
SYSTOLIC BLOOD PRESSURE: 141 MMHG | BODY MASS INDEX: 33.06 KG/M2 | DIASTOLIC BLOOD PRESSURE: 91 MMHG | WEIGHT: 280 LBS | HEIGHT: 77 IN | HEART RATE: 58 BPM

## 2019-11-07 DIAGNOSIS — N13.8 BPH WITH URINARY OBSTRUCTION: ICD-10-CM

## 2019-11-07 DIAGNOSIS — E29.1 MALE HYPOGONADISM: Primary | ICD-10-CM

## 2019-11-07 DIAGNOSIS — E78.2 MIXED HYPERLIPIDEMIA: ICD-10-CM

## 2019-11-07 DIAGNOSIS — N40.1 BPH WITH URINARY OBSTRUCTION: ICD-10-CM

## 2019-11-07 DIAGNOSIS — I10 ESSENTIAL HYPERTENSION: ICD-10-CM

## 2019-11-07 DIAGNOSIS — N52.01 ERECTILE DYSFUNCTION DUE TO ARTERIAL INSUFFICIENCY: ICD-10-CM

## 2019-11-07 PROCEDURE — 3008F BODY MASS INDEX DOCD: CPT | Mod: CPTII,S$GLB,, | Performed by: UROLOGY

## 2019-11-07 PROCEDURE — 99999 PR PBB SHADOW E&M-EST. PATIENT-LVL III: ICD-10-PCS | Mod: PBBFAC,,, | Performed by: UROLOGY

## 2019-11-07 PROCEDURE — 99214 PR OFFICE/OUTPT VISIT, EST, LEVL IV, 30-39 MIN: ICD-10-PCS | Mod: 25,S$GLB,, | Performed by: UROLOGY

## 2019-11-07 PROCEDURE — 3077F PR MOST RECENT SYSTOLIC BLOOD PRESSURE >= 140 MM HG: ICD-10-PCS | Mod: CPTII,S$GLB,, | Performed by: UROLOGY

## 2019-11-07 PROCEDURE — S0189 TESTOSTERONE PELLET 75 MG: HCPCS | Mod: S$GLB,,, | Performed by: UROLOGY

## 2019-11-07 PROCEDURE — 99999 PR PBB SHADOW E&M-EST. PATIENT-LVL III: CPT | Mod: PBBFAC,,, | Performed by: UROLOGY

## 2019-11-07 PROCEDURE — S0189 PR TESTOSTERONE PELLET 75 MG: ICD-10-PCS | Mod: S$GLB,,, | Performed by: UROLOGY

## 2019-11-07 PROCEDURE — 3080F PR MOST RECENT DIASTOLIC BLOOD PRESSURE >= 90 MM HG: ICD-10-PCS | Mod: CPTII,S$GLB,, | Performed by: UROLOGY

## 2019-11-07 PROCEDURE — 3008F PR BODY MASS INDEX (BMI) DOCUMENTED: ICD-10-PCS | Mod: CPTII,S$GLB,, | Performed by: UROLOGY

## 2019-11-07 PROCEDURE — 3080F DIAST BP >= 90 MM HG: CPT | Mod: CPTII,S$GLB,, | Performed by: UROLOGY

## 2019-11-07 PROCEDURE — 3077F SYST BP >= 140 MM HG: CPT | Mod: CPTII,S$GLB,, | Performed by: UROLOGY

## 2019-11-07 PROCEDURE — 11980 IMPLANT HORMONE PELLET(S): CPT | Mod: S$GLB,,, | Performed by: UROLOGY

## 2019-11-07 PROCEDURE — 11980 PR IMPLANT,HORMONE,SUBCUTANEOUS: ICD-10-PCS | Mod: S$GLB,,, | Performed by: UROLOGY

## 2019-11-07 PROCEDURE — 99214 OFFICE O/P EST MOD 30 MIN: CPT | Mod: 25,S$GLB,, | Performed by: UROLOGY

## 2019-11-07 RX ORDER — SILDENAFIL CITRATE 20 MG/1
TABLET ORAL
Qty: 50 TABLET | Refills: 11 | Status: SHIPPED | OUTPATIENT
Start: 2019-11-07 | End: 2020-03-11 | Stop reason: SDUPTHER

## 2019-11-07 RX ORDER — SILDENAFIL CITRATE 20 MG/1
TABLET ORAL
Qty: 50 TABLET | Refills: 11 | Status: SHIPPED | OUTPATIENT
Start: 2019-11-07 | End: 2019-11-07 | Stop reason: SDUPTHER

## 2019-11-07 NOTE — PROGRESS NOTES
CHIEF COMPLAINT:    Mr. Stewart is a 59 y.o. male presenting with hypogonadism.    PRESENTING ILLNESS:    Mr. Stewart is a 59 y.o. male with a history of hypogonadism.  This has been present for > 1 year.  He is on Testopel.  He is here for placement.  While on TRT, he has more energy and a stronger libido.    He also has ED.  This has been present for > 1 year.  He has been using Cialis, but it is not effective.  He's was using levitra with good results.  However, he c/o the cost.  He's using sildenafil with good results. Using 60-80 mg.    He voids well.  No hematuria.  No dysuria.    REVIEW OF SYSTEMS:    Patient denies any history of headache, blurred vision, fever, nausea, vomiting, chills, flank discomfort, abdominal pain, bleeding per rectum, cough, SOB, recent loss of consciousness, recent mental status changes, seizures, dizziness, or upper or lower extremity weakness.    DANIELITO  1. 3  2. 4  3. 4  4. 4  5. 5     PATIENT HISTORY:    Past Medical History:   Diagnosis Date    Allergy     Degenerative disc disease     Gout     Hyperlipidemia     Hypertension     Male hypogonadism 7/19/2012       Past Surgical History:   Procedure Laterality Date    APPENDECTOMY      arthroscopic knee surg      left X 2, right X 1    INTUSSUSCEPTION REPAIR      KNEE SURGERY         Family History   Problem Relation Age of Onset    Hyperlipidemia Father     Hypertension Father     Heart disease Father     Liver disease Mother     Heart disease Brother     Heart disease Maternal Grandmother     Heart disease Paternal Grandfather        Social History     Socioeconomic History    Marital status:      Spouse name: Not on file    Number of children: 3    Years of education: Not on file    Highest education level: Not on file   Occupational History     Employer: [employer]   Social Needs    Financial resource strain: Not hard at all    Food insecurity:     Worry: Never true     Inability: Never  true    Transportation needs:     Medical: No     Non-medical: No   Tobacco Use    Smoking status: Former Smoker     Last attempt to quit: 1987     Years since quittin.3    Smokeless tobacco: Never Used   Substance and Sexual Activity    Alcohol use: Yes     Alcohol/week: 7.0 standard drinks     Types: 7 Glasses of wine per week     Frequency: 2-3 times a week     Drinks per session: 1 or 2     Binge frequency: Less than monthly    Drug use: No    Sexual activity: Yes     Partners: Female   Lifestyle    Physical activity:     Days per week: 3 days     Minutes per session: 50 min    Stress: Only a little   Relationships    Social connections:     Talks on phone: More than three times a week     Gets together: Once a week     Attends Christianity service: Not on file     Active member of club or organization: No     Attends meetings of clubs or organizations: Never     Relationship status:    Other Topics Concern    Not on file   Social History Narrative    Not on file       Allergies:  Shellfish containing products    Medications:    Current Outpatient Medications:     lisinopril-hydrochlorothiazide (PRINZIDE,ZESTORETIC) 20-12.5 mg per tablet, TAKE 1 TABLET DAILY, Disp: 90 tablet, Rfl: 3    rosuvastatin (CRESTOR) 20 MG tablet, TAKE 1 TABLET DAILY, Disp: 90 tablet, Rfl: 3    sildenafil (REVATIO) 20 mg Tab, Take 5 tablets by mouth 1 hour before sexual activity, Disp: 50 tablet, Rfl: 11    zolpidem (AMBIEN) 10 mg Tab, TAKE 1 TABLET BY MOUTH EVERY DAY AT BEDTIME AS NEEDED, Disp: 30 tablet, Rfl: 3    Current Facility-Administered Medications:     [START ON 2019] testosterone Pllt 12 Pellet, 12 Pellet, Implant, 1 time in Clinic/HOD, Jaydon Morin MD    PHYSICAL EXAMINATION:    The patient generally appears in good health, is appropriately interactive, and is in no apparent distress.     Eyes: anicteric sclerae, moist conjunctivae; no lid-lag; PERRLA     HENT: Atraumatic; oropharynx  clear with moist mucous membranes and no mucosal ulcerations;normal hard and soft palate. No evidence of lymphadenopathy.    Neck: Trachea midline.  No thyromegaly.    Musculoskeletal: No abnormal gait.    Skin: No lesions.    Mental: Cooperative with normal affect.  Is oriented to time, place, and person.    Neuro: Grossly intact.    Chest: Normal inspiratory effort.   No accessory muscles.  No audible wheezes.  Respirations symmetric on inspiration and expiration.    Heart: Regular rhythm.      Abdomen:  Soft, non-tender. No masses or organomegaly. Bladder is not palpable. No evidence of flank discomfort. No evidence of inguinal hernia.    Genitourinary: The penis is circumcised with no evidence of plaques or induration. The urethral meatus is normal. The testes, epididymides, and cord structures are normal in size and contour bilaterally. The scrotum is normal in size and contour.    Normal anal sphincter tone. No rectal mass.    The prostate is 35 g. Normal landmarks. Lateral sulci. Median furrow intact.  No nodularity or induration. Seminal vesicles are normal.    Extremities: No clubbing, cyanosis, or edema      LABS:    Lab Results   Component Value Date    PSA 0.50 03/17/2017    PSA 0.47 07/18/2013    PSA 0.71 03/15/2013    PSADIAG 0.43 10/30/2019    PSADIAG 0.39 06/28/2019    PSADIAG 0.32 02/28/2019        IMPRESSION:    Encounter Diagnoses   Name Primary?    Male hypogonadism Yes    Erectile dysfunction due to arterial insufficiency     BPH with urinary obstruction     Essential hypertension     Mixed hyperlipidemia    HTN, controlled  Hyperlipidemia, controlled    PLAN:      1. Continue generic sildenafil for the ED.  Refilled today.  2. Procedure Report:Testopel Insertion    A Time Out was performed with the patient and nurse in the room.   The site was marked with a yes. Verbal consent was obtained. The risks and benefits of testosterone replacement were explained. The alternatives of observation  and treatment with injections and gel therapy were discussed.     The risks of testopel explained to the patient include but are not limited to worsening of BPH, edema with or without congestive heart failure, gynecomastia, cellulitis and abscess, venous thromboembolic events, testicular atrophy, possible cardiac sequelae and with high dose testosterone the risk of liver function elevation (peliosis hepatitis) and hepatocellular carcinoma. Peliosis hepatitis can be a fatal complication.     L gluteal region prepped and draped in sterile fashion.  20 cc of 2% lidocaine used for local analgesia.  Small incision made with a 15 blade.  Standard trocars used for subcutaneous insertion of 12 pellets total in a V shaped tract.  No active bleeding noted.  Area cleaned and dried. Steri strips applied. Dressing applied.      He can RTC 4 months with T, psa, cbc, hepatic panel lipid panel.          Copy to:

## 2019-12-04 ENCOUNTER — PATIENT MESSAGE (OUTPATIENT)
Dept: INTERNAL MEDICINE | Facility: CLINIC | Age: 59
End: 2019-12-04

## 2019-12-04 RX ORDER — ZOLPIDEM TARTRATE 10 MG/1
10 TABLET ORAL NIGHTLY PRN
Qty: 30 TABLET | Refills: 5 | Status: SHIPPED | OUTPATIENT
Start: 2019-12-04 | End: 2020-06-08 | Stop reason: SDUPTHER

## 2019-12-09 RX ORDER — ROSUVASTATIN CALCIUM 20 MG/1
TABLET, COATED ORAL
Qty: 90 TABLET | Refills: 4 | Status: SHIPPED | OUTPATIENT
Start: 2019-12-09 | End: 2021-03-03

## 2020-02-09 ENCOUNTER — PATIENT MESSAGE (OUTPATIENT)
Dept: INTERNAL MEDICINE | Facility: CLINIC | Age: 60
End: 2020-02-09

## 2020-02-09 DIAGNOSIS — I10 ESSENTIAL HYPERTENSION: Primary | ICD-10-CM

## 2020-02-18 ENCOUNTER — PATIENT MESSAGE (OUTPATIENT)
Dept: INTERNAL MEDICINE | Facility: CLINIC | Age: 60
End: 2020-02-18

## 2020-02-21 ENCOUNTER — PATIENT OUTREACH (OUTPATIENT)
Dept: ADMINISTRATIVE | Facility: HOSPITAL | Age: 60
End: 2020-02-21

## 2020-02-28 ENCOUNTER — LAB VISIT (OUTPATIENT)
Dept: LAB | Facility: HOSPITAL | Age: 60
End: 2020-02-28
Attending: UROLOGY
Payer: COMMERCIAL

## 2020-02-28 DIAGNOSIS — E29.1 MALE HYPOGONADISM: ICD-10-CM

## 2020-02-28 DIAGNOSIS — I10 ESSENTIAL HYPERTENSION: ICD-10-CM

## 2020-02-28 LAB
ALBUMIN SERPL BCP-MCNC: 4 G/DL (ref 3.5–5.2)
ALP SERPL-CCNC: 46 U/L (ref 55–135)
ALT SERPL W/O P-5'-P-CCNC: 46 U/L (ref 10–44)
ANION GAP SERPL CALC-SCNC: 9 MMOL/L (ref 8–16)
AST SERPL-CCNC: 32 U/L (ref 10–40)
BASOPHILS # BLD AUTO: 0.06 K/UL (ref 0–0.2)
BASOPHILS NFR BLD: 1.3 % (ref 0–1.9)
BILIRUB DIRECT SERPL-MCNC: 0.2 MG/DL (ref 0.1–0.3)
BILIRUB SERPL-MCNC: 0.6 MG/DL (ref 0.1–1)
BUN SERPL-MCNC: 17 MG/DL (ref 6–20)
CALCIUM SERPL-MCNC: 9.5 MG/DL (ref 8.7–10.5)
CHLORIDE SERPL-SCNC: 105 MMOL/L (ref 95–110)
CHOLEST SERPL-MCNC: 227 MG/DL (ref 120–199)
CHOLEST/HDLC SERPL: 4.6 {RATIO} (ref 2–5)
CO2 SERPL-SCNC: 26 MMOL/L (ref 23–29)
COMPLEXED PSA SERPL-MCNC: 0.44 NG/ML (ref 0–4)
CREAT SERPL-MCNC: 1.1 MG/DL (ref 0.5–1.4)
DIFFERENTIAL METHOD: ABNORMAL
EOSINOPHIL # BLD AUTO: 0.6 K/UL (ref 0–0.5)
EOSINOPHIL NFR BLD: 12.3 % (ref 0–8)
ERYTHROCYTE [DISTWIDTH] IN BLOOD BY AUTOMATED COUNT: 11.5 % (ref 11.5–14.5)
EST. GFR  (AFRICAN AMERICAN): >60 ML/MIN/1.73 M^2
EST. GFR  (NON AFRICAN AMERICAN): >60 ML/MIN/1.73 M^2
GLUCOSE SERPL-MCNC: 102 MG/DL (ref 70–110)
HCT VFR BLD AUTO: 50.7 % (ref 40–54)
HDLC SERPL-MCNC: 49 MG/DL (ref 40–75)
HDLC SERPL: 21.6 % (ref 20–50)
HGB BLD-MCNC: 16.7 G/DL (ref 14–18)
IMM GRANULOCYTES # BLD AUTO: 0.01 K/UL (ref 0–0.04)
IMM GRANULOCYTES NFR BLD AUTO: 0.2 % (ref 0–0.5)
LDLC SERPL CALC-MCNC: 118.8 MG/DL (ref 63–159)
LYMPHOCYTES # BLD AUTO: 1.5 K/UL (ref 1–4.8)
LYMPHOCYTES NFR BLD: 33.9 % (ref 18–48)
MCH RBC QN AUTO: 32.6 PG (ref 27–31)
MCHC RBC AUTO-ENTMCNC: 32.9 G/DL (ref 32–36)
MCV RBC AUTO: 99 FL (ref 82–98)
MONOCYTES # BLD AUTO: 0.5 K/UL (ref 0.3–1)
MONOCYTES NFR BLD: 10.8 % (ref 4–15)
NEUTROPHILS # BLD AUTO: 1.9 K/UL (ref 1.8–7.7)
NEUTROPHILS NFR BLD: 41.5 % (ref 38–73)
NONHDLC SERPL-MCNC: 178 MG/DL
NRBC BLD-RTO: 0 /100 WBC
PLATELET # BLD AUTO: 168 K/UL (ref 150–350)
PMV BLD AUTO: 11.5 FL (ref 9.2–12.9)
POTASSIUM SERPL-SCNC: 4.3 MMOL/L (ref 3.5–5.1)
PROT SERPL-MCNC: 7.2 G/DL (ref 6–8.4)
RBC # BLD AUTO: 5.12 M/UL (ref 4.6–6.2)
SODIUM SERPL-SCNC: 140 MMOL/L (ref 136–145)
TESTOST SERPL-MCNC: 352 NG/DL (ref 304–1227)
TRIGL SERPL-MCNC: 296 MG/DL (ref 30–150)
WBC # BLD AUTO: 4.54 K/UL (ref 3.9–12.7)

## 2020-02-28 PROCEDURE — 36415 COLL VENOUS BLD VENIPUNCTURE: CPT | Mod: PN

## 2020-02-28 PROCEDURE — 80076 HEPATIC FUNCTION PANEL: CPT

## 2020-02-28 PROCEDURE — 80061 LIPID PANEL: CPT

## 2020-02-28 PROCEDURE — 85025 COMPLETE CBC W/AUTO DIFF WBC: CPT

## 2020-02-28 PROCEDURE — 84153 ASSAY OF PSA TOTAL: CPT

## 2020-02-28 PROCEDURE — 84403 ASSAY OF TOTAL TESTOSTERONE: CPT

## 2020-02-28 PROCEDURE — 80048 BASIC METABOLIC PNL TOTAL CA: CPT

## 2020-03-02 ENCOUNTER — PATIENT MESSAGE (OUTPATIENT)
Dept: UROLOGY | Facility: CLINIC | Age: 60
End: 2020-03-02

## 2020-03-02 ENCOUNTER — TELEPHONE (OUTPATIENT)
Dept: UROLOGY | Facility: CLINIC | Age: 60
End: 2020-03-02

## 2020-03-05 NOTE — PROGRESS NOTES
PAST MEDICAL HISTORY:  Hypertension.  Hyperlipidemia.  Lumbar degenerative disk disease.  Hypotestosterone, followed by Urology.  Appendectomy.  Arthroscopic knee surgery twice on the left and one on the right.  Intussusception at age 6 and then recently in 2014 where he did   require surgery and a partial colon resection.  Prior diagnosis of fatty liver.  Sleep apnea.     SOCIAL HISTORY:  Tobacco use, none.  Alcohol use: maybe one glass of wine a night.  , has three children.  He works as a teacher at [employer].  Exercise is walking a mile and a half every day and occasionally doing a spinning class.     FAMILY HISTORY:  Father is alive, hypertension, hyperlipidemia, coronary artery disease.  Mother is , chronic hepatitis.  Two brothers living but one with a history of heart attack at age 50.     SCREENING:  Normal colonoscopy in year .     MEDICATIONS:  Lisinopril/hydrochlorothiazide 20/12.5 mg a day.  Testosterone pellets every six months.  Ambien 10 mg at night.   Mobic 15 mg daily as needed  Crestor 20 mg half a tablet          69-year-old male presents for follow-up visit    For the past 6 months he has been taking Crestor 20 mg at a half a tab  When he was at 1 tablet today he was experiencing pains in his right elbow over the epicondyle, the pip and dip joints of his fingers, as well as pain involving his feet  He decided to go to half a pill and eventually the pain is resolved  He is taking Nordic Omega 3 fatty as is 2 a day  On recent labs there has been a bump up in the cholesterol and triglycerides and which is total cholesterol 227 and triglycerides 296    In regard to the above he did not note any pain involving his legs or calf all upper arms    no pain involving the neck or shoulders        Also reports that when he presses in area right below his ear lobe on the left side left ear there is a little bit discomfort     there is no other circumstances when he feels the pain,  other than when pressing on the area  There is no internal ear pain and no drainage      He is followed by on all side orthopedic physician regarding arthritis involving his knees     occasionally will get corticosteroid injection and uses Mobic as needed      Recent lab review  CBC, chemistry, PSA all within normal limits except ALT 46    Review of symptoms  No chest pain palpitation shortness of breath  No abdominal pain  Regular bowel function  No difficulty urinating, no nocturia  No indigestion heartburn  No headaches    Examination  Weight 280 lb  Pulse 72  Blood pressure 132/80  HEENT exam    both tympanic membranes and ear canals look normal      nasal mucosa is clear    or pharynx clear      patient is not tender over the TMJ      there is an area tenderness from palpate over the left lower earlobe  Neck no thyromegaly no masses  Lungs clear breath sounds  Heart regular rate and rhythm no murmurs  Abdominal exam nontender soft no hepatosplenomegaly abdominal masses  Pulses 2+ carotid pulses no bruits 2+ pedal pulses  Extremities no edema  Musculoskeletal   no joint deformities involving the hands    Impression  Hypertension  Mixed hyperlipidemia   Elevated liver enzyme prior related to fatty liver and elevated triglycerides  Hypo testosterone    Plan  Vitamin-D level, CPK, magnesium  Depend on test results may have him increase the Crestor again in 20 mg and observe  The arthralgia symptoms are not entirely typical regarding the use of a statin drug  Continue with attention and proper diet physical activity  He walks most days a weeks and 1 day of the week cycles

## 2020-03-06 ENCOUNTER — OFFICE VISIT (OUTPATIENT)
Dept: INTERNAL MEDICINE | Facility: CLINIC | Age: 60
End: 2020-03-06
Payer: COMMERCIAL

## 2020-03-06 ENCOUNTER — LAB VISIT (OUTPATIENT)
Dept: LAB | Facility: HOSPITAL | Age: 60
End: 2020-03-06
Attending: INTERNAL MEDICINE
Payer: COMMERCIAL

## 2020-03-06 VITALS
HEIGHT: 77 IN | HEART RATE: 72 BPM | BODY MASS INDEX: 33.13 KG/M2 | OXYGEN SATURATION: 98 % | WEIGHT: 280.63 LBS | DIASTOLIC BLOOD PRESSURE: 88 MMHG | SYSTOLIC BLOOD PRESSURE: 136 MMHG

## 2020-03-06 DIAGNOSIS — M25.50 ARTHRALGIA, UNSPECIFIED JOINT: ICD-10-CM

## 2020-03-06 DIAGNOSIS — I10 ESSENTIAL HYPERTENSION: Primary | ICD-10-CM

## 2020-03-06 DIAGNOSIS — E29.1 MALE HYPOGONADISM: ICD-10-CM

## 2020-03-06 DIAGNOSIS — E78.2 MIXED HYPERLIPIDEMIA: ICD-10-CM

## 2020-03-06 DIAGNOSIS — Z12.11 SCREENING FOR COLON CANCER: ICD-10-CM

## 2020-03-06 DIAGNOSIS — I10 ESSENTIAL HYPERTENSION: ICD-10-CM

## 2020-03-06 PROCEDURE — 99214 PR OFFICE/OUTPT VISIT, EST, LEVL IV, 30-39 MIN: ICD-10-PCS | Mod: S$GLB,,, | Performed by: INTERNAL MEDICINE

## 2020-03-06 PROCEDURE — 82306 VITAMIN D 25 HYDROXY: CPT

## 2020-03-06 PROCEDURE — 99214 OFFICE O/P EST MOD 30 MIN: CPT | Mod: S$GLB,,, | Performed by: INTERNAL MEDICINE

## 2020-03-06 PROCEDURE — 3075F PR MOST RECENT SYSTOLIC BLOOD PRESS GE 130-139MM HG: ICD-10-PCS | Mod: CPTII,S$GLB,, | Performed by: INTERNAL MEDICINE

## 2020-03-06 PROCEDURE — 3079F DIAST BP 80-89 MM HG: CPT | Mod: CPTII,S$GLB,, | Performed by: INTERNAL MEDICINE

## 2020-03-06 PROCEDURE — 83735 ASSAY OF MAGNESIUM: CPT

## 2020-03-06 PROCEDURE — 99999 PR PBB SHADOW E&M-EST. PATIENT-LVL III: CPT | Mod: PBBFAC,,, | Performed by: INTERNAL MEDICINE

## 2020-03-06 PROCEDURE — 36415 COLL VENOUS BLD VENIPUNCTURE: CPT

## 2020-03-06 PROCEDURE — 82550 ASSAY OF CK (CPK): CPT

## 2020-03-06 PROCEDURE — 99999 PR PBB SHADOW E&M-EST. PATIENT-LVL III: ICD-10-PCS | Mod: PBBFAC,,, | Performed by: INTERNAL MEDICINE

## 2020-03-06 PROCEDURE — 3008F PR BODY MASS INDEX (BMI) DOCUMENTED: ICD-10-PCS | Mod: CPTII,S$GLB,, | Performed by: INTERNAL MEDICINE

## 2020-03-06 PROCEDURE — 3079F PR MOST RECENT DIASTOLIC BLOOD PRESSURE 80-89 MM HG: ICD-10-PCS | Mod: CPTII,S$GLB,, | Performed by: INTERNAL MEDICINE

## 2020-03-06 PROCEDURE — 3008F BODY MASS INDEX DOCD: CPT | Mod: CPTII,S$GLB,, | Performed by: INTERNAL MEDICINE

## 2020-03-06 PROCEDURE — 3075F SYST BP GE 130 - 139MM HG: CPT | Mod: CPTII,S$GLB,, | Performed by: INTERNAL MEDICINE

## 2020-03-06 RX ORDER — MELOXICAM 15 MG/1
15 TABLET ORAL DAILY PRN
COMMUNITY
Start: 2020-03-02 | End: 2022-05-05 | Stop reason: SDUPTHER

## 2020-03-07 LAB
25(OH)D3+25(OH)D2 SERPL-MCNC: 27 NG/ML (ref 30–96)
CK SERPL-CCNC: 137 U/L (ref 20–200)
MAGNESIUM SERPL-MCNC: 2.5 MG/DL (ref 1.6–2.6)

## 2020-03-10 ENCOUNTER — PATIENT OUTREACH (OUTPATIENT)
Dept: ADMINISTRATIVE | Facility: OTHER | Age: 60
End: 2020-03-10

## 2020-03-11 ENCOUNTER — OFFICE VISIT (OUTPATIENT)
Dept: UROLOGY | Facility: CLINIC | Age: 60
End: 2020-03-11
Payer: COMMERCIAL

## 2020-03-11 VITALS
WEIGHT: 273.38 LBS | HEIGHT: 77 IN | BODY MASS INDEX: 32.28 KG/M2 | DIASTOLIC BLOOD PRESSURE: 86 MMHG | HEART RATE: 65 BPM | SYSTOLIC BLOOD PRESSURE: 127 MMHG

## 2020-03-11 DIAGNOSIS — N13.8 BPH WITH URINARY OBSTRUCTION: ICD-10-CM

## 2020-03-11 DIAGNOSIS — N40.1 BPH WITH URINARY OBSTRUCTION: ICD-10-CM

## 2020-03-11 DIAGNOSIS — E78.2 MIXED HYPERLIPIDEMIA: ICD-10-CM

## 2020-03-11 DIAGNOSIS — I10 ESSENTIAL HYPERTENSION: ICD-10-CM

## 2020-03-11 DIAGNOSIS — E29.1 MALE HYPOGONADISM: Primary | ICD-10-CM

## 2020-03-11 DIAGNOSIS — N52.01 ERECTILE DYSFUNCTION DUE TO ARTERIAL INSUFFICIENCY: ICD-10-CM

## 2020-03-11 PROCEDURE — 99999 PR PBB SHADOW E&M-EST. PATIENT-LVL III: ICD-10-PCS | Mod: PBBFAC,,, | Performed by: UROLOGY

## 2020-03-11 PROCEDURE — S0189 PR TESTOSTERONE PELLET 75 MG: ICD-10-PCS | Mod: S$GLB,,, | Performed by: UROLOGY

## 2020-03-11 PROCEDURE — 3079F PR MOST RECENT DIASTOLIC BLOOD PRESSURE 80-89 MM HG: ICD-10-PCS | Mod: CPTII,S$GLB,, | Performed by: UROLOGY

## 2020-03-11 PROCEDURE — 3074F PR MOST RECENT SYSTOLIC BLOOD PRESSURE < 130 MM HG: ICD-10-PCS | Mod: CPTII,S$GLB,, | Performed by: UROLOGY

## 2020-03-11 PROCEDURE — 3074F SYST BP LT 130 MM HG: CPT | Mod: CPTII,S$GLB,, | Performed by: UROLOGY

## 2020-03-11 PROCEDURE — 11980 IMPLANT HORMONE PELLET(S): CPT | Mod: S$GLB,,, | Performed by: UROLOGY

## 2020-03-11 PROCEDURE — 3008F BODY MASS INDEX DOCD: CPT | Mod: CPTII,S$GLB,, | Performed by: UROLOGY

## 2020-03-11 PROCEDURE — 3079F DIAST BP 80-89 MM HG: CPT | Mod: CPTII,S$GLB,, | Performed by: UROLOGY

## 2020-03-11 PROCEDURE — 99999 PR PBB SHADOW E&M-EST. PATIENT-LVL III: CPT | Mod: PBBFAC,,, | Performed by: UROLOGY

## 2020-03-11 PROCEDURE — S0189 TESTOSTERONE PELLET 75 MG: HCPCS | Mod: S$GLB,,, | Performed by: UROLOGY

## 2020-03-11 PROCEDURE — 99214 PR OFFICE/OUTPT VISIT, EST, LEVL IV, 30-39 MIN: ICD-10-PCS | Mod: 25,S$GLB,, | Performed by: UROLOGY

## 2020-03-11 PROCEDURE — 99214 OFFICE O/P EST MOD 30 MIN: CPT | Mod: 25,S$GLB,, | Performed by: UROLOGY

## 2020-03-11 PROCEDURE — 11980 PR IMPLANT,HORMONE,SUBCUTANEOUS: ICD-10-PCS | Mod: S$GLB,,, | Performed by: UROLOGY

## 2020-03-11 PROCEDURE — 3008F PR BODY MASS INDEX (BMI) DOCUMENTED: ICD-10-PCS | Mod: CPTII,S$GLB,, | Performed by: UROLOGY

## 2020-03-11 RX ORDER — SILDENAFIL CITRATE 20 MG/1
TABLET ORAL
Qty: 50 TABLET | Refills: 11 | Status: SHIPPED | OUTPATIENT
Start: 2020-03-11 | End: 2020-09-02 | Stop reason: SDUPTHER

## 2020-03-11 NOTE — PROGRESS NOTES
CHIEF COMPLAINT:    Mr. Stewart is a 59 y.o. male presenting with hypogonadism.    PRESENTING ILLNESS:    Mr. Stewart is a 59 y.o. male with a history of hypogonadism.  This has been present for > 1 year.  He is on Testopel.  He is here for placement.  While on TRT, he has more energy and a stronger libido.    He also has ED.  This has been present for > 1 year.  He has been using Cialis, but it is not effective.  He's was using levitra with good results.  However, he c/o the cost.  He's using sildenafil with good results. Using 60-80 mg.    He voids well.  No hematuria.  No dysuria.    REVIEW OF SYSTEMS:    Patient denies chest pain, sore throat, headache, blurred vision, fever, nausea, vomiting, chills, flank discomfort, abdominal pain, bleeding per rectum, cough, SOB, recent loss of consciousness, recent mental status changes, seizures, dizziness, or upper or lower extremity weakness.    DANIELITO  1. 3  2. 5  3. 4  4. 4  5. 5     PATIENT HISTORY:    Past Medical History:   Diagnosis Date    Allergy     Degenerative disc disease     Gout     Hyperlipidemia     Hypertension     Male hypogonadism 7/19/2012       Past Surgical History:   Procedure Laterality Date    APPENDECTOMY      arthroscopic knee surg      left X 2, right X 1    INTUSSUSCEPTION REPAIR      KNEE SURGERY         Family History   Problem Relation Age of Onset    Hyperlipidemia Father     Hypertension Father     Heart disease Father     Liver disease Mother     Heart disease Brother     Heart disease Maternal Grandmother     Heart disease Paternal Grandfather        Social History     Socioeconomic History    Marital status:      Spouse name: Not on file    Number of children: 3    Years of education: Not on file    Highest education level: Not on file   Occupational History     Employer: [employer]   Social Needs    Financial resource strain: Not hard at all    Food insecurity:     Worry: Never true      Inability: Never true    Transportation needs:     Medical: No     Non-medical: No   Tobacco Use    Smoking status: Former Smoker     Last attempt to quit: 1987     Years since quittin.6    Smokeless tobacco: Never Used   Substance and Sexual Activity    Alcohol use: Yes     Alcohol/week: 7.0 standard drinks     Types: 7 Glasses of wine per week     Frequency: 2-3 times a week     Drinks per session: 1 or 2     Binge frequency: Less than monthly    Drug use: No    Sexual activity: Yes     Partners: Female   Lifestyle    Physical activity:     Days per week: 3 days     Minutes per session: 50 min    Stress: Only a little   Relationships    Social connections:     Talks on phone: More than three times a week     Gets together: Once a week     Attends Zoroastrian service: Not on file     Active member of club or organization: No     Attends meetings of clubs or organizations: Never     Relationship status:    Other Topics Concern    Not on file   Social History Narrative    Not on file       Allergies:  Shellfish containing products    Medications:    Current Outpatient Medications:     AFLURIA QD -,3YR UP,,PF, 60 mcg (15 mcg x 4)/0.5 mL Syrg, ADM 0.5ML IM UTD, Disp: , Rfl: 0    lisinopril-hydrochlorothiazide (PRINZIDE,ZESTORETIC) 20-12.5 mg per tablet, Take 1 tablet by mouth once daily., Disp: 90 tablet, Rfl: 3    meloxicam (MOBIC) 15 MG tablet, , Disp: , Rfl:     rosuvastatin (CRESTOR) 20 MG tablet, TAKE 1 TABLET DAILY, Disp: 90 tablet, Rfl: 4    sildenafil (REVATIO) 20 mg Tab, Take 5 tablets by mouth 1 hour before sexual activity, Disp: 50 tablet, Rfl: 11    zolpidem (AMBIEN) 10 mg Tab, Take 1 tablet (10 mg total) by mouth nightly as needed., Disp: 30 tablet, Rfl: 5    Current Facility-Administered Medications:     [START ON 4/15/2020] testosterone Pllt 12 Pellet, 12 Pellet, Implant, 1 time in Clinic/HOD, Jaydon Morin MD    PHYSICAL EXAMINATION:    The patient generally  appears in good health, is appropriately interactive, and is in no apparent distress.     Eyes: anicteric sclerae, moist conjunctivae; no lid-lag; PERRLA     HENT: Atraumatic; oropharynx clear with moist mucous membranes and no mucosal ulcerations;normal hard and soft palate. No evidence of lymphadenopathy.    Neck: Trachea midline.  No thyromegaly.    Musculoskeletal: No abnormal gait.    Skin: No lesions.    Mental: Cooperative with normal affect.  Is oriented to time, place, and person.    Neuro: Grossly intact.    Chest: Normal inspiratory effort.   No accessory muscles.  No audible wheezes.  Respirations symmetric on inspiration and expiration.    Heart: Regular rhythm.      Abdomen:  Soft, non-tender. No masses or organomegaly. Bladder is not palpable. No evidence of flank discomfort. No evidence of inguinal hernia.    Genitourinary: The penis is circumcised with no evidence of plaques or induration. The urethral meatus is normal. The testes, epididymides, and cord structures are normal in size and contour bilaterally. The scrotum is normal in size and contour.    Normal anal sphincter tone. No rectal mass.    The prostate is 35 g. Normal landmarks. Lateral sulci. Median furrow intact.  No nodularity or induration. Seminal vesicles are normal.    Extremities: No clubbing, cyanosis, or edema      LABS:    Lab Results   Component Value Date    PSA 0.50 03/17/2017    PSA 0.47 07/18/2013    PSA 0.71 03/15/2013    PSADIAG 0.44 02/28/2020    PSADIAG 0.43 10/30/2019    PSADIAG 0.39 06/28/2019        IMPRESSION:    Encounter Diagnoses   Name Primary?    Male hypogonadism Yes    BPH with urinary obstruction     Erectile dysfunction due to arterial insufficiency     Mixed hyperlipidemia     Essential hypertension    HTN, controlled  Hyperlipidemia, controlled    PLAN:      1. Continue generic sildenafil for the ED.  Refilled today.  2. Procedure Report:Testopel Insertion    A Time Out was performed with the patient  and nurse in the room.   The site was marked with a yes. Verbal consent was obtained. The risks and benefits of testosterone replacement were explained. The alternatives of observation and treatment with injections and gel therapy were discussed.     The risks of testopel explained to the patient include but are not limited to worsening of BPH, edema with or without congestive heart failure, gynecomastia, cellulitis and abscess, venous thromboembolic events, testicular atrophy, possible cardiac sequelae and with high dose testosterone the risk of liver function elevation (peliosis hepatitis) and hepatocellular carcinoma. Peliosis hepatitis can be a fatal complication.     R gluteal region prepped and draped in sterile fashion.  20 cc of 2% lidocaine used for local analgesia.  Small incision made with a 15 blade.  Standard trocars used for subcutaneous insertion of 12 pellets total in a V shaped tract.  No active bleeding noted.  Area cleaned and dried. Steri strips applied. Dressing applied.      He can RTC 4 months with T, psa, cbc, hepatic panel lipid panel.          Copy to:

## 2020-03-12 ENCOUNTER — TELEPHONE (OUTPATIENT)
Dept: ENDOSCOPY | Facility: HOSPITAL | Age: 60
End: 2020-03-12

## 2020-03-12 NOTE — TELEPHONE ENCOUNTER
Contacted Pt to schedule colonoscopy, Pt is not ready to schedule at this time, Pt to call back to schedule, number provided [phone].

## 2020-05-07 ENCOUNTER — PATIENT MESSAGE (OUTPATIENT)
Dept: UROLOGY | Facility: CLINIC | Age: 60
End: 2020-05-07

## 2020-06-07 ENCOUNTER — PATIENT MESSAGE (OUTPATIENT)
Dept: INTERNAL MEDICINE | Facility: CLINIC | Age: 60
End: 2020-06-07

## 2020-06-08 RX ORDER — ZOLPIDEM TARTRATE 10 MG/1
10 TABLET ORAL NIGHTLY PRN
Qty: 30 TABLET | Refills: 0 | Status: SHIPPED | OUTPATIENT
Start: 2020-06-08 | End: 2020-07-06 | Stop reason: SDUPTHER

## 2020-07-14 NOTE — PROGRESS NOTES
[facility]    Increase pts weight 5-10% secondary low PRE      Name: Stanley Stewart  Clinic Number: [id number]  Date of Treatment: 2018   Diagnosis:   Encounter Diagnosis   Name Primary?    Chronic right-sided low back pain without sciatica      Physician: Acacia Ramirez, *    Pain pattern determined: 1 PEP  Plan of care signed: 17   Time in: 9:35am  Time Out: 10:25am  Total Treatment time: 60  Precautions: HTN, gout, chronic back pain, RFA  Visit #: 11    POC due: 3/12/18    Reassessment due:3/28/18    Face to Face discussion of patient was done between PT and PTA.     Subjective   Stanley  Reports his back is feeling pretty good as long as he takes his Mobic and doesn't weed eat or bend over too much  reports their pain to be 0/10 on a 0-10 scale with 0 being no pain and 10 being the worst pain imaginable.    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, riding bike                      Pts goals:  strengthen without being guarded, sit and walk         Objective   Baseline IM Testing Results:   Date of testin2017  ROM 24-0 deg (Increased to 36-0 17)   Max Peak Torque 107    Min Peak Torque 46    Flex/Ext Ratio 2.3   % below normative data -74      [facility] IM Testing Results:   Date of testin2018  ROM 0-36   Max Peak Torque 96   Min Peak Torque 24   Flex/Ext Ratio 4:1   % below normative data 85       FOTO: Focus on Therapeutic Outcomes   Category: lumbar   % Impaired: 37  Current Score  = CJ = at least 20% but < 40% impaired, limited or restricted  Goal at Discharge Score = CJ = at least 20% but < 40% impaired, limited or restricted  Visit 10: 31%      Treatment    Pt was instructed in and performed the following:     Stanley received therapeutic exercises to develop/improved posture, cardiovascular endurance, muscular endurance, lumbar/cervical ROM, strength and muscular endurance for 40  Referred by: Lawrence Medina MD; Medical Diagnosis (from order):    Diagnosis Information      Diagnosis    719.41 (ICD-9-CM) - M25.511, M25.512 (ICD-10-CM) - Bilateral shoulder pain, unspecified chronicity    719.46 (ICD-9-CM) - M25.561, M25.562 (ICD-10-CM) - Pain in both knees, unspecified chronicity                Physical Therapy -  Initial Evaluation    Visit:  8   Treatment Diagnosis: left: shoulder, right: shoulder symptoms with increased pain/symptoms, impaired posture, impaired range of motion, impaired muscle length/flexibility, impaired strength, impaired scapulohumeral rhythm, impaired tissue mobility, impaired activity tolerance, impaired body mechanics  Procedure: R TSA 2012, L 2015..   Date of exacerbation: last 6 months    Chart reviewed at time of initial evaluation (relevant co-morbidities, allergies, tests and medications listed): Bilateral shoulder pain, Bilateral TSA, Bilateral TKA, DJD cervical, DJD lumbar, SVT, PVC, HTN, Hyperlipidemia.  Diagnostic Tests: No diagnostic tests were performed    SUBJECTIVE                                                                                                             Patient reports having a right TSA in 2012 and left in 2015. Patient had good results and no problems until that last 12 months. At that time patient began feeling mild increased tightness and weakness in both shoulders with doing yard work or house work. This gradually progressed and is now at a point that patient can only do 15-20 minutes of ADL'S before he needs to stop and rest secondary to pain and fatigue. Patient feels front side tightness on both shoulders left greater than right and now has anterior shoulder pain with overhead reaching or lifting. This is rated as 6/10 left and 4/10 right. If patient rest pain will resolve to 0/10 but reoccurs with repeated tasks. Patient denies other issues in the shoulder or neck at this time. Patient states he does not have radicular pain  minutes including the following exercises:  [facility] 2/28/2018   Visit Number 11   VAS Pain Rating 0   Treadmill Time (in min.) 10   Speed 3   Extension in Lying 10   Extension in Standing 10   Flexion in Lying 10   Manual Therapy -   Lumbar Extension Seat Pad -   Femur Restraint -   Top Dead Center -   Counterweight -   Lumbar Flexion -   Lumbar Extension -   Lumbar Peak Torque -   Min Torque -   Percent From Norm -   Percent Change from Initial -   Lumbar Weight 106   Repetitions 20   Rating of Perceived Exertion 2   Ice - Z Lie (in min.) 10         Standing trunk flexion, ball roll out 10x  Hook lying TA isometric 10x  Side bridges 10x - NT  PPT with march 10x  Wall dead bug 10x   Bridge 10x - NT  PPT 10x (supine and seated with mild v/c)  Seated quad stretch 20 s/h 4x - NT  LTR 10x    Peripheral muscle strengthening which included 1 set of 15-20 repetitions at a slow, controlled 7 second per rep pace focused on strengthening supporting musculature for improved body mechanics and functional mobility.  Pt and therapist focused on proper form during treatment to ensure optimal strengthening of each targeted muscle group.  Machines were utilized including torso rotation, leg extension, leg curl, chest press, upright row, Tricep extension, bicep curl, leg press, and hip abduction.      Stanley received the following manual therapy techniques: long axis distraction at the R hip and hooklying distraction 0       Home Exercise Program as follows:   Single knee to chest 10x, 2xdaily  Extension in lying 10x, 3x/daily  Supine and seated pelvic tilts 5-10 s/h 5x, 2x daily  TLR 10x  Seated quad stretch 20 s/h 4x   Supine PPT + bridges 10x, 3x weekly   Side bridges (planks) 10x, 3x weekly   Z-lie 10-20 min, 2x daily     Handouts were given to the patient. Pt demo good understanding of the education provided. Stanley demonstrated good return demonstration of activities.     Lumbar roll use compliance:  or pain below the shoulder joint. Patient goal is to be able to do yard and house work without pain.        Pain / Symptoms:  Pain/symptom is: intermittent  Pain rating (out of 10): Current: 4 ; Best: 0; Worst: 6  Location: Bilateral shoulder pain   Quality / Description: ache, sore, stiff.  Alleviating Factors:. Avoiding repeated tasks with the UE   Progression since onset: worsening  Function:   Limitations / Exacerbation Factors: pain, difficulty, increased time,  activities, house/yard work, lifting/carrying and pushing/pulling  Prior Level of Function: pain free ADLs and IADLs,  Patient Goals: independence with ADLs/IADLs, increased strength and increased motion    Prior treatment: no therapies  Discharged from hospital, home health, or skilled nursing facility in last 30 days: no    Home Environment:   Patient lives with significant other  Type of home: multiple level home  Assistance available: as needed  Feels safe at home/work/school.  2 or more falls or an unexplained fall with injury in the last year:  No    OBJECTIVE                                                                                                                     Hand Dominance: right-handed;   Observation:   Cervical: forward head Shoulder: rounded  Spine: increased thoracic kyphosis  Range of Motion (ROM)   (norms in parentheses, degrees unless noted, active unless noted):   WFL: LUE, RUE, except as noted  Shoulder:     - Behind Back Internal Rotation:        • Left: T8        • Right: L5    Strength  (out of 5 unless noted, standard test position unless noted, lbs tested with hand held dynamometer)   Shoulder:     - Flexion:         • Left: 5         • Right: 5     - Extension:           • Left: 5        • Right: 5    - Abduction:        • Left: 5        • Right: 5    - Adduction:           • Left: 5        • Right: 5    - Internal Rotation:          • Left (at 0°): 5        • Right (at 0°): 5    - External Rotation:          • Left (at 0°): 4        • Right (at 0°) :4     - Lower Trapezius Left: 4-    - Lower Trapezius Right: 4-    - Serratus Anterior Left: 4-    - Serratus Anterior Right: 4-    Palpation:   Comments / Details: Tightness noted left greater than right pectoralis M/M, deltoids x3, and lateral scapula.  Joint Play:   Shoulder:     Anterior capsule: Left: WFL;  Right: WFL    Posterior capsule: Left: WFL;  Right: hypermobile    Inferior capsule: Left: WFL;  Right: WFL  Special Tests:  Pereira-Renan: Left: positive Right: positive     TREATMENT                                                                                                                  Therapeutic Exercise:  Patient was instructed in scapular positioning and stretches in the doorway for pectoralis major and biceps.     Skilled input: verbal instruction/cues    Writer verbally educated and received verbal consent for hand placement, positioning of patient, and techniques to be performed today from patient for clothing adjustments for techniques, hand placement and palpation for techniques and therapist position for techniques as described above and how they are pertinent to the patient's plan of care.    Home Exercise Program: (*above indicates provided as part of home exercise program)  See therapeutic exercise      ASSESSMENT                                                                                                             76 year old male patient has reported functional limitations listed above impacted by signs and symptoms consistent with left shoulder, right shoulder, increased pain/symptoms, impaired posture, impaired range of motion, impaired muscle length/flexibility, impaired strength, impaired scapulohumeral rhythm, impaired tissue mobility, impaired activity tolerance and impaired body mechanics.  Patient reports having a right TSA in 2012 and left in 2015. Patient had good results and no problems until that last 12 months. At that  unknown  Additional exercises taught this treatment session:   Reviewed PPT    Assessment     Pt tolerated treatment well. Pt increased 10% on Med X machine without difficulty today. Pt continues to require verbal cues and tactile cues for PPT.  Added ADD isometric contraction to enhance PPT.  Pt feels program is helping his strength level, but that he needs to continue to loose weight to reduce LBP further.    Pt will continue to benefit from skilled outpatient physical therapy to address the deficits stated in the impairment chart, provide pt/family education and to maximize pt's level of independence in the home and community environment.       Pt's spiritual, cultural and educational needs considered and pt agreeable to plan of care and goals as stated below:     Medical necessity is demonstrated by the following IMPAIRMENTS/PROBLEMS:      History  Co-morbidities and personal factors that may impact the plan of care Examination  Body Structures and Functions, activity limitations and participation restrictions that may impact the plan of care Clinical Presentation Decision Making/ Complexity Score   Co-morbidities:   HTN, gout, chronic back pain, RFA           Personal Factors:   lifestyle Body Regions:   back  lower extremities     Body Systems:   gross symmetry  ROM  strength  gross coordinated movement  motor control     Activity limitations:   Learning and applying knowledge  no deficits     General Tasks and Commands  no deficits     Communication  no deficits     Mobility  lifting and carrying objects  walking     Self care  no deficits     Domestic Life  Yard work     Interactions/Relationships  no deficits     Life Areas  no deficits     Community and Social Life  community life  recreation and leisure     Participation Restrictions:   None    evolving clinical presentation with changing clinical characteristics   Worsening over the past few weeks    Moderate            GOALS: Pt is in agreement with the  following goals.     Short term goals:  6 weeks or 10 visits   1.  Pt will demonstrate increased lumbar ROM by at least 3 degrees from the initial ROM value with improvements noted in functional ROM and ability to perform ADLs. Met 12/27/17  2.  Pt will demonstrate increased maximum isometric torque value by 10% when compared to the initial value resulting in improved ability to perform bending, lifting, and carrying activities safely, confidently.  3.  Patient report a reduction in worst pain score by 1-2 points for improved tolerance during work and recreational activities Met 2/26/2018  4.  Pt able to perform HEP correctly with minimal cueing or supervision for therapist     Long term goals: 13 weeks or 20 visits   1. Pt will demonstrate increased lumbar ROM by at least 6 degrees from initial ROM value, resulting in improved ability to perform functional fwd bending while standing and sitting. Met 12/27/17  2. Pt will demonstrate increased maximum isometric torque value by 30% when compared to the initial value resulting in improved ability to perform bending, lifting, and carrying activities safely, confidently.  3. Pt to demonstrate ability to independently control and reduce their pain through posture positioning and mechanical movements throughout a typical day.  4.  Patient will demonstrate improved overall function per FOTO Survey to CJ = at least 20% but < 40% impaired, limited or restricted score or less. Met 2/26/2018  5. Pt will report ability to walk on uneven surface without increased symptoms.     Plan   Continue with established Plan of Care towards established PT goals.    time patient began feeling mild increased tightness and weakness in both shoulders with doing yard work or house work. This gradually progressed and is now at a point that patient can only do 15-20 minutes of ADL'S before he needs to stop and rest secondary to pain and fatigue. Patient feels front side tightness on both shoulders left greater than right and now has anterior shoulder pain with overhead reaching or lifting. Patient has s+s consistent with shoulder impingement including pain with lifting, muscle tightness and weakness, posture malalignment and positive impingement sign. Patient needs verbal and manual cues during all exercises for correct form and technique but patient is able to teach back correctly at the end of the treatment session. Patient needs continued skill therapy to advance functional strength and mobility towards goals.        Prognosis: patient will benefit from skilled therapy  Rehabilitative Potential: excellent  Predicted patient presentation: Moderate (evolving) - Patient comorbidities and complexities, as defined above, may have varying impact on steady progress for prescribed plan of care.      Pain/symptoms after session: 4  Patient Education:   Who will be receiving education: patient  Are they ready to learn: yes  Preferred learning style: written, verbal and demonstration  Barriers to learning: no barriers apparent at this time  Results of above outlined education: Verbalizes understanding and Needs reinforcement     PLAN                                                                                                                           The following skilled interventions to be implemented to achieve goals listed below:  Manual Therapy (21241)  Neuromuscular Re-Education (93210)  Therapeutic Activity (79215)  Therapeutic Exercise (58285)  Performance Test or Measure (62230)      Frequency / Duration: 2 times per week tapering as patient progresses for an estimated total of  16 visits for 8 weeks    patient involved in and agreed to plan of care and goals.  Patient given attendance policy at time of initial evaluation.    Suggestions for next session as indicated: Progress per plan of care. Patient will be seen 2 times weekly for 8 weeks to advance functional strength and mobility as tolerated.     GOALS                                                                                                                       Long Term Goals: To be met by end of plan of care:      Home Exercise Program: Independent with progressed and modified home exercise program (HEP)      Pain: Decrease pain/symptoms to 0 bilateral shoulders with ADL's  Strength: Improve involved strength to  5, bilateral low trapezius and serratus anterior      Procedures and total treatment time documented Time Entry flowsheet.

## 2020-08-10 DIAGNOSIS — Z01.818 PRE-OP TESTING: ICD-10-CM

## 2020-08-10 DIAGNOSIS — Z12.11 SPECIAL SCREENING FOR MALIGNANT NEOPLASMS, COLON: Primary | ICD-10-CM

## 2020-08-10 RX ORDER — POLYETHYLENE GLYCOL 3350, SODIUM SULFATE ANHYDROUS, SODIUM BICARBONATE, SODIUM CHLORIDE, POTASSIUM CHLORIDE 236; 22.74; 6.74; 5.86; 2.97 G/4L; G/4L; G/4L; G/4L; G/4L
4 POWDER, FOR SOLUTION ORAL ONCE
Qty: 4000 ML | Refills: 0 | Status: SHIPPED | OUTPATIENT
Start: 2020-08-10 | End: 2020-08-10

## 2020-08-27 ENCOUNTER — PATIENT OUTREACH (OUTPATIENT)
Dept: ADMINISTRATIVE | Facility: OTHER | Age: 60
End: 2020-08-27

## 2020-08-27 NOTE — PROGRESS NOTES
LINKS immunization registry updated  Care Everywhere updated  Health Maintenance updated  Chart reviewed for overdue Proactive [facility] ([facility]) health maintenance testing (CRS, Breast Ca, Diabetic Eye Exam)   Orders entered:N/A

## 2020-08-28 ENCOUNTER — LAB VISIT (OUTPATIENT)
Dept: LAB | Facility: OTHER | Age: 60
End: 2020-08-28
Attending: UROLOGY
Payer: COMMERCIAL

## 2020-08-28 DIAGNOSIS — E29.1 MALE HYPOGONADISM: ICD-10-CM

## 2020-08-28 LAB
ALBUMIN SERPL BCP-MCNC: 4.1 G/DL (ref 3.5–5.2)
ALP SERPL-CCNC: 45 U/L (ref 55–135)
ALT SERPL W/O P-5'-P-CCNC: 37 U/L (ref 10–44)
AST SERPL-CCNC: 24 U/L (ref 10–40)
BASOPHILS # BLD AUTO: 0.06 K/UL (ref 0–0.2)
BASOPHILS NFR BLD: 1.2 % (ref 0–1.9)
BILIRUB DIRECT SERPL-MCNC: 0.2 MG/DL (ref 0.1–0.3)
BILIRUB SERPL-MCNC: 0.5 MG/DL (ref 0.1–1)
CHOLEST SERPL-MCNC: 252 MG/DL (ref 120–199)
CHOLEST/HDLC SERPL: 4.9 {RATIO} (ref 2–5)
COMPLEXED PSA SERPL-MCNC: 0.36 NG/ML (ref 0–4)
DIFFERENTIAL METHOD: ABNORMAL
EOSINOPHIL # BLD AUTO: 0.5 K/UL (ref 0–0.5)
EOSINOPHIL NFR BLD: 9.2 % (ref 0–8)
ERYTHROCYTE [DISTWIDTH] IN BLOOD BY AUTOMATED COUNT: 11.9 % (ref 11.5–14.5)
HCT VFR BLD AUTO: 45.5 % (ref 40–54)
HDLC SERPL-MCNC: 51 MG/DL (ref 40–75)
HDLC SERPL: 20.2 % (ref 20–50)
HGB BLD-MCNC: 15.2 G/DL (ref 14–18)
IMM GRANULOCYTES # BLD AUTO: 0.01 K/UL (ref 0–0.04)
IMM GRANULOCYTES NFR BLD AUTO: 0.2 % (ref 0–0.5)
LDLC SERPL CALC-MCNC: 157.4 MG/DL (ref 63–159)
LYMPHOCYTES # BLD AUTO: 1.9 K/UL (ref 1–4.8)
LYMPHOCYTES NFR BLD: 36.6 % (ref 18–48)
MCH RBC QN AUTO: 31.6 PG (ref 27–31)
MCHC RBC AUTO-ENTMCNC: 33.4 G/DL (ref 32–36)
MCV RBC AUTO: 95 FL (ref 82–98)
MONOCYTES # BLD AUTO: 0.6 K/UL (ref 0.3–1)
MONOCYTES NFR BLD: 10.6 % (ref 4–15)
NEUTROPHILS # BLD AUTO: 2.2 K/UL (ref 1.8–7.7)
NEUTROPHILS NFR BLD: 42.2 % (ref 38–73)
NONHDLC SERPL-MCNC: 201 MG/DL
NRBC BLD-RTO: 0 /100 WBC
PLATELET # BLD AUTO: 176 K/UL (ref 150–350)
PMV BLD AUTO: 10.9 FL (ref 9.2–12.9)
PROT SERPL-MCNC: 7 G/DL (ref 6–8.4)
RBC # BLD AUTO: 4.81 M/UL (ref 4.6–6.2)
TESTOST SERPL-MCNC: 138 NG/DL (ref 304–1227)
TRIGL SERPL-MCNC: 218 MG/DL (ref 30–150)
WBC # BLD AUTO: 5.19 K/UL (ref 3.9–12.7)

## 2020-08-28 PROCEDURE — 36415 COLL VENOUS BLD VENIPUNCTURE: CPT

## 2020-08-28 PROCEDURE — 85025 COMPLETE CBC W/AUTO DIFF WBC: CPT

## 2020-08-28 PROCEDURE — 84403 ASSAY OF TOTAL TESTOSTERONE: CPT

## 2020-08-28 PROCEDURE — 80076 HEPATIC FUNCTION PANEL: CPT

## 2020-08-28 PROCEDURE — 84153 ASSAY OF PSA TOTAL: CPT

## 2020-08-28 PROCEDURE — 80061 LIPID PANEL: CPT

## 2020-08-31 ENCOUNTER — TELEPHONE (OUTPATIENT)
Dept: UROLOGY | Facility: CLINIC | Age: 60
End: 2020-08-31

## 2020-09-02 ENCOUNTER — TELEPHONE (OUTPATIENT)
Dept: UROLOGY | Facility: CLINIC | Age: 60
End: 2020-09-02

## 2020-09-02 ENCOUNTER — OFFICE VISIT (OUTPATIENT)
Dept: UROLOGY | Facility: CLINIC | Age: 60
End: 2020-09-02
Payer: COMMERCIAL

## 2020-09-02 VITALS
HEART RATE: 66 BPM | BODY MASS INDEX: 32.02 KG/M2 | SYSTOLIC BLOOD PRESSURE: 112 MMHG | WEIGHT: 271.19 LBS | DIASTOLIC BLOOD PRESSURE: 70 MMHG | HEIGHT: 77 IN

## 2020-09-02 DIAGNOSIS — E78.2 MIXED HYPERLIPIDEMIA: ICD-10-CM

## 2020-09-02 DIAGNOSIS — E29.1 MALE HYPOGONADISM: Primary | ICD-10-CM

## 2020-09-02 DIAGNOSIS — N13.8 BPH WITH URINARY OBSTRUCTION: ICD-10-CM

## 2020-09-02 DIAGNOSIS — N40.1 BPH WITH URINARY OBSTRUCTION: ICD-10-CM

## 2020-09-02 DIAGNOSIS — I10 ESSENTIAL HYPERTENSION: ICD-10-CM

## 2020-09-02 DIAGNOSIS — N52.01 ERECTILE DYSFUNCTION DUE TO ARTERIAL INSUFFICIENCY: ICD-10-CM

## 2020-09-02 PROCEDURE — 99214 PR OFFICE/OUTPT VISIT, EST, LEVL IV, 30-39 MIN: ICD-10-PCS | Mod: 25,S$GLB,, | Performed by: UROLOGY

## 2020-09-02 PROCEDURE — 11980 PR IMPLANT,HORMONE,SUBCUTANEOUS: ICD-10-PCS | Mod: S$GLB,,, | Performed by: UROLOGY

## 2020-09-02 PROCEDURE — 3008F PR BODY MASS INDEX (BMI) DOCUMENTED: ICD-10-PCS | Mod: CPTII,S$GLB,, | Performed by: UROLOGY

## 2020-09-02 PROCEDURE — 99999 PR PBB SHADOW E&M-EST. PATIENT-LVL III: ICD-10-PCS | Mod: PBBFAC,,, | Performed by: UROLOGY

## 2020-09-02 PROCEDURE — 3074F SYST BP LT 130 MM HG: CPT | Mod: CPTII,S$GLB,, | Performed by: UROLOGY

## 2020-09-02 PROCEDURE — 11980 IMPLANT HORMONE PELLET(S): CPT | Mod: S$GLB,,, | Performed by: UROLOGY

## 2020-09-02 PROCEDURE — 3074F PR MOST RECENT SYSTOLIC BLOOD PRESSURE < 130 MM HG: ICD-10-PCS | Mod: CPTII,S$GLB,, | Performed by: UROLOGY

## 2020-09-02 PROCEDURE — 99214 OFFICE O/P EST MOD 30 MIN: CPT | Mod: 25,S$GLB,, | Performed by: UROLOGY

## 2020-09-02 PROCEDURE — S0189 PR TESTOSTERONE PELLET 75 MG: ICD-10-PCS | Mod: S$GLB,,, | Performed by: UROLOGY

## 2020-09-02 PROCEDURE — S0189 TESTOSTERONE PELLET 75 MG: HCPCS | Mod: S$GLB,,, | Performed by: UROLOGY

## 2020-09-02 PROCEDURE — 3008F BODY MASS INDEX DOCD: CPT | Mod: CPTII,S$GLB,, | Performed by: UROLOGY

## 2020-09-02 PROCEDURE — 99999 PR PBB SHADOW E&M-EST. PATIENT-LVL III: CPT | Mod: PBBFAC,,, | Performed by: UROLOGY

## 2020-09-02 PROCEDURE — 3078F DIAST BP <80 MM HG: CPT | Mod: CPTII,S$GLB,, | Performed by: UROLOGY

## 2020-09-02 PROCEDURE — 3078F PR MOST RECENT DIASTOLIC BLOOD PRESSURE < 80 MM HG: ICD-10-PCS | Mod: CPTII,S$GLB,, | Performed by: UROLOGY

## 2020-09-02 RX ORDER — SILDENAFIL CITRATE 20 MG/1
TABLET ORAL
Qty: 50 TABLET | Refills: 11 | Status: SHIPPED | OUTPATIENT
Start: 2020-09-02 | End: 2021-01-06 | Stop reason: SDUPTHER

## 2020-09-02 RX ORDER — TAMSULOSIN HYDROCHLORIDE 0.4 MG/1
0.4 CAPSULE ORAL DAILY
Qty: 30 CAPSULE | Refills: 11 | Status: SHIPPED | OUTPATIENT
Start: 2020-09-02 | End: 2022-05-30

## 2020-09-02 NOTE — PROGRESS NOTES
CHIEF COMPLAINT:    Mr. Stewart is a 60 y.o. male presenting with hypogonadism.    PRESENTING ILLNESS:    Mr. Stewart is a 60 y.o. male with a history of hypogonadism.  This has been present for > 1 year.  He is on Testopel.  He is here for placement.  While on TRT, he has more energy and a stronger libido.    He also has ED.  This has been present for > 1 year.  He has been using Cialis, but it is not effective.  He's was using levitra with good results.  However, he c/o the cost.  He's using sildenafil with good results. Using 60-80 mg.    He has LUTS.  Nocturia x 1-4.  He does drink wine at night.  Good FOS.  No straining.  No hematuria.  No dysuria.    REVIEW OF SYSTEMS:    Patient denies chest pain, sore throat, headache, blurred vision, fever, nausea, vomiting, chills, flank discomfort, abdominal pain, bleeding per rectum, cough, SOB, recent loss of consciousness, recent mental status changes, seizures, dizziness, or upper or lower extremity weakness.    DANIELITO  1. 2  2. 4  3. 4  4. 4  5. 5     PATIENT HISTORY:    Past Medical History:   Diagnosis Date    Allergy     Degenerative disc disease     Gout     Hyperlipidemia     Hypertension     Male hypogonadism 7/19/2012       Past Surgical History:   Procedure Laterality Date    APPENDECTOMY      arthroscopic knee surg      left X 2, right X 1    INTUSSUSCEPTION REPAIR      KNEE SURGERY         Family History   Problem Relation Age of Onset    Hyperlipidemia Father     Hypertension Father     Heart disease Father     Liver disease Mother     Heart disease Brother     Heart disease Maternal Grandmother     Heart disease Paternal Grandfather        Social History     Socioeconomic History    Marital status:      Spouse name: Not on file    Number of children: 3    Years of education: Not on file    Highest education level: Not on file   Occupational History     Employer: [employer]   Social Needs    Financial resource strain:  Not hard at all    Food insecurity     Worry: Never true     Inability: Never true    Transportation needs     Medical: No     Non-medical: No   Tobacco Use    Smoking status: Former Smoker     Quit date: 1987     Years since quittin.1    Smokeless tobacco: Never Used   Substance and Sexual Activity    Alcohol use: Yes     Alcohol/week: 7.0 standard drinks     Types: 7 Glasses of wine per week     Frequency: 2-3 times a week     Drinks per session: 1 or 2     Binge frequency: Less than monthly    Drug use: No    Sexual activity: Yes     Partners: Female   Lifestyle    Physical activity     Days per week: 3 days     Minutes per session: 50 min    Stress: Only a little   Relationships    Social connections     Talks on phone: More than three times a week     Gets together: Once a week     Attends Latter day service: Not on file     Active member of club or organization: No     Attends meetings of clubs or organizations: Never     Relationship status:    Other Topics Concern    Not on file   Social History Narrative    Not on file       Allergies:  Shellfish containing products    Medications:    Current Outpatient Medications:     AFLURIA QD -,3YR UP,,PF, 60 mcg (15 mcg x 4)/0.5 mL Syrg, ADM 0.5ML IM UTD, Disp: , Rfl: 0    lisinopril-hydrochlorothiazide (PRINZIDE,ZESTORETIC) 20-12.5 mg per tablet, Take 1 tablet by mouth once daily., Disp: 90 tablet, Rfl: 3    meloxicam (MOBIC) 15 MG tablet, , Disp: , Rfl:     rosuvastatin (CRESTOR) 20 MG tablet, TAKE 1 TABLET DAILY, Disp: 90 tablet, Rfl: 4    sildenafil (REVATIO) 20 mg Tab, Take 5 tablets by mouth 1 hour before sexual activity, Disp: 50 tablet, Rfl: 11    zolpidem (AMBIEN) 10 mg Tab, Take 1 tablet (10 mg total) by mouth nightly as needed., Disp: 30 tablet, Rfl: 4    Current Facility-Administered Medications:     testosterone Pllt 12 Pellet, 12 Pellet, Implant, 1 time in Clinic/HOD, Jaydon Morin MD    PHYSICAL  EXAMINATION:    The patient generally appears in good health, is appropriately interactive, and is in no apparent distress.     Eyes: anicteric sclerae, moist conjunctivae; no lid-lag; PERRLA     HENT: Atraumatic; oropharynx clear with moist mucous membranes and no mucosal ulcerations;normal hard and soft palate. No evidence of lymphadenopathy.    Neck: Trachea midline.  No thyromegaly.    Musculoskeletal: No abnormal gait.    Skin: No lesions.    Mental: Cooperative with normal affect.  Is oriented to time, place, and person.    Neuro: Grossly intact.    Chest: Normal inspiratory effort.   No accessory muscles.  No audible wheezes.  Respirations symmetric on inspiration and expiration.    Heart: Regular rhythm.      Abdomen:  Soft, non-tender. No masses or organomegaly. Bladder is not palpable. No evidence of flank discomfort. No evidence of inguinal hernia.    Genitourinary: The penis is circumcised with no evidence of plaques or induration. The urethral meatus is normal. The testes, epididymides, and cord structures are normal in size and contour bilaterally. The scrotum is normal in size and contour.    Normal anal sphincter tone. No rectal mass.    The prostate is 35 g. Normal landmarks. Lateral sulci. Median furrow intact.  No nodularity or induration. Seminal vesicles are normal.    Extremities: No clubbing, cyanosis.  2+ LE edema.      LABS:    Lab Results   Component Value Date    PSA 0.50 03/17/2017    PSA 0.47 07/18/2013    PSA 0.71 03/15/2013    PSADIAG 0.36 08/28/2020    PSADIAG 0.44 02/28/2020    PSADIAG 0.43 10/30/2019        IMPRESSION:    Encounter Diagnoses   Name Primary?    Male hypogonadism Yes    Erectile dysfunction due to arterial insufficiency     BPH with urinary obstruction     Essential hypertension     Mixed hyperlipidemia    HTN, controlled  Hyperlipidemia, controlled    PLAN:      1. Continue generic sildenafil for the ED.  Refilled today.  2.  Discussed options for his LUTS.   Discussed he has LE edema which will worsen them.  Recommend he elevate his legs.  If that does not work, he may need a diuretic from his PCP.  Will also start flomax. Side effects discussed.  A new Rx was given  3. Procedure Report:Testopel Insertion    A Time Out was performed with the patient and nurse in the room.   The site was marked with a yes. Verbal consent was obtained. The risks and benefits of testosterone replacement were explained. The alternatives of observation and treatment with injections and gel therapy were discussed.     The risks of testopel explained to the patient include but are not limited to worsening of BPH, edema with or without congestive heart failure, gynecomastia, cellulitis and abscess, venous thromboembolic events, testicular atrophy, possible cardiac sequelae and with high dose testosterone the risk of liver function elevation (peliosis hepatitis) and hepatocellular carcinoma. Peliosis hepatitis can be a fatal complication.     L gluteal region prepped and draped in sterile fashion.  20 cc of 2% lidocaine used for local analgesia.  Small incision made with a 15 blade.  Standard trocars used for subcutaneous insertion of 12 pellets total in a V shaped tract.  No active bleeding noted.  Area cleaned and dried. Steri strips applied. Dressing applied.      He can RTC 4 months with T, psa, cbc, hepatic panel lipid panel.          Copy to:

## 2020-09-25 ENCOUNTER — LAB VISIT (OUTPATIENT)
Dept: PRIMARY CARE CLINIC | Facility: CLINIC | Age: 60
End: 2020-09-25
Payer: COMMERCIAL

## 2020-09-25 DIAGNOSIS — Z01.818 PRE-OP TESTING: ICD-10-CM

## 2020-09-25 LAB — SARS-COV-2 RNA RESP QL NAA+PROBE: NOT DETECTED

## 2020-09-25 PROCEDURE — U0003 INFECTIOUS AGENT DETECTION BY NUCLEIC ACID (DNA OR RNA); SEVERE ACUTE RESPIRATORY SYNDROME CORONAVIRUS 2 (SARS-COV-2) (CORONAVIRUS DISEASE [COVID-19]), AMPLIFIED PROBE TECHNIQUE, MAKING USE OF HIGH THROUGHPUT TECHNOLOGIES AS DESCRIBED BY CMS-2020-01-R: HCPCS

## 2020-09-28 ENCOUNTER — ANESTHESIA (OUTPATIENT)
Dept: ENDOSCOPY | Facility: HOSPITAL | Age: 60
End: 2020-09-28
Payer: COMMERCIAL

## 2020-09-28 ENCOUNTER — ANESTHESIA EVENT (OUTPATIENT)
Dept: ENDOSCOPY | Facility: HOSPITAL | Age: 60
End: 2020-09-28
Payer: COMMERCIAL

## 2020-09-28 ENCOUNTER — HOSPITAL ENCOUNTER (OUTPATIENT)
Facility: HOSPITAL | Age: 60
Discharge: HOME | End: 2020-09-28
Attending: COLON & RECTAL SURGERY | Admitting: COLON & RECTAL SURGERY
Payer: COMMERCIAL

## 2020-09-28 VITALS
BODY MASS INDEX: 32 KG/M2 | DIASTOLIC BLOOD PRESSURE: 72 MMHG | WEIGHT: 271 LBS | SYSTOLIC BLOOD PRESSURE: 117 MMHG | HEIGHT: 77 IN | HEART RATE: 59 BPM | TEMPERATURE: 98 F | OXYGEN SATURATION: 98 % | RESPIRATION RATE: 20 BRPM

## 2020-09-28 DIAGNOSIS — Z12.11 SCREEN FOR COLON CANCER: ICD-10-CM

## 2020-09-28 PROCEDURE — E9220 PRA ENDO ANESTHESIA: ICD-10-PCS | Mod: ,,, | Performed by: NURSE ANESTHETIST, CERTIFIED REGISTERED

## 2020-09-28 PROCEDURE — 37000008 HC ANESTHESIA 1ST 15 MINUTES: Performed by: COLON & RECTAL SURGERY

## 2020-09-28 PROCEDURE — E9220 PRA ENDO ANESTHESIA: HCPCS | Mod: ,,, | Performed by: NURSE ANESTHETIST, CERTIFIED REGISTERED

## 2020-09-28 PROCEDURE — 37000009 HC ANESTHESIA EA ADD 15 MINS: Performed by: COLON & RECTAL SURGERY

## 2020-09-28 PROCEDURE — G0121 COLON CA SCRN NOT HI RSK IND: HCPCS | Mod: ,,, | Performed by: COLON & RECTAL SURGERY

## 2020-09-28 PROCEDURE — 25000003 PHARM REV CODE 250: Performed by: COLON & RECTAL SURGERY

## 2020-09-28 PROCEDURE — G0121 COLON CA SCRN NOT HI RSK IND: HCPCS | Performed by: COLON & RECTAL SURGERY

## 2020-09-28 PROCEDURE — G0121 COLON CA SCRN NOT HI RSK IND: ICD-10-PCS | Mod: ,,, | Performed by: COLON & RECTAL SURGERY

## 2020-09-28 PROCEDURE — 63600175 PHARM REV CODE 636 W HCPCS: Performed by: NURSE ANESTHETIST, CERTIFIED REGISTERED

## 2020-09-28 RX ORDER — SODIUM CHLORIDE 9 MG/ML
INJECTION, SOLUTION INTRAVENOUS CONTINUOUS
Status: DISCONTINUED | OUTPATIENT
Start: 2020-09-28 | End: 2020-09-28 | Stop reason: HOSPADM

## 2020-09-28 RX ORDER — LIDOCAINE HYDROCHLORIDE 20 MG/ML
INJECTION INTRAVENOUS
Status: DISCONTINUED | OUTPATIENT
Start: 2020-09-28 | End: 2020-09-28

## 2020-09-28 RX ORDER — PROPOFOL 10 MG/ML
VIAL (ML) INTRAVENOUS
Status: DISCONTINUED | OUTPATIENT
Start: 2020-09-28 | End: 2020-09-28

## 2020-09-28 RX ORDER — PROPOFOL 10 MG/ML
VIAL (ML) INTRAVENOUS CONTINUOUS PRN
Status: DISCONTINUED | OUTPATIENT
Start: 2020-09-28 | End: 2020-09-28

## 2020-09-28 RX ADMIN — SODIUM CHLORIDE: 0.9 INJECTION, SOLUTION INTRAVENOUS at 06:09

## 2020-09-28 RX ADMIN — LIDOCAINE HYDROCHLORIDE 60 MG: 20 INJECTION, SOLUTION INTRAVENOUS at 07:09

## 2020-09-28 RX ADMIN — PROPOFOL 40 MG: 10 INJECTION, EMULSION INTRAVENOUS at 07:09

## 2020-09-28 RX ADMIN — PROPOFOL 200 MCG/KG/MIN: 10 INJECTION, EMULSION INTRAVENOUS at 07:09

## 2020-09-28 RX ADMIN — PROPOFOL 8 MG: 10 INJECTION, EMULSION INTRAVENOUS at 07:09

## 2020-09-28 NOTE — [employer] PATIENT INSTRUCTIONS
Discharge Summary/Instructions after an Endoscopic Procedure  Patient Name: Stanley Stewart  Patient MRN: [id number]  Patient YOB: 1960 Monday, September 28, 2020  Bo Montenegro MD  RESTRICTIONS:  During your procedure today, you received medications for sedation.  These   medications may affect your judgment, balance and coordination.  Therefore,   for 24 hours, you have the following restrictions:   - DO NOT drive a car, operate machinery, make legal/financial decisions,   sign important papers or drink alcohol.    ACTIVITY:  Today: no heavy lifting, straining or running due to procedural   sedation/anesthesia.  The following day: return to full activity including work.  DIET:  Eat and drink normally unless instructed otherwise.     TREATMENT FOR COMMON SIDE EFFECTS:  - Mild abdominal pain, nausea, belching, bloating or excessive gas:  rest,   eat lightly and use a heating pad.  - Sore Throat: treat with throat lozenges and/or gargle with warm salt   water.  - Because air was used during the procedure, expelling large amounts of air   from your rectum or belching is normal.  - If a bowel prep was taken, you may not have a bowel movement for 1-3 days.    This is normal.  SYMPTOMS TO WATCH FOR AND REPORT TO YOUR PHYSICIAN:  1. Abdominal pain or bloating, other than gas cramps.  2. Chest pain.  3. Back pain.  4. Signs of infection such as: chills or fever occurring within 24 hours   after the procedure.  5. Rectal bleeding, which would show as bright red, maroon, or black stools.   (A tablespoon of blood from the rectum is not serious, especially if   hemorrhoids are present.)  6. Vomiting.  7. Weakness or dizziness.  GO DIRECTLY TO THE NEAREST EMERGENCY ROOM IF YOU HAVE ANY OF THE FOLLOWING:      Difficulty breathing              Chills and/or fever over 101 F   Persistent vomiting and/or vomiting blood   Severe abdominal pain   Severe chest pain   Black, tarry stools   Bleeding- more than one  tablespoon   Any other symptom or condition that you feel may need urgent attention  Your doctor recommends these additional instructions:  If any biopsies were taken, your doctors clinic will contact you in 1 to 2   weeks with any results.  - Discharge patient to home (ambulatory).   - Patient has a contact number available for emergencies.  The signs and   symptoms of potential delayed complications were discussed with the   patient.  Return to normal activities tomorrow.  Written discharge   instructions were provided to the patient.   - Resume previous diet.   - Continue present medications.   - Repeat colonoscopy in 10 years for screening purposes.  For questions, problems or results please call your physician - Bo Montenegro MD at Work:  [phone].  [facility], EMERGENCY ROOM PHONE NUMBER: [phone]  IF A COMPLICATION OR EMERGENCY SITUATION ARISES AND YOU ARE UNABLE TO REACH   YOUR PHYSICIAN - GO DIRECTLY TO THE EMERGENCY ROOM.  Bo Montenegro MD  9/28/2020 7:38:29 AM  This report has been verified and signed electronically.  [employer]

## 2020-09-28 NOTE — ANESTHESIA POSTPROCEDURE EVALUATION
Anesthesia Post Evaluation    Patient: Stanley Stewart    Procedure(s) Performed: Procedure(s) (LRB):  COLONOSCOPY (N/A)    Final Anesthesia Type: general    Patient location during evaluation: PACU  Patient participation: Yes- Able to Participate  Level of consciousness: awake and alert  Post-procedure vital signs: reviewed and stable  Pain management: adequate  Airway patency: patent    PONV status at discharge: No PONV  Anesthetic complications: no      Cardiovascular status: hemodynamically stable  Respiratory status: unassisted  Hydration status: euvolemic  Follow-up not needed.          Vitals Value Taken Time   /72 09/28/20 0803   Temp 36.7 °C (98.1 °F) 09/28/20 0743   Pulse 59 09/28/20 0803   Resp 20 09/28/20 0803   SpO2 98 % 09/28/20 0803         Event Time   Out of Recovery 08:14:41         Pain/Rui Score: Rui Score: 10 (9/28/2020  8:03 AM)

## 2020-09-28 NOTE — TRANSFER OF CARE
"Anesthesia Transfer of Care Note    Patient: Stanley Stewart    Procedure(s) Performed: Procedure(s) (LRB):  COLONOSCOPY (N/A)    Patient location: PACU    Anesthesia Type: general    Transport from OR: Transported from OR on room air with adequate spontaneous ventilation    Post pain: adequate analgesia    Post assessment: no apparent anesthetic complications and tolerated procedure well    Post vital signs: stable    Level of consciousness: awake, alert and oriented    Nausea/Vomiting: no nausea/vomiting    Complications: none    Transfer of care protocol was followed      Last vitals:   Visit Vitals  BP (!) 149/84   Pulse 62   Temp 36.7 °C (98.1 °F) (Temporal)   Resp 16   Ht 6' 5" (1.956 m)   Wt 122.9 kg (271 lb)   SpO2 98%   BMI 32.14 kg/m²     "

## 2020-09-28 NOTE — ANESTHESIA PREPROCEDURE EVALUATION
09/28/2020  Stanley Stewart is a 60 y.o., male.    Past Medical History:   Diagnosis Date    Allergy     Degenerative disc disease     Gout     Hyperlipidemia     Hypertension     Male hypogonadism 7/19/2012     Past Surgical History:   Procedure Laterality Date    APPENDECTOMY      arthroscopic knee surg      left X 2, right X 1    INTUSSUSCEPTION REPAIR      KNEE SURGERY      TONSILLECTOMY           Anesthesia Evaluation    I have reviewed the Patient Summary Reports.      I have reviewed the Medications.     Review of Systems  Anesthesia Hx:  No problems with previous Anesthesia Denies Hx of Anesthetic complications  Neg history of prior surgery. Denies Family Hx of Anesthesia complications.   Denies Personal Hx of Anesthesia complications.   Social:  Former Smoker    Hematology/Oncology:  Hematology Normal   Oncology Normal     EENT/Dental:EENT/Dental Normal   Cardiovascular:   Exercise tolerance: good Hypertension    Pulmonary:  Pulmonary Normal    Renal/:  Renal/ Normal     Hepatic/GI:  Hepatic/GI Normal    Musculoskeletal:   Arthritis     Neurological:  Neurology Normal    Endocrine:  Endocrine Normal    Dermatological:  Skin Normal    Psych:  Psychiatric Normal           Physical Exam  General:  Well nourished    Airway/Jaw/Neck:  Airway Findings: Mouth Opening: Normal Tongue: Normal  General Airway Assessment: Adult  Mallampati: II  TM Distance: Normal, at least 6 cm        Eyes/Ears/Nose:  EYES/EARS/NOSE FINDINGS: Normal   Dental:  Dental Findings: In tact   Chest/Lungs:  Chest/Lungs Findings: Clear to auscultation, Normal Respiratory Rate     Heart/Vascular:  Heart Findings: Rate: Normal  Rhythm: Regular Rhythm  Sounds: Normal  Heart murmur: negative Vascular Findings: Normal    Abdomen:  Abdomen Findings:  Normal, Soft, Nontender     Musculoskeletal:  Musculoskeletal Findings: Normal    Skin:  Skin Findings: Normal    Mental Status:  Mental Status Findings:  Cooperative, Alert and Oriented         Anesthesia Plan  Type of Anesthesia, risks & benefits discussed:  Anesthesia Type:  general  Patient's Preference:   Intra-op Monitoring Plan: standard ASA monitors  Intra-op Monitoring Plan Comments:   Post Op Pain Control Plan:   Post Op Pain Control Plan Comments:   Induction:   IV  Beta Blocker:  Patient is not currently on a Beta-Blocker (No further documentation required).       Informed Consent: Patient understands risks and agrees with Anesthesia plan.  Questions answered. Anesthesia consent signed with patient.  ASA Score: 2     Day of Surgery Review of History & Physical:    H&P update referred to the provider.         Ready For Surgery From Anesthesia Perspective.

## 2020-09-28 NOTE — DISCHARGE INSTRUCTIONS
Colonoscopy     A camera attached to a flexible tube with a viewing lens is used to take video pictures.     Colonoscopy is a test to view the inside of your lower digestive tract (colon and rectum). Sometimes it can show the last part of the small intestine (ileum). During the test, small pieces of tissue may be removed for testing. This is called a biopsy. Small growths, such as polyps, may also be removed.   Why is colonoscopy done?  The test is done to help look for colon cancer. And it can help find the source of abdominal pain, bleeding, and changes in bowel habits. It may be needed once a year, depending on factors such as your:  · Age  · Health history  · Family health history  · Symptoms  · Results from any prior colonoscopy  Risks and possible complications  These include:  · Bleeding               · A puncture or tear in the colon   · Risks of anesthesia  · A cancer lesion not being seen  Getting ready   To prepare for the test:  · Talk with your healthcare provider about the risks of the test (see below). Also ask your healthcare provider about alternatives to the test.  · Tell your healthcare provider about any medicines you take. Also tell him or her about any health conditions you may have.  · Make sure your rectum and colon are empty for the test. Follow the diet and bowel prep instructions exactly. If you dont, the test may need to be rescheduled.  · Plan for a friend or family member to drive you home after the test.     Colonoscopy provides an inside view of the entire colon.     You may discuss the results with your doctor right away or at a future visit.  During the test   The test is usually done in the hospital on an outpatient basis. This means you go home the same day. The procedure takes about 30 minutes. During that time:  · You are given relaxing (sedating) medicine through an IV line. You may be drowsy, or fully asleep.  · The healthcare provider will first give you a physical exam to  check for anal and rectal problems.  · Then the anus is lubricated and the scope inserted.  · If you are awake, you may have a feeling similar to needing to have a bowel movement. You may also feel pressure as air is pumped into the colon. Its OK to pass gas during the procedure.  · Biopsy, polyp removal, or other treatments may be done during the test.  After the test   You may have gas right after the test. It can help to try to pass it to help prevent later bloating. Your healthcare provider may discuss the results with you right away. Or you may need to schedule a follow-up visit to talk about the results. After the test, you can go back to your normal eating and other activities. You may be tired from the sedation and need to rest for a few hours.  Date Last Reviewed: 11/1/2016 © 2000-2017 [employer]. [address] [place] [address], [place] [address]. All rights reserved. This information is not intended as a substitute for professional medical care. Always follow your healthcare professional's instructions.

## 2020-12-09 ENCOUNTER — PATIENT MESSAGE (OUTPATIENT)
Dept: INTERNAL MEDICINE | Facility: CLINIC | Age: 60
End: 2020-12-09

## 2020-12-09 RX ORDER — ZOLPIDEM TARTRATE 10 MG/1
10 TABLET ORAL NIGHTLY PRN
Qty: 30 TABLET | Refills: 4 | Status: CANCELLED | OUTPATIENT
Start: 2020-12-09

## 2020-12-31 ENCOUNTER — PATIENT MESSAGE (OUTPATIENT)
Dept: UROLOGY | Facility: CLINIC | Age: 60
End: 2020-12-31

## 2021-01-04 ENCOUNTER — PATIENT OUTREACH (OUTPATIENT)
Dept: ADMINISTRATIVE | Facility: OTHER | Age: 61
End: 2021-01-04

## 2021-01-04 ENCOUNTER — LAB VISIT (OUTPATIENT)
Dept: LAB | Facility: OTHER | Age: 61
End: 2021-01-04
Attending: UROLOGY
Payer: COMMERCIAL

## 2021-01-04 DIAGNOSIS — E29.1 MALE HYPOGONADISM: ICD-10-CM

## 2021-01-04 DIAGNOSIS — N40.1 BPH WITH URINARY OBSTRUCTION: ICD-10-CM

## 2021-01-04 DIAGNOSIS — N13.8 BPH WITH URINARY OBSTRUCTION: ICD-10-CM

## 2021-01-04 LAB
ALBUMIN SERPL BCP-MCNC: 4.2 G/DL (ref 3.5–5.2)
ALP SERPL-CCNC: 49 U/L (ref 55–135)
ALT SERPL W/O P-5'-P-CCNC: 38 U/L (ref 10–44)
AST SERPL-CCNC: 26 U/L (ref 10–40)
BASOPHILS # BLD AUTO: 0.05 K/UL (ref 0–0.2)
BASOPHILS NFR BLD: 0.9 % (ref 0–1.9)
BILIRUB DIRECT SERPL-MCNC: 0.2 MG/DL (ref 0.1–0.3)
BILIRUB SERPL-MCNC: 0.4 MG/DL (ref 0.1–1)
CHOLEST SERPL-MCNC: 260 MG/DL (ref 120–199)
CHOLEST/HDLC SERPL: 5 {RATIO} (ref 2–5)
COMPLEXED PSA SERPL-MCNC: 0.49 NG/ML (ref 0–4)
DIFFERENTIAL METHOD: ABNORMAL
EOSINOPHIL # BLD AUTO: 0.5 K/UL (ref 0–0.5)
EOSINOPHIL NFR BLD: 9.9 % (ref 0–8)
ERYTHROCYTE [DISTWIDTH] IN BLOOD BY AUTOMATED COUNT: 11.9 % (ref 11.5–14.5)
HCT VFR BLD AUTO: 50.2 % (ref 40–54)
HDLC SERPL-MCNC: 52 MG/DL (ref 40–75)
HDLC SERPL: 20 % (ref 20–50)
HGB BLD-MCNC: 16.7 G/DL (ref 14–18)
IMM GRANULOCYTES # BLD AUTO: 0.01 K/UL (ref 0–0.04)
IMM GRANULOCYTES NFR BLD AUTO: 0.2 % (ref 0–0.5)
LDLC SERPL CALC-MCNC: 166.6 MG/DL (ref 63–159)
LYMPHOCYTES # BLD AUTO: 2 K/UL (ref 1–4.8)
LYMPHOCYTES NFR BLD: 35.8 % (ref 18–48)
MCH RBC QN AUTO: 31.6 PG (ref 27–31)
MCHC RBC AUTO-ENTMCNC: 33.3 G/DL (ref 32–36)
MCV RBC AUTO: 95 FL (ref 82–98)
MONOCYTES # BLD AUTO: 0.6 K/UL (ref 0.3–1)
MONOCYTES NFR BLD: 10.6 % (ref 4–15)
NEUTROPHILS # BLD AUTO: 2.3 K/UL (ref 1.8–7.7)
NEUTROPHILS NFR BLD: 42.6 % (ref 38–73)
NONHDLC SERPL-MCNC: 208 MG/DL
NRBC BLD-RTO: 0 /100 WBC
PLATELET # BLD AUTO: 174 K/UL (ref 150–350)
PMV BLD AUTO: 11 FL (ref 9.2–12.9)
PROT SERPL-MCNC: 7 G/DL (ref 6–8.4)
RBC # BLD AUTO: 5.29 M/UL (ref 4.6–6.2)
TESTOST SERPL-MCNC: 325 NG/DL (ref 304–1227)
TRIGL SERPL-MCNC: 207 MG/DL (ref 30–150)
WBC # BLD AUTO: 5.45 K/UL (ref 3.9–12.7)

## 2021-01-04 PROCEDURE — 84153 ASSAY OF PSA TOTAL: CPT

## 2021-01-04 PROCEDURE — 80061 LIPID PANEL: CPT

## 2021-01-04 PROCEDURE — 36415 COLL VENOUS BLD VENIPUNCTURE: CPT

## 2021-01-04 PROCEDURE — 85025 COMPLETE CBC W/AUTO DIFF WBC: CPT

## 2021-01-04 PROCEDURE — 84403 ASSAY OF TOTAL TESTOSTERONE: CPT

## 2021-01-04 PROCEDURE — 80076 HEPATIC FUNCTION PANEL: CPT

## 2021-01-05 ENCOUNTER — PATIENT MESSAGE (OUTPATIENT)
Dept: UROLOGY | Facility: CLINIC | Age: 61
End: 2021-01-05

## 2021-01-06 ENCOUNTER — PATIENT MESSAGE (OUTPATIENT)
Dept: UROLOGY | Facility: CLINIC | Age: 61
End: 2021-01-06

## 2021-01-06 ENCOUNTER — OFFICE VISIT (OUTPATIENT)
Dept: UROLOGY | Facility: CLINIC | Age: 61
End: 2021-01-06
Payer: COMMERCIAL

## 2021-01-06 ENCOUNTER — TELEPHONE (OUTPATIENT)
Dept: UROLOGY | Facility: CLINIC | Age: 61
End: 2021-01-06

## 2021-01-06 VITALS
DIASTOLIC BLOOD PRESSURE: 101 MMHG | HEART RATE: 63 BPM | BODY MASS INDEX: 32.23 KG/M2 | HEIGHT: 77 IN | SYSTOLIC BLOOD PRESSURE: 152 MMHG | WEIGHT: 272.94 LBS

## 2021-01-06 DIAGNOSIS — I10 ESSENTIAL HYPERTENSION: ICD-10-CM

## 2021-01-06 DIAGNOSIS — E29.1 MALE HYPOGONADISM: Primary | ICD-10-CM

## 2021-01-06 DIAGNOSIS — N40.1 BPH WITH URINARY OBSTRUCTION: ICD-10-CM

## 2021-01-06 DIAGNOSIS — E78.2 MIXED HYPERLIPIDEMIA: ICD-10-CM

## 2021-01-06 DIAGNOSIS — N52.01 ERECTILE DYSFUNCTION DUE TO ARTERIAL INSUFFICIENCY: ICD-10-CM

## 2021-01-06 DIAGNOSIS — N13.8 BPH WITH URINARY OBSTRUCTION: ICD-10-CM

## 2021-01-06 PROBLEM — Z12.11 SCREEN FOR COLON CANCER: Status: RESOLVED | Noted: 2020-09-28 | Resolved: 2021-01-06

## 2021-01-06 PROCEDURE — 3008F BODY MASS INDEX DOCD: CPT | Mod: CPTII,S$GLB,, | Performed by: UROLOGY

## 2021-01-06 PROCEDURE — 99214 OFFICE O/P EST MOD 30 MIN: CPT | Mod: S$GLB,,, | Performed by: UROLOGY

## 2021-01-06 PROCEDURE — 3080F DIAST BP >= 90 MM HG: CPT | Mod: CPTII,S$GLB,, | Performed by: UROLOGY

## 2021-01-06 PROCEDURE — 3008F PR BODY MASS INDEX (BMI) DOCUMENTED: ICD-10-PCS | Mod: CPTII,S$GLB,, | Performed by: UROLOGY

## 2021-01-06 PROCEDURE — 1126F PR PAIN SEVERITY QUANTIFIED, NO PAIN PRESENT: ICD-10-PCS | Mod: S$GLB,,, | Performed by: UROLOGY

## 2021-01-06 PROCEDURE — 3080F PR MOST RECENT DIASTOLIC BLOOD PRESSURE >= 90 MM HG: ICD-10-PCS | Mod: CPTII,S$GLB,, | Performed by: UROLOGY

## 2021-01-06 PROCEDURE — 3077F PR MOST RECENT SYSTOLIC BLOOD PRESSURE >= 140 MM HG: ICD-10-PCS | Mod: CPTII,S$GLB,, | Performed by: UROLOGY

## 2021-01-06 PROCEDURE — 99999 PR PBB SHADOW E&M-EST. PATIENT-LVL III: CPT | Mod: PBBFAC,,, | Performed by: UROLOGY

## 2021-01-06 PROCEDURE — 1126F AMNT PAIN NOTED NONE PRSNT: CPT | Mod: S$GLB,,, | Performed by: UROLOGY

## 2021-01-06 PROCEDURE — 99999 PR PBB SHADOW E&M-EST. PATIENT-LVL III: ICD-10-PCS | Mod: PBBFAC,,, | Performed by: UROLOGY

## 2021-01-06 PROCEDURE — 3077F SYST BP >= 140 MM HG: CPT | Mod: CPTII,S$GLB,, | Performed by: UROLOGY

## 2021-01-06 PROCEDURE — 99214 PR OFFICE/OUTPT VISIT, EST, LEVL IV, 30-39 MIN: ICD-10-PCS | Mod: S$GLB,,, | Performed by: UROLOGY

## 2021-01-06 RX ORDER — SILDENAFIL CITRATE 20 MG/1
TABLET ORAL
Qty: 50 TABLET | Refills: 11 | Status: SHIPPED | OUTPATIENT
Start: 2021-01-06 | End: 2021-05-24 | Stop reason: SDUPTHER

## 2021-01-25 ENCOUNTER — PATIENT MESSAGE (OUTPATIENT)
Dept: UROLOGY | Facility: CLINIC | Age: 61
End: 2021-01-25

## 2021-02-15 ENCOUNTER — PATIENT MESSAGE (OUTPATIENT)
Dept: UROLOGY | Facility: CLINIC | Age: 61
End: 2021-02-15

## 2021-02-15 ENCOUNTER — OFFICE VISIT (OUTPATIENT)
Dept: UROLOGY | Facility: CLINIC | Age: 61
End: 2021-02-15
Payer: COMMERCIAL

## 2021-02-15 VITALS
HEIGHT: 77 IN | BODY MASS INDEX: 32.28 KG/M2 | SYSTOLIC BLOOD PRESSURE: 131 MMHG | HEART RATE: 67 BPM | DIASTOLIC BLOOD PRESSURE: 94 MMHG | WEIGHT: 273.38 LBS

## 2021-02-15 DIAGNOSIS — N40.1 BPH WITH URINARY OBSTRUCTION: ICD-10-CM

## 2021-02-15 DIAGNOSIS — N13.8 BPH WITH URINARY OBSTRUCTION: ICD-10-CM

## 2021-02-15 DIAGNOSIS — I10 ESSENTIAL HYPERTENSION: ICD-10-CM

## 2021-02-15 DIAGNOSIS — E29.1 MALE HYPOGONADISM: Primary | ICD-10-CM

## 2021-02-15 DIAGNOSIS — N52.01 ERECTILE DYSFUNCTION DUE TO ARTERIAL INSUFFICIENCY: ICD-10-CM

## 2021-02-15 DIAGNOSIS — E78.2 MIXED HYPERLIPIDEMIA: ICD-10-CM

## 2021-02-15 PROCEDURE — 3008F PR BODY MASS INDEX (BMI) DOCUMENTED: ICD-10-PCS | Mod: CPTII,S$GLB,, | Performed by: UROLOGY

## 2021-02-15 PROCEDURE — 3080F PR MOST RECENT DIASTOLIC BLOOD PRESSURE >= 90 MM HG: ICD-10-PCS | Mod: CPTII,S$GLB,, | Performed by: UROLOGY

## 2021-02-15 PROCEDURE — 99999 PR PBB SHADOW E&M-EST. PATIENT-LVL III: CPT | Mod: PBBFAC,,, | Performed by: UROLOGY

## 2021-02-15 PROCEDURE — 3075F SYST BP GE 130 - 139MM HG: CPT | Mod: CPTII,S$GLB,, | Performed by: UROLOGY

## 2021-02-15 PROCEDURE — 99499 NO LOS: ICD-10-PCS | Mod: S$GLB,,, | Performed by: UROLOGY

## 2021-02-15 PROCEDURE — 96372 THER/PROPH/DIAG INJ SC/IM: CPT | Mod: S$GLB,,, | Performed by: UROLOGY

## 2021-02-15 PROCEDURE — 1126F AMNT PAIN NOTED NONE PRSNT: CPT | Mod: S$GLB,,, | Performed by: UROLOGY

## 2021-02-15 PROCEDURE — 3075F PR MOST RECENT SYSTOLIC BLOOD PRESS GE 130-139MM HG: ICD-10-PCS | Mod: CPTII,S$GLB,, | Performed by: UROLOGY

## 2021-02-15 PROCEDURE — 96372 PR INJECTION,THERAP/PROPH/DIAG2ST, IM OR SUBCUT: ICD-10-PCS | Mod: S$GLB,,, | Performed by: UROLOGY

## 2021-02-15 PROCEDURE — 3008F BODY MASS INDEX DOCD: CPT | Mod: CPTII,S$GLB,, | Performed by: UROLOGY

## 2021-02-15 PROCEDURE — 1126F PR PAIN SEVERITY QUANTIFIED, NO PAIN PRESENT: ICD-10-PCS | Mod: S$GLB,,, | Performed by: UROLOGY

## 2021-02-15 PROCEDURE — 3080F DIAST BP >= 90 MM HG: CPT | Mod: CPTII,S$GLB,, | Performed by: UROLOGY

## 2021-02-15 PROCEDURE — 99499 UNLISTED E&M SERVICE: CPT | Mod: S$GLB,,, | Performed by: UROLOGY

## 2021-02-15 PROCEDURE — 99999 PR PBB SHADOW E&M-EST. PATIENT-LVL III: ICD-10-PCS | Mod: PBBFAC,,, | Performed by: UROLOGY

## 2021-03-03 DIAGNOSIS — E78.2 MIXED HYPERLIPIDEMIA: ICD-10-CM

## 2021-03-03 DIAGNOSIS — I10 ESSENTIAL HYPERTENSION: ICD-10-CM

## 2021-03-03 DIAGNOSIS — Z12.5 PROSTATE CANCER SCREENING: ICD-10-CM

## 2021-03-03 DIAGNOSIS — Z00.00 ANNUAL PHYSICAL EXAM: Primary | ICD-10-CM

## 2021-03-03 RX ORDER — ROSUVASTATIN CALCIUM 20 MG/1
TABLET, COATED ORAL
Qty: 90 TABLET | Refills: 3 | Status: SHIPPED | OUTPATIENT
Start: 2021-03-03 | End: 2022-03-04

## 2021-03-09 ENCOUNTER — PATIENT MESSAGE (OUTPATIENT)
Dept: INTERNAL MEDICINE | Facility: CLINIC | Age: 61
End: 2021-03-09

## 2021-03-11 ENCOUNTER — PATIENT OUTREACH (OUTPATIENT)
Dept: ADMINISTRATIVE | Facility: OTHER | Age: 61
End: 2021-03-11

## 2021-03-15 ENCOUNTER — OFFICE VISIT (OUTPATIENT)
Dept: UROLOGY | Facility: CLINIC | Age: 61
End: 2021-03-15
Payer: COMMERCIAL

## 2021-03-15 VITALS
HEIGHT: 77 IN | BODY MASS INDEX: 32.62 KG/M2 | DIASTOLIC BLOOD PRESSURE: 91 MMHG | SYSTOLIC BLOOD PRESSURE: 143 MMHG | WEIGHT: 276.25 LBS | HEART RATE: 59 BPM

## 2021-03-15 DIAGNOSIS — E78.2 MIXED HYPERLIPIDEMIA: ICD-10-CM

## 2021-03-15 DIAGNOSIS — I10 ESSENTIAL HYPERTENSION: ICD-10-CM

## 2021-03-15 DIAGNOSIS — N13.8 BPH WITH URINARY OBSTRUCTION: ICD-10-CM

## 2021-03-15 DIAGNOSIS — E29.1 MALE HYPOGONADISM: Primary | ICD-10-CM

## 2021-03-15 DIAGNOSIS — N52.01 ERECTILE DYSFUNCTION DUE TO ARTERIAL INSUFFICIENCY: ICD-10-CM

## 2021-03-15 DIAGNOSIS — N40.1 BPH WITH URINARY OBSTRUCTION: ICD-10-CM

## 2021-03-15 PROCEDURE — 3077F SYST BP >= 140 MM HG: CPT | Mod: CPTII,S$GLB,, | Performed by: UROLOGY

## 2021-03-15 PROCEDURE — 3008F BODY MASS INDEX DOCD: CPT | Mod: CPTII,S$GLB,, | Performed by: UROLOGY

## 2021-03-15 PROCEDURE — 1126F PR PAIN SEVERITY QUANTIFIED, NO PAIN PRESENT: ICD-10-PCS | Mod: S$GLB,,, | Performed by: UROLOGY

## 2021-03-15 PROCEDURE — 1126F AMNT PAIN NOTED NONE PRSNT: CPT | Mod: S$GLB,,, | Performed by: UROLOGY

## 2021-03-15 PROCEDURE — 99999 PR PBB SHADOW E&M-EST. PATIENT-LVL III: CPT | Mod: PBBFAC,,, | Performed by: UROLOGY

## 2021-03-15 PROCEDURE — 96372 PR INJECTION,THERAP/PROPH/DIAG2ST, IM OR SUBCUT: ICD-10-PCS | Mod: S$GLB,,, | Performed by: UROLOGY

## 2021-03-15 PROCEDURE — 3008F PR BODY MASS INDEX (BMI) DOCUMENTED: ICD-10-PCS | Mod: CPTII,S$GLB,, | Performed by: UROLOGY

## 2021-03-15 PROCEDURE — 99499 NO LOS: ICD-10-PCS | Mod: S$GLB,,, | Performed by: UROLOGY

## 2021-03-15 PROCEDURE — 3080F PR MOST RECENT DIASTOLIC BLOOD PRESSURE >= 90 MM HG: ICD-10-PCS | Mod: CPTII,S$GLB,, | Performed by: UROLOGY

## 2021-03-15 PROCEDURE — 96372 THER/PROPH/DIAG INJ SC/IM: CPT | Mod: S$GLB,,, | Performed by: UROLOGY

## 2021-03-15 PROCEDURE — 99499 UNLISTED E&M SERVICE: CPT | Mod: S$GLB,,, | Performed by: UROLOGY

## 2021-03-15 PROCEDURE — 99999 PR PBB SHADOW E&M-EST. PATIENT-LVL III: ICD-10-PCS | Mod: PBBFAC,,, | Performed by: UROLOGY

## 2021-03-15 PROCEDURE — 3080F DIAST BP >= 90 MM HG: CPT | Mod: CPTII,S$GLB,, | Performed by: UROLOGY

## 2021-03-15 PROCEDURE — 3077F PR MOST RECENT SYSTOLIC BLOOD PRESSURE >= 140 MM HG: ICD-10-PCS | Mod: CPTII,S$GLB,, | Performed by: UROLOGY

## 2021-03-22 RX ORDER — ZOLPIDEM TARTRATE 10 MG/1
10 TABLET ORAL NIGHTLY PRN
Qty: 30 TABLET | Refills: 5 | Status: SHIPPED | OUTPATIENT
Start: 2021-03-22 | End: 2021-10-11

## 2021-04-15 ENCOUNTER — PATIENT MESSAGE (OUTPATIENT)
Dept: INTERNAL MEDICINE | Facility: CLINIC | Age: 61
End: 2021-04-15

## 2021-04-15 RX ORDER — LISINOPRIL AND HYDROCHLOROTHIAZIDE 12.5; 2 MG/1; MG/1
TABLET ORAL
Qty: 90 TABLET | Refills: 3 | Status: SHIPPED | OUTPATIENT
Start: 2021-04-15 | End: 2022-05-17 | Stop reason: SDUPTHER

## 2021-04-21 ENCOUNTER — PATIENT MESSAGE (OUTPATIENT)
Dept: UROLOGY | Facility: CLINIC | Age: 61
End: 2021-04-21

## 2021-04-22 ENCOUNTER — TELEPHONE (OUTPATIENT)
Dept: UROLOGY | Facility: CLINIC | Age: 61
End: 2021-04-22

## 2021-04-23 ENCOUNTER — PATIENT OUTREACH (OUTPATIENT)
Dept: ADMINISTRATIVE | Facility: OTHER | Age: 61
End: 2021-04-23

## 2021-04-26 ENCOUNTER — HOSPITAL ENCOUNTER (OUTPATIENT)
Dept: RADIOLOGY | Facility: HOSPITAL | Age: 61
Discharge: HOME | End: 2021-04-26
Attending: ORTHOPAEDIC SURGERY
Payer: COMMERCIAL

## 2021-04-26 ENCOUNTER — OFFICE VISIT (OUTPATIENT)
Dept: ORTHOPEDICS | Facility: CLINIC | Age: 61
End: 2021-04-26
Payer: COMMERCIAL

## 2021-04-26 VITALS — HEIGHT: 76 IN | BODY MASS INDEX: 33.81 KG/M2 | WEIGHT: 277.63 LBS

## 2021-04-26 DIAGNOSIS — M25.561 PAIN IN BOTH KNEES, UNSPECIFIED CHRONICITY: ICD-10-CM

## 2021-04-26 DIAGNOSIS — M17.12 PRIMARY OSTEOARTHRITIS OF LEFT KNEE: Primary | ICD-10-CM

## 2021-04-26 DIAGNOSIS — M17.11 PRIMARY OSTEOARTHRITIS OF RIGHT KNEE: ICD-10-CM

## 2021-04-26 DIAGNOSIS — M25.562 PAIN IN BOTH KNEES, UNSPECIFIED CHRONICITY: Primary | ICD-10-CM

## 2021-04-26 DIAGNOSIS — M25.561 PAIN IN BOTH KNEES, UNSPECIFIED CHRONICITY: Primary | ICD-10-CM

## 2021-04-26 DIAGNOSIS — M25.562 PAIN IN BOTH KNEES, UNSPECIFIED CHRONICITY: ICD-10-CM

## 2021-04-26 PROCEDURE — 99203 PR OFFICE/OUTPT VISIT, NEW, LEVL III, 30-44 MIN: ICD-10-PCS | Mod: S$GLB,,, | Performed by: ORTHOPAEDIC SURGERY

## 2021-04-26 PROCEDURE — 99999 PR PBB SHADOW E&M-EST. PATIENT-LVL III: CPT | Mod: PBBFAC,,, | Performed by: ORTHOPAEDIC SURGERY

## 2021-04-26 PROCEDURE — 99203 OFFICE O/P NEW LOW 30 MIN: CPT | Mod: S$GLB,,, | Performed by: ORTHOPAEDIC SURGERY

## 2021-04-26 PROCEDURE — 1125F AMNT PAIN NOTED PAIN PRSNT: CPT | Mod: S$GLB,,, | Performed by: ORTHOPAEDIC SURGERY

## 2021-04-26 PROCEDURE — 3008F BODY MASS INDEX DOCD: CPT | Mod: CPTII,S$GLB,, | Performed by: ORTHOPAEDIC SURGERY

## 2021-04-26 PROCEDURE — 73562 XR KNEE ORTHO BILAT: ICD-10-PCS | Mod: 26,50,, | Performed by: RADIOLOGY

## 2021-04-26 PROCEDURE — 73562 X-RAY EXAM OF KNEE 3: CPT | Mod: TC,50

## 2021-04-26 PROCEDURE — 1125F PR PAIN SEVERITY QUANTIFIED, PAIN PRESENT: ICD-10-PCS | Mod: S$GLB,,, | Performed by: ORTHOPAEDIC SURGERY

## 2021-04-26 PROCEDURE — 73562 X-RAY EXAM OF KNEE 3: CPT | Mod: 26,50,, | Performed by: RADIOLOGY

## 2021-04-26 PROCEDURE — 99999 PR PBB SHADOW E&M-EST. PATIENT-LVL III: ICD-10-PCS | Mod: PBBFAC,,, | Performed by: ORTHOPAEDIC SURGERY

## 2021-04-26 PROCEDURE — 3008F PR BODY MASS INDEX (BMI) DOCUMENTED: ICD-10-PCS | Mod: CPTII,S$GLB,, | Performed by: ORTHOPAEDIC SURGERY

## 2021-04-28 ENCOUNTER — HOSPITAL ENCOUNTER (OUTPATIENT)
Dept: RADIOLOGY | Facility: HOSPITAL | Age: 61
Discharge: HOME | End: 2021-04-28
Attending: ORTHOPAEDIC SURGERY
Payer: COMMERCIAL

## 2021-04-28 ENCOUNTER — OFFICE VISIT (OUTPATIENT)
Dept: ORTHOPEDICS | Facility: CLINIC | Age: 61
End: 2021-04-28
Payer: COMMERCIAL

## 2021-04-28 DIAGNOSIS — M79.671 RIGHT FOOT PAIN: ICD-10-CM

## 2021-04-28 DIAGNOSIS — M77.42 METATARSALGIA OF BOTH FEET: ICD-10-CM

## 2021-04-28 DIAGNOSIS — M67.01 TIGHT HEEL CORDS, ACQUIRED, BILATERAL: ICD-10-CM

## 2021-04-28 DIAGNOSIS — M77.41 METATARSALGIA OF BOTH FEET: ICD-10-CM

## 2021-04-28 DIAGNOSIS — M67.02 TIGHT HEEL CORDS, ACQUIRED, BILATERAL: ICD-10-CM

## 2021-04-28 DIAGNOSIS — M79.671 RIGHT FOOT PAIN: Primary | ICD-10-CM

## 2021-04-28 PROCEDURE — 73630 XR FOOT COMPLETE 3 VIEW BILATERAL: ICD-10-PCS | Mod: 26,50,, | Performed by: RADIOLOGY

## 2021-04-28 PROCEDURE — 99214 OFFICE O/P EST MOD 30 MIN: CPT | Mod: S$GLB,,, | Performed by: ORTHOPAEDIC SURGERY

## 2021-04-28 PROCEDURE — 99999 PR PBB SHADOW E&M-EST. PATIENT-LVL II: CPT | Mod: PBBFAC,,, | Performed by: ORTHOPAEDIC SURGERY

## 2021-04-28 PROCEDURE — 99214 PR OFFICE/OUTPT VISIT, EST, LEVL IV, 30-39 MIN: ICD-10-PCS | Mod: S$GLB,,, | Performed by: ORTHOPAEDIC SURGERY

## 2021-04-28 PROCEDURE — 73630 X-RAY EXAM OF FOOT: CPT | Mod: TC,50

## 2021-04-28 PROCEDURE — 73630 X-RAY EXAM OF FOOT: CPT | Mod: 26,50,, | Performed by: RADIOLOGY

## 2021-04-28 PROCEDURE — 99999 PR PBB SHADOW E&M-EST. PATIENT-LVL II: ICD-10-PCS | Mod: PBBFAC,,, | Performed by: ORTHOPAEDIC SURGERY

## 2021-05-09 ENCOUNTER — PATIENT MESSAGE (OUTPATIENT)
Dept: UROLOGY | Facility: CLINIC | Age: 61
End: 2021-05-09

## 2021-05-24 ENCOUNTER — OFFICE VISIT (OUTPATIENT)
Dept: INTERNAL MEDICINE | Facility: CLINIC | Age: 61
End: 2021-05-24
Payer: COMMERCIAL

## 2021-05-24 ENCOUNTER — LAB VISIT (OUTPATIENT)
Dept: LAB | Facility: HOSPITAL | Age: 61
End: 2021-05-24
Attending: INTERNAL MEDICINE
Payer: COMMERCIAL

## 2021-05-24 ENCOUNTER — PATIENT MESSAGE (OUTPATIENT)
Dept: UROLOGY | Facility: CLINIC | Age: 61
End: 2021-05-24

## 2021-05-24 ENCOUNTER — OFFICE VISIT (OUTPATIENT)
Dept: UROLOGY | Facility: CLINIC | Age: 61
End: 2021-05-24
Payer: COMMERCIAL

## 2021-05-24 VITALS
WEIGHT: 274.5 LBS | HEART RATE: 71 BPM | SYSTOLIC BLOOD PRESSURE: 118 MMHG | HEIGHT: 76 IN | OXYGEN SATURATION: 98 % | DIASTOLIC BLOOD PRESSURE: 72 MMHG | BODY MASS INDEX: 33.43 KG/M2

## 2021-05-24 VITALS
HEART RATE: 71 BPM | SYSTOLIC BLOOD PRESSURE: 118 MMHG | BODY MASS INDEX: 31.81 KG/M2 | HEIGHT: 78 IN | DIASTOLIC BLOOD PRESSURE: 72 MMHG | WEIGHT: 274.94 LBS

## 2021-05-24 DIAGNOSIS — Z00.00 ANNUAL PHYSICAL EXAM: Primary | ICD-10-CM

## 2021-05-24 DIAGNOSIS — E78.2 MIXED HYPERLIPIDEMIA: ICD-10-CM

## 2021-05-24 DIAGNOSIS — I10 ESSENTIAL HYPERTENSION: ICD-10-CM

## 2021-05-24 DIAGNOSIS — R07.9 CHEST PAIN, UNSPECIFIED TYPE: ICD-10-CM

## 2021-05-24 DIAGNOSIS — N40.1 BPH WITH URINARY OBSTRUCTION: ICD-10-CM

## 2021-05-24 DIAGNOSIS — Z82.49 FAMILY HISTORY OF EARLY CAD: ICD-10-CM

## 2021-05-24 DIAGNOSIS — E29.1 MALE HYPOGONADISM: ICD-10-CM

## 2021-05-24 DIAGNOSIS — N13.8 BPH WITH URINARY OBSTRUCTION: ICD-10-CM

## 2021-05-24 DIAGNOSIS — E29.1 MALE HYPOGONADISM: Primary | ICD-10-CM

## 2021-05-24 DIAGNOSIS — N52.01 ERECTILE DYSFUNCTION DUE TO ARTERIAL INSUFFICIENCY: ICD-10-CM

## 2021-05-24 DIAGNOSIS — Z00.00 ANNUAL PHYSICAL EXAM: ICD-10-CM

## 2021-05-24 LAB
ANION GAP SERPL CALC-SCNC: 10 MMOL/L (ref 8–16)
BUN SERPL-MCNC: 22 MG/DL (ref 8–23)
CALCIUM SERPL-MCNC: 10 MG/DL (ref 8.7–10.5)
CHLORIDE SERPL-SCNC: 105 MMOL/L (ref 95–110)
CO2 SERPL-SCNC: 24 MMOL/L (ref 23–29)
CREAT SERPL-MCNC: 1 MG/DL (ref 0.5–1.4)
EST. GFR  (AFRICAN AMERICAN): >60 ML/MIN/1.73 M^2
EST. GFR  (NON AFRICAN AMERICAN): >60 ML/MIN/1.73 M^2
GLUCOSE SERPL-MCNC: 93 MG/DL (ref 70–110)
POTASSIUM SERPL-SCNC: 4.2 MMOL/L (ref 3.5–5.1)
SODIUM SERPL-SCNC: 139 MMOL/L (ref 136–145)

## 2021-05-24 PROCEDURE — 96372 PR INJECTION,THERAP/PROPH/DIAG2ST, IM OR SUBCUT: ICD-10-PCS | Mod: S$GLB,,, | Performed by: UROLOGY

## 2021-05-24 PROCEDURE — 3008F PR BODY MASS INDEX (BMI) DOCUMENTED: ICD-10-PCS | Mod: CPTII,S$GLB,, | Performed by: UROLOGY

## 2021-05-24 PROCEDURE — 1126F AMNT PAIN NOTED NONE PRSNT: CPT | Mod: S$GLB,,, | Performed by: UROLOGY

## 2021-05-24 PROCEDURE — 3008F BODY MASS INDEX DOCD: CPT | Mod: CPTII,S$GLB,, | Performed by: INTERNAL MEDICINE

## 2021-05-24 PROCEDURE — 99999 PR PBB SHADOW E&M-EST. PATIENT-LVL III: ICD-10-PCS | Mod: PBBFAC,,, | Performed by: INTERNAL MEDICINE

## 2021-05-24 PROCEDURE — 1126F PR PAIN SEVERITY QUANTIFIED, NO PAIN PRESENT: ICD-10-PCS | Mod: S$GLB,,, | Performed by: UROLOGY

## 2021-05-24 PROCEDURE — 36415 COLL VENOUS BLD VENIPUNCTURE: CPT | Performed by: INTERNAL MEDICINE

## 2021-05-24 PROCEDURE — 99396 PREV VISIT EST AGE 40-64: CPT | Mod: S$GLB,,, | Performed by: INTERNAL MEDICINE

## 2021-05-24 PROCEDURE — 1126F AMNT PAIN NOTED NONE PRSNT: CPT | Mod: S$GLB,,, | Performed by: INTERNAL MEDICINE

## 2021-05-24 PROCEDURE — 3008F BODY MASS INDEX DOCD: CPT | Mod: CPTII,S$GLB,, | Performed by: UROLOGY

## 2021-05-24 PROCEDURE — 99999 PR PBB SHADOW E&M-EST. PATIENT-LVL III: ICD-10-PCS | Mod: PBBFAC,,, | Performed by: UROLOGY

## 2021-05-24 PROCEDURE — 80048 BASIC METABOLIC PNL TOTAL CA: CPT | Performed by: INTERNAL MEDICINE

## 2021-05-24 PROCEDURE — 99396 PR PREVENTIVE VISIT,EST,40-64: ICD-10-PCS | Mod: S$GLB,,, | Performed by: INTERNAL MEDICINE

## 2021-05-24 PROCEDURE — 99214 PR OFFICE/OUTPT VISIT, EST, LEVL IV, 30-39 MIN: ICD-10-PCS | Mod: 25,S$GLB,, | Performed by: UROLOGY

## 2021-05-24 PROCEDURE — 3008F PR BODY MASS INDEX (BMI) DOCUMENTED: ICD-10-PCS | Mod: CPTII,S$GLB,, | Performed by: INTERNAL MEDICINE

## 2021-05-24 PROCEDURE — 99214 OFFICE O/P EST MOD 30 MIN: CPT | Mod: 25,S$GLB,, | Performed by: UROLOGY

## 2021-05-24 PROCEDURE — 99999 PR PBB SHADOW E&M-EST. PATIENT-LVL III: CPT | Mod: PBBFAC,,, | Performed by: INTERNAL MEDICINE

## 2021-05-24 PROCEDURE — 99999 PR PBB SHADOW E&M-EST. PATIENT-LVL III: CPT | Mod: PBBFAC,,, | Performed by: UROLOGY

## 2021-05-24 PROCEDURE — 96372 THER/PROPH/DIAG INJ SC/IM: CPT | Mod: S$GLB,,, | Performed by: UROLOGY

## 2021-05-24 PROCEDURE — 1126F PR PAIN SEVERITY QUANTIFIED, NO PAIN PRESENT: ICD-10-PCS | Mod: S$GLB,,, | Performed by: INTERNAL MEDICINE

## 2021-05-24 RX ORDER — SILDENAFIL CITRATE 20 MG/1
TABLET ORAL
Qty: 50 TABLET | Refills: 11 | Status: SHIPPED | OUTPATIENT
Start: 2021-05-24 | End: 2021-10-11 | Stop reason: SDUPTHER

## 2021-05-27 ENCOUNTER — HOSPITAL ENCOUNTER (OUTPATIENT)
Dept: RADIOLOGY | Facility: HOSPITAL | Age: 61
Discharge: HOME | End: 2021-05-27
Attending: INTERNAL MEDICINE
Payer: COMMERCIAL

## 2021-05-27 ENCOUNTER — DOCUMENTATION ONLY (OUTPATIENT)
Dept: INTERNAL MEDICINE | Facility: CLINIC | Age: 61
End: 2021-05-27

## 2021-05-27 ENCOUNTER — PATIENT MESSAGE (OUTPATIENT)
Dept: INTERNAL MEDICINE | Facility: CLINIC | Age: 61
End: 2021-05-27

## 2021-05-27 DIAGNOSIS — R07.9 CHEST PAIN, UNSPECIFIED TYPE: ICD-10-CM

## 2021-05-27 DIAGNOSIS — R93.1 AGATSTON CORONARY ARTERY CALCIUM SCORE GREATER THAN 400: Primary | ICD-10-CM

## 2021-05-27 DIAGNOSIS — Z82.49 FAMILY HISTORY OF EARLY CAD: ICD-10-CM

## 2021-05-27 DIAGNOSIS — E78.2 MIXED HYPERLIPIDEMIA: ICD-10-CM

## 2021-05-27 PROCEDURE — 75571 CT CALCIUM SCORING CARDIAC: ICD-10-PCS | Mod: 26,,, | Performed by: RADIOLOGY

## 2021-05-27 PROCEDURE — 75571 CT HRT W/O DYE W/CA TEST: CPT | Mod: 26,,, | Performed by: RADIOLOGY

## 2021-05-27 PROCEDURE — 75571 CT HRT W/O DYE W/CA TEST: CPT | Mod: TC

## 2021-05-28 ENCOUNTER — PATIENT MESSAGE (OUTPATIENT)
Dept: CARDIOLOGY | Facility: CLINIC | Age: 61
End: 2021-05-28

## 2021-05-28 ENCOUNTER — TELEPHONE (OUTPATIENT)
Dept: CARDIOLOGY | Facility: CLINIC | Age: 61
End: 2021-05-28

## 2021-05-31 ENCOUNTER — PATIENT OUTREACH (OUTPATIENT)
Dept: ADMINISTRATIVE | Facility: OTHER | Age: 61
End: 2021-05-31

## 2021-06-01 ENCOUNTER — TELEPHONE (OUTPATIENT)
Dept: CARDIOLOGY | Facility: CLINIC | Age: 61
End: 2021-06-01

## 2021-06-02 ENCOUNTER — PATIENT MESSAGE (OUTPATIENT)
Dept: CARDIOLOGY | Facility: CLINIC | Age: 61
End: 2021-06-02

## 2021-06-02 ENCOUNTER — LAB VISIT (OUTPATIENT)
Dept: LAB | Facility: HOSPITAL | Age: 61
End: 2021-06-02
Attending: INTERNAL MEDICINE
Payer: COMMERCIAL

## 2021-06-02 ENCOUNTER — OFFICE VISIT (OUTPATIENT)
Dept: CARDIOLOGY | Facility: CLINIC | Age: 61
End: 2021-06-02
Payer: COMMERCIAL

## 2021-06-02 VITALS
BODY MASS INDEX: 32.52 KG/M2 | SYSTOLIC BLOOD PRESSURE: 136 MMHG | OXYGEN SATURATION: 98 % | DIASTOLIC BLOOD PRESSURE: 89 MMHG | HEART RATE: 70 BPM | WEIGHT: 275.38 LBS | HEIGHT: 77 IN

## 2021-06-02 DIAGNOSIS — R93.1 AGATSTON CORONARY ARTERY CALCIUM SCORE GREATER THAN 400: ICD-10-CM

## 2021-06-02 DIAGNOSIS — I20.89 OTHER FORMS OF ANGINA PECTORIS: ICD-10-CM

## 2021-06-02 DIAGNOSIS — E78.2 MIXED HYPERLIPIDEMIA: ICD-10-CM

## 2021-06-02 DIAGNOSIS — R93.1 AGATSTON CORONARY ARTERY CALCIUM SCORE GREATER THAN 400: Primary | ICD-10-CM

## 2021-06-02 DIAGNOSIS — I25.118 CORONARY ARTERY DISEASE INVOLVING NATIVE CORONARY ARTERY OF NATIVE HEART WITH OTHER FORM OF ANGINA PECTORIS: ICD-10-CM

## 2021-06-02 DIAGNOSIS — I10 ESSENTIAL HYPERTENSION: ICD-10-CM

## 2021-06-02 LAB
CHOLEST SERPL-MCNC: 141 MG/DL (ref 120–199)
CHOLEST/HDLC SERPL: 2.9 {RATIO} (ref 2–5)
HDLC SERPL-MCNC: 49 MG/DL (ref 40–75)
HDLC SERPL: 34.8 % (ref 20–50)
LDLC SERPL CALC-MCNC: 62.8 MG/DL (ref 63–159)
NONHDLC SERPL-MCNC: 92 MG/DL
TRIGL SERPL-MCNC: 146 MG/DL (ref 30–150)

## 2021-06-02 PROCEDURE — 99204 OFFICE O/P NEW MOD 45 MIN: CPT | Mod: 25,S$GLB,, | Performed by: INTERNAL MEDICINE

## 2021-06-02 PROCEDURE — 1126F PR PAIN SEVERITY QUANTIFIED, NO PAIN PRESENT: ICD-10-PCS | Mod: S$GLB,,, | Performed by: INTERNAL MEDICINE

## 2021-06-02 PROCEDURE — 3008F BODY MASS INDEX DOCD: CPT | Mod: CPTII,S$GLB,, | Performed by: INTERNAL MEDICINE

## 2021-06-02 PROCEDURE — 80061 LIPID PANEL: CPT | Performed by: INTERNAL MEDICINE

## 2021-06-02 PROCEDURE — 99999 PR PBB SHADOW E&M-EST. PATIENT-LVL IV: ICD-10-PCS | Mod: PBBFAC,,, | Performed by: INTERNAL MEDICINE

## 2021-06-02 PROCEDURE — 99204 PR OFFICE/OUTPT VISIT, NEW, LEVL IV, 45-59 MIN: ICD-10-PCS | Mod: 25,S$GLB,, | Performed by: INTERNAL MEDICINE

## 2021-06-02 PROCEDURE — 1126F AMNT PAIN NOTED NONE PRSNT: CPT | Mod: S$GLB,,, | Performed by: INTERNAL MEDICINE

## 2021-06-02 PROCEDURE — 36415 COLL VENOUS BLD VENIPUNCTURE: CPT | Mod: PN | Performed by: INTERNAL MEDICINE

## 2021-06-02 PROCEDURE — 93000 EKG 12-LEAD: ICD-10-PCS | Mod: S$GLB,,, | Performed by: INTERNAL MEDICINE

## 2021-06-02 PROCEDURE — 93000 ELECTROCARDIOGRAM COMPLETE: CPT | Mod: S$GLB,,, | Performed by: INTERNAL MEDICINE

## 2021-06-02 PROCEDURE — 3008F PR BODY MASS INDEX (BMI) DOCUMENTED: ICD-10-PCS | Mod: CPTII,S$GLB,, | Performed by: INTERNAL MEDICINE

## 2021-06-02 PROCEDURE — 99999 PR PBB SHADOW E&M-EST. PATIENT-LVL IV: CPT | Mod: PBBFAC,,, | Performed by: INTERNAL MEDICINE

## 2021-06-02 RX ORDER — CLOPIDOGREL BISULFATE 75 MG/1
75 TABLET ORAL DAILY
Qty: 90 TABLET | Refills: 3 | Status: SHIPPED | OUTPATIENT
Start: 2021-06-02 | End: 2021-06-21

## 2021-06-03 ENCOUNTER — PATIENT MESSAGE (OUTPATIENT)
Dept: CARDIOLOGY | Facility: CLINIC | Age: 61
End: 2021-06-03

## 2021-06-10 ENCOUNTER — PATIENT MESSAGE (OUTPATIENT)
Dept: CARDIOLOGY | Facility: CLINIC | Age: 61
End: 2021-06-10

## 2021-06-11 ENCOUNTER — HOSPITAL ENCOUNTER (OUTPATIENT)
Dept: RADIOLOGY | Facility: OTHER | Age: 61
Discharge: HOME | End: 2021-06-11
Attending: INTERNAL MEDICINE
Payer: COMMERCIAL

## 2021-06-11 ENCOUNTER — HOSPITAL ENCOUNTER (OUTPATIENT)
Dept: CARDIOLOGY | Facility: OTHER | Age: 61
Discharge: HOME | End: 2021-06-11
Attending: INTERNAL MEDICINE
Payer: COMMERCIAL

## 2021-06-11 VITALS
WEIGHT: 275 LBS | HEIGHT: 77 IN | SYSTOLIC BLOOD PRESSURE: 136 MMHG | DIASTOLIC BLOOD PRESSURE: 89 MMHG | BODY MASS INDEX: 32.47 KG/M2

## 2021-06-11 VITALS
BODY MASS INDEX: 32.47 KG/M2 | SYSTOLIC BLOOD PRESSURE: 148 MMHG | WEIGHT: 275 LBS | HEART RATE: 52 BPM | HEIGHT: 77 IN | DIASTOLIC BLOOD PRESSURE: 83 MMHG

## 2021-06-11 DIAGNOSIS — I20.89 OTHER FORMS OF ANGINA PECTORIS: ICD-10-CM

## 2021-06-11 DIAGNOSIS — R93.1 AGATSTON CORONARY ARTERY CALCIUM SCORE GREATER THAN 400: ICD-10-CM

## 2021-06-11 DIAGNOSIS — I25.118 CORONARY ARTERY DISEASE INVOLVING NATIVE CORONARY ARTERY OF NATIVE HEART WITH OTHER FORM OF ANGINA PECTORIS: ICD-10-CM

## 2021-06-11 DIAGNOSIS — E78.2 MIXED HYPERLIPIDEMIA: ICD-10-CM

## 2021-06-11 DIAGNOSIS — I10 ESSENTIAL HYPERTENSION: ICD-10-CM

## 2021-06-11 LAB
ASCENDING AORTA: 3.4 CM
AV INDEX (PROSTH): 0.96
AV MEAN GRADIENT: 3 MMHG
AV PEAK GRADIENT: 6 MMHG
AV VALVE AREA: 3.5 CM2
AV VELOCITY RATIO: 0.99
BSA FOR ECHO PROCEDURE: 2.6 M2
CV ECHO LV RWT: 0.39 CM
CV STRESS BASE HR: 52 BPM
DIASTOLIC BLOOD PRESSURE: 83 MMHG
DOP CALC AO PEAK VEL: 1.24 M/S
DOP CALC AO VTI: 26.64 CM
DOP CALC LVOT AREA: 3.6 CM2
DOP CALC LVOT DIAMETER: 2.15 CM
DOP CALC LVOT PEAK VEL: 1.23 M/S
DOP CALC LVOT STROKE VOLUME: 93.26 CM3
DOP CALCLVOT PEAK VEL VTI: 25.7 CM
E WAVE DECELERATION TIME: 148.22 MSEC
E/A RATIO: 1.78
E/E' RATIO: 10.47 M/S
ECHO LV POSTERIOR WALL: 0.9 CM (ref 0.6–1.1)
EJECTION FRACTION: 65 %
FRACTIONAL SHORTENING: 37 % (ref 28–44)
INTERVENTRICULAR SEPTUM: 0.85 CM (ref 0.6–1.1)
LA MAJOR: 6.06 CM
LA MINOR: 5.77 CM
LA WIDTH: 4.91 CM
LEFT ATRIUM SIZE: 3.9 CM
LEFT ATRIUM VOLUME INDEX: 37.6 ML/M2
LEFT ATRIUM VOLUME: 96.22 CM3
LEFT INTERNAL DIMENSION IN SYSTOLE: 2.92 CM (ref 2.1–4)
LEFT VENTRICLE DIASTOLIC VOLUME INDEX: 38.63 ML/M2
LEFT VENTRICLE DIASTOLIC VOLUME: 98.89 ML
LEFT VENTRICLE MASS INDEX: 52 G/M2
LEFT VENTRICLE SYSTOLIC VOLUME INDEX: 12.8 ML/M2
LEFT VENTRICLE SYSTOLIC VOLUME: 32.86 ML
LEFT VENTRICULAR INTERNAL DIMENSION IN DIASTOLE: 4.63 CM (ref 3.5–6)
LEFT VENTRICULAR MASS: 134.09 G
LV LATERAL E/E' RATIO: 8.9 M/S
LV SEPTAL E/E' RATIO: 12.71 M/S
MV PEAK A VEL: 0.5 M/S
MV PEAK E VEL: 0.89 M/S
MV STENOSIS PRESSURE HALF TIME: 42.98 MS
MV VALVE AREA P 1/2 METHOD: 5.12 CM2
OHS CV CPX 1 MINUTE RECOVERY HEART RATE: 127 BPM
OHS CV CPX 85 PERCENT MAX PREDICTED HEART RATE MALE: 135
OHS CV CPX ESTIMATED METS: 13
OHS CV CPX MAX PREDICTED HEART RATE: 159
OHS CV CPX PATIENT IS FEMALE: 0
OHS CV CPX PATIENT IS MALE: 1
OHS CV CPX PEAK DIASTOLIC BLOOD PRESSURE: 93 MMHG
OHS CV CPX PEAK HEAR RATE: 150 BPM
OHS CV CPX PEAK RATE PRESSURE PRODUCT: NORMAL
OHS CV CPX PEAK SYSTOLIC BLOOD PRESSURE: 234 MMHG
OHS CV CPX PERCENT MAX PREDICTED HEART RATE ACHIEVED: 94
OHS CV CPX RATE PRESSURE PRODUCT PRESENTING: 7696
PISA MRMAX VEL: 0.05 M/S
PISA TR MAX VEL: 2.46 M/S
PV PEAK VELOCITY: 1.06 CM/S
RA MAJOR: 4.81 CM
RA PRESSURE: 3 MMHG
RIGHT VENTRICULAR END-DIASTOLIC DIMENSION: 3.58 CM
SINUS: 3.86 CM
STJ: 3.47 CM
STRESS ECHO POST EXERCISE DUR MIN: 9 MINUTES
STRESS ECHO POST EXERCISE DUR SEC: 45 SECONDS
SYSTOLIC BLOOD PRESSURE: 148 MMHG
TDI LATERAL: 0.1 M/S
TDI SEPTAL: 0.07 M/S
TDI: 0.09 M/S
TR MAX PG: 24 MMHG
TRICUSPID ANNULAR PLANE SYSTOLIC EXCURSION: 2.1 CM
TV REST PULMONARY ARTERY PRESSURE: 27 MMHG

## 2021-06-11 PROCEDURE — 78452 HT MUSCLE IMAGE SPECT MULT: CPT | Mod: 26,,, | Performed by: RADIOLOGY

## 2021-06-11 PROCEDURE — 93017 CV STRESS TEST TRACING ONLY: CPT

## 2021-06-11 PROCEDURE — 93306 TTE W/DOPPLER COMPLETE: CPT

## 2021-06-11 PROCEDURE — 78452 NM MYOCARDIAL PERFUSION SPECT MULTI STUDY: ICD-10-PCS | Mod: 26,,, | Performed by: RADIOLOGY

## 2021-06-11 PROCEDURE — 93306 TTE W/DOPPLER COMPLETE: CPT | Mod: 26,,, | Performed by: INTERNAL MEDICINE

## 2021-06-11 PROCEDURE — 93018 NUCLEAR STRESS TEST (CUPID ONLY): ICD-10-PCS | Mod: ,,, | Performed by: INTERNAL MEDICINE

## 2021-06-11 PROCEDURE — 93016 CV STRESS TEST SUPVJ ONLY: CPT | Mod: ,,, | Performed by: INTERNAL MEDICINE

## 2021-06-11 PROCEDURE — A9500 TC99M SESTAMIBI: HCPCS

## 2021-06-11 PROCEDURE — 78452 HT MUSCLE IMAGE SPECT MULT: CPT | Mod: TC

## 2021-06-11 PROCEDURE — 93018 CV STRESS TEST I&R ONLY: CPT | Mod: ,,, | Performed by: INTERNAL MEDICINE

## 2021-06-11 PROCEDURE — 93306 ECHO (CUPID ONLY): ICD-10-PCS | Mod: 26,,, | Performed by: INTERNAL MEDICINE

## 2021-06-11 PROCEDURE — 93016 NUCLEAR STRESS TEST (CUPID ONLY): ICD-10-PCS | Mod: ,,, | Performed by: INTERNAL MEDICINE

## 2021-06-18 ENCOUNTER — PATIENT MESSAGE (OUTPATIENT)
Dept: CARDIOLOGY | Facility: CLINIC | Age: 61
End: 2021-06-18

## 2021-07-07 ENCOUNTER — OFFICE VISIT (OUTPATIENT)
Dept: ORTHOPEDICS | Facility: CLINIC | Age: 61
End: 2021-07-07
Payer: COMMERCIAL

## 2021-07-07 ENCOUNTER — PATIENT OUTREACH (OUTPATIENT)
Dept: ADMINISTRATIVE | Facility: OTHER | Age: 61
End: 2021-07-07

## 2021-07-07 VITALS — BODY MASS INDEX: 32.93 KG/M2 | HEIGHT: 77 IN | WEIGHT: 278.88 LBS

## 2021-07-07 DIAGNOSIS — M17.12 PRIMARY OSTEOARTHRITIS OF LEFT KNEE: Primary | ICD-10-CM

## 2021-07-07 DIAGNOSIS — M17.11 PRIMARY OSTEOARTHRITIS OF RIGHT KNEE: ICD-10-CM

## 2021-07-07 PROCEDURE — 99213 PR OFFICE/OUTPT VISIT, EST, LEVL III, 20-29 MIN: ICD-10-PCS | Mod: S$GLB,,, | Performed by: ORTHOPAEDIC SURGERY

## 2021-07-07 PROCEDURE — 99999 PR PBB SHADOW E&M-EST. PATIENT-LVL III: ICD-10-PCS | Mod: PBBFAC,,, | Performed by: ORTHOPAEDIC SURGERY

## 2021-07-07 PROCEDURE — 99999 PR PBB SHADOW E&M-EST. PATIENT-LVL III: CPT | Mod: PBBFAC,,, | Performed by: ORTHOPAEDIC SURGERY

## 2021-07-07 PROCEDURE — 3008F BODY MASS INDEX DOCD: CPT | Mod: CPTII,S$GLB,, | Performed by: ORTHOPAEDIC SURGERY

## 2021-07-07 PROCEDURE — 99213 OFFICE O/P EST LOW 20 MIN: CPT | Mod: S$GLB,,, | Performed by: ORTHOPAEDIC SURGERY

## 2021-07-07 PROCEDURE — 1125F AMNT PAIN NOTED PAIN PRSNT: CPT | Mod: S$GLB,,, | Performed by: ORTHOPAEDIC SURGERY

## 2021-07-07 PROCEDURE — 3008F PR BODY MASS INDEX (BMI) DOCUMENTED: ICD-10-PCS | Mod: CPTII,S$GLB,, | Performed by: ORTHOPAEDIC SURGERY

## 2021-07-07 PROCEDURE — 1125F PR PAIN SEVERITY QUANTIFIED, PAIN PRESENT: ICD-10-PCS | Mod: S$GLB,,, | Performed by: ORTHOPAEDIC SURGERY

## 2021-07-22 ENCOUNTER — OFFICE VISIT (OUTPATIENT)
Dept: ORTHOPEDICS | Facility: CLINIC | Age: 61
End: 2021-07-22
Payer: COMMERCIAL

## 2021-07-22 VITALS — WEIGHT: 276.56 LBS | BODY MASS INDEX: 32.66 KG/M2 | HEIGHT: 77 IN

## 2021-07-22 DIAGNOSIS — M17.11 PRIMARY OSTEOARTHRITIS OF RIGHT KNEE: ICD-10-CM

## 2021-07-22 DIAGNOSIS — M17.12 PRIMARY OSTEOARTHRITIS OF LEFT KNEE: Primary | ICD-10-CM

## 2021-07-22 PROCEDURE — 99499 UNLISTED E&M SERVICE: CPT | Mod: S$GLB,,, | Performed by: PHYSICIAN ASSISTANT

## 2021-07-22 PROCEDURE — 99999 PR PBB SHADOW E&M-EST. PATIENT-LVL III: CPT | Mod: PBBFAC,,, | Performed by: PHYSICIAN ASSISTANT

## 2021-07-22 PROCEDURE — 1125F PR PAIN SEVERITY QUANTIFIED, PAIN PRESENT: ICD-10-PCS | Mod: CPTII,S$GLB,, | Performed by: PHYSICIAN ASSISTANT

## 2021-07-22 PROCEDURE — 3008F BODY MASS INDEX DOCD: CPT | Mod: CPTII,S$GLB,, | Performed by: PHYSICIAN ASSISTANT

## 2021-07-22 PROCEDURE — 20610 DRAIN/INJ JOINT/BURSA W/O US: CPT | Mod: 50,S$GLB,, | Performed by: PHYSICIAN ASSISTANT

## 2021-07-22 PROCEDURE — 1125F AMNT PAIN NOTED PAIN PRSNT: CPT | Mod: CPTII,S$GLB,, | Performed by: PHYSICIAN ASSISTANT

## 2021-07-22 PROCEDURE — 3008F PR BODY MASS INDEX (BMI) DOCUMENTED: ICD-10-PCS | Mod: CPTII,S$GLB,, | Performed by: PHYSICIAN ASSISTANT

## 2021-07-22 PROCEDURE — 20610 PR DRAIN/INJECT LARGE JOINT/BURSA: ICD-10-PCS | Mod: 50,S$GLB,, | Performed by: PHYSICIAN ASSISTANT

## 2021-07-22 PROCEDURE — 99499 NO LOS: ICD-10-PCS | Mod: S$GLB,,, | Performed by: PHYSICIAN ASSISTANT

## 2021-07-22 PROCEDURE — 99999 PR PBB SHADOW E&M-EST. PATIENT-LVL III: ICD-10-PCS | Mod: PBBFAC,,, | Performed by: PHYSICIAN ASSISTANT

## 2021-07-27 ENCOUNTER — LAB VISIT (OUTPATIENT)
Dept: LAB | Facility: HOSPITAL | Age: 61
End: 2021-07-27
Payer: COMMERCIAL

## 2021-07-27 DIAGNOSIS — E29.1 MALE HYPOGONADISM: ICD-10-CM

## 2021-07-27 LAB
ALBUMIN SERPL BCP-MCNC: 3.9 G/DL (ref 3.5–5.2)
ALP SERPL-CCNC: 40 U/L (ref 55–135)
ALT SERPL W/O P-5'-P-CCNC: 37 U/L (ref 10–44)
AST SERPL-CCNC: 32 U/L (ref 10–40)
BASOPHILS # BLD AUTO: 0.07 K/UL (ref 0–0.2)
BASOPHILS NFR BLD: 1.3 % (ref 0–1.9)
BILIRUB DIRECT SERPL-MCNC: 0.2 MG/DL (ref 0.1–0.3)
BILIRUB SERPL-MCNC: 0.6 MG/DL (ref 0.1–1)
CHOLEST SERPL-MCNC: 134 MG/DL (ref 120–199)
CHOLEST/HDLC SERPL: 2.8 {RATIO} (ref 2–5)
DIFFERENTIAL METHOD: ABNORMAL
EOSINOPHIL # BLD AUTO: 0.8 K/UL (ref 0–0.5)
EOSINOPHIL NFR BLD: 15.6 % (ref 0–8)
ERYTHROCYTE [DISTWIDTH] IN BLOOD BY AUTOMATED COUNT: 12.1 % (ref 11.5–14.5)
HCT VFR BLD AUTO: 42.7 % (ref 40–54)
HDLC SERPL-MCNC: 48 MG/DL (ref 40–75)
HDLC SERPL: 35.8 % (ref 20–50)
HGB BLD-MCNC: 14.6 G/DL (ref 14–18)
IMM GRANULOCYTES # BLD AUTO: 0.01 K/UL (ref 0–0.04)
IMM GRANULOCYTES NFR BLD AUTO: 0.2 % (ref 0–0.5)
LDLC SERPL CALC-MCNC: 56.8 MG/DL (ref 63–159)
LYMPHOCYTES # BLD AUTO: 1.7 K/UL (ref 1–4.8)
LYMPHOCYTES NFR BLD: 33.1 % (ref 18–48)
MCH RBC QN AUTO: 33.1 PG (ref 27–31)
MCHC RBC AUTO-ENTMCNC: 34.2 G/DL (ref 32–36)
MCV RBC AUTO: 97 FL (ref 82–98)
MONOCYTES # BLD AUTO: 0.4 K/UL (ref 0.3–1)
MONOCYTES NFR BLD: 8.4 % (ref 4–15)
NEUTROPHILS # BLD AUTO: 2.2 K/UL (ref 1.8–7.7)
NEUTROPHILS NFR BLD: 41.4 % (ref 38–73)
NONHDLC SERPL-MCNC: 86 MG/DL
NRBC BLD-RTO: 0 /100 WBC
PLATELET # BLD AUTO: 165 K/UL (ref 150–450)
PMV BLD AUTO: 11.7 FL (ref 9.2–12.9)
PROT SERPL-MCNC: 6.5 G/DL (ref 6–8.4)
RBC # BLD AUTO: 4.41 M/UL (ref 4.6–6.2)
TRIGL SERPL-MCNC: 146 MG/DL (ref 30–150)
WBC # BLD AUTO: 5.25 K/UL (ref 3.9–12.7)

## 2021-07-27 PROCEDURE — 84403 ASSAY OF TOTAL TESTOSTERONE: CPT | Performed by: UROLOGY

## 2021-07-27 PROCEDURE — 80076 HEPATIC FUNCTION PANEL: CPT | Performed by: UROLOGY

## 2021-07-27 PROCEDURE — 80061 LIPID PANEL: CPT | Performed by: UROLOGY

## 2021-07-27 PROCEDURE — 84153 ASSAY OF PSA TOTAL: CPT | Performed by: UROLOGY

## 2021-07-27 PROCEDURE — 85025 COMPLETE CBC W/AUTO DIFF WBC: CPT | Performed by: UROLOGY

## 2021-07-27 PROCEDURE — 36415 COLL VENOUS BLD VENIPUNCTURE: CPT | Mod: PN | Performed by: UROLOGY

## 2021-07-28 LAB
COMPLEXED PSA SERPL-MCNC: 0.4 NG/ML (ref 0–4)
TESTOST SERPL-MCNC: 277 NG/DL (ref 304–1227)

## 2021-07-29 ENCOUNTER — PATIENT MESSAGE (OUTPATIENT)
Dept: CARDIOLOGY | Facility: CLINIC | Age: 61
End: 2021-07-29

## 2021-07-29 ENCOUNTER — PATIENT MESSAGE (OUTPATIENT)
Dept: INTERNAL MEDICINE | Facility: CLINIC | Age: 61
End: 2021-07-29

## 2021-07-29 ENCOUNTER — OFFICE VISIT (OUTPATIENT)
Dept: ORTHOPEDICS | Facility: CLINIC | Age: 61
End: 2021-07-29
Payer: COMMERCIAL

## 2021-07-29 VITALS — WEIGHT: 276 LBS | BODY MASS INDEX: 32.59 KG/M2 | HEIGHT: 77 IN

## 2021-07-29 DIAGNOSIS — M17.0 PRIMARY OSTEOARTHRITIS OF BOTH KNEES: Primary | ICD-10-CM

## 2021-07-29 PROCEDURE — 1125F AMNT PAIN NOTED PAIN PRSNT: CPT | Mod: CPTII,S$GLB,, | Performed by: PHYSICIAN ASSISTANT

## 2021-07-29 PROCEDURE — 99499 NO LOS: ICD-10-PCS | Mod: S$GLB,,, | Performed by: PHYSICIAN ASSISTANT

## 2021-07-29 PROCEDURE — 20610 PR DRAIN/INJECT LARGE JOINT/BURSA: ICD-10-PCS | Mod: 50,S$GLB,, | Performed by: PHYSICIAN ASSISTANT

## 2021-07-29 PROCEDURE — 1125F PR PAIN SEVERITY QUANTIFIED, PAIN PRESENT: ICD-10-PCS | Mod: CPTII,S$GLB,, | Performed by: PHYSICIAN ASSISTANT

## 2021-07-29 PROCEDURE — 3008F BODY MASS INDEX DOCD: CPT | Mod: CPTII,S$GLB,, | Performed by: PHYSICIAN ASSISTANT

## 2021-07-29 PROCEDURE — 20610 DRAIN/INJ JOINT/BURSA W/O US: CPT | Mod: 50,S$GLB,, | Performed by: PHYSICIAN ASSISTANT

## 2021-07-29 PROCEDURE — 1159F PR MEDICATION LIST DOCUMENTED IN MEDICAL RECORD: ICD-10-PCS | Mod: CPTII,S$GLB,, | Performed by: PHYSICIAN ASSISTANT

## 2021-07-29 PROCEDURE — 1159F MED LIST DOCD IN RCRD: CPT | Mod: CPTII,S$GLB,, | Performed by: PHYSICIAN ASSISTANT

## 2021-07-29 PROCEDURE — 99999 PR PBB SHADOW E&M-EST. PATIENT-LVL III: CPT | Mod: PBBFAC,,, | Performed by: PHYSICIAN ASSISTANT

## 2021-07-29 PROCEDURE — 1160F PR REVIEW ALL MEDS BY PRESCRIBER/CLIN PHARMACIST DOCUMENTED: ICD-10-PCS | Mod: CPTII,S$GLB,, | Performed by: PHYSICIAN ASSISTANT

## 2021-07-29 PROCEDURE — 1160F RVW MEDS BY RX/DR IN RCRD: CPT | Mod: CPTII,S$GLB,, | Performed by: PHYSICIAN ASSISTANT

## 2021-07-29 PROCEDURE — 99499 UNLISTED E&M SERVICE: CPT | Mod: S$GLB,,, | Performed by: PHYSICIAN ASSISTANT

## 2021-07-29 PROCEDURE — 99999 PR PBB SHADOW E&M-EST. PATIENT-LVL III: ICD-10-PCS | Mod: PBBFAC,,, | Performed by: PHYSICIAN ASSISTANT

## 2021-07-29 PROCEDURE — 3008F PR BODY MASS INDEX (BMI) DOCUMENTED: ICD-10-PCS | Mod: CPTII,S$GLB,, | Performed by: PHYSICIAN ASSISTANT

## 2021-08-02 ENCOUNTER — OFFICE VISIT (OUTPATIENT)
Dept: UROLOGY | Facility: CLINIC | Age: 61
End: 2021-08-02
Payer: COMMERCIAL

## 2021-08-02 VITALS
DIASTOLIC BLOOD PRESSURE: 85 MMHG | HEART RATE: 68 BPM | WEIGHT: 277 LBS | SYSTOLIC BLOOD PRESSURE: 133 MMHG | HEIGHT: 77 IN | BODY MASS INDEX: 32.71 KG/M2

## 2021-08-02 DIAGNOSIS — E29.1 PRIMARY MALE HYPOGONADISM: Primary | ICD-10-CM

## 2021-08-02 DIAGNOSIS — R53.83 FATIGUE, UNSPECIFIED TYPE: ICD-10-CM

## 2021-08-02 PROCEDURE — 3079F PR MOST RECENT DIASTOLIC BLOOD PRESSURE 80-89 MM HG: ICD-10-PCS | Mod: CPTII,S$GLB,, | Performed by: NURSE PRACTITIONER

## 2021-08-02 PROCEDURE — 3075F PR MOST RECENT SYSTOLIC BLOOD PRESS GE 130-139MM HG: ICD-10-PCS | Mod: CPTII,S$GLB,, | Performed by: NURSE PRACTITIONER

## 2021-08-02 PROCEDURE — 3075F SYST BP GE 130 - 139MM HG: CPT | Mod: CPTII,S$GLB,, | Performed by: NURSE PRACTITIONER

## 2021-08-02 PROCEDURE — 1159F MED LIST DOCD IN RCRD: CPT | Mod: CPTII,S$GLB,, | Performed by: NURSE PRACTITIONER

## 2021-08-02 PROCEDURE — 99999 PR PBB SHADOW E&M-EST. PATIENT-LVL III: CPT | Mod: PBBFAC,,, | Performed by: NURSE PRACTITIONER

## 2021-08-02 PROCEDURE — 99214 OFFICE O/P EST MOD 30 MIN: CPT | Mod: 25,S$GLB,, | Performed by: NURSE PRACTITIONER

## 2021-08-02 PROCEDURE — 96372 THER/PROPH/DIAG INJ SC/IM: CPT | Mod: S$GLB,,, | Performed by: NURSE PRACTITIONER

## 2021-08-02 PROCEDURE — 1159F PR MEDICATION LIST DOCUMENTED IN MEDICAL RECORD: ICD-10-PCS | Mod: CPTII,S$GLB,, | Performed by: NURSE PRACTITIONER

## 2021-08-02 PROCEDURE — 1126F PR PAIN SEVERITY QUANTIFIED, NO PAIN PRESENT: ICD-10-PCS | Mod: CPTII,S$GLB,, | Performed by: NURSE PRACTITIONER

## 2021-08-02 PROCEDURE — 99999 PR PBB SHADOW E&M-EST. PATIENT-LVL III: ICD-10-PCS | Mod: PBBFAC,,, | Performed by: NURSE PRACTITIONER

## 2021-08-02 PROCEDURE — 3008F BODY MASS INDEX DOCD: CPT | Mod: CPTII,S$GLB,, | Performed by: NURSE PRACTITIONER

## 2021-08-02 PROCEDURE — 96372 PR INJECTION,THERAP/PROPH/DIAG2ST, IM OR SUBCUT: ICD-10-PCS | Mod: S$GLB,,, | Performed by: NURSE PRACTITIONER

## 2021-08-02 PROCEDURE — 1126F AMNT PAIN NOTED NONE PRSNT: CPT | Mod: CPTII,S$GLB,, | Performed by: NURSE PRACTITIONER

## 2021-08-02 PROCEDURE — 3008F PR BODY MASS INDEX (BMI) DOCUMENTED: ICD-10-PCS | Mod: CPTII,S$GLB,, | Performed by: NURSE PRACTITIONER

## 2021-08-02 PROCEDURE — 3079F DIAST BP 80-89 MM HG: CPT | Mod: CPTII,S$GLB,, | Performed by: NURSE PRACTITIONER

## 2021-08-02 PROCEDURE — 99214 PR OFFICE/OUTPT VISIT, EST, LEVL IV, 30-39 MIN: ICD-10-PCS | Mod: 25,S$GLB,, | Performed by: NURSE PRACTITIONER

## 2021-08-05 ENCOUNTER — OFFICE VISIT (OUTPATIENT)
Dept: ORTHOPEDICS | Facility: CLINIC | Age: 61
End: 2021-08-05
Payer: COMMERCIAL

## 2021-08-05 VITALS — HEIGHT: 77 IN | WEIGHT: 276.69 LBS | BODY MASS INDEX: 32.67 KG/M2

## 2021-08-05 DIAGNOSIS — M17.0 PRIMARY OSTEOARTHRITIS OF BOTH KNEES: Primary | ICD-10-CM

## 2021-08-05 PROCEDURE — 1125F AMNT PAIN NOTED PAIN PRSNT: CPT | Mod: CPTII,S$GLB,, | Performed by: PHYSICIAN ASSISTANT

## 2021-08-05 PROCEDURE — 99499 UNLISTED E&M SERVICE: CPT | Mod: S$GLB,,, | Performed by: PHYSICIAN ASSISTANT

## 2021-08-05 PROCEDURE — 1160F PR REVIEW ALL MEDS BY PRESCRIBER/CLIN PHARMACIST DOCUMENTED: ICD-10-PCS | Mod: CPTII,S$GLB,, | Performed by: PHYSICIAN ASSISTANT

## 2021-08-05 PROCEDURE — 20610 DRAIN/INJ JOINT/BURSA W/O US: CPT | Mod: 50,S$GLB,, | Performed by: PHYSICIAN ASSISTANT

## 2021-08-05 PROCEDURE — 1159F MED LIST DOCD IN RCRD: CPT | Mod: CPTII,S$GLB,, | Performed by: PHYSICIAN ASSISTANT

## 2021-08-05 PROCEDURE — 1159F PR MEDICATION LIST DOCUMENTED IN MEDICAL RECORD: ICD-10-PCS | Mod: CPTII,S$GLB,, | Performed by: PHYSICIAN ASSISTANT

## 2021-08-05 PROCEDURE — 20610 PR DRAIN/INJECT LARGE JOINT/BURSA: ICD-10-PCS | Mod: 50,S$GLB,, | Performed by: PHYSICIAN ASSISTANT

## 2021-08-05 PROCEDURE — 99999 PR PBB SHADOW E&M-EST. PATIENT-LVL III: ICD-10-PCS | Mod: PBBFAC,,, | Performed by: PHYSICIAN ASSISTANT

## 2021-08-05 PROCEDURE — 1160F RVW MEDS BY RX/DR IN RCRD: CPT | Mod: CPTII,S$GLB,, | Performed by: PHYSICIAN ASSISTANT

## 2021-08-05 PROCEDURE — 99499 NO LOS: ICD-10-PCS | Mod: S$GLB,,, | Performed by: PHYSICIAN ASSISTANT

## 2021-08-05 PROCEDURE — 99999 PR PBB SHADOW E&M-EST. PATIENT-LVL III: CPT | Mod: PBBFAC,,, | Performed by: PHYSICIAN ASSISTANT

## 2021-08-05 PROCEDURE — 1125F PR PAIN SEVERITY QUANTIFIED, PAIN PRESENT: ICD-10-PCS | Mod: CPTII,S$GLB,, | Performed by: PHYSICIAN ASSISTANT

## 2021-08-05 PROCEDURE — 3008F PR BODY MASS INDEX (BMI) DOCUMENTED: ICD-10-PCS | Mod: CPTII,S$GLB,, | Performed by: PHYSICIAN ASSISTANT

## 2021-08-05 PROCEDURE — 3008F BODY MASS INDEX DOCD: CPT | Mod: CPTII,S$GLB,, | Performed by: PHYSICIAN ASSISTANT

## 2021-08-16 ENCOUNTER — OFFICE VISIT (OUTPATIENT)
Dept: PAIN MEDICINE | Facility: CLINIC | Age: 61
End: 2021-08-16
Payer: COMMERCIAL

## 2021-08-16 VITALS
TEMPERATURE: 99 F | HEIGHT: 77 IN | HEART RATE: 73 BPM | DIASTOLIC BLOOD PRESSURE: 81 MMHG | RESPIRATION RATE: 18 BRPM | BODY MASS INDEX: 32.54 KG/M2 | SYSTOLIC BLOOD PRESSURE: 139 MMHG | WEIGHT: 275.56 LBS

## 2021-08-16 DIAGNOSIS — M51.36 DDD (DEGENERATIVE DISC DISEASE), LUMBAR: Primary | ICD-10-CM

## 2021-08-16 DIAGNOSIS — M47.816 LUMBAR SPONDYLOSIS: ICD-10-CM

## 2021-08-16 PROCEDURE — 1125F PR PAIN SEVERITY QUANTIFIED, PAIN PRESENT: ICD-10-PCS | Mod: CPTII,S$GLB,, | Performed by: ANESTHESIOLOGY

## 2021-08-16 PROCEDURE — 3079F PR MOST RECENT DIASTOLIC BLOOD PRESSURE 80-89 MM HG: ICD-10-PCS | Mod: CPTII,S$GLB,, | Performed by: ANESTHESIOLOGY

## 2021-08-16 PROCEDURE — 3079F DIAST BP 80-89 MM HG: CPT | Mod: CPTII,S$GLB,, | Performed by: ANESTHESIOLOGY

## 2021-08-16 PROCEDURE — 1125F AMNT PAIN NOTED PAIN PRSNT: CPT | Mod: CPTII,S$GLB,, | Performed by: ANESTHESIOLOGY

## 2021-08-16 PROCEDURE — 1159F PR MEDICATION LIST DOCUMENTED IN MEDICAL RECORD: ICD-10-PCS | Mod: CPTII,S$GLB,, | Performed by: ANESTHESIOLOGY

## 2021-08-16 PROCEDURE — 3075F SYST BP GE 130 - 139MM HG: CPT | Mod: CPTII,S$GLB,, | Performed by: ANESTHESIOLOGY

## 2021-08-16 PROCEDURE — 99999 PR PBB SHADOW E&M-EST. PATIENT-LVL III: ICD-10-PCS | Mod: PBBFAC,,, | Performed by: ANESTHESIOLOGY

## 2021-08-16 PROCEDURE — 3008F BODY MASS INDEX DOCD: CPT | Mod: CPTII,S$GLB,, | Performed by: ANESTHESIOLOGY

## 2021-08-16 PROCEDURE — 99204 OFFICE O/P NEW MOD 45 MIN: CPT | Mod: S$GLB,,, | Performed by: ANESTHESIOLOGY

## 2021-08-16 PROCEDURE — 99999 PR PBB SHADOW E&M-EST. PATIENT-LVL III: CPT | Mod: PBBFAC,,, | Performed by: ANESTHESIOLOGY

## 2021-08-16 PROCEDURE — 3075F PR MOST RECENT SYSTOLIC BLOOD PRESS GE 130-139MM HG: ICD-10-PCS | Mod: CPTII,S$GLB,, | Performed by: ANESTHESIOLOGY

## 2021-08-16 PROCEDURE — 3008F PR BODY MASS INDEX (BMI) DOCUMENTED: ICD-10-PCS | Mod: CPTII,S$GLB,, | Performed by: ANESTHESIOLOGY

## 2021-08-16 PROCEDURE — 99204 PR OFFICE/OUTPT VISIT, NEW, LEVL IV, 45-59 MIN: ICD-10-PCS | Mod: S$GLB,,, | Performed by: ANESTHESIOLOGY

## 2021-08-16 PROCEDURE — 1159F MED LIST DOCD IN RCRD: CPT | Mod: CPTII,S$GLB,, | Performed by: ANESTHESIOLOGY

## 2021-08-23 ENCOUNTER — PATIENT MESSAGE (OUTPATIENT)
Dept: PAIN MEDICINE | Facility: OTHER | Age: 61
End: 2021-08-23

## 2021-08-24 ENCOUNTER — HOSPITAL ENCOUNTER (OUTPATIENT)
Facility: OTHER | Age: 61
Discharge: HOME | End: 2021-08-24
Attending: ANESTHESIOLOGY | Admitting: ANESTHESIOLOGY
Payer: COMMERCIAL

## 2021-08-24 VITALS
BODY MASS INDEX: 32.47 KG/M2 | DIASTOLIC BLOOD PRESSURE: 82 MMHG | SYSTOLIC BLOOD PRESSURE: 166 MMHG | RESPIRATION RATE: 16 BRPM | WEIGHT: 275 LBS | OXYGEN SATURATION: 99 % | HEART RATE: 62 BPM | TEMPERATURE: 99 F | HEIGHT: 77 IN

## 2021-08-24 DIAGNOSIS — M51.36 DDD (DEGENERATIVE DISC DISEASE), LUMBAR: ICD-10-CM

## 2021-08-24 DIAGNOSIS — M47.9 OSTEOARTHRITIS OF SPINE, UNSPECIFIED SPINAL OSTEOARTHRITIS COMPLICATION STATUS, UNSPECIFIED SPINAL REGION: Primary | ICD-10-CM

## 2021-08-24 DIAGNOSIS — M47.819 SPONDYLOSIS WITHOUT MYELOPATHY: ICD-10-CM

## 2021-08-24 PROBLEM — M51.369 DDD (DEGENERATIVE DISC DISEASE), LUMBAR: Status: ACTIVE | Noted: 2021-08-24

## 2021-08-24 PROCEDURE — 64494 INJ PARAVERT F JNT L/S 2 LEV: CPT | Mod: RT,,, | Performed by: ANESTHESIOLOGY

## 2021-08-24 PROCEDURE — 64494 INJ PARAVERT F JNT L/S 2 LEV: CPT | Mod: RT | Performed by: ANESTHESIOLOGY

## 2021-08-24 PROCEDURE — 64493 INJ PARAVERT F JNT L/S 1 LEV: CPT | Mod: RT,,, | Performed by: ANESTHESIOLOGY

## 2021-08-24 PROCEDURE — 64494 PR INJ DX/THER AGNT PARAVERT FACET JOINT,IMG GUIDE,LUMBAR/SAC, 2ND LEVEL: ICD-10-PCS | Mod: RT,,, | Performed by: ANESTHESIOLOGY

## 2021-08-24 PROCEDURE — 25000003 PHARM REV CODE 250: Performed by: ANESTHESIOLOGY

## 2021-08-24 PROCEDURE — 64493 PR INJ DX/THER AGNT PARAVERT FACET JOINT,IMG GUIDE,LUMBAR/SAC,1ST LVL: ICD-10-PCS | Mod: RT,,, | Performed by: ANESTHESIOLOGY

## 2021-08-24 PROCEDURE — 64493 INJ PARAVERT F JNT L/S 1 LEV: CPT | Mod: RT | Performed by: ANESTHESIOLOGY

## 2021-08-24 RX ORDER — LIDOCAINE HYDROCHLORIDE 10 MG/ML
INJECTION INFILTRATION; PERINEURAL
Status: DISCONTINUED | OUTPATIENT
Start: 2021-08-24 | End: 2021-08-24 | Stop reason: HOSPADM

## 2021-08-24 RX ORDER — BUPIVACAINE HYDROCHLORIDE 5 MG/ML
INJECTION, SOLUTION EPIDURAL; INTRACAUDAL
Status: DISCONTINUED | OUTPATIENT
Start: 2021-08-24 | End: 2021-08-24 | Stop reason: HOSPADM

## 2021-08-25 ENCOUNTER — TELEPHONE (OUTPATIENT)
Dept: PAIN MEDICINE | Facility: CLINIC | Age: 61
End: 2021-08-25

## 2021-09-10 ENCOUNTER — TELEPHONE (OUTPATIENT)
Dept: PAIN MEDICINE | Facility: CLINIC | Age: 61
End: 2021-09-10

## 2021-09-15 ENCOUNTER — PATIENT MESSAGE (OUTPATIENT)
Dept: PAIN MEDICINE | Facility: CLINIC | Age: 61
End: 2021-09-15

## 2021-09-21 ENCOUNTER — TELEPHONE (OUTPATIENT)
Dept: PAIN MEDICINE | Facility: CLINIC | Age: 61
End: 2021-09-21

## 2021-09-22 ENCOUNTER — TELEPHONE (OUTPATIENT)
Dept: PAIN MEDICINE | Facility: CLINIC | Age: 61
End: 2021-09-22

## 2021-09-25 ENCOUNTER — PATIENT MESSAGE (OUTPATIENT)
Dept: ORTHOPEDICS | Facility: CLINIC | Age: 61
End: 2021-09-25

## 2021-09-27 ENCOUNTER — PATIENT MESSAGE (OUTPATIENT)
Dept: PAIN MEDICINE | Facility: CLINIC | Age: 61
End: 2021-09-27

## 2021-09-27 ENCOUNTER — OFFICE VISIT (OUTPATIENT)
Dept: ORTHOPEDICS | Facility: CLINIC | Age: 61
End: 2021-09-27
Payer: COMMERCIAL

## 2021-09-27 ENCOUNTER — TELEPHONE (OUTPATIENT)
Dept: ORTHOPEDICS | Facility: CLINIC | Age: 61
End: 2021-09-27

## 2021-09-27 VITALS — WEIGHT: 273.5 LBS | HEIGHT: 77 IN | BODY MASS INDEX: 32.29 KG/M2

## 2021-09-27 DIAGNOSIS — M17.0 PRIMARY OSTEOARTHRITIS OF BOTH KNEES: Primary | ICD-10-CM

## 2021-09-27 PROCEDURE — 1159F PR MEDICATION LIST DOCUMENTED IN MEDICAL RECORD: ICD-10-PCS | Mod: CPTII,S$GLB,, | Performed by: PHYSICIAN ASSISTANT

## 2021-09-27 PROCEDURE — 4010F PR ACE/ARB THEARPY RXD/TAKEN: ICD-10-PCS | Mod: CPTII,S$GLB,, | Performed by: PHYSICIAN ASSISTANT

## 2021-09-27 PROCEDURE — 3008F PR BODY MASS INDEX (BMI) DOCUMENTED: ICD-10-PCS | Mod: CPTII,S$GLB,, | Performed by: PHYSICIAN ASSISTANT

## 2021-09-27 PROCEDURE — 99213 PR OFFICE/OUTPT VISIT, EST, LEVL III, 20-29 MIN: ICD-10-PCS | Mod: 25,S$GLB,, | Performed by: PHYSICIAN ASSISTANT

## 2021-09-27 PROCEDURE — 3008F BODY MASS INDEX DOCD: CPT | Mod: CPTII,S$GLB,, | Performed by: PHYSICIAN ASSISTANT

## 2021-09-27 PROCEDURE — 1160F PR REVIEW ALL MEDS BY PRESCRIBER/CLIN PHARMACIST DOCUMENTED: ICD-10-PCS | Mod: CPTII,S$GLB,, | Performed by: PHYSICIAN ASSISTANT

## 2021-09-27 PROCEDURE — 4010F ACE/ARB THERAPY RXD/TAKEN: CPT | Mod: CPTII,S$GLB,, | Performed by: PHYSICIAN ASSISTANT

## 2021-09-27 PROCEDURE — 1160F RVW MEDS BY RX/DR IN RCRD: CPT | Mod: CPTII,S$GLB,, | Performed by: PHYSICIAN ASSISTANT

## 2021-09-27 PROCEDURE — 20610 LARGE JOINT ASPIRATION/INJECTION: BILATERAL KNEE: ICD-10-PCS | Mod: 50,S$GLB,, | Performed by: PHYSICIAN ASSISTANT

## 2021-09-27 PROCEDURE — 20610 DRAIN/INJ JOINT/BURSA W/O US: CPT | Mod: 50,S$GLB,, | Performed by: PHYSICIAN ASSISTANT

## 2021-09-27 PROCEDURE — 1159F MED LIST DOCD IN RCRD: CPT | Mod: CPTII,S$GLB,, | Performed by: PHYSICIAN ASSISTANT

## 2021-09-27 PROCEDURE — 99213 OFFICE O/P EST LOW 20 MIN: CPT | Mod: 25,S$GLB,, | Performed by: PHYSICIAN ASSISTANT

## 2021-09-27 PROCEDURE — 99999 PR PBB SHADOW E&M-EST. PATIENT-LVL III: CPT | Mod: PBBFAC,,, | Performed by: PHYSICIAN ASSISTANT

## 2021-09-27 PROCEDURE — 99999 PR PBB SHADOW E&M-EST. PATIENT-LVL III: ICD-10-PCS | Mod: PBBFAC,,, | Performed by: PHYSICIAN ASSISTANT

## 2021-09-27 RX ORDER — TRIAMCINOLONE ACETONIDE 40 MG/ML
40 INJECTION, SUSPENSION INTRA-ARTICULAR; INTRAMUSCULAR
Status: DISCONTINUED | OUTPATIENT
Start: 2021-09-27 | End: 2021-09-27 | Stop reason: HOSPADM

## 2021-09-27 RX ORDER — CLOPIDOGREL BISULFATE 75 MG/1
75 TABLET ORAL DAILY
COMMUNITY
End: 2022-05-27

## 2021-09-27 RX ADMIN — TRIAMCINOLONE ACETONIDE 40 MG: 40 INJECTION, SUSPENSION INTRA-ARTICULAR; INTRAMUSCULAR at 03:09

## 2021-09-28 ENCOUNTER — CLINICAL SUPPORT (OUTPATIENT)
Dept: REHABILITATION | Facility: OTHER | Age: 61
End: 2021-09-28
Payer: COMMERCIAL

## 2021-09-28 DIAGNOSIS — M25.562 ACUTE PAIN OF BOTH KNEES: ICD-10-CM

## 2021-09-28 DIAGNOSIS — M62.81 MUSCLE WEAKNESS OF LOWER EXTREMITY: ICD-10-CM

## 2021-09-28 DIAGNOSIS — M25.561 ACUTE PAIN OF BOTH KNEES: ICD-10-CM

## 2021-09-28 DIAGNOSIS — M17.0 PRIMARY OSTEOARTHRITIS OF BOTH KNEES: ICD-10-CM

## 2021-09-28 PROCEDURE — 97161 PT EVAL LOW COMPLEX 20 MIN: CPT | Mod: PN

## 2021-09-29 PROBLEM — M62.81 MUSCLE WEAKNESS OF LOWER EXTREMITY: Status: ACTIVE | Noted: 2021-09-29

## 2021-09-29 PROBLEM — M25.562 ACUTE PAIN OF BOTH KNEES: Status: ACTIVE | Noted: 2021-09-29

## 2021-09-29 PROBLEM — M25.561 ACUTE PAIN OF BOTH KNEES: Status: ACTIVE | Noted: 2021-09-29

## 2021-09-29 PROBLEM — M17.0 PRIMARY OSTEOARTHRITIS OF BOTH KNEES: Status: ACTIVE | Noted: 2021-09-29

## 2021-09-30 ENCOUNTER — PATIENT MESSAGE (OUTPATIENT)
Dept: PAIN MEDICINE | Facility: OTHER | Age: 61
End: 2021-09-30

## 2021-09-30 ENCOUNTER — TELEPHONE (OUTPATIENT)
Dept: PAIN MEDICINE | Facility: OTHER | Age: 61
End: 2021-09-30

## 2021-10-08 ENCOUNTER — CLINICAL SUPPORT (OUTPATIENT)
Dept: REHABILITATION | Facility: OTHER | Age: 61
End: 2021-10-08
Payer: COMMERCIAL

## 2021-10-08 ENCOUNTER — DOCUMENTATION ONLY (OUTPATIENT)
Dept: REHABILITATION | Facility: OTHER | Age: 61
End: 2021-10-08

## 2021-10-08 DIAGNOSIS — M62.81 MUSCLE WEAKNESS OF LOWER EXTREMITY: ICD-10-CM

## 2021-10-08 DIAGNOSIS — M25.562 ACUTE PAIN OF BOTH KNEES: ICD-10-CM

## 2021-10-08 DIAGNOSIS — M25.561 ACUTE PAIN OF BOTH KNEES: ICD-10-CM

## 2021-10-08 PROCEDURE — 97110 THERAPEUTIC EXERCISES: CPT | Mod: PN,CQ

## 2021-10-11 ENCOUNTER — OFFICE VISIT (OUTPATIENT)
Dept: UROLOGY | Facility: CLINIC | Age: 61
End: 2021-10-11
Payer: COMMERCIAL

## 2021-10-11 VITALS
BODY MASS INDEX: 31.97 KG/M2 | DIASTOLIC BLOOD PRESSURE: 84 MMHG | WEIGHT: 270.75 LBS | HEIGHT: 77 IN | HEART RATE: 66 BPM | SYSTOLIC BLOOD PRESSURE: 139 MMHG

## 2021-10-11 DIAGNOSIS — E78.2 MIXED HYPERLIPIDEMIA: ICD-10-CM

## 2021-10-11 DIAGNOSIS — N13.8 BPH WITH URINARY OBSTRUCTION: ICD-10-CM

## 2021-10-11 DIAGNOSIS — I10 PRIMARY HYPERTENSION: ICD-10-CM

## 2021-10-11 DIAGNOSIS — N52.01 ERECTILE DYSFUNCTION DUE TO ARTERIAL INSUFFICIENCY: ICD-10-CM

## 2021-10-11 DIAGNOSIS — E29.1 MALE HYPOGONADISM: Primary | ICD-10-CM

## 2021-10-11 DIAGNOSIS — N40.1 BPH WITH URINARY OBSTRUCTION: ICD-10-CM

## 2021-10-11 PROCEDURE — 1159F MED LIST DOCD IN RCRD: CPT | Mod: CPTII,S$GLB,, | Performed by: UROLOGY

## 2021-10-11 PROCEDURE — 99999 PR PBB SHADOW E&M-EST. PATIENT-LVL IV: CPT | Mod: PBBFAC,,, | Performed by: UROLOGY

## 2021-10-11 PROCEDURE — 99999 PR PBB SHADOW E&M-EST. PATIENT-LVL IV: ICD-10-PCS | Mod: PBBFAC,,, | Performed by: UROLOGY

## 2021-10-11 PROCEDURE — 4010F ACE/ARB THERAPY RXD/TAKEN: CPT | Mod: CPTII,S$GLB,, | Performed by: UROLOGY

## 2021-10-11 PROCEDURE — 3079F PR MOST RECENT DIASTOLIC BLOOD PRESSURE 80-89 MM HG: ICD-10-PCS | Mod: CPTII,S$GLB,, | Performed by: UROLOGY

## 2021-10-11 PROCEDURE — 96372 THER/PROPH/DIAG INJ SC/IM: CPT | Mod: S$GLB,,, | Performed by: UROLOGY

## 2021-10-11 PROCEDURE — 3008F BODY MASS INDEX DOCD: CPT | Mod: CPTII,S$GLB,, | Performed by: UROLOGY

## 2021-10-11 PROCEDURE — 3075F SYST BP GE 130 - 139MM HG: CPT | Mod: CPTII,S$GLB,, | Performed by: UROLOGY

## 2021-10-11 PROCEDURE — 3075F PR MOST RECENT SYSTOLIC BLOOD PRESS GE 130-139MM HG: ICD-10-PCS | Mod: CPTII,S$GLB,, | Performed by: UROLOGY

## 2021-10-11 PROCEDURE — 3079F DIAST BP 80-89 MM HG: CPT | Mod: CPTII,S$GLB,, | Performed by: UROLOGY

## 2021-10-11 PROCEDURE — 99214 PR OFFICE/OUTPT VISIT, EST, LEVL IV, 30-39 MIN: ICD-10-PCS | Mod: 25,S$GLB,, | Performed by: UROLOGY

## 2021-10-11 PROCEDURE — 96372 PR INJECTION,THERAP/PROPH/DIAG2ST, IM OR SUBCUT: ICD-10-PCS | Mod: S$GLB,,, | Performed by: UROLOGY

## 2021-10-11 PROCEDURE — 3008F PR BODY MASS INDEX (BMI) DOCUMENTED: ICD-10-PCS | Mod: CPTII,S$GLB,, | Performed by: UROLOGY

## 2021-10-11 PROCEDURE — 1160F RVW MEDS BY RX/DR IN RCRD: CPT | Mod: CPTII,S$GLB,, | Performed by: UROLOGY

## 2021-10-11 PROCEDURE — 99214 OFFICE O/P EST MOD 30 MIN: CPT | Mod: 25,S$GLB,, | Performed by: UROLOGY

## 2021-10-11 PROCEDURE — 1159F PR MEDICATION LIST DOCUMENTED IN MEDICAL RECORD: ICD-10-PCS | Mod: CPTII,S$GLB,, | Performed by: UROLOGY

## 2021-10-11 PROCEDURE — 1160F PR REVIEW ALL MEDS BY PRESCRIBER/CLIN PHARMACIST DOCUMENTED: ICD-10-PCS | Mod: CPTII,S$GLB,, | Performed by: UROLOGY

## 2021-10-11 PROCEDURE — 4010F PR ACE/ARB THEARPY RXD/TAKEN: ICD-10-PCS | Mod: CPTII,S$GLB,, | Performed by: UROLOGY

## 2021-10-11 RX ORDER — SILDENAFIL CITRATE 20 MG/1
TABLET ORAL
Qty: 50 TABLET | Refills: 11 | Status: SHIPPED | OUTPATIENT
Start: 2021-10-11 | End: 2021-12-20 | Stop reason: SDUPTHER

## 2021-10-11 RX ORDER — ZOLPIDEM TARTRATE 10 MG/1
TABLET ORAL
Qty: 30 TABLET | Refills: 0 | Status: SHIPPED | OUTPATIENT
Start: 2021-10-11 | End: 2021-11-10

## 2021-10-12 ENCOUNTER — HOSPITAL ENCOUNTER (OUTPATIENT)
Facility: OTHER | Age: 61
Discharge: HOME | End: 2021-10-12
Attending: ANESTHESIOLOGY | Admitting: ANESTHESIOLOGY
Payer: COMMERCIAL

## 2021-10-12 VITALS
DIASTOLIC BLOOD PRESSURE: 80 MMHG | WEIGHT: 275 LBS | TEMPERATURE: 98 F | HEIGHT: 77 IN | RESPIRATION RATE: 14 BRPM | HEART RATE: 59 BPM | BODY MASS INDEX: 32.47 KG/M2 | SYSTOLIC BLOOD PRESSURE: 144 MMHG | OXYGEN SATURATION: 98 %

## 2021-10-12 DIAGNOSIS — G89.4 CHRONIC PAIN SYNDROME: ICD-10-CM

## 2021-10-12 DIAGNOSIS — G89.29 CHRONIC PAIN: ICD-10-CM

## 2021-10-12 DIAGNOSIS — M47.816 FACET ARTHROPATHY, LUMBAR: Primary | ICD-10-CM

## 2021-10-12 PROCEDURE — 64494 INJ PARAVERT F JNT L/S 2 LEV: CPT | Mod: RT | Performed by: ANESTHESIOLOGY

## 2021-10-12 PROCEDURE — 25000003 PHARM REV CODE 250: Performed by: ANESTHESIOLOGY

## 2021-10-12 PROCEDURE — 64493 PR INJ DX/THER AGNT PARAVERT FACET JOINT,IMG GUIDE,LUMBAR/SAC,1ST LVL: ICD-10-PCS | Mod: RT,,, | Performed by: ANESTHESIOLOGY

## 2021-10-12 PROCEDURE — 64494 PR INJ DX/THER AGNT PARAVERT FACET JOINT,IMG GUIDE,LUMBAR/SAC, 2ND LEVEL: ICD-10-PCS | Mod: RT,,, | Performed by: ANESTHESIOLOGY

## 2021-10-12 PROCEDURE — 64493 INJ PARAVERT F JNT L/S 1 LEV: CPT | Mod: RT,,, | Performed by: ANESTHESIOLOGY

## 2021-10-12 PROCEDURE — 64493 INJ PARAVERT F JNT L/S 1 LEV: CPT | Mod: RT | Performed by: ANESTHESIOLOGY

## 2021-10-12 PROCEDURE — 64494 INJ PARAVERT F JNT L/S 2 LEV: CPT | Mod: RT,,, | Performed by: ANESTHESIOLOGY

## 2021-10-12 RX ORDER — LIDOCAINE HYDROCHLORIDE 10 MG/ML
INJECTION INFILTRATION; PERINEURAL
Status: DISCONTINUED | OUTPATIENT
Start: 2021-10-12 | End: 2021-10-12 | Stop reason: HOSPADM

## 2021-10-12 RX ORDER — SODIUM CHLORIDE 9 MG/ML
INJECTION, SOLUTION INTRAVENOUS CONTINUOUS
Status: DISCONTINUED | OUTPATIENT
Start: 2021-10-12 | End: 2021-10-12 | Stop reason: HOSPADM

## 2021-10-12 RX ORDER — BUPIVACAINE HYDROCHLORIDE 5 MG/ML
INJECTION, SOLUTION EPIDURAL; INTRACAUDAL
Status: DISCONTINUED | OUTPATIENT
Start: 2021-10-12 | End: 2021-10-12 | Stop reason: HOSPADM

## 2021-11-05 ENCOUNTER — PATIENT MESSAGE (OUTPATIENT)
Dept: PAIN MEDICINE | Facility: CLINIC | Age: 61
End: 2021-11-05
Payer: COMMERCIAL

## 2021-11-09 RX ORDER — ZOLPIDEM TARTRATE 10 MG/1
TABLET ORAL
Qty: 30 TABLET | Refills: 0 | Status: CANCELLED | OUTPATIENT
Start: 2021-11-09

## 2021-11-10 RX ORDER — ZOLPIDEM TARTRATE 10 MG/1
TABLET ORAL
Qty: 30 TABLET | Refills: 2 | Status: SHIPPED | OUTPATIENT
Start: 2021-11-10 | End: 2021-12-09 | Stop reason: SDUPTHER

## 2021-11-22 ENCOUNTER — PATIENT MESSAGE (OUTPATIENT)
Dept: PAIN MEDICINE | Facility: CLINIC | Age: 61
End: 2021-11-22
Payer: COMMERCIAL

## 2021-12-01 ENCOUNTER — TELEPHONE (OUTPATIENT)
Dept: PAIN MEDICINE | Facility: CLINIC | Age: 61
End: 2021-12-01
Payer: COMMERCIAL

## 2021-12-01 DIAGNOSIS — M47.816 LUMBAR SPONDYLOSIS: Primary | ICD-10-CM

## 2021-12-02 ENCOUNTER — TELEPHONE (OUTPATIENT)
Dept: PAIN MEDICINE | Facility: CLINIC | Age: 61
End: 2021-12-02
Payer: COMMERCIAL

## 2021-12-06 ENCOUNTER — TELEPHONE (OUTPATIENT)
Dept: PAIN MEDICINE | Facility: CLINIC | Age: 61
End: 2021-12-06
Payer: COMMERCIAL

## 2021-12-07 ENCOUNTER — PATIENT OUTREACH (OUTPATIENT)
Dept: ADMINISTRATIVE | Facility: OTHER | Age: 61
End: 2021-12-07
Payer: COMMERCIAL

## 2021-12-08 ENCOUNTER — HOSPITAL ENCOUNTER (OUTPATIENT)
Facility: OTHER | Age: 61
Discharge: HOME | End: 2021-12-08
Attending: ANESTHESIOLOGY | Admitting: ANESTHESIOLOGY
Payer: COMMERCIAL

## 2021-12-08 ENCOUNTER — OFFICE VISIT (OUTPATIENT)
Dept: CARDIOLOGY | Facility: CLINIC | Age: 61
End: 2021-12-08
Payer: COMMERCIAL

## 2021-12-08 ENCOUNTER — PATIENT MESSAGE (OUTPATIENT)
Dept: INTERNAL MEDICINE | Facility: CLINIC | Age: 61
End: 2021-12-08
Payer: COMMERCIAL

## 2021-12-08 VITALS
OXYGEN SATURATION: 98 % | TEMPERATURE: 98 F | HEIGHT: 77 IN | BODY MASS INDEX: 33.06 KG/M2 | WEIGHT: 280 LBS | RESPIRATION RATE: 16 BRPM | HEART RATE: 62 BPM | DIASTOLIC BLOOD PRESSURE: 80 MMHG | SYSTOLIC BLOOD PRESSURE: 134 MMHG

## 2021-12-08 VITALS
DIASTOLIC BLOOD PRESSURE: 70 MMHG | HEART RATE: 75 BPM | HEIGHT: 77 IN | WEIGHT: 264.56 LBS | BODY MASS INDEX: 31.24 KG/M2 | SYSTOLIC BLOOD PRESSURE: 118 MMHG | OXYGEN SATURATION: 96 %

## 2021-12-08 DIAGNOSIS — I10 PRIMARY HYPERTENSION: ICD-10-CM

## 2021-12-08 DIAGNOSIS — M47.816 LUMBAR SPONDYLOSIS: ICD-10-CM

## 2021-12-08 DIAGNOSIS — M51.36 DDD (DEGENERATIVE DISC DISEASE), LUMBAR: Primary | ICD-10-CM

## 2021-12-08 DIAGNOSIS — G89.29 CHRONIC PAIN: ICD-10-CM

## 2021-12-08 DIAGNOSIS — R93.1 AGATSTON CORONARY ARTERY CALCIUM SCORE GREATER THAN 400: Chronic | ICD-10-CM

## 2021-12-08 DIAGNOSIS — E78.2 MIXED HYPERLIPIDEMIA: Primary | ICD-10-CM

## 2021-12-08 PROCEDURE — 4010F ACE/ARB THERAPY RXD/TAKEN: CPT | Mod: CPTII,S$GLB,, | Performed by: INTERNAL MEDICINE

## 2021-12-08 PROCEDURE — 25000003 PHARM REV CODE 250: Performed by: ANESTHESIOLOGY

## 2021-12-08 PROCEDURE — 4010F PR ACE/ARB THEARPY RXD/TAKEN: ICD-10-PCS | Mod: CPTII,S$GLB,, | Performed by: INTERNAL MEDICINE

## 2021-12-08 PROCEDURE — 64635 DESTROY LUMB/SAC FACET JNT: CPT | Mod: RT | Performed by: ANESTHESIOLOGY

## 2021-12-08 PROCEDURE — 64636 DESTROY L/S FACET JNT ADDL: CPT | Mod: RT | Performed by: ANESTHESIOLOGY

## 2021-12-08 PROCEDURE — 99999 PR PBB SHADOW E&M-EST. PATIENT-LVL III: ICD-10-PCS | Mod: PBBFAC,,, | Performed by: INTERNAL MEDICINE

## 2021-12-08 PROCEDURE — 64636 PR DESTROY L/S FACET JNT ADDL: ICD-10-PCS | Mod: RT,,, | Performed by: ANESTHESIOLOGY

## 2021-12-08 PROCEDURE — 99214 OFFICE O/P EST MOD 30 MIN: CPT | Mod: S$GLB,,, | Performed by: INTERNAL MEDICINE

## 2021-12-08 PROCEDURE — 99999 PR PBB SHADOW E&M-EST. PATIENT-LVL III: CPT | Mod: PBBFAC,,, | Performed by: INTERNAL MEDICINE

## 2021-12-08 PROCEDURE — 64636 DESTROY L/S FACET JNT ADDL: CPT | Mod: RT,,, | Performed by: ANESTHESIOLOGY

## 2021-12-08 PROCEDURE — 64635 PR DESTROY LUMB/SAC FACET JNT: ICD-10-PCS | Mod: RT,,, | Performed by: ANESTHESIOLOGY

## 2021-12-08 PROCEDURE — 99214 PR OFFICE/OUTPT VISIT, EST, LEVL IV, 30-39 MIN: ICD-10-PCS | Mod: S$GLB,,, | Performed by: INTERNAL MEDICINE

## 2021-12-08 PROCEDURE — 64635 DESTROY LUMB/SAC FACET JNT: CPT | Mod: RT,,, | Performed by: ANESTHESIOLOGY

## 2021-12-08 PROCEDURE — 63600175 PHARM REV CODE 636 W HCPCS: Performed by: ANESTHESIOLOGY

## 2021-12-08 RX ORDER — DEXAMETHASONE SODIUM PHOSPHATE 10 MG/ML
INJECTION INTRAMUSCULAR; INTRAVENOUS
Status: DISCONTINUED | OUTPATIENT
Start: 2021-12-08 | End: 2021-12-08 | Stop reason: HOSPADM

## 2021-12-08 RX ORDER — LIDOCAINE HYDROCHLORIDE 20 MG/ML
INJECTION, SOLUTION INFILTRATION; PERINEURAL
Status: DISCONTINUED | OUTPATIENT
Start: 2021-12-08 | End: 2021-12-08 | Stop reason: HOSPADM

## 2021-12-08 RX ORDER — FENTANYL CITRATE 50 UG/ML
INJECTION, SOLUTION INTRAMUSCULAR; INTRAVENOUS
Status: DISCONTINUED | OUTPATIENT
Start: 2021-12-08 | End: 2021-12-08 | Stop reason: HOSPADM

## 2021-12-08 RX ORDER — SODIUM CHLORIDE 9 MG/ML
INJECTION, SOLUTION INTRAVENOUS CONTINUOUS
Status: DISCONTINUED | OUTPATIENT
Start: 2021-12-08 | End: 2021-12-08 | Stop reason: HOSPADM

## 2021-12-08 RX ORDER — BUPIVACAINE HYDROCHLORIDE 2.5 MG/ML
INJECTION, SOLUTION EPIDURAL; INFILTRATION; INTRACAUDAL
Status: DISCONTINUED | OUTPATIENT
Start: 2021-12-08 | End: 2021-12-08 | Stop reason: HOSPADM

## 2021-12-08 RX ORDER — MIDAZOLAM HYDROCHLORIDE 1 MG/ML
INJECTION INTRAMUSCULAR; INTRAVENOUS
Status: DISCONTINUED | OUTPATIENT
Start: 2021-12-08 | End: 2021-12-08 | Stop reason: HOSPADM

## 2021-12-09 ENCOUNTER — TELEPHONE (OUTPATIENT)
Dept: CARDIOLOGY | Facility: CLINIC | Age: 61
End: 2021-12-09
Payer: COMMERCIAL

## 2021-12-09 RX ORDER — ZOLPIDEM TARTRATE 10 MG/1
TABLET ORAL
Qty: 30 TABLET | Refills: 5 | Status: SHIPPED | OUTPATIENT
Start: 2021-12-09 | End: 2022-06-09 | Stop reason: SDUPTHER

## 2021-12-20 ENCOUNTER — OFFICE VISIT (OUTPATIENT)
Dept: ORTHOPEDICS | Facility: CLINIC | Age: 61
End: 2021-12-20
Payer: COMMERCIAL

## 2021-12-20 ENCOUNTER — PATIENT MESSAGE (OUTPATIENT)
Dept: ORTHOPEDICS | Facility: CLINIC | Age: 61
End: 2021-12-20

## 2021-12-20 ENCOUNTER — OFFICE VISIT (OUTPATIENT)
Dept: UROLOGY | Facility: CLINIC | Age: 61
End: 2021-12-20
Payer: COMMERCIAL

## 2021-12-20 VITALS — WEIGHT: 262.88 LBS | BODY MASS INDEX: 31.04 KG/M2 | HEIGHT: 77 IN

## 2021-12-20 DIAGNOSIS — N52.01 ERECTILE DYSFUNCTION DUE TO ARTERIAL INSUFFICIENCY: ICD-10-CM

## 2021-12-20 DIAGNOSIS — E78.2 MIXED HYPERLIPIDEMIA: ICD-10-CM

## 2021-12-20 DIAGNOSIS — E29.1 MALE HYPOGONADISM: Primary | ICD-10-CM

## 2021-12-20 DIAGNOSIS — M17.0 PRIMARY OSTEOARTHRITIS OF BOTH KNEES: Primary | ICD-10-CM

## 2021-12-20 DIAGNOSIS — I10 PRIMARY HYPERTENSION: ICD-10-CM

## 2021-12-20 DIAGNOSIS — N40.1 BPH WITH URINARY OBSTRUCTION: ICD-10-CM

## 2021-12-20 DIAGNOSIS — N13.8 BPH WITH URINARY OBSTRUCTION: ICD-10-CM

## 2021-12-20 PROCEDURE — 4010F ACE/ARB THERAPY RXD/TAKEN: CPT | Mod: CPTII,S$GLB,, | Performed by: UROLOGY

## 2021-12-20 PROCEDURE — 4010F ACE/ARB THERAPY RXD/TAKEN: CPT | Mod: CPTII,S$GLB,, | Performed by: PHYSICIAN ASSISTANT

## 2021-12-20 PROCEDURE — 99214 PR OFFICE/OUTPT VISIT, EST, LEVL IV, 30-39 MIN: ICD-10-PCS | Mod: 25,S$GLB,, | Performed by: UROLOGY

## 2021-12-20 PROCEDURE — 96372 THER/PROPH/DIAG INJ SC/IM: CPT | Mod: S$GLB,,, | Performed by: UROLOGY

## 2021-12-20 PROCEDURE — 20610 PR DRAIN/INJECT LARGE JOINT/BURSA: ICD-10-PCS | Mod: 50,S$GLB,, | Performed by: PHYSICIAN ASSISTANT

## 2021-12-20 PROCEDURE — 99999 PR PBB SHADOW E&M-EST. PATIENT-LVL III: CPT | Mod: PBBFAC,,, | Performed by: PHYSICIAN ASSISTANT

## 2021-12-20 PROCEDURE — 4010F PR ACE/ARB THEARPY RXD/TAKEN: ICD-10-PCS | Mod: CPTII,S$GLB,, | Performed by: PHYSICIAN ASSISTANT

## 2021-12-20 PROCEDURE — 20610 DRAIN/INJ JOINT/BURSA W/O US: CPT | Mod: 50,S$GLB,, | Performed by: PHYSICIAN ASSISTANT

## 2021-12-20 PROCEDURE — 99999 PR PBB SHADOW E&M-EST. PATIENT-LVL III: CPT | Mod: PBBFAC,,, | Performed by: UROLOGY

## 2021-12-20 PROCEDURE — 96372 PR INJECTION,THERAP/PROPH/DIAG2ST, IM OR SUBCUT: ICD-10-PCS | Mod: S$GLB,,, | Performed by: UROLOGY

## 2021-12-20 PROCEDURE — 99213 OFFICE O/P EST LOW 20 MIN: CPT | Mod: 25,S$GLB,, | Performed by: PHYSICIAN ASSISTANT

## 2021-12-20 PROCEDURE — 99999 PR PBB SHADOW E&M-EST. PATIENT-LVL III: ICD-10-PCS | Mod: PBBFAC,,, | Performed by: UROLOGY

## 2021-12-20 PROCEDURE — 99214 OFFICE O/P EST MOD 30 MIN: CPT | Mod: 25,S$GLB,, | Performed by: UROLOGY

## 2021-12-20 PROCEDURE — 4010F PR ACE/ARB THEARPY RXD/TAKEN: ICD-10-PCS | Mod: CPTII,S$GLB,, | Performed by: UROLOGY

## 2021-12-20 PROCEDURE — 99213 PR OFFICE/OUTPT VISIT, EST, LEVL III, 20-29 MIN: ICD-10-PCS | Mod: 25,S$GLB,, | Performed by: PHYSICIAN ASSISTANT

## 2021-12-20 PROCEDURE — 99999 PR PBB SHADOW E&M-EST. PATIENT-LVL III: ICD-10-PCS | Mod: PBBFAC,,, | Performed by: PHYSICIAN ASSISTANT

## 2021-12-20 RX ORDER — TRIAMCINOLONE ACETONIDE 40 MG/ML
40 INJECTION, SUSPENSION INTRA-ARTICULAR; INTRAMUSCULAR
Status: DISCONTINUED | OUTPATIENT
Start: 2021-12-20 | End: 2021-12-20 | Stop reason: HOSPADM

## 2021-12-20 RX ORDER — SILDENAFIL CITRATE 20 MG/1
TABLET ORAL
Qty: 50 TABLET | Refills: 11 | Status: SHIPPED | OUTPATIENT
Start: 2021-12-20 | End: 2022-03-02 | Stop reason: SDUPTHER

## 2021-12-20 RX ADMIN — TRIAMCINOLONE ACETONIDE 40 MG: 40 INJECTION, SUSPENSION INTRA-ARTICULAR; INTRAMUSCULAR at 09:12

## 2022-01-04 ENCOUNTER — TELEPHONE (OUTPATIENT)
Dept: PAIN MEDICINE | Facility: CLINIC | Age: 62
End: 2022-01-04
Payer: COMMERCIAL

## 2022-01-04 NOTE — TELEPHONE ENCOUNTER
This message is for patient in regards to his/her appointment 01/05/22 at 01:00p       [facility] Policy: For the safety of all patients and staff members.     Patient Visitor policy: Due to social distancing and limited seating staff are requesting patient to arrive to their schedule appointments on time. During this visit we're asking all patients to only have one visitor over the age of 18yrs old to accompany to be seen by Delores Aaron NP. If patient do not required assistance with their visit, we're asking all visitors to remain outside the waiting area.    Upon arriving to your schedule appointment; patients are required to wear a face mask, if patient do not have a face mask one will be provided entering the facility. We ask patients to contact clinical staff at this number [phone] to notify staff that they have arrived or they may do so by utilizing the Mobile checked in [place](if patient have patient portal; clinical staff will send a message through there letting them know it's okay to proceed to their visit). If you have any questions or concerns please contact [phone]    Staff left  for patient regarding appt date & time listed.

## 2022-01-30 ENCOUNTER — PATIENT MESSAGE (OUTPATIENT)
Dept: UROLOGY | Facility: CLINIC | Age: 62
End: 2022-01-30
Payer: COMMERCIAL

## 2022-02-27 ENCOUNTER — PATIENT MESSAGE (OUTPATIENT)
Dept: UROLOGY | Facility: CLINIC | Age: 62
End: 2022-02-27
Payer: COMMERCIAL

## 2022-02-28 ENCOUNTER — LAB VISIT (OUTPATIENT)
Dept: LAB | Facility: HOSPITAL | Age: 62
End: 2022-02-28
Attending: UROLOGY
Payer: COMMERCIAL

## 2022-02-28 DIAGNOSIS — E29.1 MALE HYPOGONADISM: ICD-10-CM

## 2022-02-28 LAB
ALBUMIN SERPL BCP-MCNC: 3.8 G/DL (ref 3.5–5.2)
ALP SERPL-CCNC: 36 U/L (ref 55–135)
ALT SERPL W/O P-5'-P-CCNC: 28 U/L (ref 10–44)
AST SERPL-CCNC: 20 U/L (ref 10–40)
BASOPHILS # BLD AUTO: 0.06 K/UL (ref 0–0.2)
BASOPHILS NFR BLD: 1.1 % (ref 0–1.9)
BILIRUB DIRECT SERPL-MCNC: 0.2 MG/DL (ref 0.1–0.3)
BILIRUB SERPL-MCNC: 0.6 MG/DL (ref 0.1–1)
CHOLEST SERPL-MCNC: 146 MG/DL (ref 120–199)
CHOLEST/HDLC SERPL: 2.5 {RATIO} (ref 2–5)
COMPLEXED PSA SERPL-MCNC: 0.67 NG/ML (ref 0–4)
DIFFERENTIAL METHOD: ABNORMAL
EOSINOPHIL # BLD AUTO: 0.3 K/UL (ref 0–0.5)
EOSINOPHIL NFR BLD: 5.3 % (ref 0–8)
ERYTHROCYTE [DISTWIDTH] IN BLOOD BY AUTOMATED COUNT: 11.5 % (ref 11.5–14.5)
HCT VFR BLD AUTO: 48.7 % (ref 40–54)
HDLC SERPL-MCNC: 58 MG/DL (ref 40–75)
HDLC SERPL: 39.7 % (ref 20–50)
HGB BLD-MCNC: 16.3 G/DL (ref 14–18)
IMM GRANULOCYTES # BLD AUTO: 0.03 K/UL (ref 0–0.04)
IMM GRANULOCYTES NFR BLD AUTO: 0.5 % (ref 0–0.5)
LDLC SERPL CALC-MCNC: 72.2 MG/DL (ref 63–159)
LYMPHOCYTES # BLD AUTO: 1.7 K/UL (ref 1–4.8)
LYMPHOCYTES NFR BLD: 29.4 % (ref 18–48)
MCH RBC QN AUTO: 32.7 PG (ref 27–31)
MCHC RBC AUTO-ENTMCNC: 33.5 G/DL (ref 32–36)
MCV RBC AUTO: 98 FL (ref 82–98)
MONOCYTES # BLD AUTO: 0.6 K/UL (ref 0.3–1)
MONOCYTES NFR BLD: 10.3 % (ref 4–15)
NEUTROPHILS # BLD AUTO: 3 K/UL (ref 1.8–7.7)
NEUTROPHILS NFR BLD: 53.4 % (ref 38–73)
NONHDLC SERPL-MCNC: 88 MG/DL
NRBC BLD-RTO: 0 /100 WBC
PLATELET # BLD AUTO: 202 K/UL (ref 150–450)
PMV BLD AUTO: 11 FL (ref 9.2–12.9)
PROT SERPL-MCNC: 6.4 G/DL (ref 6–8.4)
RBC # BLD AUTO: 4.98 M/UL (ref 4.6–6.2)
TESTOST SERPL-MCNC: 363 NG/DL (ref 304–1227)
TRIGL SERPL-MCNC: 79 MG/DL (ref 30–150)
WBC # BLD AUTO: 5.65 K/UL (ref 3.9–12.7)

## 2022-02-28 PROCEDURE — 80061 LIPID PANEL: CPT | Performed by: UROLOGY

## 2022-02-28 PROCEDURE — 85025 COMPLETE CBC W/AUTO DIFF WBC: CPT | Performed by: UROLOGY

## 2022-02-28 PROCEDURE — 80076 HEPATIC FUNCTION PANEL: CPT | Performed by: UROLOGY

## 2022-02-28 PROCEDURE — 36415 COLL VENOUS BLD VENIPUNCTURE: CPT | Mod: PO | Performed by: UROLOGY

## 2022-02-28 PROCEDURE — 84153 ASSAY OF PSA TOTAL: CPT | Performed by: UROLOGY

## 2022-02-28 PROCEDURE — 84403 ASSAY OF TOTAL TESTOSTERONE: CPT | Performed by: UROLOGY

## 2022-03-02 ENCOUNTER — OFFICE VISIT (OUTPATIENT)
Dept: UROLOGY | Facility: CLINIC | Age: 62
End: 2022-03-02
Payer: COMMERCIAL

## 2022-03-02 VITALS
HEIGHT: 77 IN | HEART RATE: 66 BPM | WEIGHT: 263.25 LBS | DIASTOLIC BLOOD PRESSURE: 95 MMHG | SYSTOLIC BLOOD PRESSURE: 151 MMHG | BODY MASS INDEX: 31.08 KG/M2

## 2022-03-02 DIAGNOSIS — E29.1 MALE HYPOGONADISM: Primary | ICD-10-CM

## 2022-03-02 DIAGNOSIS — N13.8 BPH WITH URINARY OBSTRUCTION: ICD-10-CM

## 2022-03-02 DIAGNOSIS — E78.2 MIXED HYPERLIPIDEMIA: ICD-10-CM

## 2022-03-02 DIAGNOSIS — N52.01 ERECTILE DYSFUNCTION DUE TO ARTERIAL INSUFFICIENCY: ICD-10-CM

## 2022-03-02 DIAGNOSIS — I10 PRIMARY HYPERTENSION: ICD-10-CM

## 2022-03-02 DIAGNOSIS — N40.1 BPH WITH URINARY OBSTRUCTION: ICD-10-CM

## 2022-03-02 PROCEDURE — 3080F PR MOST RECENT DIASTOLIC BLOOD PRESSURE >= 90 MM HG: ICD-10-PCS | Mod: CPTII,S$GLB,, | Performed by: UROLOGY

## 2022-03-02 PROCEDURE — 3008F PR BODY MASS INDEX (BMI) DOCUMENTED: ICD-10-PCS | Mod: CPTII,S$GLB,, | Performed by: UROLOGY

## 2022-03-02 PROCEDURE — 99214 OFFICE O/P EST MOD 30 MIN: CPT | Mod: 25,S$GLB,, | Performed by: UROLOGY

## 2022-03-02 PROCEDURE — 96372 PR INJECTION,THERAP/PROPH/DIAG2ST, IM OR SUBCUT: ICD-10-PCS | Mod: S$GLB,,, | Performed by: UROLOGY

## 2022-03-02 PROCEDURE — 4010F PR ACE/ARB THEARPY RXD/TAKEN: ICD-10-PCS | Mod: CPTII,S$GLB,, | Performed by: UROLOGY

## 2022-03-02 PROCEDURE — 3077F SYST BP >= 140 MM HG: CPT | Mod: CPTII,S$GLB,, | Performed by: UROLOGY

## 2022-03-02 PROCEDURE — 99214 PR OFFICE/OUTPT VISIT, EST, LEVL IV, 30-39 MIN: ICD-10-PCS | Mod: 25,S$GLB,, | Performed by: UROLOGY

## 2022-03-02 PROCEDURE — 1159F PR MEDICATION LIST DOCUMENTED IN MEDICAL RECORD: ICD-10-PCS | Mod: CPTII,S$GLB,, | Performed by: UROLOGY

## 2022-03-02 PROCEDURE — 1160F PR REVIEW ALL MEDS BY PRESCRIBER/CLIN PHARMACIST DOCUMENTED: ICD-10-PCS | Mod: CPTII,S$GLB,, | Performed by: UROLOGY

## 2022-03-02 PROCEDURE — 1159F MED LIST DOCD IN RCRD: CPT | Mod: CPTII,S$GLB,, | Performed by: UROLOGY

## 2022-03-02 PROCEDURE — 96372 THER/PROPH/DIAG INJ SC/IM: CPT | Mod: S$GLB,,, | Performed by: UROLOGY

## 2022-03-02 PROCEDURE — 99999 PR PBB SHADOW E&M-EST. PATIENT-LVL IV: CPT | Mod: PBBFAC,,, | Performed by: UROLOGY

## 2022-03-02 PROCEDURE — 3077F PR MOST RECENT SYSTOLIC BLOOD PRESSURE >= 140 MM HG: ICD-10-PCS | Mod: CPTII,S$GLB,, | Performed by: UROLOGY

## 2022-03-02 PROCEDURE — 3008F BODY MASS INDEX DOCD: CPT | Mod: CPTII,S$GLB,, | Performed by: UROLOGY

## 2022-03-02 PROCEDURE — 4010F ACE/ARB THERAPY RXD/TAKEN: CPT | Mod: CPTII,S$GLB,, | Performed by: UROLOGY

## 2022-03-02 PROCEDURE — 1160F RVW MEDS BY RX/DR IN RCRD: CPT | Mod: CPTII,S$GLB,, | Performed by: UROLOGY

## 2022-03-02 PROCEDURE — 99999 PR PBB SHADOW E&M-EST. PATIENT-LVL IV: ICD-10-PCS | Mod: PBBFAC,,, | Performed by: UROLOGY

## 2022-03-02 PROCEDURE — 3080F DIAST BP >= 90 MM HG: CPT | Mod: CPTII,S$GLB,, | Performed by: UROLOGY

## 2022-03-02 RX ORDER — SILDENAFIL CITRATE 20 MG/1
TABLET ORAL
Qty: 50 TABLET | Refills: 11 | Status: SHIPPED | OUTPATIENT
Start: 2022-03-02 | End: 2022-12-12 | Stop reason: SDUPTHER

## 2022-03-02 NOTE — PROGRESS NOTES
CHIEF COMPLAINT:    Mr. Stewart is a 61 y.o. male presenting with hypogonadism.    PRESENTING ILLNESS:    Mr. Stewart is a 61 y.o. male with a history of hypogonadism.  This has been present for > 1 year.  He was on Testopel.   While on TRT, he has more energy and a stronger libido.  Now on Aveed.    He also has ED.  This has been present for > 1 year.  He has been using Cialis, but it is not effective.  He's was using levitra with good results.  However, he c/o the cost.  He's using sildenafil with good results. Using 60-80 mg.    He has LUTS.  Nocturia x 1-4.  He does drink wine at night.  Good FOS.  No straining.  No hematuria.  No dysuria.    REVIEW OF SYSTEMS:    Patient denies chest pain, sore throat, headache, blurred vision, fever, nausea, vomiting, chills, flank discomfort, abdominal pain, bleeding per rectum, cough, SOB, recent loss of consciousness, recent mental status changes, seizures, dizziness, or upper or lower extremity weakness.    DANIELITO  1. 2  2. 3  3. 4  4. 4  5. 4     PATIENT HISTORY:    Past Medical History:   Diagnosis Date    Allergy     BPH with urinary obstruction     Degenerative disc disease     Dysphagia     ED (erectile dysfunction)     Gout     Hyperlipidemia     Hypertension     Male hypogonadism 7/19/2012       Past Surgical History:   Procedure Laterality Date    APPENDECTOMY      arthroscopic knee surg      left X 2, right X 1    COLONOSCOPY N/A 9/28/2020    Procedure: COLONOSCOPY;  Surgeon: CAYDEN Montenegro MD;  Location: [address]);  Service: Endoscopy;  Laterality: N/A;  9/25-covid [id number]    INJECTION OF ANESTHETIC AGENT AROUND NERVE Right 8/24/2021    Procedure: BLOCK, NERVE, MEDIAL BRANCH L3,L4,L5;  Surgeon: Zachary Salas MD;  Location: [facility];  Service: Pain Management;  Laterality: Right;  1 of 2    INJECTION OF ANESTHETIC AGENT AROUND NERVE Right 10/12/2021    Procedure: BLOCK, NERVE, L3-L4-L5 MEDIAL BRANCH 2 OF 2 NEED CONSENT/  conitnue Plavix;  Surgeon: Zachary Salas MD;  Location: [facility];  Service: Pain Management;  Laterality: Right;    INTUSSUSCEPTION REPAIR      KNEE SURGERY      RADIOFREQUENCY ABLATION Right 2021    Procedure: RADIOFREQUENCY ABLATION right L3, L4, L5 NEEDS CONSENT;  Surgeon: Zachary Salas MD;  Location: [facility];  Service: Pain Management;  Laterality: Right;    TONSILLECTOMY         Family History   Problem Relation Age of Onset    Hyperlipidemia Father     Hypertension Father     Heart disease Father     Liver disease Mother     Heart disease Brother     Heart disease Maternal Grandmother     Heart disease Paternal Grandfather        Social History     Socioeconomic History    Marital status:     Number of children: 3   Occupational History     Employer: [employer]   Tobacco Use    Smoking status: Former Smoker     Quit date: 1987     Years since quittin.6    Smokeless tobacco: Never Used   Substance and Sexual Activity    Alcohol use: Yes     Alcohol/week: 7.0 standard drinks     Types: 7 Glasses of wine per week    Drug use: No    Sexual activity: Yes     Partners: Female     Social Determinants of Health     Financial Resource Strain: Low Risk     Difficulty of Paying Living Expenses: Not hard at all   Food Insecurity: No Food Insecurity    Worried About Running Out of Food in the Last Year: Never true    Ran Out of Food in the Last Year: Never true   Transportation Needs: No Transportation Needs    Lack of Transportation (Medical): No    Lack of Transportation (Non-Medical): No   Physical Activity: Insufficiently Active    Days of Exercise per Week: 4 days    Minutes of Exercise per Session: 30 min   Stress: No Stress Concern Present    Feeling of Stress : Not at all   Social Connections: Unknown    Frequency of Communication with Friends and Family: More than three times a week    Frequency of Social Gatherings with Friends and  Family: More than three times a week    Active Member of Clubs or Organizations: Yes    Attends Club or Organization Meetings: More than 4 times per year    Marital Status:        Allergies:  Doxycycline, Shellfish containing products, and Tree pollen-white akhil    Medications:    Current Outpatient Medications:     AFLURIA QD 2019-20,3YR UP,,PF, 60 mcg (15 mcg x 4)/0.5 mL Syrg, ADM 0.5ML IM UTD, Disp: , Rfl: 0    clopidogreL (PLAVIX) 75 mg tablet, Take 75 mg by mouth once daily., Disp: , Rfl:     lisinopriL-hydrochlorothiazide (PRINZIDE,ZESTORETIC) 20-12.5 mg per tablet, TAKE 1 TABLET DAILY, Disp: 90 tablet, Rfl: 3    meloxicam (MOBIC) 15 MG tablet, 15 mg daily as needed. , Disp: , Rfl:     rosuvastatin (CRESTOR) 20 MG tablet, TAKE 1 TABLET DAILY, Disp: 90 tablet, Rfl: 3    sildenafil (REVATIO) 20 mg Tab, Take 5 tablets by mouth 1 hour before sexual activity, Disp: 50 tablet, Rfl: 11    zolpidem (AMBIEN) 10 mg Tab, TAKE 1 TABLET(10 MG) BY MOUTH EVERY NIGHT AS NEEDED FOR INSOMNIA, Disp: 30 tablet, Rfl: 5    tamsulosin (FLOMAX) 0.4 mg Cap, Take 1 capsule (0.4 mg total) by mouth once daily. (Patient not taking: Reported on 8/16/2021), Disp: 30 capsule, Rfl: 11    Current Facility-Administered Medications:     sodium hyaluronate (EUFLEXXA) 10 mg/mL(mw 2.4 -3.6 million) injection 40 mg, 40 mg, Intra-articular, Weekly, Placido Frazier MD, 40 mg at 08/05/21 0849    testosterone undecanoate injection 750 mg, 750 mg, Intramuscular, Q10 weeks, Jil Horta NP, 750 mg at 10/11/21 0910    testosterone undecanoate injection 750 mg, 750 mg, Intramuscular, Once, Jaydon Morin MD    PHYSICAL EXAMINATION:    The patient generally appears in good health, is appropriately interactive, and is in no apparent distress.     Eyes: anicteric sclerae, moist conjunctivae; no lid-lag; PERRLA     HENT: Atraumatic; oropharynx clear with moist mucous membranes and no mucosal ulcerations;normal hard and soft  palate. No evidence of lymphadenopathy.    Neck: Trachea midline.  No thyromegaly.    Musculoskeletal: No abnormal gait.    Skin: No lesions.    Mental: Cooperative with normal affect.  Is oriented to time, place, and person.    Neuro: Grossly intact.    Chest: Normal inspiratory effort.   No accessory muscles.  No audible wheezes.  Respirations symmetric on inspiration and expiration.    Heart: Regular rhythm.      Abdomen:  Soft, non-tender. No masses or organomegaly. Bladder is not palpable. No evidence of flank discomfort. No evidence of inguinal hernia.    Genitourinary: The penis is circumcised with no evidence of plaques or induration. The urethral meatus is normal. The testes, epididymides, and cord structures are normal in size and contour bilaterally. The scrotum is normal in size and contour.    Normal anal sphincter tone. No rectal mass.    The prostate is 35 g. Normal landmarks. Lateral sulci. Median furrow intact.  No nodularity or induration. Seminal vesicles are normal.    Extremities: No clubbing, cyanosis.  2+ LE edema.      LABS:    Lab Results   Component Value Date    PSA 0.50 03/17/2017    PSA 0.47 07/18/2013    PSA 0.71 03/15/2013    PSADIAG 0.67 02/28/2022    PSADIAG 0.40 07/27/2021    PSADIAG 0.49 01/04/2021        IMPRESSION:    Encounter Diagnoses   Name Primary?    Male hypogonadism Yes    Erectile dysfunction due to arterial insufficiency     BPH with urinary obstruction     Primary hypertension     Mixed hyperlipidemia    HTN, stable  Hyperlipidemia, stable    PLAN:      1. Continue generic sildenafil for the ED (affected by above comorbidities).   Refilled.  2.  Discussed options for his LUTS.  Will observe them as he's pleased with his voiding.  3.  After obtaining informed consent verbally, 750 mg of Aveed was injected into the R gluteal muscle using standard sterile technique.  Was injected over 90 seconds.  He will be observed for 30 minutes.  4. RTC 10 weeks.  Labs due 9/22.   Aveed re-ordered.    Copy to:

## 2022-04-07 ENCOUNTER — TELEPHONE (OUTPATIENT)
Dept: PAIN MEDICINE | Facility: CLINIC | Age: 62
End: 2022-04-07
Payer: COMMERCIAL

## 2022-04-07 NOTE — TELEPHONE ENCOUNTER
Staff called pt. avi in regards to appt. cancelled in Jan. Staff also stated to give a call back into the office atn [phone]. SG

## 2022-04-11 NOTE — TELEPHONE ENCOUNTER
No new care gaps identified.  Powered by [employer]. Reference number: [id number].   4/11/2022 12:26:14 AM CDT

## 2022-04-11 NOTE — TELEPHONE ENCOUNTER
Refill Routing Note   Medication(s) are not appropriate for processing by [facility] for the following reason(s):      - Required vitals are abnormal  - Patient has been seen in the ED/Hospital since the last PCP visit    [facility] action(s):  Defer          Medication reconciliation completed: No     Appointments  past 12m or future 3m with PCP    Date Provider   Last Visit   5/24/2021 Kadeem Estrada MD   Next Visit   Visit date not found Kadeem Estrada MD   ED visits in past 90 days: 0        Note composed:12:10 PM 04/11/2022

## 2022-04-22 ENCOUNTER — PATIENT MESSAGE (OUTPATIENT)
Dept: UROLOGY | Facility: CLINIC | Age: 62
End: 2022-04-22
Payer: COMMERCIAL

## 2022-04-27 ENCOUNTER — TELEPHONE (OUTPATIENT)
Dept: PAIN MEDICINE | Facility: CLINIC | Age: 62
End: 2022-04-27
Payer: COMMERCIAL

## 2022-04-27 NOTE — TELEPHONE ENCOUNTER
----- Message from Eric Sutton MD sent at 4/27/2022 10:59 AM CDT -----  Pls call and schedule f/u with Dr Baires or Dr MCCLURE

## 2022-05-05 ENCOUNTER — OFFICE VISIT (OUTPATIENT)
Dept: ORTHOPEDICS | Facility: CLINIC | Age: 62
End: 2022-05-05
Payer: COMMERCIAL

## 2022-05-05 VITALS — BODY MASS INDEX: 30.97 KG/M2 | HEIGHT: 77 IN | WEIGHT: 262.31 LBS

## 2022-05-05 DIAGNOSIS — M17.0 PRIMARY OSTEOARTHRITIS OF BOTH KNEES: Primary | ICD-10-CM

## 2022-05-05 PROCEDURE — 1159F PR MEDICATION LIST DOCUMENTED IN MEDICAL RECORD: ICD-10-PCS | Mod: CPTII,S$GLB,, | Performed by: PHYSICIAN ASSISTANT

## 2022-05-05 PROCEDURE — 99999 PR PBB SHADOW E&M-EST. PATIENT-LVL III: CPT | Mod: PBBFAC,,, | Performed by: PHYSICIAN ASSISTANT

## 2022-05-05 PROCEDURE — 4010F PR ACE/ARB THEARPY RXD/TAKEN: ICD-10-PCS | Mod: CPTII,S$GLB,, | Performed by: PHYSICIAN ASSISTANT

## 2022-05-05 PROCEDURE — 99213 PR OFFICE/OUTPT VISIT, EST, LEVL III, 20-29 MIN: ICD-10-PCS | Mod: 25,S$GLB,, | Performed by: PHYSICIAN ASSISTANT

## 2022-05-05 PROCEDURE — 99999 PR PBB SHADOW E&M-EST. PATIENT-LVL III: ICD-10-PCS | Mod: PBBFAC,,, | Performed by: PHYSICIAN ASSISTANT

## 2022-05-05 PROCEDURE — 20610 DRAIN/INJ JOINT/BURSA W/O US: CPT | Mod: 50,S$GLB,, | Performed by: PHYSICIAN ASSISTANT

## 2022-05-05 PROCEDURE — 20610 PR DRAIN/INJECT LARGE JOINT/BURSA: ICD-10-PCS | Mod: 50,S$GLB,, | Performed by: PHYSICIAN ASSISTANT

## 2022-05-05 PROCEDURE — 4010F ACE/ARB THERAPY RXD/TAKEN: CPT | Mod: CPTII,S$GLB,, | Performed by: PHYSICIAN ASSISTANT

## 2022-05-05 PROCEDURE — 99213 OFFICE O/P EST LOW 20 MIN: CPT | Mod: 25,S$GLB,, | Performed by: PHYSICIAN ASSISTANT

## 2022-05-05 PROCEDURE — 3008F PR BODY MASS INDEX (BMI) DOCUMENTED: ICD-10-PCS | Mod: CPTII,S$GLB,, | Performed by: PHYSICIAN ASSISTANT

## 2022-05-05 PROCEDURE — 3008F BODY MASS INDEX DOCD: CPT | Mod: CPTII,S$GLB,, | Performed by: PHYSICIAN ASSISTANT

## 2022-05-05 PROCEDURE — 1159F MED LIST DOCD IN RCRD: CPT | Mod: CPTII,S$GLB,, | Performed by: PHYSICIAN ASSISTANT

## 2022-05-05 RX ORDER — TRIAMCINOLONE ACETONIDE 40 MG/ML
80 INJECTION, SUSPENSION INTRA-ARTICULAR; INTRAMUSCULAR
Status: COMPLETED | OUTPATIENT
Start: 2022-05-05 | End: 2022-05-05

## 2022-05-05 RX ORDER — MELOXICAM 15 MG/1
15 TABLET ORAL DAILY PRN
Qty: 90 TABLET | Refills: 2 | Status: SHIPPED | OUTPATIENT
Start: 2022-05-05 | End: 2022-06-24

## 2022-05-05 RX ADMIN — TRIAMCINOLONE ACETONIDE 80 MG: 40 INJECTION, SUSPENSION INTRA-ARTICULAR; INTRAMUSCULAR at 09:05

## 2022-05-05 NOTE — PROGRESS NOTES
"Patient ID: Stanley Stewart is a 61 y.o. male.    Chief Complaint: Pain of the Left Knee and Pain of the Right Knee      HISTORY:  Stanley Stewart is a 61 y.o. male who returns to me today for follow up of bilateral knee pain.  He was last seen by Jazlyn Fuller for injections 12/20/2021.  Today he is doing well, but notes recent worsening pain in both knees.  He had good relief with the steroid injections until recently. There has been no new injury.  He would like to repeat injections today.       PMH/PSH/FamHx/SocHx:    Unchanged from prior visit.    ROS:  Constitution: Negative for chills, fever and weakness.   Cardiovascular: Negative for chest pain.   Respiratory: Negative for cough and shortness of breath.   Hematologic/Lymphatic: Negative for bleeding problem. Does not bruise/bleed easily.   Skin: Negative for color change, itching and poor wound healing.   Musculoskeletal: Positive for bilateral knee pain  Gastrointestinal: Negative for heartburn.   Neurological: Negative for dizziness, focal weakness, numbness, paresthesias and sensory change.   Psychiatric/Behavioral: The patient is not nervous/anxious.   Allergic/Immunologic: Negative for environmental allergies and persistent infections.     PHYSICAL EXAM:   Ht 6' 5" (1.956 m)   Wt 119 kg (262 lb 4.8 oz)   BMI 31.10 kg/m²   Bilateral knee  Skin intact  Mild effusion  No warmth  ROM 0-130    IMAGING: xrays reviewed revealing mild degenerative changes of both knees     ASSESSMENT/PLAN:  Primary osteoarthritis of both knees  - CSI bilateral knee today  - Mobic refilled for prn use. Safe use discussed.   - F/u prn    Knee Injection Procedure Note  Diagnosis: bilateral knee degenerative arthritis  Indications: bilateral knee pain  Procedure Details: Verbal consent was obtained for the procedure. The injection site was identified and the skin was prepared with alcohol. The bilateral knee was injected from an anterolateral approach with 1 ml of Kenalog and 2 ml " Lidocaine under sterile technique using a 22 gauge needle. The needle was removed and the area cleansed and dressed.  Complications:  Patient tolerated the procedure well.    he was advised to rest the knee today, using ice and elevation as needed for comfort and swelling.Immediate relief of the knee pain may be short lived and secondary to the lidocaine. he may have an increase in discomfort tonight followed by steady improvement over the next several days. It may take 1-2 weeks following the injection to get the full benefit of the medication.

## 2022-05-18 RX ORDER — LISINOPRIL AND HYDROCHLOROTHIAZIDE 12.5; 2 MG/1; MG/1
TABLET ORAL
Qty: 90 TABLET | Refills: 3 | Status: SHIPPED | OUTPATIENT
Start: 2022-05-18 | End: 2023-07-07 | Stop reason: SDUPTHER

## 2022-05-30 ENCOUNTER — OFFICE VISIT (OUTPATIENT)
Dept: FAMILY MEDICINE | Facility: CLINIC | Age: 62
End: 2022-05-30
Payer: COMMERCIAL

## 2022-05-30 ENCOUNTER — TELEPHONE (OUTPATIENT)
Dept: FAMILY MEDICINE | Facility: CLINIC | Age: 62
End: 2022-05-30
Payer: COMMERCIAL

## 2022-05-30 VITALS
BODY MASS INDEX: 30.71 KG/M2 | SYSTOLIC BLOOD PRESSURE: 140 MMHG | OXYGEN SATURATION: 98 % | WEIGHT: 260.13 LBS | DIASTOLIC BLOOD PRESSURE: 80 MMHG | HEART RATE: 68 BPM | HEIGHT: 77 IN

## 2022-05-30 DIAGNOSIS — N40.1 BPH WITH URINARY OBSTRUCTION: ICD-10-CM

## 2022-05-30 DIAGNOSIS — G47.00 INSOMNIA, UNSPECIFIED TYPE: ICD-10-CM

## 2022-05-30 DIAGNOSIS — I25.118 CORONARY ARTERY DISEASE INVOLVING NATIVE CORONARY ARTERY OF NATIVE HEART WITH OTHER FORM OF ANGINA PECTORIS: ICD-10-CM

## 2022-05-30 DIAGNOSIS — Z23 NEED FOR TDAP VACCINATION: ICD-10-CM

## 2022-05-30 DIAGNOSIS — M54.50 CHRONIC RIGHT-SIDED LOW BACK PAIN WITHOUT SCIATICA: ICD-10-CM

## 2022-05-30 DIAGNOSIS — R21 RASH: ICD-10-CM

## 2022-05-30 DIAGNOSIS — R93.1 AGATSTON CORONARY ARTERY CALCIUM SCORE GREATER THAN 400: ICD-10-CM

## 2022-05-30 DIAGNOSIS — G89.29 CHRONIC RIGHT-SIDED LOW BACK PAIN WITHOUT SCIATICA: ICD-10-CM

## 2022-05-30 DIAGNOSIS — E78.2 MIXED HYPERLIPIDEMIA: ICD-10-CM

## 2022-05-30 DIAGNOSIS — N13.8 BPH WITH URINARY OBSTRUCTION: ICD-10-CM

## 2022-05-30 DIAGNOSIS — I10 PRIMARY HYPERTENSION: Primary | ICD-10-CM

## 2022-05-30 PROCEDURE — 3079F PR MOST RECENT DIASTOLIC BLOOD PRESSURE 80-89 MM HG: ICD-10-PCS | Mod: CPTII,S$GLB,, | Performed by: FAMILY MEDICINE

## 2022-05-30 PROCEDURE — 3079F DIAST BP 80-89 MM HG: CPT | Mod: CPTII,S$GLB,, | Performed by: FAMILY MEDICINE

## 2022-05-30 PROCEDURE — 99999 PR PBB SHADOW E&M-EST. PATIENT-LVL IV: ICD-10-PCS | Mod: PBBFAC,,, | Performed by: FAMILY MEDICINE

## 2022-05-30 PROCEDURE — 3077F PR MOST RECENT SYSTOLIC BLOOD PRESSURE >= 140 MM HG: ICD-10-PCS | Mod: CPTII,S$GLB,, | Performed by: FAMILY MEDICINE

## 2022-05-30 PROCEDURE — 3008F PR BODY MASS INDEX (BMI) DOCUMENTED: ICD-10-PCS | Mod: CPTII,S$GLB,, | Performed by: FAMILY MEDICINE

## 2022-05-30 PROCEDURE — 3077F SYST BP >= 140 MM HG: CPT | Mod: CPTII,S$GLB,, | Performed by: FAMILY MEDICINE

## 2022-05-30 PROCEDURE — 1159F MED LIST DOCD IN RCRD: CPT | Mod: CPTII,S$GLB,, | Performed by: FAMILY MEDICINE

## 2022-05-30 PROCEDURE — 1160F RVW MEDS BY RX/DR IN RCRD: CPT | Mod: CPTII,S$GLB,, | Performed by: FAMILY MEDICINE

## 2022-05-30 PROCEDURE — 99204 OFFICE O/P NEW MOD 45 MIN: CPT | Mod: S$GLB,,, | Performed by: FAMILY MEDICINE

## 2022-05-30 PROCEDURE — 99999 PR PBB SHADOW E&M-EST. PATIENT-LVL IV: CPT | Mod: PBBFAC,,, | Performed by: FAMILY MEDICINE

## 2022-05-30 PROCEDURE — 3008F BODY MASS INDEX DOCD: CPT | Mod: CPTII,S$GLB,, | Performed by: FAMILY MEDICINE

## 2022-05-30 PROCEDURE — 1160F PR REVIEW ALL MEDS BY PRESCRIBER/CLIN PHARMACIST DOCUMENTED: ICD-10-PCS | Mod: CPTII,S$GLB,, | Performed by: FAMILY MEDICINE

## 2022-05-30 PROCEDURE — 4010F ACE/ARB THERAPY RXD/TAKEN: CPT | Mod: CPTII,S$GLB,, | Performed by: FAMILY MEDICINE

## 2022-05-30 PROCEDURE — 4010F PR ACE/ARB THEARPY RXD/TAKEN: ICD-10-PCS | Mod: CPTII,S$GLB,, | Performed by: FAMILY MEDICINE

## 2022-05-30 PROCEDURE — 1159F PR MEDICATION LIST DOCUMENTED IN MEDICAL RECORD: ICD-10-PCS | Mod: CPTII,S$GLB,, | Performed by: FAMILY MEDICINE

## 2022-05-30 PROCEDURE — 99204 PR OFFICE/OUTPT VISIT, NEW, LEVL IV, 45-59 MIN: ICD-10-PCS | Mod: S$GLB,,, | Performed by: FAMILY MEDICINE

## 2022-05-30 RX ORDER — TRAZODONE HYDROCHLORIDE 150 MG/1
TABLET ORAL
Qty: 30 TABLET | Refills: 11 | Status: SHIPPED | OUTPATIENT
Start: 2022-05-30 | End: 2022-10-04

## 2022-05-30 NOTE — PROGRESS NOTES
"Subjective:       Patient ID: Stanley Stewart is a 62 y.o. male.    Chief Complaint: Establish Care and Medication Refill    Here today to establish care with a new PCP.  He was seeing Dr. Estrada on the [place] but moved to the [place] in the fall 2021.      HTN: He is on lisinopril/HCTZ.  BP has been controlled.  He did not take his BP medication this AM  CAD: Had a CT calcium and had a score of 674.  He saw Dr. Polanco in [place] and would like to see cardiologist on the [place]. He was started on Plavix.  He has been off and on it due to a recurrent rash.    HLD:  He is on Crestor 20 mg daily.  He had lab work in Feb 2022  Male Hypogonadism/BPH/ED:  He has been seeing urology ([facility]).  He is on testosterone injections.  He is also on sildenafil.    Chronic Pain:  He is seeing pain mgt and has been getting RFA treatments for his back. He has had good results.   He has also seen Ortho and received injections in his knees  Insomnia:  He is on chronic daily Ambien over the past 5 years.  He has not used any other medication.    Review of Systems    Objective:      Vitals:    05/30/22 1106   BP: (!) 140/80   BP Location: Right arm   Patient Position: Sitting   BP Method: Large (Manual)   Pulse: 68   SpO2: 98%   Weight: 118 kg (260 lb 2.3 oz)   Height: 6' 5" (1.956 m)      Physical Exam  Constitutional:       General: He is not in acute distress.  Cardiovascular:      Rate and Rhythm: Normal rate and regular rhythm.      Heart sounds: Normal heart sounds. No murmur heard.  Pulmonary:      Effort: Pulmonary effort is normal. No respiratory distress.      Breath sounds: Normal breath sounds. No wheezing, rhonchi or rales.   Skin:     General: Skin is warm and dry.   Neurological:      General: No focal deficit present.      Mental Status: He is alert.   Psychiatric:         Mood and Affect: Mood normal.         Behavior: Behavior normal.         Thought Content: Thought content normal.         Results for orders " placed or performed in visit on 02/28/22   Testosterone   Result Value Ref Range    Testosterone, Total 363 304 - 1227 ng/dL   Prostate Specific Antigen, Diagnostic   Result Value Ref Range    PSA Diagnostic 0.67 0.00 - 4.00 ng/mL   CBC Auto Differential   Result Value Ref Range    WBC 5.65 3.90 - 12.70 K/uL    RBC 4.98 4.60 - 6.20 M/uL    Hemoglobin 16.3 14.0 - 18.0 g/dL    Hematocrit 48.7 40.0 - 54.0 %    MCV 98 82 - 98 fL    MCH 32.7 (H) 27.0 - 31.0 pg    MCHC 33.5 32.0 - 36.0 g/dL    RDW 11.5 11.5 - 14.5 %    Platelets 202 150 - 450 K/uL    MPV 11.0 9.2 - 12.9 fL    Immature Granulocytes 0.5 0.0 - 0.5 %    Gran # (ANC) 3.0 1.8 - 7.7 K/uL    Immature Grans (Abs) 0.03 0.00 - 0.04 K/uL    Lymph # 1.7 1.0 - 4.8 K/uL    Mono # 0.6 0.3 - 1.0 K/uL    Eos # 0.3 0.0 - 0.5 K/uL    Baso # 0.06 0.00 - 0.20 K/uL    nRBC 0 0 /100 WBC    Gran % 53.4 38.0 - 73.0 %    Lymph % 29.4 18.0 - 48.0 %    Mono % 10.3 4.0 - 15.0 %    Eosinophil % 5.3 0.0 - 8.0 %    Basophil % 1.1 0.0 - 1.9 %    Differential Method Automated    Hepatic Function Panel   Result Value Ref Range    Total Protein 6.4 6.0 - 8.4 g/dL    Albumin 3.8 3.5 - 5.2 g/dL    Total Bilirubin 0.6 0.1 - 1.0 mg/dL    Bilirubin, Direct 0.2 0.1 - 0.3 mg/dL    AST 20 10 - 40 U/L    ALT 28 10 - 44 U/L    Alkaline Phosphatase 36 (L) 55 - 135 U/L   Lipid Panel   Result Value Ref Range    Cholesterol 146 120 - 199 mg/dL    Triglycerides 79 30 - 150 mg/dL    HDL 58 40 - 75 mg/dL    LDL Cholesterol 72.2 63.0 - 159.0 mg/dL    HDL/Cholesterol Ratio 39.7 20.0 - 50.0 %    Total Cholesterol/HDL Ratio 2.5 2.0 - 5.0    Non-HDL Cholesterol 88 mg/dL      Assessment:       1. Primary hypertension    2. Mixed hyperlipidemia    3. Agatston coronary artery calcium score greater than 400    4. Chronic right-sided low back pain without sciatica    5. BPH with urinary obstruction    6. Coronary artery disease involving native coronary artery of native heart with other form of angina pectoris    7.  Rash    8. Insomnia, unspecified type    9. Need for Tdap vaccination        Plan:       Primary hypertension  Did not take medication today.  Has been controlled per chart review  Mixed hyperlipidemia  On Crestor, Cholesterol controlled    Chronic right-sided low back pain without sciatica  Doing well at this time.  BPH with urinary obstruction  Seeing Urology  Coronary artery disease involving native coronary artery of native heart with other form of angina pectoris  -     Ambulatory referral/consult to Cardiology; Future; Expected date: 06/06/2022    Rash  -     Ambulatory referral/consult to Dermatology; Future; Expected date: 06/06/2022    Insomnia, unspecified type  -     traZODone (DESYREL) 150 MG tablet; Use 1/3 - 1 tabs PO qhs PRN insomnia  Dispense: 30 tablet; Refill: 11  He is willing to try something in place of Ambien.  Trial of Trazodone at this time.  Need for Tdap vaccination  -     Tdap Vaccine          Medication List with Changes/Refills   New Medications    TRAZODONE (DESYREL) 150 MG TABLET    Use 1/3 - 1 tabs PO qhs PRN insomnia   Current Medications    LISINOPRIL-HYDROCHLOROTHIAZIDE (PRINZIDE,ZESTORETIC) 20-12.5 MG PER TABLET    TAKE 1 TABLET DAILY    MELOXICAM (MOBIC) 15 MG TABLET    Take 1 tablet (15 mg total) by mouth daily as needed for Pain.    ROSUVASTATIN (CRESTOR) 20 MG TABLET    TAKE 1 TABLET DAILY    SILDENAFIL (REVATIO) 20 MG TAB    Take 5 tablets by mouth 1 hour before sexual activity    ZOLPIDEM (AMBIEN) 10 MG TAB    TAKE 1 TABLET(10 MG) BY MOUTH EVERY NIGHT AS NEEDED FOR INSOMNIA   Discontinued Medications    AFLURIA QD 2019-20,3YR UP,,PF, 60 MCG (15 MCG X 4)/0.5 ML SYRG    ADM 0.5ML IM UTD    CLOPIDOGREL (PLAVIX) 75 MG TABLET    TAKE 1 TABLET DAILY    LISINOPRIL-HYDROCHLOROTHIAZIDE (PRINZIDE,ZESTORETIC) 20-12.5 MG PER TABLET    Take 1 tablet by mouth once daily.    TAMSULOSIN (FLOMAX) 0.4 MG CAP    Take 1 capsule (0.4 mg total) by mouth once daily.

## 2022-06-07 ENCOUNTER — PATIENT MESSAGE (OUTPATIENT)
Dept: FAMILY MEDICINE | Facility: CLINIC | Age: 62
End: 2022-06-07
Payer: COMMERCIAL

## 2022-06-07 DIAGNOSIS — G47.00 INSOMNIA, UNSPECIFIED TYPE: Primary | ICD-10-CM

## 2022-06-09 ENCOUNTER — TELEPHONE (OUTPATIENT)
Dept: FAMILY MEDICINE | Facility: CLINIC | Age: 62
End: 2022-06-09
Payer: COMMERCIAL

## 2022-06-09 ENCOUNTER — PATIENT MESSAGE (OUTPATIENT)
Dept: FAMILY MEDICINE | Facility: CLINIC | Age: 62
End: 2022-06-09
Payer: COMMERCIAL

## 2022-06-09 ENCOUNTER — TELEPHONE (OUTPATIENT)
Dept: DERMATOLOGY | Facility: CLINIC | Age: 62
End: 2022-06-09
Payer: COMMERCIAL

## 2022-06-09 RX ORDER — ZOLPIDEM TARTRATE 10 MG/1
TABLET ORAL
Qty: 30 TABLET | Refills: 5 | Status: SHIPPED | OUTPATIENT
Start: 2022-06-09 | End: 2022-06-24

## 2022-06-10 ENCOUNTER — TELEPHONE (OUTPATIENT)
Dept: DERMATOLOGY | Facility: CLINIC | Age: 62
End: 2022-06-10
Payer: COMMERCIAL

## 2022-06-13 ENCOUNTER — TELEPHONE (OUTPATIENT)
Dept: DERMATOLOGY | Facility: CLINIC | Age: 62
End: 2022-06-13
Payer: COMMERCIAL

## 2022-06-23 ENCOUNTER — OFFICE VISIT (OUTPATIENT)
Dept: CARDIOLOGY | Facility: CLINIC | Age: 62
End: 2022-06-23
Payer: COMMERCIAL

## 2022-06-23 VITALS
SYSTOLIC BLOOD PRESSURE: 129 MMHG | DIASTOLIC BLOOD PRESSURE: 88 MMHG | HEART RATE: 65 BPM | BODY MASS INDEX: 30.32 KG/M2 | HEIGHT: 77 IN | WEIGHT: 256.81 LBS

## 2022-06-23 DIAGNOSIS — I10 PRIMARY HYPERTENSION: ICD-10-CM

## 2022-06-23 DIAGNOSIS — E78.2 MIXED HYPERLIPIDEMIA: ICD-10-CM

## 2022-06-23 DIAGNOSIS — I25.118 CORONARY ARTERY DISEASE INVOLVING NATIVE CORONARY ARTERY OF NATIVE HEART WITH OTHER FORM OF ANGINA PECTORIS: ICD-10-CM

## 2022-06-23 DIAGNOSIS — R93.1 AGATSTON CORONARY ARTERY CALCIUM SCORE GREATER THAN 400: Primary | Chronic | ICD-10-CM

## 2022-06-23 PROCEDURE — 1159F MED LIST DOCD IN RCRD: CPT | Mod: CPTII,S$GLB,, | Performed by: INTERNAL MEDICINE

## 2022-06-23 PROCEDURE — 93005 ELECTROCARDIOGRAM TRACING: CPT | Mod: PO

## 2022-06-23 PROCEDURE — 1159F PR MEDICATION LIST DOCUMENTED IN MEDICAL RECORD: ICD-10-PCS | Mod: CPTII,S$GLB,, | Performed by: INTERNAL MEDICINE

## 2022-06-23 PROCEDURE — 3079F PR MOST RECENT DIASTOLIC BLOOD PRESSURE 80-89 MM HG: ICD-10-PCS | Mod: CPTII,S$GLB,, | Performed by: INTERNAL MEDICINE

## 2022-06-23 PROCEDURE — 99204 PR OFFICE/OUTPT VISIT, NEW, LEVL IV, 45-59 MIN: ICD-10-PCS | Mod: 25,S$GLB,, | Performed by: INTERNAL MEDICINE

## 2022-06-23 PROCEDURE — 1160F RVW MEDS BY RX/DR IN RCRD: CPT | Mod: CPTII,S$GLB,, | Performed by: INTERNAL MEDICINE

## 2022-06-23 PROCEDURE — 99999 PR PBB SHADOW E&M-EST. PATIENT-LVL III: ICD-10-PCS | Mod: PBBFAC,,, | Performed by: INTERNAL MEDICINE

## 2022-06-23 PROCEDURE — 99999 PR PBB SHADOW E&M-EST. PATIENT-LVL III: CPT | Mod: PBBFAC,,, | Performed by: INTERNAL MEDICINE

## 2022-06-23 PROCEDURE — 93010 EKG 12-LEAD: ICD-10-PCS | Mod: S$GLB,,, | Performed by: INTERNAL MEDICINE

## 2022-06-23 PROCEDURE — 3079F DIAST BP 80-89 MM HG: CPT | Mod: CPTII,S$GLB,, | Performed by: INTERNAL MEDICINE

## 2022-06-23 PROCEDURE — 99204 OFFICE O/P NEW MOD 45 MIN: CPT | Mod: 25,S$GLB,, | Performed by: INTERNAL MEDICINE

## 2022-06-23 PROCEDURE — 3008F BODY MASS INDEX DOCD: CPT | Mod: CPTII,S$GLB,, | Performed by: INTERNAL MEDICINE

## 2022-06-23 PROCEDURE — 3008F PR BODY MASS INDEX (BMI) DOCUMENTED: ICD-10-PCS | Mod: CPTII,S$GLB,, | Performed by: INTERNAL MEDICINE

## 2022-06-23 PROCEDURE — 3074F SYST BP LT 130 MM HG: CPT | Mod: CPTII,S$GLB,, | Performed by: INTERNAL MEDICINE

## 2022-06-23 PROCEDURE — 93010 ELECTROCARDIOGRAM REPORT: CPT | Mod: S$GLB,,, | Performed by: INTERNAL MEDICINE

## 2022-06-23 PROCEDURE — 1160F PR REVIEW ALL MEDS BY PRESCRIBER/CLIN PHARMACIST DOCUMENTED: ICD-10-PCS | Mod: CPTII,S$GLB,, | Performed by: INTERNAL MEDICINE

## 2022-06-23 PROCEDURE — 4010F PR ACE/ARB THEARPY RXD/TAKEN: ICD-10-PCS | Mod: CPTII,S$GLB,, | Performed by: INTERNAL MEDICINE

## 2022-06-23 PROCEDURE — 3074F PR MOST RECENT SYSTOLIC BLOOD PRESSURE < 130 MM HG: ICD-10-PCS | Mod: CPTII,S$GLB,, | Performed by: INTERNAL MEDICINE

## 2022-06-23 PROCEDURE — 4010F ACE/ARB THERAPY RXD/TAKEN: CPT | Mod: CPTII,S$GLB,, | Performed by: INTERNAL MEDICINE

## 2022-06-23 NOTE — PROGRESS NOTES
Subjective:    Patient ID:  Stanley Stewart is a 62 y.o. male who presents for evaluation of No chief complaint on file.      HPI62 yo WM with HLD, HTN and elevated CACS. Negative nuclear stress test. Very active. Denies chest pain, SOB, or edema    Review of Systems   Constitutional: Negative for decreased appetite, fever, malaise/fatigue, weight gain and weight loss.   HENT: Negative for hearing loss and nosebleeds.    Eyes: Negative for visual disturbance.   Cardiovascular: Negative for chest pain, claudication, cyanosis, dyspnea on exertion, irregular heartbeat, leg swelling, near-syncope, orthopnea, palpitations, paroxysmal nocturnal dyspnea and syncope.   Respiratory: Negative for cough, hemoptysis, shortness of breath, sleep disturbances due to breathing, snoring and wheezing.    Endocrine: Negative for cold intolerance, heat intolerance, polydipsia and polyuria.   Hematologic/Lymphatic: Negative for adenopathy and bleeding problem. Does not bruise/bleed easily.   Skin: Negative for color change, itching, poor wound healing, rash and suspicious lesions.   Musculoskeletal: Negative for arthritis, back pain, falls, joint pain, joint swelling, muscle cramps, muscle weakness and myalgias.   Gastrointestinal: Negative for bloating, abdominal pain, change in bowel habit, constipation, flatus, heartburn, hematemesis, hematochezia, hemorrhoids, jaundice, melena, nausea and vomiting.   Genitourinary: Negative for bladder incontinence, decreased libido, frequency, hematuria, hesitancy and urgency.   Neurological: Negative for brief paralysis, difficulty with concentration, excessive daytime sleepiness, dizziness, focal weakness, headaches, light-headedness, loss of balance, numbness, vertigo and weakness.   Psychiatric/Behavioral: Negative for altered mental status, depression and memory loss. The patient does not have insomnia and is not nervous/anxious.    Allergic/Immunologic: Negative for environmental allergies,  "hives and persistent infections.        Objective:    Physical Exam  Constitutional:       General: He is not in acute distress.     Appearance: He is well-developed. He is not diaphoretic.      Comments: /88 (BP Location: Left arm, Patient Position: Sitting, BP Method: Large (Automatic))   Pulse 65   Ht 6' 5" (1.956 m)   Wt 116.5 kg (256 lb 13.4 oz)   BMI 30.46 kg/m²      HENT:      Head: Normocephalic and atraumatic.   Eyes:      General: Lids are normal. No scleral icterus.        Right eye: No discharge.      Conjunctiva/sclera: Conjunctivae normal.      Pupils: Pupils are equal, round, and reactive to light.   Neck:      Thyroid: No thyromegaly.      Vascular: No JVD.      Trachea: No tracheal deviation.   Cardiovascular:      Rate and Rhythm: Normal rate and regular rhythm.      Pulses: Intact distal pulses.           Carotid pulses are 2+ on the right side and 2+ on the left side.       Radial pulses are 2+ on the right side and 2+ on the left side.        Femoral pulses are 2+ on the right side and 2+ on the left side.       Popliteal pulses are 2+ on the right side and 2+ on the left side.        Dorsalis pedis pulses are 2+ on the right side and 2+ on the left side.        Posterior tibial pulses are 2+ on the right side and 2+ on the left side.      Heart sounds: Normal heart sounds, S1 normal and S2 normal. No murmur heard.    No friction rub. No gallop.   Pulmonary:      Effort: Pulmonary effort is normal. No respiratory distress.      Breath sounds: Normal breath sounds. No wheezing or rales.   Chest:      Chest wall: No tenderness.   Abdominal:      General: Bowel sounds are normal. There is no distension.      Palpations: Abdomen is soft. There is no hepatomegaly or mass.      Tenderness: There is no abdominal tenderness. There is no guarding or rebound.   Musculoskeletal:         General: No tenderness. Normal range of motion.      Cervical back: Normal range of motion and neck supple. "   Lymphadenopathy:      Cervical: No cervical adenopathy.   Skin:     General: Skin is warm and dry.      Coloration: Skin is not pale.      Findings: No erythema or rash.   Neurological:      Mental Status: He is alert and oriented to person, place, and time.      Cranial Nerves: No cranial nerve deficit.      Coordination: Coordination normal.      Deep Tendon Reflexes: Reflexes are normal and symmetric.   Psychiatric:         Speech: Speech normal.         Behavior: Behavior normal.         Thought Content: Thought content normal.         Judgment: Judgment normal.           Assessment:       1. Agatston coronary artery calcium score greater than 400    2. Coronary artery disease involving native coronary artery of native heart with other form of angina pectoris    3. Mixed hyperlipidemia    4. Primary hypertension         Plan:     The current medical regimen is effective;  continue present plan and medications.    Patient advised to modify risk factors such as weight, exercise, diet,  tobacco and alcohol exposure       Orders Placed This Encounter   Procedures    IN OFFICE EKG 12-LEAD (to Muse)     Follow up in about 1 year (around 6/23/2023).

## 2022-06-24 DIAGNOSIS — G47.00 INSOMNIA, UNSPECIFIED TYPE: ICD-10-CM

## 2022-06-24 NOTE — TELEPHONE ENCOUNTER
No new care gaps identified.  [facility] Embedded Care Gaps. Reference number: [id number]. 6/24/2022   10:36:43 AM BR[place]

## 2022-06-27 RX ORDER — ZOLPIDEM TARTRATE 10 MG/1
10 TABLET ORAL NIGHTLY
Qty: 90 TABLET | Refills: 0 | Status: SHIPPED | OUTPATIENT
Start: 2022-06-27 | End: 2022-09-13

## 2022-09-14 DIAGNOSIS — I10 HYPERTENSION: ICD-10-CM

## 2022-10-04 ENCOUNTER — PATIENT MESSAGE (OUTPATIENT)
Dept: INTERNAL MEDICINE | Facility: CLINIC | Age: 62
End: 2022-10-04

## 2022-10-04 ENCOUNTER — OFFICE VISIT (OUTPATIENT)
Dept: INTERNAL MEDICINE | Facility: CLINIC | Age: 62
End: 2022-10-04
Payer: COMMERCIAL

## 2022-10-04 ENCOUNTER — LAB VISIT (OUTPATIENT)
Dept: LAB | Facility: HOSPITAL | Age: 62
End: 2022-10-04
Attending: INTERNAL MEDICINE
Payer: COMMERCIAL

## 2022-10-04 VITALS
WEIGHT: 264.31 LBS | OXYGEN SATURATION: 99 % | HEART RATE: 62 BPM | BODY MASS INDEX: 31.21 KG/M2 | DIASTOLIC BLOOD PRESSURE: 80 MMHG | SYSTOLIC BLOOD PRESSURE: 130 MMHG | HEIGHT: 77 IN

## 2022-10-04 DIAGNOSIS — R93.1 AGATSTON CORONARY ARTERY CALCIUM SCORE GREATER THAN 400: ICD-10-CM

## 2022-10-04 DIAGNOSIS — E78.2 MIXED HYPERLIPIDEMIA: ICD-10-CM

## 2022-10-04 DIAGNOSIS — I10 ESSENTIAL HYPERTENSION: ICD-10-CM

## 2022-10-04 DIAGNOSIS — Z00.00 ANNUAL PHYSICAL EXAM: ICD-10-CM

## 2022-10-04 DIAGNOSIS — Z00.00 ANNUAL PHYSICAL EXAM: Primary | ICD-10-CM

## 2022-10-04 DIAGNOSIS — E29.1 MALE HYPOGONADISM: ICD-10-CM

## 2022-10-04 DIAGNOSIS — J32.9 SINUSITIS, UNSPECIFIED CHRONICITY, UNSPECIFIED LOCATION: ICD-10-CM

## 2022-10-04 LAB
ANION GAP SERPL CALC-SCNC: 8 MMOL/L (ref 8–16)
BUN SERPL-MCNC: 18 MG/DL (ref 8–23)
CALCIUM SERPL-MCNC: 9.6 MG/DL (ref 8.7–10.5)
CHLORIDE SERPL-SCNC: 106 MMOL/L (ref 95–110)
CO2 SERPL-SCNC: 25 MMOL/L (ref 23–29)
CREAT SERPL-MCNC: 1 MG/DL (ref 0.5–1.4)
EST. GFR  (NO RACE VARIABLE): >60 ML/MIN/1.73 M^2
GLUCOSE SERPL-MCNC: 93 MG/DL (ref 70–110)
POTASSIUM SERPL-SCNC: 4.8 MMOL/L (ref 3.5–5.1)
SODIUM SERPL-SCNC: 139 MMOL/L (ref 136–145)

## 2022-10-04 PROCEDURE — 1159F PR MEDICATION LIST DOCUMENTED IN MEDICAL RECORD: ICD-10-PCS | Mod: CPTII,S$GLB,, | Performed by: INTERNAL MEDICINE

## 2022-10-04 PROCEDURE — 3008F BODY MASS INDEX DOCD: CPT | Mod: CPTII,S$GLB,, | Performed by: INTERNAL MEDICINE

## 2022-10-04 PROCEDURE — 3008F PR BODY MASS INDEX (BMI) DOCUMENTED: ICD-10-PCS | Mod: CPTII,S$GLB,, | Performed by: INTERNAL MEDICINE

## 2022-10-04 PROCEDURE — 4010F ACE/ARB THERAPY RXD/TAKEN: CPT | Mod: CPTII,S$GLB,, | Performed by: INTERNAL MEDICINE

## 2022-10-04 PROCEDURE — 4010F PR ACE/ARB THEARPY RXD/TAKEN: ICD-10-PCS | Mod: CPTII,S$GLB,, | Performed by: INTERNAL MEDICINE

## 2022-10-04 PROCEDURE — 99396 PREV VISIT EST AGE 40-64: CPT | Mod: S$GLB,,, | Performed by: INTERNAL MEDICINE

## 2022-10-04 PROCEDURE — 99999 PR PBB SHADOW E&M-EST. PATIENT-LVL III: ICD-10-PCS | Mod: PBBFAC,,, | Performed by: INTERNAL MEDICINE

## 2022-10-04 PROCEDURE — 36415 COLL VENOUS BLD VENIPUNCTURE: CPT | Performed by: INTERNAL MEDICINE

## 2022-10-04 PROCEDURE — 80048 BASIC METABOLIC PNL TOTAL CA: CPT | Performed by: INTERNAL MEDICINE

## 2022-10-04 PROCEDURE — 1159F MED LIST DOCD IN RCRD: CPT | Mod: CPTII,S$GLB,, | Performed by: INTERNAL MEDICINE

## 2022-10-04 PROCEDURE — 3079F DIAST BP 80-89 MM HG: CPT | Mod: CPTII,S$GLB,, | Performed by: INTERNAL MEDICINE

## 2022-10-04 PROCEDURE — 99999 PR PBB SHADOW E&M-EST. PATIENT-LVL III: CPT | Mod: PBBFAC,,, | Performed by: INTERNAL MEDICINE

## 2022-10-04 PROCEDURE — 99396 PR PREVENTIVE VISIT,EST,40-64: ICD-10-PCS | Mod: S$GLB,,, | Performed by: INTERNAL MEDICINE

## 2022-10-04 PROCEDURE — 3075F SYST BP GE 130 - 139MM HG: CPT | Mod: CPTII,S$GLB,, | Performed by: INTERNAL MEDICINE

## 2022-10-04 PROCEDURE — 3079F PR MOST RECENT DIASTOLIC BLOOD PRESSURE 80-89 MM HG: ICD-10-PCS | Mod: CPTII,S$GLB,, | Performed by: INTERNAL MEDICINE

## 2022-10-04 PROCEDURE — 3075F PR MOST RECENT SYSTOLIC BLOOD PRESS GE 130-139MM HG: ICD-10-PCS | Mod: CPTII,S$GLB,, | Performed by: INTERNAL MEDICINE

## 2022-10-04 RX ORDER — AZELASTINE 1 MG/ML
2 SPRAY, METERED NASAL 2 TIMES DAILY
Qty: 30 ML | Refills: 0 | Status: SHIPPED | OUTPATIENT
Start: 2022-10-04 | End: 2024-04-02 | Stop reason: SDUPTHER

## 2022-10-04 RX ORDER — PREDNISONE 20 MG/1
TABLET ORAL
Qty: 12 TABLET | Refills: 0 | Status: SHIPPED | OUTPATIENT
Start: 2022-10-04 | End: 2022-10-11 | Stop reason: SDUPTHER

## 2022-10-04 RX ORDER — AMOXICILLIN 875 MG/1
875 TABLET, FILM COATED ORAL 2 TIMES DAILY
Qty: 20 TABLET | Refills: 0 | Status: SHIPPED | OUTPATIENT
Start: 2022-10-04 | End: 2023-06-08

## 2022-10-04 NOTE — PROGRESS NOTES
MEDICAL HISTORY:  Hypertension.  Hyperlipidemia.  Elevated calcium score/CAC  Lumbar degenerative disk disease.  Hypotestosterone, followed by Urology.  Appendectomy.  Arthroscopic knee surgery twice on the left and one on the right.  Intussusception at age 6 and then recently in 2014 where he did   require surgery and a partial colon resection.  Fatty Liver  Sleep apnea.     SOCIAL HISTORY:  Tobacco use, none.  Alcohol use: maybe one glass of wine a night.  , has three children.  He works as a teacher at [employer].  Exercise is walking a mile and a half every day and occasionally doing a spinning class.     FAMILY HISTORY:  Father is alive, hypertension, hyperlipidemia, coronary artery disease.  Mother is , chronic hepatitis.  Two brothers living but one with a history of heart attack at age 50.     SCREENING:  Normal colonoscopy 2020     MEDICATIONS:  Lisinopril/hydrochlorothiazide 20/12.5 mg a day.  Ambien 10 mg at night.   Crestor 20 mg  daily  Testosterone injection    Answers submitted by the patient for this visit:  Cough Questionnaire (Submitted on 10/3/2022)  Chief Complaint: Cough  Chronicity: recurrent  Onset: more than 1 month ago  Progression since onset: waxing and waning  Frequency: hourly  Cough characteristics: non-productive  chest pain: No  chills: No  ear congestion: No  ear pain: No  fever: No  headaches: Yes  heartburn: No  hemoptysis: No  myalgias: No  nasal congestion: Yes  postnasal drip: Yes  rash: No  rhinorrhea: Yes  shortness of breath: No  sore throat: Yes  sweats: No  weight loss: No  wheezing: Yes  Aggravated by: pollens  asthma: No  bronchiectasis: No  bronchitis: No  COPD: No  emphysema: No  environmental allergies: Yes  pneumonia: No  Treatments tried: OTC cough suppressant  Improvement on treatment: no relief      62-year-old male  presents for routine check but what also prompted this is that he has been having persistent head cold for least 6  weeks.  Started around August 25th.  Went urgent care on his September 22nd for which he got a Z-Mark which helped for a little bit.  He is having ongoing nasal and head congestion.  Scratchy throat.  It blowing out yellow mucus.  No chest congestion but has occasional cough.  He is not short of breath.  He is test himself home COVID number times which been negative      He can since the follow-up with Cardiology and Urology.  He is getting testosterone injections every 10 weeks.    He gets routine labs to Urology.    Hepatic function panel/PSA/lipid profile/CBC in February was normal    Review of symptoms  Negative for chest pain, palpitations, shortness breath, abdominal pain.  Reports regular bowel function.  No difficulty urinating.  No urgency frequency.  No arthralgias headaches    Examination   Weight 264 lb   Pulse 64   blood pressure by me 138/80.  He did not take his medications this morning   HEENT exam, tympanic membranes normal, nasal mucosa swollen congested, Or pharynx no abnormal findings, slight tenderness over the maxillary left sinus  Neck no thyromegaly no masses  chest clear breath sounds good effort  heart regular rate rhythm no murmurs gallops  abdominal exam soft nontender no hepatosplenomegaly abdominal masses   pulses 2+ carotid pulses no bruits 2+ pedal pulses  extremities no edema  lymph gland palpable adenopathy    Impression  General examination  Hypertension   Hyperlipidemia   Hypo testosterone   Sinusitis  Positive Agaston calcium score    Plan   Basic metabolic profile        later in year when he meets up with Urology other testing will be performed  Amoxicillin 875 mg b.i.d. for 10 days, Astelin nasal spray, prednisone taper over the next 8 days

## 2022-10-11 ENCOUNTER — PATIENT MESSAGE (OUTPATIENT)
Dept: INTERNAL MEDICINE | Facility: CLINIC | Age: 62
End: 2022-10-11
Payer: COMMERCIAL

## 2022-10-11 RX ORDER — PREDNISONE 20 MG/1
TABLET ORAL
Qty: 12 TABLET | Refills: 0 | Status: SHIPPED | OUTPATIENT
Start: 2022-10-11 | End: 2023-06-08

## 2022-10-18 ENCOUNTER — PATIENT MESSAGE (OUTPATIENT)
Dept: INTERNAL MEDICINE | Facility: CLINIC | Age: 62
End: 2022-10-18
Payer: COMMERCIAL

## 2022-10-18 RX ORDER — FLUTICASONE PROPIONATE 50 MCG
2 SPRAY, SUSPENSION (ML) NASAL DAILY
Qty: 16 G | Refills: 1 | Status: SHIPPED | OUTPATIENT
Start: 2022-10-18 | End: 2022-10-19 | Stop reason: SDUPTHER

## 2022-10-19 RX ORDER — FLUTICASONE PROPIONATE 50 MCG
2 SPRAY, SUSPENSION (ML) NASAL DAILY
Qty: 16 G | Refills: 1 | Status: SHIPPED | OUTPATIENT
Start: 2022-10-19 | End: 2023-03-17 | Stop reason: SDUPTHER

## 2022-10-19 NOTE — TELEPHONE ENCOUNTER
No new care gaps identified.  [facility] Embedded Care Gaps. Reference number: [id number]. 10/19/2022   11:32:08 AM [place]

## 2022-11-17 ENCOUNTER — PATIENT MESSAGE (OUTPATIENT)
Dept: UROLOGY | Facility: CLINIC | Age: 62
End: 2022-11-17
Payer: COMMERCIAL

## 2022-11-21 DIAGNOSIS — E29.1 MALE HYPOGONADISM: Primary | ICD-10-CM

## 2022-11-23 ENCOUNTER — LAB VISIT (OUTPATIENT)
Dept: LAB | Facility: HOSPITAL | Age: 62
End: 2022-11-23
Attending: UROLOGY
Payer: COMMERCIAL

## 2022-11-23 DIAGNOSIS — E29.1 MALE HYPOGONADISM: ICD-10-CM

## 2022-11-23 LAB
ALBUMIN SERPL BCP-MCNC: 4 G/DL (ref 3.5–5.2)
ALP SERPL-CCNC: 49 U/L (ref 55–135)
ALT SERPL W/O P-5'-P-CCNC: 33 U/L (ref 10–44)
ANION GAP SERPL CALC-SCNC: 8 MMOL/L (ref 8–16)
AST SERPL-CCNC: 26 U/L (ref 10–40)
BASOPHILS # BLD AUTO: 0.07 K/UL (ref 0–0.2)
BASOPHILS NFR BLD: 1.2 % (ref 0–1.9)
BILIRUB DIRECT SERPL-MCNC: 0.3 MG/DL (ref 0.1–0.3)
BILIRUB SERPL-MCNC: 0.7 MG/DL (ref 0.1–1)
BUN SERPL-MCNC: 20 MG/DL (ref 8–23)
CALCIUM SERPL-MCNC: 9.5 MG/DL (ref 8.7–10.5)
CHLORIDE SERPL-SCNC: 105 MMOL/L (ref 95–110)
CHOLEST SERPL-MCNC: 147 MG/DL (ref 120–199)
CHOLEST/HDLC SERPL: 2.8 {RATIO} (ref 2–5)
CO2 SERPL-SCNC: 27 MMOL/L (ref 23–29)
CREAT SERPL-MCNC: 1.1 MG/DL (ref 0.5–1.4)
DIFFERENTIAL METHOD: ABNORMAL
EOSINOPHIL # BLD AUTO: 0.5 K/UL (ref 0–0.5)
EOSINOPHIL NFR BLD: 8.5 % (ref 0–8)
ERYTHROCYTE [DISTWIDTH] IN BLOOD BY AUTOMATED COUNT: 11.9 % (ref 11.5–14.5)
EST. GFR  (NO RACE VARIABLE): >60 ML/MIN/1.73 M^2
GLUCOSE SERPL-MCNC: 98 MG/DL (ref 70–110)
HCT VFR BLD AUTO: 40.6 % (ref 40–54)
HDLC SERPL-MCNC: 53 MG/DL (ref 40–75)
HDLC SERPL: 36.1 % (ref 20–50)
HGB BLD-MCNC: 13.8 G/DL (ref 14–18)
IMM GRANULOCYTES # BLD AUTO: 0.03 K/UL (ref 0–0.04)
IMM GRANULOCYTES NFR BLD AUTO: 0.5 % (ref 0–0.5)
LDLC SERPL CALC-MCNC: 68.6 MG/DL (ref 63–159)
LYMPHOCYTES # BLD AUTO: 2 K/UL (ref 1–4.8)
LYMPHOCYTES NFR BLD: 35.3 % (ref 18–48)
MCH RBC QN AUTO: 32.9 PG (ref 27–31)
MCHC RBC AUTO-ENTMCNC: 34 G/DL (ref 32–36)
MCV RBC AUTO: 97 FL (ref 82–98)
MONOCYTES # BLD AUTO: 0.6 K/UL (ref 0.3–1)
MONOCYTES NFR BLD: 9.8 % (ref 4–15)
NEUTROPHILS # BLD AUTO: 2.5 K/UL (ref 1.8–7.7)
NEUTROPHILS NFR BLD: 44.7 % (ref 38–73)
NONHDLC SERPL-MCNC: 94 MG/DL
NRBC BLD-RTO: 0 /100 WBC
PLATELET # BLD AUTO: 175 K/UL (ref 150–450)
PMV BLD AUTO: 11.2 FL (ref 9.2–12.9)
POTASSIUM SERPL-SCNC: 4.5 MMOL/L (ref 3.5–5.1)
PROT SERPL-MCNC: 6.8 G/DL (ref 6–8.4)
RBC # BLD AUTO: 4.19 M/UL (ref 4.6–6.2)
SODIUM SERPL-SCNC: 140 MMOL/L (ref 136–145)
TESTOST SERPL-MCNC: 250 NG/DL (ref 304–1227)
TRIGL SERPL-MCNC: 127 MG/DL (ref 30–150)
WBC # BLD AUTO: 5.64 K/UL (ref 3.9–12.7)

## 2022-11-23 PROCEDURE — 36415 COLL VENOUS BLD VENIPUNCTURE: CPT | Mod: PO | Performed by: UROLOGY

## 2022-11-23 PROCEDURE — 84403 ASSAY OF TOTAL TESTOSTERONE: CPT | Performed by: UROLOGY

## 2022-11-23 PROCEDURE — 80076 HEPATIC FUNCTION PANEL: CPT | Performed by: UROLOGY

## 2022-11-23 PROCEDURE — 80048 BASIC METABOLIC PNL TOTAL CA: CPT | Performed by: UROLOGY

## 2022-11-23 PROCEDURE — 80061 LIPID PANEL: CPT | Performed by: UROLOGY

## 2022-11-23 PROCEDURE — 85025 COMPLETE CBC W/AUTO DIFF WBC: CPT | Performed by: UROLOGY

## 2022-12-12 ENCOUNTER — OFFICE VISIT (OUTPATIENT)
Dept: UROLOGY | Facility: CLINIC | Age: 62
End: 2022-12-12
Payer: COMMERCIAL

## 2022-12-12 VITALS
BODY MASS INDEX: 31.42 KG/M2 | HEIGHT: 77 IN | WEIGHT: 266.13 LBS | SYSTOLIC BLOOD PRESSURE: 121 MMHG | HEART RATE: 63 BPM | DIASTOLIC BLOOD PRESSURE: 78 MMHG

## 2022-12-12 DIAGNOSIS — N52.01 ERECTILE DYSFUNCTION DUE TO ARTERIAL INSUFFICIENCY: ICD-10-CM

## 2022-12-12 DIAGNOSIS — N13.8 BPH WITH URINARY OBSTRUCTION: ICD-10-CM

## 2022-12-12 DIAGNOSIS — E29.1 MALE HYPOGONADISM: Primary | ICD-10-CM

## 2022-12-12 DIAGNOSIS — N40.1 BPH WITH URINARY OBSTRUCTION: ICD-10-CM

## 2022-12-12 PROCEDURE — 1159F MED LIST DOCD IN RCRD: CPT | Mod: CPTII,S$GLB,, | Performed by: UROLOGY

## 2022-12-12 PROCEDURE — 3008F BODY MASS INDEX DOCD: CPT | Mod: CPTII,S$GLB,, | Performed by: UROLOGY

## 2022-12-12 PROCEDURE — 4010F PR ACE/ARB THEARPY RXD/TAKEN: ICD-10-PCS | Mod: CPTII,S$GLB,, | Performed by: UROLOGY

## 2022-12-12 PROCEDURE — 3074F PR MOST RECENT SYSTOLIC BLOOD PRESSURE < 130 MM HG: ICD-10-PCS | Mod: CPTII,S$GLB,, | Performed by: UROLOGY

## 2022-12-12 PROCEDURE — 99999 PR PBB SHADOW E&M-EST. PATIENT-LVL IV: ICD-10-PCS | Mod: PBBFAC,,, | Performed by: UROLOGY

## 2022-12-12 PROCEDURE — 3008F PR BODY MASS INDEX (BMI) DOCUMENTED: ICD-10-PCS | Mod: CPTII,S$GLB,, | Performed by: UROLOGY

## 2022-12-12 PROCEDURE — 1159F PR MEDICATION LIST DOCUMENTED IN MEDICAL RECORD: ICD-10-PCS | Mod: CPTII,S$GLB,, | Performed by: UROLOGY

## 2022-12-12 PROCEDURE — 1160F PR REVIEW ALL MEDS BY PRESCRIBER/CLIN PHARMACIST DOCUMENTED: ICD-10-PCS | Mod: CPTII,S$GLB,, | Performed by: UROLOGY

## 2022-12-12 PROCEDURE — 99999 PR PBB SHADOW E&M-EST. PATIENT-LVL IV: CPT | Mod: PBBFAC,,, | Performed by: UROLOGY

## 2022-12-12 PROCEDURE — 3078F PR MOST RECENT DIASTOLIC BLOOD PRESSURE < 80 MM HG: ICD-10-PCS | Mod: CPTII,S$GLB,, | Performed by: UROLOGY

## 2022-12-12 PROCEDURE — 3074F SYST BP LT 130 MM HG: CPT | Mod: CPTII,S$GLB,, | Performed by: UROLOGY

## 2022-12-12 PROCEDURE — 4010F ACE/ARB THERAPY RXD/TAKEN: CPT | Mod: CPTII,S$GLB,, | Performed by: UROLOGY

## 2022-12-12 PROCEDURE — 3078F DIAST BP <80 MM HG: CPT | Mod: CPTII,S$GLB,, | Performed by: UROLOGY

## 2022-12-12 PROCEDURE — 99214 OFFICE O/P EST MOD 30 MIN: CPT | Mod: S$GLB,,, | Performed by: UROLOGY

## 2022-12-12 PROCEDURE — 99214 PR OFFICE/OUTPT VISIT, EST, LEVL IV, 30-39 MIN: ICD-10-PCS | Mod: S$GLB,,, | Performed by: UROLOGY

## 2022-12-12 PROCEDURE — 1160F RVW MEDS BY RX/DR IN RCRD: CPT | Mod: CPTII,S$GLB,, | Performed by: UROLOGY

## 2022-12-12 RX ORDER — TESTOSTERONE 20.25 MG/1.25G
GEL TOPICAL
Qty: 1 EACH | Refills: 5 | Status: SHIPPED | OUTPATIENT
Start: 2022-12-12 | End: 2023-10-05 | Stop reason: SDUPTHER

## 2022-12-12 RX ORDER — SILDENAFIL CITRATE 20 MG/1
TABLET ORAL
Qty: 50 TABLET | Refills: 11 | Status: SHIPPED | OUTPATIENT
Start: 2022-12-12 | End: 2023-10-05 | Stop reason: SDUPTHER

## 2022-12-12 NOTE — PROGRESS NOTES
CHIEF COMPLAINT:    Mr. Stewart is a 62 y.o. male presenting with hypogonadism.    PRESENTING ILLNESS:    Mr. Stewart is a 62 y.o. male with a history of hypogonadism.  This has been present for > 1 year.  He was on Testopel.  Then switched to Aveed.   While on TRT, he has more energy and a stronger libido.  Would like to go back on topical therapy.    He also has ED.  This has been present for > 1 year.  He has been using Cialis, but it is not effective.  He's was using levitra with good results.  However, he c/o the cost.  He's using sildenafil with good results. Using 60-80 mg.    He has LUTS.  Nocturia x 1-4.  He does drink wine at night.  Good FOS.  No straining.  No hematuria.  No dysuria.    REVIEW OF SYSTEMS:    Patient denies chest pain, sore throat, headache, blurred vision, fever, nausea, vomiting, chills, flank discomfort, abdominal pain, bleeding per rectum, cough, SOB, recent loss of consciousness, recent mental status changes, seizures, dizziness, or upper or lower extremity weakness.    DANIELITO  1. 2  2. 3  3. 4  4. 4  5. 4     PATIENT HISTORY:    Past Medical History:   Diagnosis Date    Allergy     BPH with urinary obstruction     Degenerative disc disease     Dysphagia     ED (erectile dysfunction)     Gout     Hyperlipidemia     Hypertension     Male hypogonadism 7/19/2012       Past Surgical History:   Procedure Laterality Date    APPENDECTOMY      arthroscopic knee surg      left X 2, right X 1    COLONOSCOPY N/A 9/28/2020    Procedure: COLONOSCOPY;  Surgeon: CAYDEN Montenegro MD;  Location: [address];  Service: Endoscopy;  Laterality: N/A;  9/25-covid [place]-tb    INJECTION OF ANESTHETIC AGENT AROUND NERVE Right 8/24/2021    Procedure: BLOCK, NERVE, MEDIAL BRANCH L3,L4,L5;  Surgeon: Zachary Salas MD;  Location: [facility];  Service: Pain Management;  Laterality: Right;  1 of 2    INJECTION OF ANESTHETIC AGENT AROUND NERVE Right 10/12/2021    Procedure: BLOCK, NERVE,  L3-L4-L5 MEDIAL BRANCH 2 OF 2 NEED CONSENT/ conitnue Plavix;  Surgeon: Zachary Salas MD;  Location: [facility];  Service: Pain Management;  Laterality: Right;    INTUSSUSCEPTION REPAIR      KNEE SURGERY      RADIOFREQUENCY ABLATION Right 2021    Procedure: RADIOFREQUENCY ABLATION right L3, L4, L5 NEEDS CONSENT;  Surgeon: Zachary Salas MD;  Location: [facility];  Service: Pain Management;  Laterality: Right;    TONSILLECTOMY         Family History   Problem Relation Age of Onset    Hyperlipidemia Father     Hypertension Father     Heart disease Father     Liver disease Mother     Heart disease Brother     Heart disease Maternal Grandmother     Heart disease Paternal Grandfather        Social History     Socioeconomic History    Marital status:     Number of children: 3   Occupational History     Employer: [employer]   Tobacco Use    Smoking status: Former     Types: Cigarettes     Quit date: 1987     Years since quittin.4    Smokeless tobacco: Never   Substance and Sexual Activity    Alcohol use: Yes     Alcohol/week: 7.0 standard drinks     Types: 7 Glasses of wine per week    Drug use: No    Sexual activity: Yes     Partners: Female     Social Determinants of Health     Financial Resource Strain: Low Risk     Difficulty of Paying Living Expenses: Not hard at all   Food Insecurity: No Food Insecurity    Worried About Running Out of Food in the Last Year: Never true    Ran Out of Food in the Last Year: Never true   Transportation Needs: No Transportation Needs    Lack of Transportation (Medical): No    Lack of Transportation (Non-Medical): No   Physical Activity: Sufficiently Active    Days of Exercise per Week: 5 days    Minutes of Exercise per Session: 40 min   Stress: Stress Concern Present    Feeling of Stress : To some extent   Social Connections: Unknown    Frequency of Communication with Friends and Family: More than three times a week    Frequency of Social  Gatherings with Friends and Family: More than three times a week    Active Member of Clubs or Organizations: Yes    Attends Club or Organization Meetings: More than 4 times per year    Marital Status:    Housing Stability: Low Risk     Unable to Pay for Housing in the Last Year: No    Number of Places Lived in the Last Year: 1    Unstable Housing in the Last Year: No       Allergies:  Doxycycline, Shellfish containing products, and Tree pollen-white akhil    Medications:    Current Outpatient Medications:     amoxicillin (AMOXIL) 875 MG tablet, Take 1 tablet (875 mg total) by mouth 2 (two) times daily., Disp: 20 tablet, Rfl: 0    azelastine (ASTELIN) 137 mcg (0.1 %) nasal spray, 2 sprays (274 mcg total) by Nasal route 2 (two) times daily., Disp: 30 mL, Rfl: 0    fluticasone propionate (FLONASE) 50 mcg/actuation nasal spray, 2 sprays (100 mcg total) by Each Nostril route once daily., Disp: 16 g, Rfl: 1    lisinopriL-hydrochlorothiazide (PRINZIDE,ZESTORETIC) 20-12.5 mg per tablet, TAKE 1 TABLET DAILY, Disp: 90 tablet, Rfl: 3    predniSONE (DELTASONE) 20 MG tablet, 2 pills po daily for 4 days, then 1 pill po day for 4 days, Disp: 12 tablet, Rfl: 0    rosuvastatin (CRESTOR) 20 MG tablet, TAKE 1 TABLET DAILY, Disp: 90 tablet, Rfl: 3    sildenafil (REVATIO) 20 mg Tab, Take 5 tablets by mouth 1 hour before sexual activity, Disp: 50 tablet, Rfl: 11    zolpidem (AMBIEN) 10 mg Tab, TAKE 1 TABLET EVERY EVENING, Disp: 90 tablet, Rfl: 0    Current Facility-Administered Medications:     sodium hyaluronate (EUFLEXXA) 10 mg/mL(mw 2.4 -3.6 million) injection 40 mg, 40 mg, Intra-articular, Weekly, Placido Frazier MD, 40 mg at 08/05/21 0849    PHYSICAL EXAMINATION:    The patient generally appears in good health, is appropriately interactive, and is in no apparent distress.     Eyes: anicteric sclerae, moist conjunctivae; no lid-lag; PERRLA     HENT: Atraumatic; oropharynx clear with moist mucous membranes and no mucosal  ulcerations;normal hard and soft palate. No evidence of lymphadenopathy.    Neck: Trachea midline.  No thyromegaly.    Musculoskeletal: No abnormal gait.    Skin: No lesions.    Mental: Cooperative with normal affect.  Is oriented to time, place, and person.    Neuro: Grossly intact.    Chest: Normal inspiratory effort.   No accessory muscles.  No audible wheezes.  Respirations symmetric on inspiration and expiration.    Heart: Regular rhythm.      Abdomen:  Soft, non-tender. No masses or organomegaly. Bladder is not palpable. No evidence of flank discomfort. No evidence of inguinal hernia.    Genitourinary: The penis is circumcised with no evidence of plaques or induration. The urethral meatus is normal. The testes, epididymides, and cord structures are normal in size and contour bilaterally. The scrotum is normal in size and contour.    Normal anal sphincter tone. No rectal mass.    The prostate is 35 g. Normal landmarks. Lateral sulci. Median furrow intact.  No nodularity or induration. Seminal vesicles are normal.    Extremities: No clubbing, cyanosis.  2+ LE edema.    LABS:    Lab Results   Component Value Date    PSA 0.50 03/17/2017    PSA 0.47 07/18/2013    PSA 0.71 03/15/2013    PSADIAG 0.67 02/28/2022    PSADIAG 0.40 07/27/2021    PSADIAG 0.49 01/04/2021        IMPRESSION:    Encounter Diagnoses   Name Primary?    Male hypogonadism Yes    BPH with urinary obstruction     Erectile dysfunction due to arterial insufficiency    HTN, stable  Hyperlipidemia, stable    PLAN:      1. Continue generic sildenafil for the ED (affected by above comorbidities).   Refilled.  2.  Discussed options for his LUTS.  Will observe them as he's pleased with his voiding.  3.  A new Rx for Androgel 1.62% was given today.   Will check a T in 2 weeks and adjust the dose of TRT if necessary.    4.  He can then RTC 6 months with T, PSA, CBC, hepatic panel, lipid panel.      Copy to:

## 2022-12-13 ENCOUNTER — PATIENT MESSAGE (OUTPATIENT)
Dept: UROLOGY | Facility: CLINIC | Age: 62
End: 2022-12-13
Payer: COMMERCIAL

## 2022-12-16 ENCOUNTER — PATIENT MESSAGE (OUTPATIENT)
Dept: UROLOGY | Facility: CLINIC | Age: 62
End: 2022-12-16
Payer: COMMERCIAL

## 2023-01-02 ENCOUNTER — PATIENT MESSAGE (OUTPATIENT)
Dept: INTERNAL MEDICINE | Facility: CLINIC | Age: 63
End: 2023-01-02
Payer: COMMERCIAL

## 2023-01-02 DIAGNOSIS — G47.00 INSOMNIA, UNSPECIFIED TYPE: ICD-10-CM

## 2023-01-02 RX ORDER — ZOLPIDEM TARTRATE 10 MG/1
10 TABLET ORAL NIGHTLY
Qty: 90 TABLET | Refills: 1 | Status: SHIPPED | OUTPATIENT
Start: 2023-01-02 | End: 2023-04-18

## 2023-01-02 NOTE — TELEPHONE ENCOUNTER
No new care gaps identified.  [facility] Embedded Care Gaps. Reference number: [id number]. 1/02/2023   4:13:04 PM CST

## 2023-01-06 ENCOUNTER — OFFICE VISIT (OUTPATIENT)
Dept: CARDIOLOGY | Facility: CLINIC | Age: 63
End: 2023-01-06
Payer: COMMERCIAL

## 2023-01-06 ENCOUNTER — LAB VISIT (OUTPATIENT)
Dept: LAB | Facility: HOSPITAL | Age: 63
End: 2023-01-06
Attending: UROLOGY
Payer: COMMERCIAL

## 2023-01-06 VITALS
SYSTOLIC BLOOD PRESSURE: 147 MMHG | WEIGHT: 268.06 LBS | BODY MASS INDEX: 31.65 KG/M2 | HEIGHT: 77 IN | DIASTOLIC BLOOD PRESSURE: 94 MMHG | HEART RATE: 62 BPM

## 2023-01-06 DIAGNOSIS — E29.1 MALE HYPOGONADISM: ICD-10-CM

## 2023-01-06 DIAGNOSIS — I10 PRIMARY HYPERTENSION: ICD-10-CM

## 2023-01-06 DIAGNOSIS — E78.2 MIXED HYPERLIPIDEMIA: ICD-10-CM

## 2023-01-06 DIAGNOSIS — G89.29 CHRONIC RIGHT-SIDED LOW BACK PAIN WITHOUT SCIATICA: ICD-10-CM

## 2023-01-06 DIAGNOSIS — M17.0 PRIMARY OSTEOARTHRITIS OF BOTH KNEES: ICD-10-CM

## 2023-01-06 DIAGNOSIS — R91.1 SOLITARY PULMONARY NODULE: ICD-10-CM

## 2023-01-06 DIAGNOSIS — M54.50 CHRONIC RIGHT-SIDED LOW BACK PAIN WITHOUT SCIATICA: ICD-10-CM

## 2023-01-06 DIAGNOSIS — R93.1 AGATSTON CORONARY ARTERY CALCIUM SCORE GREATER THAN 400: Primary | Chronic | ICD-10-CM

## 2023-01-06 LAB — TESTOST SERPL-MCNC: 214 NG/DL (ref 304–1227)

## 2023-01-06 PROCEDURE — 99214 PR OFFICE/OUTPT VISIT, EST, LEVL IV, 30-39 MIN: ICD-10-PCS | Mod: S$GLB,,, | Performed by: INTERNAL MEDICINE

## 2023-01-06 PROCEDURE — 3080F DIAST BP >= 90 MM HG: CPT | Mod: CPTII,S$GLB,, | Performed by: INTERNAL MEDICINE

## 2023-01-06 PROCEDURE — 1159F MED LIST DOCD IN RCRD: CPT | Mod: CPTII,S$GLB,, | Performed by: INTERNAL MEDICINE

## 2023-01-06 PROCEDURE — 3077F SYST BP >= 140 MM HG: CPT | Mod: CPTII,S$GLB,, | Performed by: INTERNAL MEDICINE

## 2023-01-06 PROCEDURE — 84403 ASSAY OF TOTAL TESTOSTERONE: CPT | Performed by: UROLOGY

## 2023-01-06 PROCEDURE — 99214 OFFICE O/P EST MOD 30 MIN: CPT | Mod: S$GLB,,, | Performed by: INTERNAL MEDICINE

## 2023-01-06 PROCEDURE — 99999 PR PBB SHADOW E&M-EST. PATIENT-LVL III: ICD-10-PCS | Mod: PBBFAC,,, | Performed by: INTERNAL MEDICINE

## 2023-01-06 PROCEDURE — 3008F BODY MASS INDEX DOCD: CPT | Mod: CPTII,S$GLB,, | Performed by: INTERNAL MEDICINE

## 2023-01-06 PROCEDURE — 36415 COLL VENOUS BLD VENIPUNCTURE: CPT | Mod: PO | Performed by: UROLOGY

## 2023-01-06 PROCEDURE — 3080F PR MOST RECENT DIASTOLIC BLOOD PRESSURE >= 90 MM HG: ICD-10-PCS | Mod: CPTII,S$GLB,, | Performed by: INTERNAL MEDICINE

## 2023-01-06 PROCEDURE — 1159F PR MEDICATION LIST DOCUMENTED IN MEDICAL RECORD: ICD-10-PCS | Mod: CPTII,S$GLB,, | Performed by: INTERNAL MEDICINE

## 2023-01-06 PROCEDURE — 99999 PR PBB SHADOW E&M-EST. PATIENT-LVL III: CPT | Mod: PBBFAC,,, | Performed by: INTERNAL MEDICINE

## 2023-01-06 PROCEDURE — 3077F PR MOST RECENT SYSTOLIC BLOOD PRESSURE >= 140 MM HG: ICD-10-PCS | Mod: CPTII,S$GLB,, | Performed by: INTERNAL MEDICINE

## 2023-01-06 PROCEDURE — 3008F PR BODY MASS INDEX (BMI) DOCUMENTED: ICD-10-PCS | Mod: CPTII,S$GLB,, | Performed by: INTERNAL MEDICINE

## 2023-01-06 NOTE — PROGRESS NOTES
Subjective:    Patient ID:  Stanley Stewart is a 62 y.o. male patient here for evaluation Establish Care      History of Present Illness:  Cardiology follow-up.  Patient with past history of high CT calcium score, 400.  Follow-up nuclear exercise perfusion study and echo unremarkable.  Patient symptomatic since 2020 with left pectoral chest pain, at best atypical for angina most likely chest wall pain musculoskeletal.  No change or progression.  Risk factors include hypertension, dyslipidemia family history.  Very remote past tobacco use.    CT scan 2021 with 0.2 cm solid nodule right middle lobe lateral segment.  Suggest follow-up.             Review of patient's allergies indicates:   Allergen Reactions    Doxycycline      Possible allergy/ Rash     Shellfish containing products      Other reaction(s): Hives    Tree pollen-white akhil Itching and Other (See Comments)    Clopidogrel Rash       Past Medical History:   Diagnosis Date    Allergy     BPH with urinary obstruction     Degenerative disc disease     Dysphagia     ED (erectile dysfunction)     Gout     Hyperlipidemia     Hypertension     Male hypogonadism 7/19/2012     Past Surgical History:   Procedure Laterality Date    APPENDECTOMY      arthroscopic knee surg      left X 2, right X 1    COLONOSCOPY N/A 9/28/2020    Procedure: COLONOSCOPY;  Surgeon: CAYDEN Montenegro MD;  [facility]  Service: Endoscopy;  Laterality: N/A;  9/25-covid [place]-tb    INJECTION OF ANESTHETIC AGENT AROUND NERVE Right 8/24/2021    Procedure: BLOCK, NERVE, MEDIAL BRANCH L3,L4,L5;  Surgeon: Zachary Salas MD;  Location: [facility] [place];  Service: Pain Management;  Laterality: Right;  1 of 2    INJECTION OF ANESTHETIC AGENT AROUND NERVE Right 10/12/2021    Procedure: BLOCK, NERVE, L3-L4-L5 MEDIAL BRANCH 2 OF 2 NEED CONSENT/ conitnue Plavix;  Surgeon: Zachary Salas MD;  Location: [facility] [place], [facility];  Service: Pain Management;  Laterality: Right;     INTUSSUSCEPTION REPAIR      KNEE SURGERY      RADIOFREQUENCY ABLATION Right 2021    Procedure: RADIOFREQUENCY ABLATION right L3, L4, L5 NEEDS CONSENT;  Surgeon: Zachary Salas MD;  Location: [facility];  Service: Pain Management;  Laterality: Right;    TONSILLECTOMY       Social History     Tobacco Use    Smoking status: Former     Types: Cigarettes     Quit date: 1987     Years since quittin.4    Smokeless tobacco: Never   Substance Use Topics    Alcohol use: Yes     Alcohol/week: 7.0 standard drinks     Types: 7 Glasses of wine per week    Drug use: No        Review of Systems:    As noted in HPI in addition      REVIEW OF SYSTEMS  Review of Systems   Constitutional: Negative for decreased appetite, diaphoresis, night sweats, weight gain and weight loss.   HENT:  Negative for nosebleeds and odynophagia.    Eyes:  Negative for double vision and photophobia.   Cardiovascular:  Positive for chest pain. Negative for claudication, cyanosis, dyspnea on exertion, irregular heartbeat, leg swelling, near-syncope, orthopnea, palpitations, paroxysmal nocturnal dyspnea and syncope.   Respiratory:  Negative for cough, hemoptysis, shortness of breath and wheezing.    Hematologic/Lymphatic: Negative for adenopathy.   Skin:  Negative for flushing, skin cancer and suspicious lesions.   Musculoskeletal:  Negative for gout, myalgias and neck pain.   Gastrointestinal:  Negative for abdominal pain, heartburn, hematemesis and hematochezia.   Genitourinary:  Negative for bladder incontinence, hesitancy and nocturia.   Neurological:  Negative for focal weakness, headaches, light-headedness and paresthesias.   Psychiatric/Behavioral:  Negative for memory loss and substance abuse.             Objective:        Vitals:    23 0812   BP: (!) 147/94   Pulse: 62       Lab Results   Component Value Date    WBC 5.64 2022    HGB 13.8 (L) 2022    HCT 40.6 2022     2022    CHOL 147  11/23/2022    TRIG 127 11/23/2022    HDL 53 11/23/2022    ALT 33 11/23/2022    AST 26 11/23/2022     11/23/2022    K 4.5 11/23/2022     11/23/2022    CREATININE 1.1 11/23/2022    BUN 20 11/23/2022    CO2 27 11/23/2022    TSH 2.123 03/17/2017    PSA 0.50 03/17/2017    HGBA1C 5.4 11/29/2011        ECHOCARDIOGRAM RESULTS  Results for orders placed during the hospital encounter of 06/11/21    Echo    Interpretation Summary  · The left ventricle is normal in size with normal systolic function.  · The estimated ejection fraction is 65%.  · Normal left ventricular diastolic function.  · Mild left atrial enlargement.  · Normal right ventricular size with normal right ventricular systolic function.  · The estimated PA systolic pressure is 27 mmHg.  · Normal central venous pressure (3 mmHg).        CURRENT/PREVIOUS VISIT EKG  Results for orders placed or performed in visit on 06/23/22   IN OFFICE EKG 12-LEAD (to [facility])    Collection Time: 06/23/22 10:33 AM    Narrative    Test Reason : Z76.89    Vent. Rate : 068 BPM     Atrial Rate : 068 BPM     P-R Int : 174 ms          QRS Dur : 070 ms      QT Int : 370 ms       P-R-T Axes : 034 038 046 degrees     QTc Int : 393 ms    Normal sinus rhythm with sinus arrhythmia  Normal ECG  When compared with ECG of 02-JUN-2021 09:49,  No significant change was found  Confirmed by LATONIA BERNAL MD ([id number]) on 6/24/2022 7:51:04 AM    Referred By: NINO ALICEA           Confirmed By:LATONIA BERNAL MD     No valid procedures specified.   Results for orders placed during the hospital encounter of 06/11/21    Nuclear Stress Test    Interpretation Summary    The exercise capacity was average.    The patient reported no chest pain during the stress test.    The EKG portion of this study is negative for ischemia.    The blood pressure response to exercise was hypertensive.    There were no arrhythmias during stress.    The nuclear portion of this study will be reported separately.    No  valid procedures specified.    PHYSICAL EXAM  CONSTITUTIONAL: Well built, well nourished in no apparent distress  NECK: no carotid bruit, no JVD  LUNGS: CTA  CHEST WALL: no tenderness,  HEART: regular rate and rhythm, S1, S2 normal, no murmur, click, rub or gallop   ABDOMEN: soft, non-tender; bowel sounds normal; no masses,  no organomegaly  EXTREMITIES: Extremities normal, no edema, no calf tenderness noted  NEURO: AAO X 3    I HAVE REVIEWED :    The vital signs, nurses notes, and all the pertinent radiology and labs.         Current Outpatient Medications   Medication Instructions    amoxicillin (AMOXIL) 875 mg, Oral, 2 times daily    azelastine (ASTELIN) 274 mcg, Nasal, 2 times daily    fluticasone propionate (FLONASE) 100 mcg, Each Nostril, Daily    lisinopriL-hydrochlorothiazide (PRINZIDE,ZESTORETIC) 20-12.5 mg per tablet TAKE 1 TABLET DAILY    predniSONE (DELTASONE) 20 MG tablet 2 pills po daily for 4 days, then 1 pill po day for 4 days    rosuvastatin (CRESTOR) 20 MG tablet TAKE 1 TABLET DAILY    sildenafil (REVATIO) 20 mg Tab Take 5 tablets by mouth 1 hour before sexual activity    testosterone (ANDROGEL) 20.25 mg/1.25 gram (1.62 %) GlPm Apply 2 pumps to shoulders daily    zolpidem (AMBIEN) 10 mg, Oral, Nightly          Assessment:   Chronic intermittent atypical chest pain.  Abnormal CT calcium score with above noted right middle lobe lateral nodule 0.2 cm.  Hypertension, dyslipidemia, past tobacco use.  Positive family history.          Plan:   Follow-up lipids at goal on review stent 20 mg daily.  Blood pressure borderline controlled with lisinopril/hydrochlorothiazide.    Continue home blood pressure monitoring.  Follow-up CT scan to re-evaluate right middle lobe nodule.  Suggestion per Radiology.    Call results.          No follow-ups on file.

## 2023-01-09 ENCOUNTER — HOSPITAL ENCOUNTER (OUTPATIENT)
Dept: RADIOLOGY | Facility: HOSPITAL | Age: 63
Discharge: HOME | End: 2023-01-09
Attending: INTERNAL MEDICINE
Payer: COMMERCIAL

## 2023-01-09 DIAGNOSIS — R91.1 SOLITARY PULMONARY NODULE: ICD-10-CM

## 2023-01-09 PROCEDURE — 71260 CT CHEST WITH CONTRAST: ICD-10-PCS | Mod: 26,,, | Performed by: RADIOLOGY

## 2023-01-09 PROCEDURE — 71260 CT THORAX DX C+: CPT | Mod: 26,,, | Performed by: RADIOLOGY

## 2023-01-09 PROCEDURE — 71260 CT THORAX DX C+: CPT | Mod: TC,PO

## 2023-01-09 PROCEDURE — 25500020 PHARM REV CODE 255: Mod: PO | Performed by: INTERNAL MEDICINE

## 2023-01-09 RX ADMIN — IOHEXOL 75 ML: 350 INJECTION, SOLUTION INTRAVENOUS at 04:01

## 2023-01-12 ENCOUNTER — TELEPHONE (OUTPATIENT)
Dept: UROLOGY | Facility: CLINIC | Age: 63
End: 2023-01-12
Payer: COMMERCIAL

## 2023-01-12 DIAGNOSIS — E29.1 MALE HYPOGONADISM: Primary | ICD-10-CM

## 2023-01-12 NOTE — TELEPHONE ENCOUNTER
Call placed to patient. Name and date of birth verified. Patient informed of the following:    Please notify T is low.  Verify he used androgel correctly and on the day of his draw.  If so, increase to 4 pumps/day.  Recheck T in 1 week.   -Dr. Morin    Patient stated he applies 3 pumps to shoulders daily as instructed and prior to lab draw. Patient instructed to increase application to 4 pumps daily and reminded to apply prior to lab draw. Patient lab appointment scheduled and noted in epic. Patient informed appointment details are noted in patient portal. Patient verbalized understanding.

## 2023-01-12 NOTE — TELEPHONE ENCOUNTER
Please notify T is low.  Verify he used androgel correctly and on the day of his draw.  If so, increase to 4 pumps/day.  Recheck T in 1 week.

## 2023-01-18 ENCOUNTER — PATIENT MESSAGE (OUTPATIENT)
Dept: CARDIOLOGY | Facility: CLINIC | Age: 63
End: 2023-01-18
Payer: COMMERCIAL

## 2023-01-20 ENCOUNTER — LAB VISIT (OUTPATIENT)
Dept: LAB | Facility: HOSPITAL | Age: 63
End: 2023-01-20
Attending: UROLOGY
Payer: COMMERCIAL

## 2023-01-20 DIAGNOSIS — E29.1 MALE HYPOGONADISM: ICD-10-CM

## 2023-01-20 LAB — TESTOST SERPL-MCNC: 467 NG/DL (ref 304–1227)

## 2023-01-20 PROCEDURE — 36415 COLL VENOUS BLD VENIPUNCTURE: CPT | Mod: PO | Performed by: UROLOGY

## 2023-01-20 PROCEDURE — 84403 ASSAY OF TOTAL TESTOSTERONE: CPT | Performed by: UROLOGY

## 2023-01-22 ENCOUNTER — PATIENT MESSAGE (OUTPATIENT)
Dept: UROLOGY | Facility: CLINIC | Age: 63
End: 2023-01-22
Payer: COMMERCIAL

## 2023-01-23 ENCOUNTER — TELEPHONE (OUTPATIENT)
Dept: UROLOGY | Facility: CLINIC | Age: 63
End: 2023-01-23
Payer: COMMERCIAL

## 2023-01-23 ENCOUNTER — PATIENT MESSAGE (OUTPATIENT)
Dept: UROLOGY | Facility: CLINIC | Age: 63
End: 2023-01-23
Payer: COMMERCIAL

## 2023-01-23 NOTE — TELEPHONE ENCOUNTER
Please notify T is normal.  Continue TRT at current dose.  RTC as scheduled.  He may need a new Rx called in due to the increase in dose.

## 2023-01-24 NOTE — TELEPHONE ENCOUNTER
Call placed to patient. Name and date of birth verified. Patient informed of the following:    Please notify T is normal.  Continue TRT at current dose.  RTC as scheduled.  He may need a new Rx called in due to the increase in dose  -Dr. Morin    Patient verbalized understanding.

## 2023-01-25 ENCOUNTER — PATIENT MESSAGE (OUTPATIENT)
Dept: UROLOGY | Facility: CLINIC | Age: 63
End: 2023-01-25
Payer: COMMERCIAL

## 2023-01-27 ENCOUNTER — TELEPHONE (OUTPATIENT)
Dept: UROLOGY | Facility: CLINIC | Age: 63
End: 2023-01-27
Payer: COMMERCIAL

## 2023-01-27 NOTE — TELEPHONE ENCOUNTER
----- Message from Flora Moore sent at 1/27/2023 10:38 AM CST -----  Contact: @ [phone]  Pt called in regards to get a refill and authorization for a medication said they have been talking but portal needs to speak to someone .....Please call and adv @ [phone]

## 2023-01-30 NOTE — TELEPHONE ENCOUNTER
Call placed to patient. Name and date of birth verified. Patient informed Rx has been sent to [facility] pharmacy as requested. Patient verbalized understanding.

## 2023-02-01 ENCOUNTER — TELEPHONE (OUTPATIENT)
Dept: UROLOGY | Facility: CLINIC | Age: 63
End: 2023-02-01
Payer: COMMERCIAL

## 2023-02-01 NOTE — TELEPHONE ENCOUNTER
Call placed to patient pharmacy for the 2nd time, spoke with Cristal. Patient name and date of provided. New Rx for testosterone (Androgel) 1.62 %. Dispense 90 day supply with 1 refill. Instructions to apply 4 pumps to shoulders daily. Cristal read back order. Patient notified of above.

## 2023-02-02 ENCOUNTER — OFFICE VISIT (OUTPATIENT)
Dept: ORTHOPEDICS | Facility: CLINIC | Age: 63
End: 2023-02-02
Payer: COMMERCIAL

## 2023-02-02 VITALS — BODY MASS INDEX: 31.24 KG/M2 | WEIGHT: 264.56 LBS | HEIGHT: 77 IN

## 2023-02-02 DIAGNOSIS — M17.0 PRIMARY OSTEOARTHRITIS OF BOTH KNEES: Primary | ICD-10-CM

## 2023-02-02 PROCEDURE — 1159F PR MEDICATION LIST DOCUMENTED IN MEDICAL RECORD: ICD-10-PCS | Mod: CPTII,S$GLB,, | Performed by: PHYSICIAN ASSISTANT

## 2023-02-02 PROCEDURE — 20610 DRAIN/INJ JOINT/BURSA W/O US: CPT | Mod: 50,S$GLB,, | Performed by: PHYSICIAN ASSISTANT

## 2023-02-02 PROCEDURE — 99213 OFFICE O/P EST LOW 20 MIN: CPT | Mod: 25,S$GLB,, | Performed by: PHYSICIAN ASSISTANT

## 2023-02-02 PROCEDURE — 1160F PR REVIEW ALL MEDS BY PRESCRIBER/CLIN PHARMACIST DOCUMENTED: ICD-10-PCS | Mod: CPTII,S$GLB,, | Performed by: PHYSICIAN ASSISTANT

## 2023-02-02 PROCEDURE — 99999 PR PBB SHADOW E&M-EST. PATIENT-LVL III: CPT | Mod: PBBFAC,,, | Performed by: PHYSICIAN ASSISTANT

## 2023-02-02 PROCEDURE — 3008F PR BODY MASS INDEX (BMI) DOCUMENTED: ICD-10-PCS | Mod: CPTII,S$GLB,, | Performed by: PHYSICIAN ASSISTANT

## 2023-02-02 PROCEDURE — 99213 PR OFFICE/OUTPT VISIT, EST, LEVL III, 20-29 MIN: ICD-10-PCS | Mod: 25,S$GLB,, | Performed by: PHYSICIAN ASSISTANT

## 2023-02-02 PROCEDURE — 1159F MED LIST DOCD IN RCRD: CPT | Mod: CPTII,S$GLB,, | Performed by: PHYSICIAN ASSISTANT

## 2023-02-02 PROCEDURE — 99999 PR PBB SHADOW E&M-EST. PATIENT-LVL III: ICD-10-PCS | Mod: PBBFAC,,, | Performed by: PHYSICIAN ASSISTANT

## 2023-02-02 PROCEDURE — 3008F BODY MASS INDEX DOCD: CPT | Mod: CPTII,S$GLB,, | Performed by: PHYSICIAN ASSISTANT

## 2023-02-02 PROCEDURE — 20610 LARGE JOINT ASPIRATION/INJECTION: BILATERAL KNEE: ICD-10-PCS | Mod: 50,S$GLB,, | Performed by: PHYSICIAN ASSISTANT

## 2023-02-02 PROCEDURE — 1160F RVW MEDS BY RX/DR IN RCRD: CPT | Mod: CPTII,S$GLB,, | Performed by: PHYSICIAN ASSISTANT

## 2023-02-02 RX ORDER — TRIAMCINOLONE ACETONIDE 40 MG/ML
40 INJECTION, SUSPENSION INTRA-ARTICULAR; INTRAMUSCULAR
Status: DISCONTINUED | OUTPATIENT
Start: 2023-02-02 | End: 2023-02-02 | Stop reason: HOSPADM

## 2023-02-02 RX ADMIN — TRIAMCINOLONE ACETONIDE 40 MG: 40 INJECTION, SUSPENSION INTRA-ARTICULAR; INTRAMUSCULAR at 09:02

## 2023-02-02 NOTE — PROGRESS NOTES
"Patient ID: Stanley Stewart is a 62 y.o. male.    Chief Complaint: Injections of the Right Knee and Injections of the Left Knee      HISTORY:  Stanley Stewart is a 62 y.o. male who returns to me today for follow up of bilateral knee pain.  He was last seen by me 12/20/2021.  Today he is doing well, but notes recently the pain has started to worsen. It has been over one year since last injections.  No significant changes since he was last seen.  Meloxicam was helping but does not seem to work as well as in the past.      PMH/PSH/FamHx/SocHx:    Unchanged from prior visit.    ROS:  Constitution: Negative for chills, fever and weakness.   Respiratory: Negative for cough and shortness of breath.   Musculoskeletal: Positive for bilateral knee pain  Psychiatric/Behavioral: The patient is not nervous/anxious.       PHYSICAL EXAM:   Ht 6' 5" (1.956 m)   Wt 120 kg (264 lb 8.8 oz)   BMI 31.37 kg/m²   Bilateral knee  Skin intact  No effusion  ROM 0-120  TTP medial joint   5/5 quad, 5/5 hamstring      ASSESSMENT/PLAN:    Stanley was seen today for injections and injections.    Diagnoses and all orders for this visit:    Primary osteoarthritis of both knees  -     Large Joint Aspiration/Injection: bilateral knee    - Repeat bilateral knee CSI today  - We can try celebrex and stop meloxicam- will discuss with his cardiologist  - Follow up as needed      "

## 2023-02-02 NOTE — PROCEDURES
Large Joint Aspiration/Injection: bilateral knee    Date/Time: 2/2/2023 9:30 AM  Performed by: Michaela Fuller PA-C  Authorized by: Michaela Fuller PA-C     Consent Done?:  Yes (Verbal)  Indications:  Pain  Prep: patient was prepped and draped in usual sterile fashion    Local anesthetic:  Topical anesthetic    Details:  Needle Size:  22 G  Approach:  Anterolateral  Location:  Knee  Laterality:  Bilateral  Site:  Bilateral knee  Medications (Right):  40 mg triamcinolone acetonide 40 mg/mL  Medications (Left):  40 mg triamcinolone acetonide 40 mg/mL  Patient tolerance:  Patient tolerated the procedure well with no immediate complications

## 2023-02-06 ENCOUNTER — PATIENT MESSAGE (OUTPATIENT)
Dept: ORTHOPEDICS | Facility: CLINIC | Age: 63
End: 2023-02-06
Payer: COMMERCIAL

## 2023-02-06 RX ORDER — CELECOXIB 200 MG/1
200 CAPSULE ORAL DAILY
Qty: 30 CAPSULE | Refills: 1 | Status: SHIPPED | OUTPATIENT
Start: 2023-02-06 | End: 2023-03-08

## 2023-04-14 ENCOUNTER — PATIENT MESSAGE (OUTPATIENT)
Dept: INTERNAL MEDICINE | Facility: CLINIC | Age: 63
End: 2023-04-14
Payer: COMMERCIAL

## 2023-04-14 RX ORDER — FLUTICASONE PROPIONATE 50 MCG
2 SPRAY, SUSPENSION (ML) NASAL DAILY
Qty: 48 G | Refills: 0 | Status: SHIPPED | OUTPATIENT
Start: 2023-04-14 | End: 2024-04-02 | Stop reason: SDUPTHER

## 2023-04-18 DIAGNOSIS — G47.00 INSOMNIA, UNSPECIFIED TYPE: ICD-10-CM

## 2023-04-18 RX ORDER — ZOLPIDEM TARTRATE 10 MG/1
10 TABLET ORAL NIGHTLY
Qty: 90 TABLET | Refills: 1 | Status: SHIPPED | OUTPATIENT
Start: 2023-04-18 | End: 2023-08-16 | Stop reason: SDUPTHER

## 2023-04-18 NOTE — TELEPHONE ENCOUNTER
No new care gaps identified.  [facility] Embedded Care Gaps. Reference number: [id number]. 4/18/2023   9:40:27 AM [place]

## 2023-06-08 ENCOUNTER — LAB VISIT (OUTPATIENT)
Dept: LAB | Facility: HOSPITAL | Age: 63
End: 2023-06-08
Attending: INTERNAL MEDICINE
Payer: COMMERCIAL

## 2023-06-08 ENCOUNTER — OFFICE VISIT (OUTPATIENT)
Dept: INTERNAL MEDICINE | Facility: CLINIC | Age: 63
End: 2023-06-08
Payer: COMMERCIAL

## 2023-06-08 VITALS
BODY MASS INDEX: 32.09 KG/M2 | DIASTOLIC BLOOD PRESSURE: 78 MMHG | SYSTOLIC BLOOD PRESSURE: 126 MMHG | WEIGHT: 271.81 LBS | OXYGEN SATURATION: 98 % | HEART RATE: 67 BPM | HEIGHT: 77 IN

## 2023-06-08 DIAGNOSIS — E29.1 MALE HYPOGONADISM: ICD-10-CM

## 2023-06-08 DIAGNOSIS — K64.9 HEMORRHOIDS, UNSPECIFIED HEMORRHOID TYPE: ICD-10-CM

## 2023-06-08 DIAGNOSIS — Z00.00 ANNUAL PHYSICAL EXAM: Primary | ICD-10-CM

## 2023-06-08 DIAGNOSIS — E78.2 MIXED HYPERLIPIDEMIA: ICD-10-CM

## 2023-06-08 DIAGNOSIS — R93.1 AGATSTON CORONARY ARTERY CALCIUM SCORE GREATER THAN 400: ICD-10-CM

## 2023-06-08 DIAGNOSIS — R13.19 ESOPHAGEAL DYSPHAGIA: ICD-10-CM

## 2023-06-08 DIAGNOSIS — I10 ESSENTIAL HYPERTENSION: ICD-10-CM

## 2023-06-08 DIAGNOSIS — Z00.00 ANNUAL PHYSICAL EXAM: ICD-10-CM

## 2023-06-08 LAB
ALBUMIN SERPL BCP-MCNC: 4.3 G/DL (ref 3.5–5.2)
ALP SERPL-CCNC: 49 U/L (ref 55–135)
ALT SERPL W/O P-5'-P-CCNC: 23 U/L (ref 10–44)
ANION GAP SERPL CALC-SCNC: 7 MMOL/L (ref 8–16)
AST SERPL-CCNC: 22 U/L (ref 10–40)
BASOPHILS # BLD AUTO: 0.06 K/UL (ref 0–0.2)
BASOPHILS NFR BLD: 1.2 % (ref 0–1.9)
BILIRUB SERPL-MCNC: 0.7 MG/DL (ref 0.1–1)
BUN SERPL-MCNC: 16 MG/DL (ref 8–23)
CALCIUM SERPL-MCNC: 9.6 MG/DL (ref 8.7–10.5)
CHLORIDE SERPL-SCNC: 108 MMOL/L (ref 95–110)
CHOLEST SERPL-MCNC: 201 MG/DL (ref 120–199)
CHOLEST/HDLC SERPL: 3.4 {RATIO} (ref 2–5)
CO2 SERPL-SCNC: 26 MMOL/L (ref 23–29)
CREAT SERPL-MCNC: 0.9 MG/DL (ref 0.5–1.4)
DIFFERENTIAL METHOD: ABNORMAL
EOSINOPHIL # BLD AUTO: 0.4 K/UL (ref 0–0.5)
EOSINOPHIL NFR BLD: 8.7 % (ref 0–8)
ERYTHROCYTE [DISTWIDTH] IN BLOOD BY AUTOMATED COUNT: 12.6 % (ref 11.5–14.5)
EST. GFR  (NO RACE VARIABLE): >60 ML/MIN/1.73 M^2
GLUCOSE SERPL-MCNC: 96 MG/DL (ref 70–110)
HCT VFR BLD AUTO: 40.6 % (ref 40–54)
HDLC SERPL-MCNC: 60 MG/DL (ref 40–75)
HDLC SERPL: 29.9 % (ref 20–50)
HGB BLD-MCNC: 13.1 G/DL (ref 14–18)
IMM GRANULOCYTES # BLD AUTO: 0.01 K/UL (ref 0–0.04)
IMM GRANULOCYTES NFR BLD AUTO: 0.2 % (ref 0–0.5)
LDLC SERPL CALC-MCNC: 105.6 MG/DL (ref 63–159)
LYMPHOCYTES # BLD AUTO: 1.7 K/UL (ref 1–4.8)
LYMPHOCYTES NFR BLD: 33.7 % (ref 18–48)
MCH RBC QN AUTO: 32 PG (ref 27–31)
MCHC RBC AUTO-ENTMCNC: 32.3 G/DL (ref 32–36)
MCV RBC AUTO: 99 FL (ref 82–98)
MONOCYTES # BLD AUTO: 0.5 K/UL (ref 0.3–1)
MONOCYTES NFR BLD: 10.1 % (ref 4–15)
NEUTROPHILS # BLD AUTO: 2.3 K/UL (ref 1.8–7.7)
NEUTROPHILS NFR BLD: 46.1 % (ref 38–73)
NONHDLC SERPL-MCNC: 141 MG/DL
NRBC BLD-RTO: 0 /100 WBC
PLATELET # BLD AUTO: 194 K/UL (ref 150–450)
PMV BLD AUTO: 11.4 FL (ref 9.2–12.9)
POTASSIUM SERPL-SCNC: 4.5 MMOL/L (ref 3.5–5.1)
PROT SERPL-MCNC: 7 G/DL (ref 6–8.4)
RBC # BLD AUTO: 4.09 M/UL (ref 4.6–6.2)
SODIUM SERPL-SCNC: 141 MMOL/L (ref 136–145)
TESTOST SERPL-MCNC: 301 NG/DL (ref 304–1227)
TRIGL SERPL-MCNC: 177 MG/DL (ref 30–150)
WBC # BLD AUTO: 4.96 K/UL (ref 3.9–12.7)

## 2023-06-08 PROCEDURE — 3078F DIAST BP <80 MM HG: CPT | Mod: CPTII,S$GLB,, | Performed by: INTERNAL MEDICINE

## 2023-06-08 PROCEDURE — 80053 COMPREHEN METABOLIC PANEL: CPT | Performed by: INTERNAL MEDICINE

## 2023-06-08 PROCEDURE — 99999 PR PBB SHADOW E&M-EST. PATIENT-LVL V: CPT | Mod: PBBFAC,,, | Performed by: INTERNAL MEDICINE

## 2023-06-08 PROCEDURE — 99396 PREV VISIT EST AGE 40-64: CPT | Mod: S$GLB,,, | Performed by: INTERNAL MEDICINE

## 2023-06-08 PROCEDURE — 85025 COMPLETE CBC W/AUTO DIFF WBC: CPT | Performed by: INTERNAL MEDICINE

## 2023-06-08 PROCEDURE — 36415 COLL VENOUS BLD VENIPUNCTURE: CPT | Performed by: INTERNAL MEDICINE

## 2023-06-08 PROCEDURE — 1159F PR MEDICATION LIST DOCUMENTED IN MEDICAL RECORD: ICD-10-PCS | Mod: CPTII,S$GLB,, | Performed by: INTERNAL MEDICINE

## 2023-06-08 PROCEDURE — 1160F PR REVIEW ALL MEDS BY PRESCRIBER/CLIN PHARMACIST DOCUMENTED: ICD-10-PCS | Mod: CPTII,S$GLB,, | Performed by: INTERNAL MEDICINE

## 2023-06-08 PROCEDURE — 3074F PR MOST RECENT SYSTOLIC BLOOD PRESSURE < 130 MM HG: ICD-10-PCS | Mod: CPTII,S$GLB,, | Performed by: INTERNAL MEDICINE

## 2023-06-08 PROCEDURE — 1160F RVW MEDS BY RX/DR IN RCRD: CPT | Mod: CPTII,S$GLB,, | Performed by: INTERNAL MEDICINE

## 2023-06-08 PROCEDURE — 3078F PR MOST RECENT DIASTOLIC BLOOD PRESSURE < 80 MM HG: ICD-10-PCS | Mod: CPTII,S$GLB,, | Performed by: INTERNAL MEDICINE

## 2023-06-08 PROCEDURE — 3074F SYST BP LT 130 MM HG: CPT | Mod: CPTII,S$GLB,, | Performed by: INTERNAL MEDICINE

## 2023-06-08 PROCEDURE — 84403 ASSAY OF TOTAL TESTOSTERONE: CPT | Performed by: INTERNAL MEDICINE

## 2023-06-08 PROCEDURE — 3008F BODY MASS INDEX DOCD: CPT | Mod: CPTII,S$GLB,, | Performed by: INTERNAL MEDICINE

## 2023-06-08 PROCEDURE — 99396 PR PREVENTIVE VISIT,EST,40-64: ICD-10-PCS | Mod: S$GLB,,, | Performed by: INTERNAL MEDICINE

## 2023-06-08 PROCEDURE — 99999 PR PBB SHADOW E&M-EST. PATIENT-LVL V: ICD-10-PCS | Mod: PBBFAC,,, | Performed by: INTERNAL MEDICINE

## 2023-06-08 PROCEDURE — 1159F MED LIST DOCD IN RCRD: CPT | Mod: CPTII,S$GLB,, | Performed by: INTERNAL MEDICINE

## 2023-06-08 PROCEDURE — 3008F PR BODY MASS INDEX (BMI) DOCUMENTED: ICD-10-PCS | Mod: CPTII,S$GLB,, | Performed by: INTERNAL MEDICINE

## 2023-06-08 PROCEDURE — 80061 LIPID PANEL: CPT | Performed by: INTERNAL MEDICINE

## 2023-06-08 NOTE — PROGRESS NOTES
MEDICAL HISTORY:  Hypertension.  Hyperlipidemia.  Elevated calcium score/CAC  Lumbar degenerative disk disease.  Hypotestosterone, followed by Urology.  Appendectomy.  Arthroscopic knee surgery twice on the left and one on the right.  Intussusception at age 6 and then recently in 2014 where he did   require surgery and a partial colon resection.  Fatty Liver  Sleep apnea.     SOCIAL HISTORY:  Tobacco use, none.  Alcohol use: maybe one glass of wine a night.  , has three children.  He works as a teacher at [employer].  Exercise is walking a mile and a half every day and occasionally doing a spinning class.     FAMILY HISTORY:  Father  alive, hypertension, hyperlipidemia, coronary artery disease.  Mother is , chronic hepatitis.  Two brothers living but one with a history of heart attack at age 50.     SCREENING:  Normal colonoscopy 2020     MEDICATIONS:  Lisinopril/hydrochlorothiazide 20/12.5 mg a day.  Ambien 10 mg at night.   Crestor 20 mg  daily  Androgel  Answers submitted by the patient for this visit:  Review of Systems Questionnaire (Submitted on 2023)  activity change: No  unexpected weight change: No  neck pain: No  hearing loss: No  rhinorrhea: No  trouble swallowing: Yes  eye discharge: No  visual disturbance: No  chest tightness: No  wheezing: No  chest pain: No  palpitations: No  blood in stool: Yes  constipation: No  vomiting: No  diarrhea: No  polydipsia: No  polyuria: No  difficulty urinating: No  urgency: No  hematuria: No  joint swelling: No  arthralgias: Yes  headaches: No  weakness: No  confusion: No  dysphoric mood: No      63-year-old male  Presents for an annual routine visit   Overall he feels well.  He is followed by Urology regarding hypo testosterone.  Since January he has been on AndroGel in place of testosterone pellets.  He feels well with the medication, has an overall well-being feeling.    Another issue is that he still get episodes of dysphagia or  stuck feeling in his mid chest after he swallows food, occasional liquids.  There is no regurgitation.  He has no trouble neck the swallowing.  Two thousand seventeen an upper endoscopy was performed for similar situation no esophageal issues were noted at the time    Also he will get recurring episodes of hemorrhoids.  Is where he feels a swelling in his anus in tissue protrudes out.  May last for few days after taking preparation H. he will feel it more when sitting down.  There is rectal bleeding of bright red blood per rectum.    Review of symptoms  Negative for chest pain, palpitations, shortness a breath, abdominal pain.  Regular bowel function.  No arthralgias, headaches.  He reports no symptoms of heartburn indigestion    Examination   Weight 271   Pulse 76   Blood pressure 144/80.  He did not take his medications this morning  Neck, no thyromegaly no masses   Chest, clear breath sounds good effort  Heart regular rate rhythm, no murmurs or gallops  Abdominal exam, soft, nontender, no hepatosplenomegaly abdominal masses  Pulses 2+ carotid pulses no bruits, 2+ pedal pulses  Extremities no edema  Lymph gland, no palpable adenopathy  Skin, no gross abnormal findings    Impression   General examination   Hypertension  Hyperlipidemia  Hypo testosterone  Dysphagia   Hemorrhoids    Plan   Labs today of chemistry lipid CBC.  In a few months he will follow-up Urology where PSA test will be performed.  At his request will also check testosterone  Referral to GI will also arrange for esophagram although he may require having an upper endoscopy  Referral to colon rectal surgery regarding recurrent hemorrhoids

## 2023-06-19 ENCOUNTER — PATIENT MESSAGE (OUTPATIENT)
Dept: CARDIOLOGY | Facility: CLINIC | Age: 63
End: 2023-06-19
Payer: COMMERCIAL

## 2023-07-06 ENCOUNTER — HOSPITAL ENCOUNTER (OUTPATIENT)
Dept: RADIOLOGY | Facility: HOSPITAL | Age: 63
Discharge: HOME | End: 2023-07-06
Attending: INTERNAL MEDICINE
Payer: COMMERCIAL

## 2023-07-06 DIAGNOSIS — R13.19 ESOPHAGEAL DYSPHAGIA: ICD-10-CM

## 2023-07-06 PROCEDURE — 25500020 PHARM REV CODE 255: Mod: PO | Performed by: INTERNAL MEDICINE

## 2023-07-06 PROCEDURE — A9698 NON-RAD CONTRAST MATERIALNOC: HCPCS | Mod: PO | Performed by: INTERNAL MEDICINE

## 2023-07-06 PROCEDURE — 74220 X-RAY XM ESOPHAGUS 1CNTRST: CPT | Mod: 26,,, | Performed by: RADIOLOGY

## 2023-07-06 PROCEDURE — 74220 FL ESOPHAGRAM COMPLETE: ICD-10-PCS | Mod: 26,,, | Performed by: RADIOLOGY

## 2023-07-06 PROCEDURE — 74220 X-RAY XM ESOPHAGUS 1CNTRST: CPT | Mod: TC,PO

## 2023-07-06 RX ADMIN — BARIUM SULFATE 355 ML: 0.6 SUSPENSION ORAL at 09:07

## 2023-07-06 RX ADMIN — BARIUM SULFATE 340 ML: 980 POWDER, FOR SUSPENSION ORAL at 09:07

## 2023-07-07 ENCOUNTER — OFFICE VISIT (OUTPATIENT)
Dept: GASTROENTEROLOGY | Facility: CLINIC | Age: 63
End: 2023-07-07
Payer: COMMERCIAL

## 2023-07-07 ENCOUNTER — PATIENT MESSAGE (OUTPATIENT)
Dept: INTERNAL MEDICINE | Facility: CLINIC | Age: 63
End: 2023-07-07
Payer: COMMERCIAL

## 2023-07-07 VITALS — BODY MASS INDEX: 31.73 KG/M2 | HEIGHT: 77 IN | WEIGHT: 268.75 LBS

## 2023-07-07 DIAGNOSIS — R93.3 ABNORMAL ESOPHAGRAM: ICD-10-CM

## 2023-07-07 DIAGNOSIS — R13.19 ESOPHAGEAL DYSPHAGIA: Primary | ICD-10-CM

## 2023-07-07 DIAGNOSIS — Z12.11 SCREENING FOR COLON CANCER: ICD-10-CM

## 2023-07-07 DIAGNOSIS — K22.4 ESOPHAGEAL DYSMOTILITY: ICD-10-CM

## 2023-07-07 PROCEDURE — 1159F MED LIST DOCD IN RCRD: CPT | Mod: CPTII,S$GLB,, | Performed by: NURSE PRACTITIONER

## 2023-07-07 PROCEDURE — 1160F RVW MEDS BY RX/DR IN RCRD: CPT | Mod: CPTII,S$GLB,, | Performed by: NURSE PRACTITIONER

## 2023-07-07 PROCEDURE — 99999 PR PBB SHADOW E&M-EST. PATIENT-LVL IV: ICD-10-PCS | Mod: PBBFAC,,, | Performed by: NURSE PRACTITIONER

## 2023-07-07 PROCEDURE — 99999 PR PBB SHADOW E&M-EST. PATIENT-LVL IV: CPT | Mod: PBBFAC,,, | Performed by: NURSE PRACTITIONER

## 2023-07-07 PROCEDURE — 1160F PR REVIEW ALL MEDS BY PRESCRIBER/CLIN PHARMACIST DOCUMENTED: ICD-10-PCS | Mod: CPTII,S$GLB,, | Performed by: NURSE PRACTITIONER

## 2023-07-07 PROCEDURE — 1159F PR MEDICATION LIST DOCUMENTED IN MEDICAL RECORD: ICD-10-PCS | Mod: CPTII,S$GLB,, | Performed by: NURSE PRACTITIONER

## 2023-07-07 PROCEDURE — 3008F PR BODY MASS INDEX (BMI) DOCUMENTED: ICD-10-PCS | Mod: CPTII,S$GLB,, | Performed by: NURSE PRACTITIONER

## 2023-07-07 PROCEDURE — 99214 OFFICE O/P EST MOD 30 MIN: CPT | Mod: S$GLB,,, | Performed by: NURSE PRACTITIONER

## 2023-07-07 PROCEDURE — 3008F BODY MASS INDEX DOCD: CPT | Mod: CPTII,S$GLB,, | Performed by: NURSE PRACTITIONER

## 2023-07-07 PROCEDURE — 99214 PR OFFICE/OUTPT VISIT, EST, LEVL IV, 30-39 MIN: ICD-10-PCS | Mod: S$GLB,,, | Performed by: NURSE PRACTITIONER

## 2023-07-07 NOTE — TELEPHONE ENCOUNTER
No care due was identified.  [facility] Embedded Care Due Messages. Reference number: [id number].   7/07/2023 10:15:43 AM CDT

## 2023-07-07 NOTE — PROGRESS NOTES
Subjective:       Patient ID: Stanley Stewart is a 63 y.o. male, Body mass index is 31.87 kg/m².    Chief Complaint: Dysphagia      Patient is new to me. Established patient of Dr. Trevino & Dr. Mack.  Referred by Dr. Estrada for dysphagia.     Gastroesophageal Reflux  He complains of dysphagia. He reports no abdominal pain, no belching, no chest pain, no choking, no coughing, no early satiety, no globus sensation, no heartburn, no hoarse voice, no nausea, no sore throat, no stridor, no tooth decay, no water brash or no wheezing. This is a chronic problem. The current episode started more than 1 year ago. The problem occurs occasionally. The problem has been unchanged. The symptoms are aggravated by certain foods (dysphagia to solids and cold liquids; belching helps episodes of dysphagia). Pertinent negatives include no anemia, melena or weight loss. Risk factors include ETOH use, obesity and smoking/tobacco exposure (Former smoker). He has tried nothing for the symptoms. Past procedures include an EGD and a UGI (showed esophageal dysmotility and cricopharyngeal bar).   Review of Systems   Constitutional:  Negative for appetite change, fever, unexpected weight change and weight loss.   HENT:  Positive for trouble swallowing. Negative for hoarse voice and sore throat.    Respiratory:  Negative for cough, choking, shortness of breath and wheezing.    Cardiovascular:  Negative for chest pain.   Gastrointestinal:  Positive for dysphagia. Negative for abdominal distention, abdominal pain, anal bleeding, blood in stool, constipation, diarrhea, heartburn, melena, nausea, rectal pain and vomiting.   Genitourinary:  Negative for difficulty urinating and dysuria.   Musculoskeletal:  Negative for gait problem.   Skin:  Negative for rash.   Neurological:  Negative for speech difficulty.   Psychiatric/Behavioral:  Negative for confusion.      Past Medical History:   Diagnosis Date    Allergy     BPH with urinary obstruction      Degenerative disc disease     Dysphagia     ED (erectile dysfunction)     Gout     Hyperlipidemia     Hypertension     Male hypogonadism 7/19/2012      Past Surgical History:   Procedure Laterality Date    APPENDECTOMY      arthroscopic knee surg      left X 2, right X 1    COLONOSCOPY N/A 9/28/2020    Procedure: COLONOSCOPY;  Surgeon: CAYDEN Montenegro MD;  Location: [facility] ENDO (4TH FLR);  Service: Endoscopy;  Laterality: N/A;  9/25-covid [id number]    INJECTION OF ANESTHETIC AGENT AROUND NERVE Right 8/24/2021    Procedure: BLOCK, NERVE, MEDIAL BRANCH L3,L4,L5;  Surgeon: Zachary Salas MD;  [facility]: [facility];  Service: Pain Management;  Laterality: Right;  1 of 2    INJECTION OF ANESTHETIC AGENT AROUND NERVE Right 10/12/2021    Procedure: BLOCK, NERVE, L3-L4-L5 MEDIAL BRANCH 2 OF 2 NEED CONSENT/ conitnue Plavix;  Surgeon: Zachary Salas MD;  Location: [facility] PAIN MGT;  Service: Pain Management;  Laterality: Right;    INTUSSUSCEPTION REPAIR      KNEE SURGERY      RADIOFREQUENCY ABLATION Right 12/8/2021    Procedure: RADIOFREQUENCY ABLATION right L3, L4, L5 NEEDS CONSENT;  Surgeon: Zachary Salas MD;  [facility]: [facility];  Service: Pain Management;  Laterality: Right;    TONSILLECTOMY        Family History   Problem Relation Age of Onset    Hyperlipidemia Father     Hypertension Father     Heart disease Father     Liver disease Mother     Heart disease Brother     Heart disease Maternal Grandmother     Heart disease Paternal Grandfather       Wt Readings from Last 10 Encounters:   07/07/23 121.9 kg (268 lb 11.9 oz)   06/08/23 123.3 kg (271 lb 13.2 oz)   02/02/23 120 kg (264 lb 8.8 oz)   01/06/23 121.6 kg (268 lb 1.3 oz)   12/12/22 120.7 kg (266 lb 1.5 oz)   10/04/22 119.9 kg (264 lb 5.3 oz)   06/23/22 116.5 kg (256 lb 13.4 oz)   05/30/22 118 kg (260 lb 2.3 oz)   05/05/22 119 kg (262 lb 4.8 oz)   03/02/22 119.4 kg (263 lb 3.7 oz)     Lab Results   Component Value Date    WBC 4.96 06/08/2023     HGB 13.1 (L) 06/08/2023    HCT 40.6 06/08/2023    MCV 99 (H) 06/08/2023     06/08/2023     CMP  Sodium   Date Value Ref Range Status   06/08/2023 141 136 - 145 mmol/L Final     Potassium   Date Value Ref Range Status   06/08/2023 4.5 3.5 - 5.1 mmol/L Final     Chloride   Date Value Ref Range Status   06/08/2023 108 95 - 110 mmol/L Final     CO2   Date Value Ref Range Status   06/08/2023 26 23 - 29 mmol/L Final     Glucose   Date Value Ref Range Status   06/08/2023 96 70 - 110 mg/dL Final     BUN   Date Value Ref Range Status   06/08/2023 16 8 - 23 mg/dL Final     Creatinine   Date Value Ref Range Status   06/08/2023 0.9 0.5 - 1.4 mg/dL Final     Calcium   Date Value Ref Range Status   06/08/2023 9.6 8.7 - 10.5 mg/dL Final     Total Protein   Date Value Ref Range Status   06/08/2023 7.0 6.0 - 8.4 g/dL Final     Albumin   Date Value Ref Range Status   06/08/2023 4.3 3.5 - 5.2 g/dL Final     Total Bilirubin   Date Value Ref Range Status   06/08/2023 0.7 0.1 - 1.0 mg/dL Final     Comment:     For infants and newborns, interpretation of results should be based  on gestational age, weight and in agreement with clinical  observations.    Premature Infant recommended reference ranges:  Up to 24 hours.............<8.0 mg/dL  Up to 48 hours............<12.0 mg/dL  3-5 days..................<15.0 mg/dL  6-29 days.................<15.0 mg/dL       Alkaline Phosphatase   Date Value Ref Range Status   06/08/2023 49 (L) 55 - 135 U/L Final     AST   Date Value Ref Range Status   06/08/2023 22 10 - 40 U/L Final     ALT   Date Value Ref Range Status   06/08/2023 23 10 - 44 U/L Final     Anion Gap   Date Value Ref Range Status   06/08/2023 7 (L) 8 - 16 mmol/L Final     eGFR if    Date Value Ref Range Status   05/24/2021 >60.0 >60 mL/min/1.73 m^2 Final     eGFR if non    Date Value Ref Range Status   05/24/2021 >60.0 >60 mL/min/1.73 m^2 Final     Comment:     Calculation used to obtain the  estimated glomerular filtration  rate (eGFR) is the CKD-EPI equation.                 Reviewed prior medical records including radiology report of esophagram 7/6/23 & endoscopy history (see surgical history).     Objective:      Physical Exam  Constitutional:       General: He is not in acute distress.     Appearance: He is well-developed.   HENT:      Head: Normocephalic.      Right Ear: Hearing normal.      Left Ear: Hearing normal.      Nose: Nose normal.      Mouth/Throat:      Mouth: No oral lesions.      Pharynx: Uvula midline.   Eyes:      General: Lids are normal.      Conjunctiva/sclera: Conjunctivae normal.      Pupils: Pupils are equal, round, and reactive to light.   Neck:      Trachea: Trachea normal.   Cardiovascular:      Rate and Rhythm: Normal rate and regular rhythm.      Heart sounds: Normal heart sounds. No murmur heard.  Pulmonary:      Effort: Pulmonary effort is normal. No respiratory distress.      Breath sounds: Normal breath sounds. No stridor. No wheezing.   Abdominal:      General: Bowel sounds are normal. There is no distension.      Palpations: Abdomen is soft. There is no mass.      Tenderness: There is no abdominal tenderness. There is no guarding or rebound.   Musculoskeletal:         General: Normal range of motion.      Cervical back: Normal range of motion.   Skin:     General: Skin is warm and dry.      Findings: No rash.      Comments: Non jaundiced   Neurological:      Mental Status: He is alert and oriented to person, place, and time.   Psychiatric:         Speech: Speech normal.         Behavior: Behavior normal. Behavior is cooperative.         Assessment:       1. Esophageal dysphagia    2. Abnormal esophagram    3. Esophageal dysmotility    4. Screening for colon cancer           Plan:   All diagnoses and orders for this visit:    Esophageal dysphagia, Abnormal esophagram & Esophageal dysmotility  - Schedule EGD with possible esophageal dilation if indicated  - Educated  patient to eat smaller more frequent meals and to eat slowly and advised to eat a soft diet.  - Ambulatory referral/consult to Gastroenterology; Future; Expected date: 07/14/2023 (GI motility specialist)    Screening for colon cancer   - Next surveillance colonoscopy due 9/2030    If no improvement in symptoms or symptoms worsen, call/follow-up at clinic or go to ER

## 2023-07-08 RX ORDER — LISINOPRIL AND HYDROCHLOROTHIAZIDE 12.5; 2 MG/1; MG/1
1 TABLET ORAL DAILY
Qty: 90 TABLET | Refills: 3 | Status: SHIPPED | OUTPATIENT
Start: 2023-07-08

## 2023-07-19 ENCOUNTER — TELEPHONE (OUTPATIENT)
Dept: SURGERY | Facility: CLINIC | Age: 63
End: 2023-07-19
Payer: COMMERCIAL

## 2023-07-19 NOTE — TELEPHONE ENCOUNTER
I called patient to inform him that Dr. Tompkins is on call on Wednesday, 8/9/23 and that I have to move his appointment from 2pm to the morning.  I offered patient a 9:45am appointment and he accepted.  [place]

## 2023-08-08 ENCOUNTER — HOSPITAL ENCOUNTER (OUTPATIENT)
Facility: HOSPITAL | Age: 63
Discharge: HOME | End: 2023-08-08
Attending: INTERNAL MEDICINE | Admitting: INTERNAL MEDICINE
Payer: COMMERCIAL

## 2023-08-08 ENCOUNTER — ANESTHESIA EVENT (OUTPATIENT)
Dept: ENDOSCOPY | Facility: HOSPITAL | Age: 63
End: 2023-08-08
Payer: COMMERCIAL

## 2023-08-08 ENCOUNTER — ANESTHESIA (OUTPATIENT)
Dept: ENDOSCOPY | Facility: HOSPITAL | Age: 63
End: 2023-08-08
Payer: COMMERCIAL

## 2023-08-08 VITALS
TEMPERATURE: 98 F | OXYGEN SATURATION: 99 % | RESPIRATION RATE: 15 BRPM | DIASTOLIC BLOOD PRESSURE: 90 MMHG | WEIGHT: 270 LBS | HEIGHT: 77 IN | BODY MASS INDEX: 31.88 KG/M2 | SYSTOLIC BLOOD PRESSURE: 188 MMHG | HEART RATE: 54 BPM

## 2023-08-08 DIAGNOSIS — R13.10 DYSPHAGIA: ICD-10-CM

## 2023-08-08 PROCEDURE — 43239 EGD BIOPSY SINGLE/MULTIPLE: CPT | Mod: 59,PO | Performed by: INTERNAL MEDICINE

## 2023-08-08 PROCEDURE — D9220A PRA ANESTHESIA: ICD-10-PCS | Mod: ANES,,, | Performed by: ANESTHESIOLOGY

## 2023-08-08 PROCEDURE — D9220A PRA ANESTHESIA: Mod: ANES,,, | Performed by: ANESTHESIOLOGY

## 2023-08-08 PROCEDURE — 27201012 HC FORCEPS, HOT/COLD, DISP: Mod: PO | Performed by: INTERNAL MEDICINE

## 2023-08-08 PROCEDURE — 37000009 HC ANESTHESIA EA ADD 15 MINS: Mod: PO | Performed by: INTERNAL MEDICINE

## 2023-08-08 PROCEDURE — D9220A PRA ANESTHESIA: ICD-10-PCS | Mod: CRNA,,, | Performed by: NURSE ANESTHETIST, CERTIFIED REGISTERED

## 2023-08-08 PROCEDURE — 43249 ESOPH EGD DILATION <30 MM: CPT | Mod: PO | Performed by: INTERNAL MEDICINE

## 2023-08-08 PROCEDURE — 43239 EGD BIOPSY SINGLE/MULTIPLE: CPT | Mod: 59,,, | Performed by: INTERNAL MEDICINE

## 2023-08-08 PROCEDURE — 88305 TISSUE EXAM BY PATHOLOGIST: CPT | Mod: PO | Performed by: PATHOLOGY

## 2023-08-08 PROCEDURE — 43249 ESOPH EGD DILATION <30 MM: CPT | Mod: ,,, | Performed by: INTERNAL MEDICINE

## 2023-08-08 PROCEDURE — 25000003 PHARM REV CODE 250: Mod: PO | Performed by: NURSE ANESTHETIST, CERTIFIED REGISTERED

## 2023-08-08 PROCEDURE — 88305 TISSUE EXAM BY PATHOLOGIST: CPT | Mod: 26,,, | Performed by: PATHOLOGY

## 2023-08-08 PROCEDURE — 43249 PR EGD, FLEX, W/BALL DILATION, < 30MM: ICD-10-PCS | Mod: ,,, | Performed by: INTERNAL MEDICINE

## 2023-08-08 PROCEDURE — 88305 TISSUE EXAM BY PATHOLOGIST: ICD-10-PCS | Mod: 26,,, | Performed by: PATHOLOGY

## 2023-08-08 PROCEDURE — 63600175 PHARM REV CODE 636 W HCPCS: Mod: PO | Performed by: NURSE ANESTHETIST, CERTIFIED REGISTERED

## 2023-08-08 PROCEDURE — D9220A PRA ANESTHESIA: Mod: CRNA,,, | Performed by: NURSE ANESTHETIST, CERTIFIED REGISTERED

## 2023-08-08 PROCEDURE — 37000008 HC ANESTHESIA 1ST 15 MINUTES: Mod: PO | Performed by: INTERNAL MEDICINE

## 2023-08-08 PROCEDURE — C1726 CATH, BAL DIL, NON-VASCULAR: HCPCS | Mod: PO | Performed by: INTERNAL MEDICINE

## 2023-08-08 PROCEDURE — 43239 PR EGD, FLEX, W/BIOPSY, SGL/MULTI: ICD-10-PCS | Mod: 59,,, | Performed by: INTERNAL MEDICINE

## 2023-08-08 RX ORDER — OMEPRAZOLE 40 MG/1
40 CAPSULE, DELAYED RELEASE ORAL DAILY
Qty: 90 CAPSULE | Refills: 0 | Status: SHIPPED | OUTPATIENT
Start: 2023-08-08 | End: 2023-11-06

## 2023-08-08 RX ORDER — SODIUM CHLORIDE 0.9 % (FLUSH) 0.9 %
10 SYRINGE (ML) INJECTION
Status: DISCONTINUED | OUTPATIENT
Start: 2023-08-08 | End: 2023-08-08 | Stop reason: HOSPADM

## 2023-08-08 RX ORDER — LIDOCAINE HYDROCHLORIDE 20 MG/ML
INJECTION INTRAVENOUS
Status: DISCONTINUED | OUTPATIENT
Start: 2023-08-08 | End: 2023-08-08

## 2023-08-08 RX ORDER — PROPOFOL 10 MG/ML
VIAL (ML) INTRAVENOUS
Status: DISCONTINUED | OUTPATIENT
Start: 2023-08-08 | End: 2023-08-08

## 2023-08-08 RX ADMIN — PROPOFOL 50 MG: 10 INJECTION, EMULSION INTRAVENOUS at 01:08

## 2023-08-08 RX ADMIN — PROPOFOL 200 MG: 10 INJECTION, EMULSION INTRAVENOUS at 01:08

## 2023-08-08 RX ADMIN — SODIUM CHLORIDE, SODIUM LACTATE, POTASSIUM CHLORIDE, AND CALCIUM CHLORIDE: .6; .31; .03; .02 INJECTION, SOLUTION INTRAVENOUS at 01:08

## 2023-08-08 RX ADMIN — LIDOCAINE HYDROCHLORIDE 100 MG: 20 INJECTION INTRAVENOUS at 01:08

## 2023-08-08 NOTE — TRANSFER OF CARE
"Anesthesia Transfer of Care Note    Patient: Stanley Stewart    Procedure(s) Performed: Procedure(s) (LRB):  EGD (ESOPHAGOGASTRODUODENOSCOPY) (N/A)    Patient location: PACU    Anesthesia Type: general    Transport from OR: Transported from OR on room air with adequate spontaneous ventilation    Post pain: adequate analgesia    Post assessment: no apparent anesthetic complications and tolerated procedure well    Post vital signs: stable    Level of consciousness: awake, alert and oriented    Nausea/Vomiting: no nausea/vomiting    Complications: none    Transfer of care protocol was followed      Last vitals:   Visit Vitals  /82   Pulse (!) 53   Temp 36.7 °C (98 °F) (Skin)   Resp 16   Ht 6' 5" (1.956 m)   Wt 122.5 kg (270 lb)   SpO2 100%   BMI 32.02 kg/m²     "

## 2023-08-08 NOTE — H&P
"[facility] Note    CC: Dysphagia    HPI 63 y.o. male presents for evaluation of dysphagia    Past Medical History:   Diagnosis Date    Allergy     BPH with urinary obstruction     Degenerative disc disease     Dysphagia     ED (erectile dysfunction)     Gout     Hyperlipidemia     Hypertension     Male hypogonadism 7/19/2012       Allergies and Medications reviewed     Review of Systems  General ROS: negative for - chills, fever or weight loss  Cardiovascular ROS: no chest pain or dyspnea on exertion  Gastrointestinal ROS: + dysphagia    Physical Examination  Ht 6' 5" (1.956 m)   Wt 122.5 kg (270 lb)   BMI 32.02 kg/m²   General appearance: alert, cooperative, no distress  HENT: Normocephalic, atraumatic, neck symmetrical, no nasal discharge, sclera anicteric   Lungs: clear to auscultation bilaterally, symmetric chest wall expansion bilaterally  Heart: regular rate and rhythm without rub; no displacement of the PMI   Abdomen: soft  Extremities: extremities symmetric; no clubbing, cyanosis, or edema        Labs:  Lab Results   Component Value Date    WBC 4.96 06/08/2023    HGB 13.1 (L) 06/08/2023    HCT 40.6 06/08/2023    MCV 99 (H) 06/08/2023     06/08/2023           Assessment:   63 y.o. male presents for EGD    Plan:  -proceed to EGD    Tiffany Mars MD  [facility]  [address], [address]  [place] [address]  Office: [phone]  Fax: [phone]   "

## 2023-08-08 NOTE — PROVATION PATIENT INSTRUCTIONS
Discharge Summary/Instructions after an Endoscopic Procedure  Patient Name: Stanley Stewart  Patient MRN: [id number]  Patient YOB: 1960 Tuesday, August 8, 2023  Tiffany Mars MD  Dear patient,  As a result of recent federal legislation (The Federal Cures Act), you may   receive lab or pathology results from your procedure in your [facility]   account before your physician is able to contact you. Your physician or   their representative will relay the results to you with their   recommendations at their soonest availability.  Thank you,  RESTRICTIONS:  During your procedure today, you received medications for sedation.  These   medications may affect your judgment, balance and coordination.  Therefore,   for 24 hours, you have the following restrictions:   - DO NOT drive a car, operate machinery, make legal/financial decisions,   sign important papers or drink alcohol.    ACTIVITY:  Today: no heavy lifting, straining or running due to procedural   sedation/anesthesia.  The following day: return to full activity including work.  DIET:  Eat and drink normally unless instructed otherwise.     TREATMENT FOR COMMON SIDE EFFECTS:  - Mild abdominal pain, nausea, belching, bloating or excessive gas:  rest,   eat lightly and use a heating pad.  - Sore Throat: treat with throat lozenges and/or gargle with warm salt   water.  - Because air was used during the procedure, expelling large amounts of air   from your rectum or belching is normal.  - If a bowel prep was taken, you may not have a bowel movement for 1-3 days.    This is normal.  SYMPTOMS TO WATCH FOR AND REPORT TO YOUR PHYSICIAN:  1. Abdominal pain or bloating, other than gas cramps.  2. Chest pain.  3. Back pain.  4. Signs of infection such as: chills or fever occurring within 24 hours   after the procedure.  5. Rectal bleeding, which would show as bright red, maroon, or black stools.   (A tablespoon of blood from the rectum is not serious,  especially if   hemorrhoids are present.)  6. Vomiting.  7. Weakness or dizziness.  GO DIRECTLY TO THE NEAREST EMERGENCY ROOM IF YOU HAVE ANY OF THE FOLLOWING:      Difficulty breathing              Chills and/or fever over 101 F   Persistent vomiting and/or vomiting blood   Severe abdominal pain   Severe chest pain   Black, tarry stools   Bleeding- more than one tablespoon   Any other symptom or condition that you feel may need urgent attention  Your doctor recommends these additional instructions:  If any biopsies were taken, your doctors clinic will contact you in 1 to 2   weeks with any results.  You are being discharged to home.   You have a contact number available for emergencies.  The signs and symptoms   of potential delayed complications were discussed with you.  You may return   to normal activities tomorrow.  Written discharge instructions were   provided to you.   Resume your previous diet.   Continue your present medications.   We are waiting for your pathology results.  For questions, problems or results please call your physician - Tiffany Mars MD at Work:  [phone].  EMERGENCY PHONE NUMBER: [phone], LAB RESULTS: [phone]  IF A COMPLICATION OR EMERGENCY SITUATION ARISES AND YOU ARE UNABLE TO REACH   YOUR PHYSICIAN - GO DIRECTLY TO THE EMERGENCY ROOM.  ___________________________________________  Nurse Signature  ___________________________________________  Patient/Designated Responsible Party Signature  Tiffany Mars MD  8/8/2023 1:30:55 PM  This report has been verified and signed electronically.  Dear patient,  As a result of recent federal legislation (The Federal Cures Act), you may   receive lab or pathology results from your procedure in your [facility]   account before your physician is able to contact you. Your physician or   their representative will relay the results to you with their   recommendations at their soonest availability.  Thank  you.  PROVATION

## 2023-08-08 NOTE — ANESTHESIA POSTPROCEDURE EVALUATION
Anesthesia Post Evaluation    Patient: Stanley Stewart    Procedure(s) Performed: Procedure(s) (LRB):  EGD (ESOPHAGOGASTRODUODENOSCOPY) (N/A)    Final Anesthesia Type: general      Patient location during evaluation: PACU  Patient participation: Yes- Able to Participate  Level of consciousness: awake and alert and oriented  Post-procedure vital signs: reviewed and stable  Pain management: adequate  Airway patency: patent    PONV status at discharge: No PONV  Anesthetic complications: no      Cardiovascular status: blood pressure returned to baseline  Respiratory status: unassisted, spontaneous ventilation and room air  Hydration status: euvolemic  Follow-up not needed.          Vitals Value Taken Time   /86 08/08/23 1341   Temp 36.5 °C (97.7 °F) 08/08/23 1331   Pulse 57 08/08/23 1341   Resp 9 08/08/23 1341   SpO2 98 % 08/08/23 1341         No case tracking events are documented in the log.      Pain/Rui Score: Rui Score: 9 (8/8/2023  1:31 PM)

## 2023-08-08 NOTE — ANESTHESIA PREPROCEDURE EVALUATION
08/08/2023  Stanley Stewart is a 63 y.o., male.    Past Medical History:   Diagnosis Date    Allergy     BPH with urinary obstruction     Degenerative disc disease     Dysphagia     ED (erectile dysfunction)     Gout     Hyperlipidemia     Hypertension     Male hypogonadism 7/19/2012     Past Surgical History:   Procedure Laterality Date    APPENDECTOMY      arthroscopic knee surg      left X 2, right X 1    COLONOSCOPY N/A 9/28/2020    Procedure: COLONOSCOPY;  Surgeon: CAYDEN Montenegro MD;  Location: [facility] ENDO (4TH FLR);  Service: Endoscopy;  Laterality: N/A;  9[place]id [place]    INJECTION OF ANESTHETIC AGENT AROUND NERVE Right 8/24/2021    Procedure: BLOCK, NERVE, MEDIAL BRANCH L3,L4,L5;  Surgeon: Zachary Salas MD;  Location: [facility] PAIN MGT;  Service: Pain Management;  Laterality: Right;  1 of 2    INJECTION OF ANESTHETIC AGENT AROUND NERVE Right 10/12/2021    Procedure: BLOCK, NERVE, L3-L4-L5 MEDIAL BRANCH 2 OF 2 NEED CONSENT/ conitnue Plavix;  Surgeon: Zachary Salas MD;  Location: [facility] PAIN MGT;  Service: Pain Management;  Laterality: Right;    INTUSSUSCEPTION REPAIR      KNEE SURGERY      RADIOFREQUENCY ABLATION Right 12/8/2021    Procedure: RADIOFREQUENCY ABLATION right L3, L4, L5 NEEDS CONSENT;  Surgeon: Zachary Salas MD;  Location: [facility] PAIN MGT;  Service: Pain Management;  Laterality: Right;    TONSILLECTOMY           Pre-op Assessment    I have reviewed the Patient Summary Reports.   I have reviewed the NPO Status.   I have reviewed the Medications.     Review of Systems  Anesthesia Hx:  No problems with previous Anesthesia  Neg history of prior surgery. Denies Family Hx of Anesthesia complications.   Denies Personal Hx of Anesthesia complications.   Social:  Former Smoker    Hematology/Oncology:  Hematology Normal   Oncology Normal     EENT/Dental:EENT/Dental  Normal   Cardiovascular:   Exercise tolerance: good Hypertension CAD      Pulmonary:  Pulmonary Normal    Renal/:  Renal/ Normal     Musculoskeletal:   Arthritis     Neurological:  Neurology Normal    Endocrine:  Obesity / BMI > 30  Dermatological:  Skin Normal    Psych:  Psychiatric Normal           Physical Exam  General:  Well nourished      Airway/Jaw/Neck:  Airway Findings: Mouth Opening: Normal   Tongue: Normal   General Airway Assessment: Adult Mallampati: II  TM Distance: Normal, at least 6 cm         Eyes/Ears/Nose:  EYES/EARS/NOSE FINDINGS: Normal   Dental:  Dental Findings: In tact     Chest/Lungs:  Chest/Lungs Findings: Clear to auscultation, Normal Respiratory Rate      Heart/Vascular:  Heart Findings: Rate: Normal  Rhythm: Regular Rhythm  Sounds: Normal  Heart murmur: negative Vascular Findings: Normal    Abdomen:  Abdomen Findings:  Normal, Soft, Nontender      Musculoskeletal:  Musculoskeletal Findings: Normal   Skin:  Skin Findings: Normal    Mental Status:  Mental Status Findings:  Cooperative, Alert and Oriented         Anesthesia Plan  Type of Anesthesia, risks & benefits discussed:  Anesthesia Type:  Gen Natural Airway    Patient's Preference:   Plan Factors:          Intra-op Monitoring Plan: standard ASA monitors  Intra-op Monitoring Plan Comments:   Post Op Pain Control Plan:   Post Op Pain Control Plan Comments:     Induction:   IV  Beta Blocker:  Patient is not currently on a Beta-Blocker (No further documentation required).       Informed Consent: Informed consent signed with the Patient and all parties understand the risks and agree with anesthesia plan.  All questions answered.  Anesthesia consent signed with patient.  ASA Score: 2     Day of Surgery Review of History & Physical:              Ready For Surgery From Anesthesia Perspective.           Physical Exam  General: Well nourished    Airway:  Mallampati: II   Mouth Opening: Normal  TM Distance: Normal, at least 6 cm  Tongue:  Normal    Dental:  In tact    Chest/Lungs:  Clear to auscultation, Normal Respiratory Rate    Heart:  Rate: Normal  Rhythm: Regular Rhythm  Sounds: Normal    Abdomen:  Normal, Soft, Nontender          Anesthesia Plan  Type of Anesthesia, risks & benefits discussed:    Anesthesia Type: Gen Natural Airway  Intra-op Monitoring Plan: standard ASA monitors  Induction:  IV  Informed Consent: Informed consent signed with the Patient and all parties understand the risks and agree with anesthesia plan.  All questions answered.   ASA Score: 2    Ready For Surgery From Anesthesia Perspective.       .

## 2023-08-09 ENCOUNTER — OFFICE VISIT (OUTPATIENT)
Dept: SURGERY | Facility: CLINIC | Age: 63
End: 2023-08-09
Payer: COMMERCIAL

## 2023-08-09 VITALS
HEART RATE: 64 BPM | BODY MASS INDEX: 31.24 KG/M2 | WEIGHT: 264.56 LBS | DIASTOLIC BLOOD PRESSURE: 80 MMHG | SYSTOLIC BLOOD PRESSURE: 141 MMHG | TEMPERATURE: 97 F | HEIGHT: 77 IN

## 2023-08-09 DIAGNOSIS — K64.9 BLEEDING HEMORRHOIDS: Primary | ICD-10-CM

## 2023-08-09 PROCEDURE — 99999 PR PBB SHADOW E&M-EST. PATIENT-LVL III: CPT | Mod: PBBFAC,,, | Performed by: SURGERY

## 2023-08-09 PROCEDURE — 4010F ACE/ARB THERAPY RXD/TAKEN: CPT | Mod: CPTII,S$GLB,, | Performed by: SURGERY

## 2023-08-09 PROCEDURE — 3079F DIAST BP 80-89 MM HG: CPT | Mod: CPTII,S$GLB,, | Performed by: SURGERY

## 2023-08-09 PROCEDURE — 99214 OFFICE O/P EST MOD 30 MIN: CPT | Mod: 25,S$GLB,, | Performed by: SURGERY

## 2023-08-09 PROCEDURE — 3008F BODY MASS INDEX DOCD: CPT | Mod: CPTII,S$GLB,, | Performed by: SURGERY

## 2023-08-09 PROCEDURE — 3077F SYST BP >= 140 MM HG: CPT | Mod: CPTII,S$GLB,, | Performed by: SURGERY

## 2023-08-09 PROCEDURE — 1159F MED LIST DOCD IN RCRD: CPT | Mod: CPTII,S$GLB,, | Performed by: SURGERY

## 2023-08-09 PROCEDURE — 1159F PR MEDICATION LIST DOCUMENTED IN MEDICAL RECORD: ICD-10-PCS | Mod: CPTII,S$GLB,, | Performed by: SURGERY

## 2023-08-09 PROCEDURE — 3077F PR MOST RECENT SYSTOLIC BLOOD PRESSURE >= 140 MM HG: ICD-10-PCS | Mod: CPTII,S$GLB,, | Performed by: SURGERY

## 2023-08-09 PROCEDURE — 99214 PR OFFICE/OUTPT VISIT, EST, LEVL IV, 30-39 MIN: ICD-10-PCS | Mod: 25,S$GLB,, | Performed by: SURGERY

## 2023-08-09 PROCEDURE — 4010F PR ACE/ARB THEARPY RXD/TAKEN: ICD-10-PCS | Mod: CPTII,S$GLB,, | Performed by: SURGERY

## 2023-08-09 PROCEDURE — 99999 PR PBB SHADOW E&M-EST. PATIENT-LVL III: ICD-10-PCS | Mod: PBBFAC,,, | Performed by: SURGERY

## 2023-08-09 PROCEDURE — 46221 LIGATION OF HEMORRHOID(S): CPT | Mod: S$GLB,,, | Performed by: SURGERY

## 2023-08-09 PROCEDURE — 3079F PR MOST RECENT DIASTOLIC BLOOD PRESSURE 80-89 MM HG: ICD-10-PCS | Mod: CPTII,S$GLB,, | Performed by: SURGERY

## 2023-08-09 PROCEDURE — 3008F PR BODY MASS INDEX (BMI) DOCUMENTED: ICD-10-PCS | Mod: CPTII,S$GLB,, | Performed by: SURGERY

## 2023-08-09 PROCEDURE — 46221 PR HEMORRHOIDECTOMY INTERNAL RUBBER BAND LIGATIONS: ICD-10-PCS | Mod: S$GLB,,, | Performed by: SURGERY

## 2023-08-09 NOTE — PROGRESS NOTES
Subjective     Patient ID: Stanley Stewart is a 63 y.o. male.    Chief Complaint: Consult (Bleeding hemorrhoids)    HPI  this is a pleasant 63-year-old gentleman who was referred to me for evaluation of hemorrhoids.  Patient notes that he has hemorrhoids that will protruding prolapse with bowel movements straining.  Often times notes of clear mucus leakage from them.  He notes he will also have periods of significant bleeding in association with bowel movements.  Describe significant pressure but no sharp pain.  No fevers or chills.  No other significant changes in bowel habits.  He had a colonoscopy in 2020.  Denies any significant abdominal surgical history.  No significant past medical history.  Review of Systems   Constitutional:  Negative for activity change and appetite change.   Gastrointestinal:  Positive for anal bleeding and blood in stool. Negative for abdominal pain, rectal pain and vomiting.   Musculoskeletal:  Negative for arthralgias.          Objective     Physical Exam  Vitals reviewed.   Cardiovascular:      Rate and Rhythm: Normal rate.      Pulses: Normal pulses.   Pulmonary:      Effort: Pulmonary effort is normal.   Abdominal:      General: Abdomen is flat. Bowel sounds are normal. There is no distension.      Tenderness: There is no abdominal tenderness.      Hernia: No hernia is present.   Genitourinary:     Comments: External exam does demonstrate confirm presence of external hemorrhoids particular the right anterior and right posterior position.  Digital rectal exam with normal sphincter tone.  Anoscopy demonstrating large internal hemorrhoids in the right anterior, right posterior and left lateral columns.  Neurological:      Mental Status: He is alert.   Psychiatric:         Mood and Affect: Mood normal.            Assessment and Plan     Hemorrhoid prolapse      Patient.  He does have prolapsing hemorrhoids.  I have recommended and offered proceeding with banding.  In addition I did  recommend daily fiber therapy.  Given the size of his hemorrhoids however do believe he will ultimately need banding.  He desired to proceed with banding today.    Having received informed consent pt was placed in prone jackknife position. I then placed rubber bands on the L lateral column, R Ant and R post columns without event. Bleeding was minimal and pt tolerated the procedure well.           No follow-ups on file.

## 2023-08-11 LAB
FINAL PATHOLOGIC DIAGNOSIS: NORMAL
GROSS: NORMAL
Lab: NORMAL

## 2023-08-16 DIAGNOSIS — G47.00 INSOMNIA, UNSPECIFIED TYPE: ICD-10-CM

## 2023-08-17 NOTE — TELEPHONE ENCOUNTER
No care due was identified.  [facility] Embedded Care Due Messages. Reference number: [id number].   8/16/2023 9:31:25 PM CDT

## 2023-09-19 ENCOUNTER — PATIENT MESSAGE (OUTPATIENT)
Dept: UROLOGY | Facility: CLINIC | Age: 63
End: 2023-09-19
Payer: COMMERCIAL

## 2023-09-22 ENCOUNTER — TELEPHONE (OUTPATIENT)
Dept: UROLOGY | Facility: CLINIC | Age: 63
End: 2023-09-22
Payer: COMMERCIAL

## 2023-09-22 ENCOUNTER — LAB VISIT (OUTPATIENT)
Dept: LAB | Facility: HOSPITAL | Age: 63
End: 2023-09-22
Attending: UROLOGY
Payer: COMMERCIAL

## 2023-09-22 DIAGNOSIS — E29.1 MALE HYPOGONADISM: ICD-10-CM

## 2023-09-22 LAB
ALBUMIN SERPL BCP-MCNC: 4 G/DL (ref 3.5–5.2)
ALP SERPL-CCNC: 45 U/L (ref 55–135)
ALT SERPL W/O P-5'-P-CCNC: 22 U/L (ref 10–44)
AST SERPL-CCNC: 22 U/L (ref 10–40)
BASOPHILS # BLD AUTO: 0.05 K/UL (ref 0–0.2)
BASOPHILS NFR BLD: 1.1 % (ref 0–1.9)
BILIRUB DIRECT SERPL-MCNC: 0.2 MG/DL (ref 0.1–0.3)
BILIRUB SERPL-MCNC: 0.6 MG/DL (ref 0.1–1)
CHOLEST SERPL-MCNC: 131 MG/DL (ref 120–199)
CHOLEST/HDLC SERPL: 2.8 {RATIO} (ref 2–5)
COMPLEXED PSA SERPL-MCNC: 0.36 NG/ML (ref 0–4)
DIFFERENTIAL METHOD: ABNORMAL
EOSINOPHIL # BLD AUTO: 0.4 K/UL (ref 0–0.5)
EOSINOPHIL NFR BLD: 8.7 % (ref 0–8)
ERYTHROCYTE [DISTWIDTH] IN BLOOD BY AUTOMATED COUNT: 12 % (ref 11.5–14.5)
HCT VFR BLD AUTO: 42.1 % (ref 40–54)
HDLC SERPL-MCNC: 46 MG/DL (ref 40–75)
HDLC SERPL: 35.1 % (ref 20–50)
HGB BLD-MCNC: 13.9 G/DL (ref 14–18)
IMM GRANULOCYTES # BLD AUTO: 0 K/UL (ref 0–0.04)
IMM GRANULOCYTES NFR BLD AUTO: 0 % (ref 0–0.5)
LDLC SERPL CALC-MCNC: 65.4 MG/DL (ref 63–159)
LYMPHOCYTES # BLD AUTO: 1.7 K/UL (ref 1–4.8)
LYMPHOCYTES NFR BLD: 37.2 % (ref 18–48)
MCH RBC QN AUTO: 32.1 PG (ref 27–31)
MCHC RBC AUTO-ENTMCNC: 33 G/DL (ref 32–36)
MCV RBC AUTO: 97 FL (ref 82–98)
MONOCYTES # BLD AUTO: 0.5 K/UL (ref 0.3–1)
MONOCYTES NFR BLD: 11 % (ref 4–15)
NEUTROPHILS # BLD AUTO: 1.9 K/UL (ref 1.8–7.7)
NEUTROPHILS NFR BLD: 42 % (ref 38–73)
NONHDLC SERPL-MCNC: 85 MG/DL
NRBC BLD-RTO: 0 /100 WBC
PLATELET # BLD AUTO: 183 K/UL (ref 150–450)
PMV BLD AUTO: 11.9 FL (ref 9.2–12.9)
PROT SERPL-MCNC: 6.7 G/DL (ref 6–8.4)
RBC # BLD AUTO: 4.33 M/UL (ref 4.6–6.2)
TESTOST SERPL-MCNC: 308 NG/DL (ref 304–1227)
TRIGL SERPL-MCNC: 98 MG/DL (ref 30–150)
WBC # BLD AUTO: 4.46 K/UL (ref 3.9–12.7)

## 2023-09-22 PROCEDURE — 80061 LIPID PANEL: CPT | Performed by: UROLOGY

## 2023-09-22 PROCEDURE — 84153 ASSAY OF PSA TOTAL: CPT | Performed by: UROLOGY

## 2023-09-22 PROCEDURE — 85025 COMPLETE CBC W/AUTO DIFF WBC: CPT | Performed by: UROLOGY

## 2023-09-22 PROCEDURE — 36415 COLL VENOUS BLD VENIPUNCTURE: CPT | Mod: PO | Performed by: UROLOGY

## 2023-09-22 PROCEDURE — 80076 HEPATIC FUNCTION PANEL: CPT | Performed by: UROLOGY

## 2023-09-22 PROCEDURE — 84403 ASSAY OF TOTAL TESTOSTERONE: CPT | Performed by: UROLOGY

## 2023-09-22 NOTE — TELEPHONE ENCOUNTER
----- Message from Amanda Doan sent at 9/22/2023  1:58 PM CDT -----  Regarding: Missed Call  Contact: [phone]  Pt is returning a missed call from someone in the office and is asking for a return call back soon. Thanks.

## 2023-10-03 ENCOUNTER — OFFICE VISIT (OUTPATIENT)
Dept: PODIATRY | Facility: CLINIC | Age: 63
End: 2023-10-03
Payer: COMMERCIAL

## 2023-10-03 VITALS — HEIGHT: 77 IN | BODY MASS INDEX: 31.17 KG/M2 | WEIGHT: 264 LBS

## 2023-10-03 DIAGNOSIS — M77.41 METATARSALGIA OF BOTH FEET: Primary | ICD-10-CM

## 2023-10-03 DIAGNOSIS — D36.10 NEUROMA: ICD-10-CM

## 2023-10-03 DIAGNOSIS — M77.8 CAPSULITIS OF FOOT, RIGHT: ICD-10-CM

## 2023-10-03 DIAGNOSIS — M77.42 METATARSALGIA OF BOTH FEET: Primary | ICD-10-CM

## 2023-10-03 PROCEDURE — 64455 NEUROMA: ICD-10-PCS | Mod: RT,S$GLB,, | Performed by: STUDENT IN AN ORGANIZED HEALTH CARE EDUCATION/TRAINING PROGRAM

## 2023-10-03 PROCEDURE — 1160F PR REVIEW ALL MEDS BY PRESCRIBER/CLIN PHARMACIST DOCUMENTED: ICD-10-PCS | Mod: CPTII,S$GLB,, | Performed by: STUDENT IN AN ORGANIZED HEALTH CARE EDUCATION/TRAINING PROGRAM

## 2023-10-03 PROCEDURE — 4010F PR ACE/ARB THEARPY RXD/TAKEN: ICD-10-PCS | Mod: CPTII,S$GLB,, | Performed by: STUDENT IN AN ORGANIZED HEALTH CARE EDUCATION/TRAINING PROGRAM

## 2023-10-03 PROCEDURE — 99999 PR PBB SHADOW E&M-EST. PATIENT-LVL III: CPT | Mod: PBBFAC,,, | Performed by: STUDENT IN AN ORGANIZED HEALTH CARE EDUCATION/TRAINING PROGRAM

## 2023-10-03 PROCEDURE — 99203 OFFICE O/P NEW LOW 30 MIN: CPT | Mod: 25,S$GLB,, | Performed by: STUDENT IN AN ORGANIZED HEALTH CARE EDUCATION/TRAINING PROGRAM

## 2023-10-03 PROCEDURE — 1159F MED LIST DOCD IN RCRD: CPT | Mod: CPTII,S$GLB,, | Performed by: STUDENT IN AN ORGANIZED HEALTH CARE EDUCATION/TRAINING PROGRAM

## 2023-10-03 PROCEDURE — 99999 PR PBB SHADOW E&M-EST. PATIENT-LVL III: ICD-10-PCS | Mod: PBBFAC,,, | Performed by: STUDENT IN AN ORGANIZED HEALTH CARE EDUCATION/TRAINING PROGRAM

## 2023-10-03 PROCEDURE — 1159F PR MEDICATION LIST DOCUMENTED IN MEDICAL RECORD: ICD-10-PCS | Mod: CPTII,S$GLB,, | Performed by: STUDENT IN AN ORGANIZED HEALTH CARE EDUCATION/TRAINING PROGRAM

## 2023-10-03 PROCEDURE — 1160F RVW MEDS BY RX/DR IN RCRD: CPT | Mod: CPTII,S$GLB,, | Performed by: STUDENT IN AN ORGANIZED HEALTH CARE EDUCATION/TRAINING PROGRAM

## 2023-10-03 PROCEDURE — 3008F PR BODY MASS INDEX (BMI) DOCUMENTED: ICD-10-PCS | Mod: CPTII,S$GLB,, | Performed by: STUDENT IN AN ORGANIZED HEALTH CARE EDUCATION/TRAINING PROGRAM

## 2023-10-03 PROCEDURE — 64455 NJX AA&/STRD PLTR COM DG NRV: CPT | Mod: RT,S$GLB,, | Performed by: STUDENT IN AN ORGANIZED HEALTH CARE EDUCATION/TRAINING PROGRAM

## 2023-10-03 PROCEDURE — 4010F ACE/ARB THERAPY RXD/TAKEN: CPT | Mod: CPTII,S$GLB,, | Performed by: STUDENT IN AN ORGANIZED HEALTH CARE EDUCATION/TRAINING PROGRAM

## 2023-10-03 PROCEDURE — 3008F BODY MASS INDEX DOCD: CPT | Mod: CPTII,S$GLB,, | Performed by: STUDENT IN AN ORGANIZED HEALTH CARE EDUCATION/TRAINING PROGRAM

## 2023-10-03 PROCEDURE — 99203 PR OFFICE/OUTPT VISIT, NEW, LEVL III, 30-44 MIN: ICD-10-PCS | Mod: 25,S$GLB,, | Performed by: STUDENT IN AN ORGANIZED HEALTH CARE EDUCATION/TRAINING PROGRAM

## 2023-10-03 RX ORDER — IBUPROFEN 800 MG/1
800 TABLET ORAL EVERY 8 HOURS PRN
COMMUNITY
Start: 2023-08-16

## 2023-10-03 RX ORDER — DEXAMETHASONE SODIUM PHOSPHATE 4 MG/ML
4 INJECTION, SOLUTION INTRA-ARTICULAR; INTRALESIONAL; INTRAMUSCULAR; INTRAVENOUS; SOFT TISSUE
Status: COMPLETED | OUTPATIENT
Start: 2023-10-03 | End: 2023-10-03

## 2023-10-03 RX ORDER — AZITHROMYCIN 250 MG/1
250 TABLET, FILM COATED ORAL
COMMUNITY
Start: 2023-09-05

## 2023-10-03 RX ADMIN — DEXAMETHASONE SODIUM PHOSPHATE 4 MG: 4 INJECTION, SOLUTION INTRA-ARTICULAR; INTRALESIONAL; INTRAMUSCULAR; INTRAVENOUS; SOFT TISSUE at 03:10

## 2023-10-03 NOTE — PROGRESS NOTES
Subjective:      Patient ID: Stanley Stewart is a 63 y.o. male.    Chief Complaint: Foot Pain    Mr. Stewart presents with complaints of mainly R foot pain. He's dealt with this pain for over 5 years now. Pain is worse with standing and walking. Feels like amass at the plantar R forefoot.     Review of Systems   Musculoskeletal:         Forefoot pain   All other systems reviewed and are negative.          Objective:      Physical Exam  Cardiovascular:      Pulses:           Dorsalis pedis pulses are 2+ on the right side and 2+ on the left side.        Posterior tibial pulses are 2+ on the right side and 2+ on the left side.   Feet:      Right foot:      Skin integrity: Skin integrity normal.      Toenail Condition: Right toenails are normal.      Left foot:      Skin integrity: Skin integrity normal.      Toenail Condition: Left toenails are normal.      Comments: Overall rectus foot type b/l. Tenderness to palpation of the right 2nd MTPJ and 2nd interspace.               Assessment:       Encounter Diagnoses   Name Primary?    Metatarsalgia of both feet Yes    Capsulitis of foot, right     Neuroma - Right Foot          Plan:       Stanley was seen today for foot pain.    Diagnoses and all orders for this visit:    Metatarsalgia of both feet  -     ORTHOTIC DEVICE (DME)  -     Neuroma    Capsulitis of foot, right  -     ORTHOTIC DEVICE (DME)  -     Neuroma    Neuroma - Right Foot    Other orders  -     dexAMETHasone injection 4 mg      I counseled the patient on his conditions, their implications and medical management.    Recommend injection for the 2nd MTPJ R foot and the neuroma.     Orthotics to have build in metatarsal pads.    Avoid barefoot walking.    X-rays reviewed. Likely getting excess pressure to the 2nd and 3rd MTPJs from shortened 1st met b/l and tight heel cords.    F/u 2 months.    Alexander Contreras DPM    Neuromsophie    Date/Time: 10/3/2023 3:20 PM    Performed by: Alexander Contreras DPM  Authorized by: Alexander Contreras  CARLY Rose    Consent Done?:  Yes (Verbal)  Indications:  Pain  Location Right Foot:  Second Webspace  Needle size:  25 G  Approach:  Dorsal  Patient tolerance:  Patient tolerated the procedure well with no immediate complications

## 2023-10-04 ENCOUNTER — PATIENT MESSAGE (OUTPATIENT)
Dept: INTERNAL MEDICINE | Facility: CLINIC | Age: 63
End: 2023-10-04
Payer: COMMERCIAL

## 2023-10-05 ENCOUNTER — OFFICE VISIT (OUTPATIENT)
Dept: UROLOGY | Facility: CLINIC | Age: 63
End: 2023-10-05
Payer: COMMERCIAL

## 2023-10-05 VITALS
HEIGHT: 77 IN | SYSTOLIC BLOOD PRESSURE: 118 MMHG | BODY MASS INDEX: 32 KG/M2 | DIASTOLIC BLOOD PRESSURE: 74 MMHG | WEIGHT: 271 LBS | HEART RATE: 68 BPM

## 2023-10-05 DIAGNOSIS — N13.8 BPH WITH URINARY OBSTRUCTION: ICD-10-CM

## 2023-10-05 DIAGNOSIS — E78.2 MIXED HYPERLIPIDEMIA: ICD-10-CM

## 2023-10-05 DIAGNOSIS — N52.01 ERECTILE DYSFUNCTION DUE TO ARTERIAL INSUFFICIENCY: ICD-10-CM

## 2023-10-05 DIAGNOSIS — N40.1 BPH WITH URINARY OBSTRUCTION: ICD-10-CM

## 2023-10-05 DIAGNOSIS — I10 PRIMARY HYPERTENSION: ICD-10-CM

## 2023-10-05 DIAGNOSIS — E29.1 MALE HYPOGONADISM: Primary | ICD-10-CM

## 2023-10-05 PROCEDURE — 3074F PR MOST RECENT SYSTOLIC BLOOD PRESSURE < 130 MM HG: ICD-10-PCS | Mod: CPTII,S$GLB,, | Performed by: UROLOGY

## 2023-10-05 PROCEDURE — 3078F DIAST BP <80 MM HG: CPT | Mod: CPTII,S$GLB,, | Performed by: UROLOGY

## 2023-10-05 PROCEDURE — 99214 OFFICE O/P EST MOD 30 MIN: CPT | Mod: S$GLB,,, | Performed by: UROLOGY

## 2023-10-05 PROCEDURE — 3078F PR MOST RECENT DIASTOLIC BLOOD PRESSURE < 80 MM HG: ICD-10-PCS | Mod: CPTII,S$GLB,, | Performed by: UROLOGY

## 2023-10-05 PROCEDURE — 4010F ACE/ARB THERAPY RXD/TAKEN: CPT | Mod: CPTII,S$GLB,, | Performed by: UROLOGY

## 2023-10-05 PROCEDURE — 99214 PR OFFICE/OUTPT VISIT, EST, LEVL IV, 30-39 MIN: ICD-10-PCS | Mod: S$GLB,,, | Performed by: UROLOGY

## 2023-10-05 PROCEDURE — 99999 PR PBB SHADOW E&M-EST. PATIENT-LVL IV: CPT | Mod: PBBFAC,,, | Performed by: UROLOGY

## 2023-10-05 PROCEDURE — 1159F PR MEDICATION LIST DOCUMENTED IN MEDICAL RECORD: ICD-10-PCS | Mod: CPTII,S$GLB,, | Performed by: UROLOGY

## 2023-10-05 PROCEDURE — 3008F PR BODY MASS INDEX (BMI) DOCUMENTED: ICD-10-PCS | Mod: CPTII,S$GLB,, | Performed by: UROLOGY

## 2023-10-05 PROCEDURE — 3074F SYST BP LT 130 MM HG: CPT | Mod: CPTII,S$GLB,, | Performed by: UROLOGY

## 2023-10-05 PROCEDURE — 99999 PR PBB SHADOW E&M-EST. PATIENT-LVL IV: ICD-10-PCS | Mod: PBBFAC,,, | Performed by: UROLOGY

## 2023-10-05 PROCEDURE — 3008F BODY MASS INDEX DOCD: CPT | Mod: CPTII,S$GLB,, | Performed by: UROLOGY

## 2023-10-05 PROCEDURE — 4010F PR ACE/ARB THEARPY RXD/TAKEN: ICD-10-PCS | Mod: CPTII,S$GLB,, | Performed by: UROLOGY

## 2023-10-05 PROCEDURE — 1159F MED LIST DOCD IN RCRD: CPT | Mod: CPTII,S$GLB,, | Performed by: UROLOGY

## 2023-10-05 RX ORDER — SILDENAFIL CITRATE 20 MG/1
TABLET ORAL
Qty: 50 TABLET | Refills: 11 | Status: SHIPPED | OUTPATIENT
Start: 2023-10-05

## 2023-10-05 RX ORDER — TESTOSTERONE 20.25 MG/1.25G
GEL TOPICAL
Qty: 6 EACH | Refills: 1 | Status: SHIPPED | OUTPATIENT
Start: 2023-10-05

## 2023-10-05 RX ORDER — ZOLPIDEM TARTRATE 10 MG/1
10 TABLET ORAL NIGHTLY
Qty: 90 TABLET | Refills: 1 | Status: SHIPPED | OUTPATIENT
Start: 2023-10-05

## 2023-10-05 NOTE — PROGRESS NOTES
CHIEF COMPLAINT:    Mr. Stewart is a 63 y.o. male presenting with hypogonadism.    PRESENTING ILLNESS:    Mr. Stewart is a 63 y.o. male with a history of hypogonadism.  This has been present for > 1 year.  He was on Testopel.  Then switched to Aveed.   While on TRT, he has more energy and a stronger libido.  Wanted to go back on topical therapy.  Is on Androgel 1.62% using 4 pumps.    He also has ED.  This has been present for > 1 year.  He has been using Cialis, but it is not effective.  He's was using levitra with good results.  However, he c/o the cost.  He's using sildenafil with good results. Using 60-80 mg.    He has LUTS.  Nocturia x 1-4.  He does drink wine at night.  Good FOS.  No straining.  No hematuria.  No dysuria.    REVIEW OF SYSTEMS:    Patient denies chest pain, sore throat, headache, blurred vision, fever, nausea, vomiting, chills, flank discomfort, abdominal pain, bleeding per rectum, cough, SOB, recent loss of consciousness, recent mental status changes, seizures, dizziness, or upper or lower extremity weakness.    DANIELITO  1. 2  2. 3  3. 4  4. 4  5. 4     PATIENT HISTORY:    Past Medical History:   Diagnosis Date    Allergy     BPH with urinary obstruction     Degenerative disc disease     Dysphagia     ED (erectile dysfunction)     Gout     Hyperlipidemia     Hypertension     Male hypogonadism 7/19/2012       Past Surgical History:   Procedure Laterality Date    APPENDECTOMY      arthroscopic knee surg      left X 2, right X 1    COLONOSCOPY N/A 9/28/2020    Procedure: COLONOSCOPY;  Surgeon: CAYDEN Montenegro MD;  Location: [facility] ENDO ([address] [place] [address]);  Service: Endoscopy;  Laterality: N/A;  9/25-covid [place]    ESOPHAGOGASTRODUODENOSCOPY N/A 8/8/2023    Procedure: EGD (ESOPHAGOGASTRODUODENOSCOPY);  Surgeon: Tiffany Mars MD;  Location: [facility] ENDO;  Service: Endoscopy;  Laterality: N/A;    INJECTION OF ANESTHETIC AGENT AROUND NERVE Right 8/24/2021    Procedure: BLOCK, NERVE, MEDIAL BRANCH  L3,L4,L5;  Surgeon: Zachary Salas MD;  Location: [facility] PAIN MGT;  Service: Pain Management;  Laterality: Right;  1 of 2    INJECTION OF ANESTHETIC AGENT AROUND NERVE Right 10/12/2021    Procedure: BLOCK, NERVE, L3-L4-L5 MEDIAL BRANCH 2 OF 2 NEED CONSENT/ conitnue Plavix;  Surgeon: Zachary Salas MD;  Location: [facility] PAIN MGT;  Service: Pain Management;  Laterality: Right;    INTUSSUSCEPTION REPAIR      KNEE SURGERY      RADIOFREQUENCY ABLATION Right 2021    Procedure: RADIOFREQUENCY ABLATION right L3, L4, L5 NEEDS CONSENT;  Surgeon: Zachary Salas MD;  Location: [facility] PAIN MGT;  Service: Pain Management;  Laterality: Right;    TONSILLECTOMY         Family History   Problem Relation Age of Onset    Hyperlipidemia Father     Hypertension Father     Heart disease Father     Liver disease Mother     Heart disease Brother     Heart disease Maternal Grandmother     Heart disease Paternal Grandfather        Social History     Socioeconomic History    Marital status:     Number of children: 3   Occupational History     [employer]er: [employer]   Tobacco Use    Smoking status: Former     Current packs/day: 0.00     Types: Cigarettes     Quit date: 1987     Years since quittin.2    Smokeless tobacco: Never   Substance and Sexual Activity    Alcohol use: Yes     Alcohol/week: 7.0 standard drinks of alcohol     Types: 7 Glasses of wine per week    Drug use: No    Sexual activity: Yes     Partners: Female     Social Determinants of Health     Financial Resource Strain: Low Risk  (10/1/2023)    Overall Financial Resource Strain (CARDIA)     Difficulty of Paying Living Expenses: Not hard at all   Food Insecurity: No Food Insecurity (10/1/2023)    Hunger Vital Sign     Worried About Running Out of Food in the Last Year: Never true     Ran Out of Food in the Last Year: Never true   Transportation Needs: No Transportation Needs (10/1/2023)    PRAPARE - Transportation     Lack of Transportation  (Medical): No     Lack of Transportation (Non-Medical): No   Physical Activity: Insufficiently Active (10/1/2023)    Exercise Vital Sign     Days of Exercise per Week: 3 days     Minutes of Exercise per Session: 20 min   Stress: No Stress Concern Present (10/1/2023)    [place] [employer] - Occupational Stress Questionnaire     Feeling of Stress : Not at all   Social Connections: Unknown (10/1/2023)    Social Connection and Isolation Panel [NHANES]     Frequency of Communication with Friends and Family: More than three times a week     Frequency of Social Gatherings with Friends and Family: More than three times a week     Active Member of Clubs or Organizations: Yes     Attends Club or Organization Meetings: 1 to 4 times per year     Marital Status:    Housing Stability: Low Risk  (10/1/2023)    Housing Stability Vital Sign     Unable to Pay for Housing in the Last Year: No     Number of Places Lived in the Last Year: 1     Unstable Housing in the Last Year: No       Allergies:  Doxycycline, Shellfish containing products, Tree pollen-white akhil, and Clopidogrel    Medications:    Current Outpatient Medications:     azelastine (ASTELIN) 137 mcg (0.1 %) nasal spray, 2 sprays (274 mcg total) by Nasal route 2 (two) times daily., Disp: 30 mL, Rfl: 0    azithromycin (Z-JONY) 250 MG tablet, Take 250 mg by mouth., Disp: , Rfl:     fluticasone propionate (FLONASE) 50 mcg/actuation nasal spray, 2 sprays (100 mcg total) by Each Nostril route once daily., Disp: 48 g, Rfl: 0    ibuprofen (ADVIL,MOTRIN) 800 MG tablet, Take 800 mg by mouth every 8 (eight) hours as needed., Disp: , Rfl:     lisinopriL-hydrochlorothiazide (PRINZIDE,ZESTORETIC) 20-12.5 mg per tablet, Take 1 tablet by mouth once daily., Disp: 90 tablet, Rfl: 3    omeprazole (PRILOSEC) 40 MG capsule, Take 1 capsule (40 mg total) by mouth once daily., Disp: 90 capsule, Rfl: 0    rosuvastatin (CRESTOR) 20 MG tablet, TAKE 1 TABLET DAILY, Disp:  90 tablet, Rfl: 2    sildenafil (REVATIO) 20 mg Tab, Take 5 tablets by mouth 1 hour before sexual activity, Disp: 50 tablet, Rfl: 11    testosterone (ANDROGEL) 20.25 mg/1.25 gram (1.62 %) GlPm, Apply 2 pumps to shoulders daily, Disp: 1 each, Rfl: 5    zolpidem (AMBIEN) 10 mg Tab, Take 1 tablet (10 mg total) by mouth every evening., Disp: 90 tablet, Rfl: 1    Current Facility-Administered Medications:     sodium hyaluronate (EUFLEXXA) 10 mg/mL(mw 2.4 -3.6 million) injection 40 mg, 40 mg, Intra-articular, Weekly, Placido Frazier MD, 40 mg at 08/05/21 0849    PHYSICAL EXAMINATION:    The patient generally appears in good health, is appropriately interactive, and is in no apparent distress.     Eyes: anicteric sclerae, moist conjunctivae; no lid-lag; PERRLA     HENT: Atraumatic; oropharynx clear with moist mucous membranes and no mucosal ulcerations;normal hard and soft palate. No evidence of lymphadenopathy.    Neck: Trachea midline.  No thyromegaly.    Musculoskeletal: No abnormal gait.    Skin: No lesions.    Mental: Cooperative with normal affect.  Is oriented to time, place, and person.    Neuro: Grossly intact.    Chest: Normal inspiratory effort.   No accessory muscles.  No audible wheezes.  Respirations symmetric on inspiration and expiration.    Heart: Regular rhythm.      Abdomen:  Soft, non-tender. No masses or organomegaly. Bladder is not palpable. No evidence of flank discomfort. No evidence of inguinal hernia.    Genitourinary: The penis is circumcised with no evidence of plaques or induration. The urethral meatus is normal. The testes, epididymides, and cord structures are normal in size and contour bilaterally. The scrotum is normal in size and contour.    Normal anal sphincter tone. No rectal mass.    The prostate is 35 g. Normal landmarks. Lateral sulci. Median furrow intact.  No nodularity or induration. Seminal vesicles are normal.    Extremities: No clubbing, cyanosis.  2+ LE  edema.    LABS:    Lab Results   Component Value Date    PSA 0.50 03/17/2017    PSA 0.47 07/18/2013    PSA 0.71 03/15/2013    PSADIAG 0.36 09/22/2023    PSADIAG 0.67 02/28/2022    PSADIAG 0.40 07/27/2021        IMPRESSION:    Encounter Diagnoses   Name Primary?    Male hypogonadism Yes    BPH with urinary obstruction     Erectile dysfunction due to arterial insufficiency     Mixed hyperlipidemia     Primary hypertension    HTN, stable  Hyperlipidemia, stable    PLAN:      1. Continue generic sildenafil for the ED (affected by above comorbidities).   Refilled to [facility].  2.  Discussed options for his LUTS.  Will observe them as he's pleased with his voiding.  3.  Androgel 16/2% refilled.  4.  He can then RTC 6 months with T, PSA, CBC, hepatic panel, lipid panel.      Copy to:

## 2023-10-10 RX ORDER — ROSUVASTATIN CALCIUM 20 MG/1
20 TABLET, COATED ORAL DAILY
Qty: 90 TABLET | Refills: 2 | Status: SHIPPED | OUTPATIENT
Start: 2023-10-10

## 2023-10-10 NOTE — TELEPHONE ENCOUNTER
No care due was identified.  [facility] Embedded Care Due Messages. Reference number: [id number].   10/10/2023 7:51:57 AM CDT

## 2023-10-19 ENCOUNTER — TELEPHONE (OUTPATIENT)
Dept: SURGERY | Facility: CLINIC | Age: 63
End: 2023-10-19
Payer: COMMERCIAL

## 2023-10-19 ENCOUNTER — OFFICE VISIT (OUTPATIENT)
Dept: SURGERY | Facility: CLINIC | Age: 63
End: 2023-10-19
Payer: COMMERCIAL

## 2023-10-19 VITALS
HEART RATE: 70 BPM | BODY MASS INDEX: 32.07 KG/M2 | TEMPERATURE: 97 F | DIASTOLIC BLOOD PRESSURE: 80 MMHG | HEIGHT: 77 IN | SYSTOLIC BLOOD PRESSURE: 155 MMHG | WEIGHT: 271.63 LBS

## 2023-10-19 DIAGNOSIS — K64.9 BLEEDING HEMORRHOIDS: Primary | ICD-10-CM

## 2023-10-19 PROCEDURE — 4010F ACE/ARB THERAPY RXD/TAKEN: CPT | Mod: CPTII,S$GLB,, | Performed by: SURGERY

## 2023-10-19 PROCEDURE — 3008F BODY MASS INDEX DOCD: CPT | Mod: CPTII,S$GLB,, | Performed by: SURGERY

## 2023-10-19 PROCEDURE — 3077F PR MOST RECENT SYSTOLIC BLOOD PRESSURE >= 140 MM HG: ICD-10-PCS | Mod: CPTII,S$GLB,, | Performed by: SURGERY

## 2023-10-19 PROCEDURE — 1159F MED LIST DOCD IN RCRD: CPT | Mod: CPTII,S$GLB,, | Performed by: SURGERY

## 2023-10-19 PROCEDURE — 4010F PR ACE/ARB THEARPY RXD/TAKEN: ICD-10-PCS | Mod: CPTII,S$GLB,, | Performed by: SURGERY

## 2023-10-19 PROCEDURE — 99213 OFFICE O/P EST LOW 20 MIN: CPT | Mod: S$GLB,,, | Performed by: SURGERY

## 2023-10-19 PROCEDURE — 3077F SYST BP >= 140 MM HG: CPT | Mod: CPTII,S$GLB,, | Performed by: SURGERY

## 2023-10-19 PROCEDURE — 99999 PR PBB SHADOW E&M-EST. PATIENT-LVL III: CPT | Mod: PBBFAC,,, | Performed by: SURGERY

## 2023-10-19 PROCEDURE — 99213 PR OFFICE/OUTPT VISIT, EST, LEVL III, 20-29 MIN: ICD-10-PCS | Mod: S$GLB,,, | Performed by: SURGERY

## 2023-10-19 PROCEDURE — 99999 PR PBB SHADOW E&M-EST. PATIENT-LVL III: ICD-10-PCS | Mod: PBBFAC,,, | Performed by: SURGERY

## 2023-10-19 PROCEDURE — 3079F DIAST BP 80-89 MM HG: CPT | Mod: CPTII,S$GLB,, | Performed by: SURGERY

## 2023-10-19 PROCEDURE — 3008F PR BODY MASS INDEX (BMI) DOCUMENTED: ICD-10-PCS | Mod: CPTII,S$GLB,, | Performed by: SURGERY

## 2023-10-19 PROCEDURE — 1159F PR MEDICATION LIST DOCUMENTED IN MEDICAL RECORD: ICD-10-PCS | Mod: CPTII,S$GLB,, | Performed by: SURGERY

## 2023-10-19 PROCEDURE — 3079F PR MOST RECENT DIASTOLIC BLOOD PRESSURE 80-89 MM HG: ICD-10-PCS | Mod: CPTII,S$GLB,, | Performed by: SURGERY

## 2023-10-19 NOTE — PROGRESS NOTES
Pleasant 63-year-old gentleman whom I am familiar with.  He was status post banding in early August.  He notes after the banding he has had significant improvement in the bleeding that he has been having.  Notes with the 1st 2 months he had essentially no bleeding.  Over the last few weeks he has noted occasional spotting in association with bowel movements.  No fevers chills.  No pain no other discomfort.    AFVSS  AAOx3  Soft/nt/nd  No resp distress  Rectal deferred    D/w pt.  I have recommended doing fiber twice daily.  If bleeding were to continue could certainly attempt repeat banding.  Discussed with patient that certainly if the bleeding were to continue after that he would need repeat colonoscopy at some point as it has been 3 years since his last colonoscopy.  Patient agrees.  He will start fiber twice daily.  If bleeding continues or worsens he will return to office for repeat banding.

## 2023-10-19 NOTE — TELEPHONE ENCOUNTER
I called patient to offer him an earlier time today @ 1:45pm instead of 4:30pm with Dr. Tompkins and he accepted.  [place]

## 2023-12-05 ENCOUNTER — HOSPITAL ENCOUNTER (OUTPATIENT)
Dept: RADIOLOGY | Facility: HOSPITAL | Age: 63
Discharge: HOME | End: 2023-12-05
Attending: PHYSICIAN ASSISTANT
Payer: COMMERCIAL

## 2023-12-05 ENCOUNTER — OFFICE VISIT (OUTPATIENT)
Dept: ORTHOPEDICS | Facility: CLINIC | Age: 63
End: 2023-12-05
Payer: COMMERCIAL

## 2023-12-05 DIAGNOSIS — M17.0 PRIMARY OSTEOARTHRITIS OF BOTH KNEES: ICD-10-CM

## 2023-12-05 DIAGNOSIS — M17.0 PRIMARY OSTEOARTHRITIS OF BOTH KNEES: Primary | ICD-10-CM

## 2023-12-05 PROCEDURE — 99213 PR OFFICE/OUTPT VISIT, EST, LEVL III, 20-29 MIN: ICD-10-PCS | Mod: 25,S$GLB,, | Performed by: PHYSICIAN ASSISTANT

## 2023-12-05 PROCEDURE — 1160F PR REVIEW ALL MEDS BY PRESCRIBER/CLIN PHARMACIST DOCUMENTED: ICD-10-PCS | Mod: CPTII,S$GLB,, | Performed by: PHYSICIAN ASSISTANT

## 2023-12-05 PROCEDURE — 73562 X-RAY EXAM OF KNEE 3: CPT | Mod: 26,50,, | Performed by: RADIOLOGY

## 2023-12-05 PROCEDURE — 99999 PR PBB SHADOW E&M-EST. PATIENT-LVL III: CPT | Mod: PBBFAC,,, | Performed by: PHYSICIAN ASSISTANT

## 2023-12-05 PROCEDURE — 1159F MED LIST DOCD IN RCRD: CPT | Mod: CPTII,S$GLB,, | Performed by: PHYSICIAN ASSISTANT

## 2023-12-05 PROCEDURE — 4010F PR ACE/ARB THEARPY RXD/TAKEN: ICD-10-PCS | Mod: CPTII,S$GLB,, | Performed by: PHYSICIAN ASSISTANT

## 2023-12-05 PROCEDURE — 20610 DRAIN/INJ JOINT/BURSA W/O US: CPT | Mod: 50,S$GLB,, | Performed by: PHYSICIAN ASSISTANT

## 2023-12-05 PROCEDURE — 99213 OFFICE O/P EST LOW 20 MIN: CPT | Mod: 25,S$GLB,, | Performed by: PHYSICIAN ASSISTANT

## 2023-12-05 PROCEDURE — 1159F PR MEDICATION LIST DOCUMENTED IN MEDICAL RECORD: ICD-10-PCS | Mod: CPTII,S$GLB,, | Performed by: PHYSICIAN ASSISTANT

## 2023-12-05 PROCEDURE — 73562 XR KNEE ORTHO BILAT: ICD-10-PCS | Mod: 26,50,, | Performed by: RADIOLOGY

## 2023-12-05 PROCEDURE — 4010F ACE/ARB THERAPY RXD/TAKEN: CPT | Mod: CPTII,S$GLB,, | Performed by: PHYSICIAN ASSISTANT

## 2023-12-05 PROCEDURE — 73562 X-RAY EXAM OF KNEE 3: CPT | Mod: TC,50

## 2023-12-05 PROCEDURE — 99999 PR PBB SHADOW E&M-EST. PATIENT-LVL III: ICD-10-PCS | Mod: PBBFAC,,, | Performed by: PHYSICIAN ASSISTANT

## 2023-12-05 PROCEDURE — 1160F RVW MEDS BY RX/DR IN RCRD: CPT | Mod: CPTII,S$GLB,, | Performed by: PHYSICIAN ASSISTANT

## 2023-12-05 PROCEDURE — 20610 LARGE JOINT ASPIRATION/INJECTION: BILATERAL KNEE: ICD-10-PCS | Mod: 50,S$GLB,, | Performed by: PHYSICIAN ASSISTANT

## 2023-12-05 RX ORDER — TRIAMCINOLONE ACETONIDE 40 MG/ML
40 INJECTION, SUSPENSION INTRA-ARTICULAR; INTRAMUSCULAR
Status: DISCONTINUED | OUTPATIENT
Start: 2023-12-05 | End: 2023-12-05 | Stop reason: HOSPADM

## 2023-12-05 RX ORDER — LIDOCAINE HYDROCHLORIDE 10 MG/ML
2 INJECTION INFILTRATION; PERINEURAL
Status: DISCONTINUED | OUTPATIENT
Start: 2023-12-05 | End: 2023-12-05 | Stop reason: HOSPADM

## 2023-12-05 RX ORDER — MELOXICAM 15 MG/1
15 TABLET ORAL DAILY
Qty: 90 TABLET | Refills: 1 | Status: SHIPPED | OUTPATIENT
Start: 2023-12-05 | End: 2024-03-04

## 2023-12-05 RX ADMIN — TRIAMCINOLONE ACETONIDE 40 MG: 40 INJECTION, SUSPENSION INTRA-ARTICULAR; INTRAMUSCULAR at 08:12

## 2023-12-05 RX ADMIN — LIDOCAINE HYDROCHLORIDE 2 ML: 10 INJECTION INFILTRATION; PERINEURAL at 08:12

## 2023-12-05 NOTE — PROGRESS NOTES
Patient ID: Stanley Stewart is a 63 y.o. male.    Chief Complaint: Bilateral knee pain      HISTORY:  Stanley Stewart is a 63 y.o. male who returns to me today for follow up of bilateral knee pain.  He was last seen by me 2/2/2023.  He had good relief with injections at the time.  His pain has worsened recently.  He has occasional buckling.  He also takes meloxicam occasionally.        PMH/PSH/FamHx/SocHx:    Unchanged from prior visit.    ROS:  Constitution: Negative for chills, fever and weakness.   Respiratory: Negative for cough and shortness of breath.   Musculoskeletal: Positive for bilateral knee pain  Psychiatric/Behavioral: The patient is not nervous/anxious.       PHYSICAL EXAM:  Bilateral knee  Skin intact  No warmth or effusion  ROM 0-130  Stable to testing    IMAGING: X-rays of the bilateral knee, personally reviewed by me, demonstrate mild degenerative changes with joint space narrowing , osteophyte formation and subchondral sclerosis.  No fracture or dislocation.     ASSESSMENT/PLAN:    Diagnoses and all orders for this visit:    Primary osteoarthritis of both knees  -     X-ray Knee Ortho Bilateral; Future  -     Large Joint Aspiration/Injection: bilateral knee    Other orders  -     meloxicam (MOBIC) 15 MG tablet; Take 1 tablet (15 mg total) by mouth once daily.    - Bilateral knee CSI performed today  - Recommend rest, ice as needed  - Refill of meloxicam  - follow up as needed

## 2023-12-05 NOTE — PROCEDURES
Large Joint Aspiration/Injection: bilateral knee    Date/Time: 12/5/2023 8:00 AM    Performed by: Michaela Fuller PA-C  Authorized by: Michaela Fuller PA-C    Consent Done?:  Yes (Verbal)  Indications:  Pain  Timeout: prior to procedure the correct patient, procedure, and site was verified    Prep: patient was prepped and draped in usual sterile fashion    Local anesthetic:  Topical anesthetic    Details:  Needle Size:  22 G  Approach:  Anterolateral  Location:  Knee  Laterality:  Bilateral  Site:  Bilateral knee  Medications (Right):  40 mg triamcinolone acetonide 40 mg/mL; 2 mL LIDOcaine HCL 10 mg/ml (1%) 10 mg/mL (1 %)  Medications (Left):  40 mg triamcinolone acetonide 40 mg/mL; 2 mL LIDOcaine HCL 10 mg/ml (1%) 10 mg/mL (1 %)  Patient tolerance:  Patient tolerated the procedure well with no immediate complications

## 2024-01-08 ENCOUNTER — OFFICE VISIT (OUTPATIENT)
Dept: CARDIOLOGY | Facility: CLINIC | Age: 64
End: 2024-01-08
Payer: COMMERCIAL

## 2024-01-08 VITALS
HEIGHT: 77 IN | HEART RATE: 63 BPM | SYSTOLIC BLOOD PRESSURE: 144 MMHG | WEIGHT: 268.5 LBS | BODY MASS INDEX: 31.7 KG/M2 | DIASTOLIC BLOOD PRESSURE: 91 MMHG

## 2024-01-08 DIAGNOSIS — R93.1 AGATSTON CORONARY ARTERY CALCIUM SCORE GREATER THAN 400: Primary | Chronic | ICD-10-CM

## 2024-01-08 DIAGNOSIS — M17.0 PRIMARY OSTEOARTHRITIS OF BOTH KNEES: ICD-10-CM

## 2024-01-08 DIAGNOSIS — E78.2 MIXED HYPERLIPIDEMIA: ICD-10-CM

## 2024-01-08 DIAGNOSIS — I10 PRIMARY HYPERTENSION: ICD-10-CM

## 2024-01-08 PROCEDURE — 3077F SYST BP >= 140 MM HG: CPT | Mod: CPTII,S$GLB,, | Performed by: INTERNAL MEDICINE

## 2024-01-08 PROCEDURE — 3080F DIAST BP >= 90 MM HG: CPT | Mod: CPTII,S$GLB,, | Performed by: INTERNAL MEDICINE

## 2024-01-08 PROCEDURE — 99214 OFFICE O/P EST MOD 30 MIN: CPT | Mod: S$GLB,,, | Performed by: INTERNAL MEDICINE

## 2024-01-08 PROCEDURE — 1159F MED LIST DOCD IN RCRD: CPT | Mod: CPTII,S$GLB,, | Performed by: INTERNAL MEDICINE

## 2024-01-08 PROCEDURE — 93010 ELECTROCARDIOGRAM REPORT: CPT | Mod: S$GLB,,, | Performed by: INTERNAL MEDICINE

## 2024-01-08 PROCEDURE — 3008F BODY MASS INDEX DOCD: CPT | Mod: CPTII,S$GLB,, | Performed by: INTERNAL MEDICINE

## 2024-01-08 PROCEDURE — 93005 ELECTROCARDIOGRAM TRACING: CPT | Mod: PO

## 2024-01-08 PROCEDURE — 99999 PR PBB SHADOW E&M-EST. PATIENT-LVL III: CPT | Mod: PBBFAC,,, | Performed by: INTERNAL MEDICINE

## 2024-01-08 NOTE — PROGRESS NOTES
Subjective:    Patient ID:  Stanley Stewart is a 63 y.o. male patient here for evaluation Follow-up      History of Present Illness:  Cardiology follow-up.  Abnormal calcium score 400, negative noninvasive cardiac assessment with nuclear perfusion imaging and echo 2021.  Positive family history.  History of hypertension, dyslipidemia.  Very remote tobacco use.    Baseline EKG today normal.  Cardiac review of systems otherwise unremarkable.  No angina no PND orthopnea no edema.             Review of patient's allergies indicates:   Allergen Reactions    Doxycycline      Possible allergy/ Rash     Shellfish containing products      Other reaction(s): Hives    Tree pollen-white akhil Itching and Other (See Comments)    Clopidogrel Rash       Past Medical History:   Diagnosis Date    Allergy     BPH with urinary obstruction     Degenerative disc disease     Dysphagia     ED (erectile dysfunction)     Gout     Hyperlipidemia     Hypertension     Male hypogonadism 7/19/2012     Past Surgical History:   Procedure Laterality Date    APPENDECTOMY      arthroscopic knee surg      left X 2, right X 1    COLONOSCOPY N/A 9/28/2020    Procedure: COLONOSCOPY;  Surgeon: CAYDEN Montenegro MD;  Location: [address]);  Service: Endoscopy;  Laterality: N/A;  9/25-covid [place]    ESOPHAGOGASTRODUODENOSCOPY N/A 8/8/2023    Procedure: EGD (ESOPHAGOGASTRODUODENOSCOPY);  Surgeon: Tiffany Mars MD;  [facility]: [facility];  Service: Endoscopy;  Laterality: N/A;    INJECTION OF ANESTHETIC AGENT AROUND NERVE Right 8/24/2021    Procedure: BLOCK, NERVE, MEDIAL BRANCH L3,L4,L5;  Surgeon: Zachary Salas MD;  Location: [facility];  Service: Pain Management;  Laterality: Right;  1 of 2    INJECTION OF ANESTHETIC AGENT AROUND NERVE Right 10/12/2021    Procedure: BLOCK, NERVE, L3-L4-L5 MEDIAL BRANCH 2 OF 2 NEED CONSENT/ conitnue Plavix;  Surgeon: Zachary Salas MD;  Location: [facility];  Service: Pain Management;   Laterality: Right;    INTUSSUSCEPTION REPAIR      KNEE SURGERY      RADIOFREQUENCY ABLATION Right 2021    Procedure: RADIOFREQUENCY ABLATION right L3, L4, L5 NEEDS CONSENT;  Surgeon: Zachary Salas MD;  Location: [facility];  Service: Pain Management;  Laterality: Right;    TONSILLECTOMY       Social History     Tobacco Use    Smoking status: Former     Current packs/day: 0.00     Types: Cigarettes     Quit date: 1987     Years since quittin.4    Smokeless tobacco: Never   Substance Use Topics    Alcohol use: Yes     Alcohol/week: 7.0 standard drinks of alcohol     Types: 7 Glasses of wine per week    Drug use: No        Review of Systems:    As noted in HPI in addition      REVIEW OF SYSTEMS  Review of Systems   Constitutional: Negative for decreased appetite, diaphoresis, night sweats, weight gain and weight loss.   HENT:  Negative for nosebleeds and odynophagia.    Eyes:  Negative for double vision and photophobia.   Cardiovascular:  Negative for chest pain, claudication, cyanosis, dyspnea on exertion, irregular heartbeat, leg swelling, near-syncope, orthopnea, palpitations, paroxysmal nocturnal dyspnea and syncope.   Respiratory:  Negative for cough, hemoptysis, shortness of breath and wheezing.    Hematologic/Lymphatic: Negative for adenopathy.   Skin:  Negative for flushing, skin cancer and suspicious lesions.   Musculoskeletal:  Negative for gout, myalgias and neck pain.   Gastrointestinal:  Negative for abdominal pain, heartburn, hematemesis and hematochezia.   Genitourinary:  Negative for bladder incontinence, hesitancy and nocturia.   Neurological:  Negative for focal weakness, headaches, light-headedness and paresthesias.   Psychiatric/Behavioral:  Negative for memory loss and substance abuse.               Objective:        Vitals:    24 0857   BP: (!) 144/91   Pulse: 63       Lab Results   Component Value Date    WBC 4.46 2023    HGB 13.9 (L) 2023    HCT 42.1  09/22/2023     09/22/2023    CHOL 131 09/22/2023    TRIG 98 09/22/2023    HDL 46 09/22/2023    ALT 22 09/22/2023    AST 22 09/22/2023     06/08/2023    K 4.5 06/08/2023     06/08/2023    CREATININE 0.9 06/08/2023    BUN 16 06/08/2023    CO2 26 06/08/2023    TSH 2.123 03/17/2017    PSA 0.50 03/17/2017    HGBA1C 5.4 11/29/2011        ECHOCARDIOGRAM RESULTS  Results for orders placed during the hospital encounter of 06/11/21    Echo    Interpretation Summary  · The left ventricle is normal in size with normal systolic function.  · The estimated ejection fraction is 65%.  · Normal left ventricular diastolic function.  · Mild left atrial enlargement.  · Normal right ventricular size with normal right ventricular systolic function.  · The estimated PA systolic pressure is 27 mmHg.  · Normal central venous pressure (3 mmHg).        CURRENT/PREVIOUS VISIT EKG  Results for orders placed or performed in visit on 06/23/22   IN OFFICE EKG 12-LEAD (to [employer])    Collection Time: 06/23/22 10:33 AM    Narrative    Test Reason : Z76.89    Vent. Rate : 068 BPM     Atrial Rate : 068 BPM     P-R Int : 174 ms          QRS Dur : 070 ms      QT Int : 370 ms       P-R-T Axes : 034 038 046 degrees     QTc Int : 393 ms    Normal sinus rhythm with sinus arrhythmia  Normal ECG  When compared with ECG of 02-JUN-2021 09:49,  No significant change was found  Confirmed by LATONIA BERNAL MD ([id number]) on 6/24/2022 7:51:04 AM    Referred By: NINO ALICEA           Confirmed By:LATONIA BERNAL MD     No valid procedures specified.   Results for orders placed during the hospital encounter of 06/11/21    Nuclear Stress Test    Interpretation Summary    The exercise capacity was average.    The patient reported no chest pain during the stress test.    The EKG portion of this study is negative for ischemia.    The blood pressure response to exercise was hypertensive.    There were no arrhythmias during stress.    The nuclear portion of  this study will be reported separately.    No valid procedures specified.    PHYSICAL EXAM  CONSTITUTIONAL: Well built, well nourished in no apparent distress  NECK: no carotid bruit, no JVD  LUNGS: CTA  CHEST WALL: no tenderness,  HEART: regular rate and rhythm, S1, S2 normal, no murmur, click, rub or gallop   ABDOMEN: soft, non-tender; bowel sounds normal; no masses,  no organomegaly  EXTREMITIES: Extremities normal, no edema, no calf tenderness noted  NEURO: AAO X 3    I HAVE REVIEWED :    The vital signs, nurses notes, and all the pertinent radiology and labs.         Current Outpatient Medications   Medication Instructions    azelastine (ASTELIN) 274 mcg, Nasal, 2 times daily    azithromycin (Z-JONY) 250 mg, Oral    fluticasone propionate (FLONASE) 100 mcg, Each Nostril, Daily    ibuprofen (ADVIL,MOTRIN) 800 mg, Oral, Every 8 hours PRN    lisinopriL-hydrochlorothiazide (PRINZIDE,ZESTORETIC) 20-12.5 mg per tablet 1 tablet, Oral, Daily    meloxicam (MOBIC) 15 mg, Oral, Daily    omeprazole (PRILOSEC) 40 mg, Oral, Daily    rosuvastatin (CRESTOR) 20 mg, Oral, Daily    sildenafil (REVATIO) 20 mg Tab Take 5 tablets by mouth 1 hour before sexual activity    testosterone (ANDROGEL) 20.25 mg/1.25 gram (1.62 %) GlPm Apply 4 pumps to shoulders daily<BR><BR>Dispense 3 month supply.    zolpidem (AMBIEN) 10 mg, Oral, Nightly          Assessment:   History of abnormal CT calcium score, negative noninvasive cardiac assessment 2021.  Hypertension, dyslipidemia, remote past tobacco use, positive family history.  Lipids 3 months ago echo.  Continue low-dose statin agent.    Plan:   Baseline electrocardiogram is normal.  Cardiac exam review of systems normal.  Blood pressure borderline, continue to monitor at home.  Continue weight loss diet exercise.  Yearly follow-up.  Labs follow-up primary care.          No follow-ups on file.

## 2024-03-29 ENCOUNTER — ON-DEMAND VIRTUAL (OUTPATIENT)
Dept: URGENT CARE | Facility: CLINIC | Age: 64
End: 2024-03-29
Payer: COMMERCIAL

## 2024-03-29 DIAGNOSIS — R05.9 COUGH, UNSPECIFIED TYPE: Primary | ICD-10-CM

## 2024-03-29 PROCEDURE — 99203 OFFICE O/P NEW LOW 30 MIN: CPT | Mod: 95,,, | Performed by: NURSE PRACTITIONER

## 2024-03-29 RX ORDER — BENZONATATE 100 MG/1
100 CAPSULE ORAL 3 TIMES DAILY PRN
Qty: 30 CAPSULE | Refills: 0 | Status: SHIPPED | OUTPATIENT
Start: 2024-03-29 | End: 2024-04-08

## 2024-03-29 NOTE — PROGRESS NOTES
Subjective:      Patient ID: Stanley Stewart is a 63 y.o. male.    Vitals:  vitals were not taken for this visit.     Chief Complaint: Cough      Visit Type: TELE AUDIOVISUAL    Present with the patient at the time of consultation: TELEMED PRESENT WITH PATIENT: None    Location: Home in [place], [place]    Past Medical History:   Diagnosis Date    Allergy     BPH with urinary obstruction     Degenerative disc disease     Dysphagia     ED (erectile dysfunction)     Gout     Hyperlipidemia     Hypertension     Male hypogonadism 7/19/2012     Past Surgical History:   Procedure Laterality Date    APPENDECTOMY      arthroscopic knee surg      left X 2, right X 1    COLONOSCOPY N/A 9/28/2020    Procedure: COLONOSCOPY;  Surgeon: CAYDEN Montenegro MD;  [facility]  Service: Endoscopy;  Laterality: N/A;  9/25-covid [place]    ESOPHAGOGASTRODUODENOSCOPY N/A 8/8/2023    Procedure: EGD (ESOPHAGOGASTRODUODENOSCOPY);  Surgeon: Tiffany Mars MD;  Location: [facility] ENDO;  Service: Endoscopy;  Laterality: N/A;    INJECTION OF ANESTHETIC AGENT AROUND NERVE Right 8/24/2021    Procedure: BLOCK, NERVE, MEDIAL BRANCH L3,L4,L5;  Surgeon: Zachary Salas MD;  Location: [facility];  Service: Pain Management;  Laterality: Right;  1 of 2    INJECTION OF ANESTHETIC AGENT AROUND NERVE Right 10/12/2021    Procedure: BLOCK, NERVE, L3-L4-L5 MEDIAL BRANCH 2 OF 2 NEED CONSENT/ conitnue Plavix;  Surgeon: Zachary Salas MD;  Location: [facility];  Service: Pain Management;  Laterality: Right;    INTUSSUSCEPTION REPAIR      KNEE SURGERY      RADIOFREQUENCY ABLATION Right 12/8/2021    Procedure: RADIOFREQUENCY ABLATION right L3, L4, L5 NEEDS CONSENT;  Surgeon: Zachary Salas MD;  Location: [facility];  Service: Pain Management;  Laterality: Right;    TONSILLECTOMY       Review of patient's allergies indicates:   Allergen Reactions    Doxycycline      Possible allergy/ Rash     Shellfish containing products       Other reaction(s): Hives    Tree pollen-white akhil Itching and Other (See Comments)    Clopidogrel Rash     Current Outpatient Medications on File Prior to Visit   Medication Sig Dispense Refill    azelastine (ASTELIN) 137 mcg (0.1 %) nasal spray 2 sprays (274 mcg total) by Nasal route 2 (two) times daily. (Patient not taking: Reported on 10/19/2023) 30 mL 0    azithromycin (Z-JONY) 250 MG tablet Take 250 mg by mouth.      fluticasone propionate (FLONASE) 50 mcg/actuation nasal spray 2 sprays (100 mcg total) by Each Nostril route once daily. (Patient not taking: Reported on 10/19/2023) 48 g 0    ibuprofen (ADVIL,MOTRIN) 800 MG tablet Take 800 mg by mouth every 8 (eight) hours as needed.      lisinopriL-hydrochlorothiazide (PRINZIDE,ZESTORETIC) 20-12.5 mg per tablet Take 1 tablet by mouth once daily. 90 tablet 3    omeprazole (PRILOSEC) 40 MG capsule Take 1 capsule (40 mg total) by mouth once daily. (Patient not taking: Reported on 10/19/2023) 90 capsule 0    rosuvastatin (CRESTOR) 20 MG tablet Take 1 tablet (20 mg total) by mouth once daily. 90 tablet 2    sildenafil (REVATIO) 20 mg Tab Take 5 tablets by mouth 1 hour before sexual activity (Patient not taking: Reported on 10/19/2023) 50 tablet 11    testosterone (ANDROGEL) 20.25 mg/1.25 gram (1.62 %) GlPm Apply 4 pumps to shoulders daily    Dispense 3 month supply. 6 each 1    zolpidem (AMBIEN) 10 mg Tab Take 1 tablet (10 mg total) by mouth every evening. (Patient not taking: Reported on 10/19/2023) 90 tablet 1     Current Facility-Administered Medications on File Prior to Visit   Medication Dose Route Frequency Provider Last Rate Last Admin    sodium hyaluronate (EUFLEXXA) 10 mg/mL(mw 2.4 -3.6 million) injection 40 mg  40 mg Intra-articular Weekly Placido Frazier MD   40 mg at 08/05/21 0861     Family History   Problem Relation Age of Onset    Hyperlipidemia Father     Hypertension Father     Heart disease Father     Liver disease Mother     Heart disease  Brother     Heart disease Maternal Grandmother     Heart disease Paternal Grandfather        Medications Ordered                [id number] - [place], [place] - [address] [facility] [address] [facility] [address] [facility] [address]   [address] [address] [address]    Telephone: [phone]   Fax: [phone]   Hours: Not open 24 hours                         E-Prescribed (1 of 1)              benzonatate (TESSALON) 100 MG capsule    Sig: Take 1 capsule (100 mg total) by mouth 3 (three) times daily as needed for Cough.       Start: 3/29/24     Quantity: 30 capsule Refills: 0                           Ohs Peq Odvv Intake    3/29/2024  8:10 AM CDT - Filed by Patient   What is your current physical address in the event of a medical emergency? [address] [facility] [address]   Are you able to take your vital signs? Yes   Systolic Blood Pressure: 162   Diastolic Blood Pressure: 97   Weight: 270   Height: 77   Pulse: 64   Temperature: 98.6   Respiration rate: 18   Pulse Oxygen: 96   Please attach any relevant images or files          Pt states last Thursday, picked up grandson who was diagnosed with croup. About 6-7 days, started with cough. Has a hx of allergies. States wife is a nurse and listened to lungs and they are free and clear. Is taking flonase and azelastine but cough won't go away. Denies fever. Took a home covid test and it was negative.    Cough  The current episode started in the past 7 days. Associated symptoms include postnasal drip. Pertinent negatives include no fever.       Constitution: Negative for fever.   HENT:  Positive for postnasal drip.    Respiratory:  Positive for cough.         Objective:   The physical exam was conducted virtually.  Physical Exam   Constitutional: He is oriented to person, place, and time. No distress.   HENT:   Head: Normocephalic.   Eyes: Conjunctivae are normal. No scleral icterus.   Pulmonary/Chest: Effort normal. No respiratory distress.   Musculoskeletal: Normal  "range of motion.         General: Normal range of motion.   Neurological: He is alert and oriented to person, place, and time.   Psychiatric: His behavior is normal. Judgment and thought content normal.       Assessment:     1. Cough, unspecified type        Plan:       Cough, unspecified type    Other orders  -     benzonatate (TESSALON) 100 MG capsule; Take 1 capsule (100 mg total) by mouth 3 (three) times daily as needed for Cough.  Dispense: 30 capsule; Refill: 0      Rest. Increase fluid intake.    Runny nose: Ensure you are blowing your nose frequently. It is better to get the nasal discharge out.    Cough: Cover the cough/cough into the elbow and wash hands often to prevent the spread of germs.  A cough may be the last symptom to go away and may persist for up to 4-6 weeks, so do not be alarmed.    Non-Pharmacological Interventions: Consider humidifiers, maintaining hydration, and elevating the head during sleep to alleviate symptoms.    Congestion: OTC nasal saline into each nostril 1-3 times daily. May also use flonase nasal spray. I especially like Xlear nasal spray to help with congestion.    There are a variety of oral OTC decongestants. Beware of decongestants containing phenylephrine. Studies have shown that these do not work to improve your symptoms.    Nasal decongestants (like Afrin) should only be taken for 1-3 days. If you use these longer than this, you are at high right for "rebound congestion" which means you will continue to be congested.    If headache or fever, take OTC tylenol or ibuprofen as needed per the instructions on the label.    Sore throat: OTC Cepacol or Chloraseptic throat lozenges. Warm salt water gargles several times daily.     Sneezing: Antihistamines (Claritin, Allegra, Benadryl, Zyrtec, Xyzal) help with these symptoms. Benadryl, Zyrtec, and Xyzal may cause drowsiness, so avoid operating any heavy machinery or driving while on these medications.    Report for immediate " medical care for symptoms including but not limited to: unremitting fever, severe nasal congestion that makes it hard for you to breathe, nostrils are flaring, skin is pale, wheezing, shortness of breath, chest pain, etc.    Follow-up with your PCP for recheck in 1-3 days.    All questions were answered to the best of my abilities and all concerns were addressed at this time.     Follow up:   1. Please schedule a virtual follow up visit as needed.  2. Please see an in-person provider if your symptoms are worsening or not improving in 2-3 days.  3. Please print a copy of this note and send it to your regular doctor or take it to your next visit so it may be included in your medical record.   4. You must understand that you've received Telehealth Urgent Care treatment only and that you may be released before all your medical problems are known or treated. You, the patient, will arrange for follow up care as instructed.  5. Follow up with your PCP or specialty clinic as directed. To schedule an appointment with the appropriate provider with [facility], please call [phone]. For pediatric referrals, please call [phone]  6. Contact customer support at [phone] for questions or concerns  7. For prescription questions or problems, call [phone] anytime for assistance.  8. For [facility] support, call [phone].

## 2024-04-03 RX ORDER — AZELASTINE 1 MG/ML
1 SPRAY, METERED NASAL 2 TIMES DAILY
Qty: 30 ML | Refills: 3 | Status: SHIPPED | OUTPATIENT
Start: 2024-04-03

## 2024-04-03 RX ORDER — FLUTICASONE PROPIONATE 50 MCG
2 SPRAY, SUSPENSION (ML) NASAL DAILY
Qty: 48 G | Refills: 3 | Status: SHIPPED | OUTPATIENT
Start: 2024-04-03

## 2024-04-03 NOTE — TELEPHONE ENCOUNTER
Care Due:                  Date            Visit Type   Department     Provider  --------------------------------------------------------------------------------                                MYCHART                              ANNUAL                              CHECKUP/PHY  [facility] PRIMARY  Last Visit: 06-      S            YOUSUF Estrada                              [facility]                              ANNUAL                              CHECKUP/PHY  [facility] INTERNAL  Next Visit: 04-      S            MEDICINE       Kadeem Estrada                                                            Last  Test          Frequency    Reason                     Performed    Due Date  --------------------------------------------------------------------------------    CMP.........  12 months..  lisinopriL-hydrochlorothi  06- 06-                             azide....................    [employer] Embedded Care Due Messages. Reference number: [id number].   4/02/2024 8:45:11 PM CDT

## 2024-04-06 NOTE — PROGRESS NOTES
MEDICAL HISTORY:  Hypertension.  Hyperlipidemia.  Elevated calcium score/CAC  Lumbar degenerative disk disease.  Hypotestosterone, followed by Urology.  Appendectomy.  Arthroscopic knee surgery twice on the left and one on the right.  Intussusception at age 6 and then recently in 2014 where he did   require surgery and a partial colon resection.  Fatty Liver  Sleep apnea.  Previously on CPAP     SOCIAL HISTORY:  Tobacco use, none.  Alcohol use: maybe one glass of wine a night.  , has three children.  He works as a teacher at [employer].  Exercise is walking a mile and a half every day and occasionally doing a spinning class.     FAMILY HISTORY:  Father  alive, hypertension, hyperlipidemia, coronary artery disease.  Mother is , chronic hepatitis.  Two brothers living but one with a history of heart attack at age 50.     SCREENING:  Normal colonoscopy 2020     MEDICATIONS:  Lisinopril/hydrochlorothiazide 20/12.5 mg a day.  Ambien 10 mg at night.   Crestor 20 mg  daily  Androgel    Answers submitted by the patient for this visit:  Cough Questionnaire (Submitted on 2024)  Chief Complaint: Cough  Chronicity: chronic  Onset: 1 to 4 weeks ago  Progression since onset: unchanged  Frequency: hourly  Cough characteristics: non-productive  chest pain: No  chills: No  ear congestion: No  ear pain: No  fever: No  headaches: No  heartburn: No  hemoptysis: No  myalgias: Yes  nasal congestion: Yes  postnasal drip: Yes  rash: No  rhinorrhea: No  shortness of breath: No  sore throat: Yes  sweats: No  weight loss: No  wheezing: No  Aggravated by: pollens  asthma: No  bronchiectasis: No  bronchitis: No  COPD: No  emphysema: No  environmental allergies: Yes  pneumonia: No  Treatments tried: body position changes, prescription cough suppressant, steroid inhaler  Improvement on treatment: mild    63-year-old male   This appointment was set up for his annual visit.     However with a the past 3 weeks he  has been having a persistent cough .  He has had congested in his chest.  He was having some degree of nasal congestion with rhinorrhea postnasal drip he started taking his Flonase and Astelin responded well.  At times the cough has improved.  But yesterday he started notice return of the cough.  It is not positional.  It does not happen during the after meal.  He has not having symptoms of heartburn or regurgitation.  That has no fever chills with this.    In the interim in August 2023,  he had upper endoscopy for dilatation of Schatzki's ring.  At that time he was having dysphagia which has resolved.    Also he met with Colorectal surgery in which he would banding of external hemorrhoids.  This is worked out well.  That has no external bleeding.    Continues to follow-up with orthopedics arthritis in the knees gets periodic corticosteroid injections    Continues to follow-up with urology regarding hypo testosterone      He has taking note that he is having breakthrough insomnia.  Ambien has been the best medicine for him as for his going to sleep but at present he is waking up 4 times at night sometimes to urinate.  That has no difficulty with urinating during the day in which flow is fine that has no urgency incomplete emptying    In regard to sleep but occasional snoring.  He sleeps on his stomach.  He breathes well as to his nose.  In the past he was on CPAP for sleep apnea but this improved when he lost weight    Review of symptoms   Negative for chest pain palpitations.  No abdominal pain.  Regular bowel function.  No headaches arthralgias    Examination   Weight 272 lb   BMI 32.31   Pulse 64   Blood pressure 138/82  HEENT exam, no abnormal findings  Neck no thyromegaly masses   Chest, clear breath sounds with inspiration but that is coarse rhonchi and wheezing with expiration  Heart, regular rate rhythm no murmurs or gallops  Abdominal exam, soft nontender no hepatosplenomegaly abdominal masses  Pulses 2+  carotid pulses no bruits 2+ dorsalis pedal pulses  Extremities, no edema  Lymph gland, no palpable adenopathy  Skin, no gross abnormal findings  Musculoskeletal, no gross abnormal findings   Digital rectal exam, stool is brown, prostate mildly enlarged smooth    Impression   General exam  Hypertension  Hyperlipidemia  Coronary artery calcification  Hypo testosterone  Asthmatic bronchitis  Class 1 obesity  Sleep disorder  BPH with nocturia    Plan   Prednisone taper over the next 8 days   Use of Mucinex  Fasting routine labs and urinalysis  Use of melatonin at night.  Time voiding throughout the day was discussed  Another option future could be the use of tamsulosin

## 2024-04-08 ENCOUNTER — OFFICE VISIT (OUTPATIENT)
Dept: INTERNAL MEDICINE | Facility: CLINIC | Age: 64
End: 2024-04-08
Payer: COMMERCIAL

## 2024-04-08 ENCOUNTER — HOSPITAL ENCOUNTER (OUTPATIENT)
Dept: RADIOLOGY | Facility: HOSPITAL | Age: 64
Discharge: HOME | End: 2024-04-08
Attending: INTERNAL MEDICINE
Payer: COMMERCIAL

## 2024-04-08 VITALS
HEART RATE: 65 BPM | WEIGHT: 272.5 LBS | SYSTOLIC BLOOD PRESSURE: 124 MMHG | HEIGHT: 77 IN | OXYGEN SATURATION: 97 % | BODY MASS INDEX: 32.17 KG/M2 | DIASTOLIC BLOOD PRESSURE: 84 MMHG

## 2024-04-08 DIAGNOSIS — E78.2 MIXED HYPERLIPIDEMIA: ICD-10-CM

## 2024-04-08 DIAGNOSIS — R93.1 AGATSTON CORONARY ARTERY CALCIUM SCORE GREATER THAN 400: ICD-10-CM

## 2024-04-08 DIAGNOSIS — N40.1 BENIGN PROSTATIC HYPERPLASIA WITH NOCTURIA: ICD-10-CM

## 2024-04-08 DIAGNOSIS — I10 ESSENTIAL HYPERTENSION: ICD-10-CM

## 2024-04-08 DIAGNOSIS — G47.00 INSOMNIA, UNSPECIFIED TYPE: ICD-10-CM

## 2024-04-08 DIAGNOSIS — E66.9 CLASS 1 OBESITY WITHOUT SERIOUS COMORBIDITY WITH BODY MASS INDEX (BMI) OF 32.0 TO 32.9 IN ADULT, UNSPECIFIED OBESITY TYPE: ICD-10-CM

## 2024-04-08 DIAGNOSIS — E29.1 MALE HYPOGONADISM: ICD-10-CM

## 2024-04-08 DIAGNOSIS — R35.1 BENIGN PROSTATIC HYPERPLASIA WITH NOCTURIA: ICD-10-CM

## 2024-04-08 DIAGNOSIS — Z00.00 ANNUAL PHYSICAL EXAM: Primary | ICD-10-CM

## 2024-04-08 DIAGNOSIS — Z00.00 ANNUAL PHYSICAL EXAM: ICD-10-CM

## 2024-04-08 LAB
BILIRUB UR QL STRIP: NEGATIVE
CLARITY UR REFRACT.AUTO: CLEAR
COLOR UR AUTO: YELLOW
GLUCOSE UR QL STRIP: NEGATIVE
HGB UR QL STRIP: NEGATIVE
KETONES UR QL STRIP: NEGATIVE
LEUKOCYTE ESTERASE UR QL STRIP: NEGATIVE
NITRITE UR QL STRIP: NEGATIVE
PH UR STRIP: 5 [PH] (ref 5–8)
PROT UR QL STRIP: NEGATIVE
SP GR UR STRIP: 1.01 (ref 1–1.03)
URN SPEC COLLECT METH UR: NORMAL

## 2024-04-08 PROCEDURE — 81003 URINALYSIS AUTO W/O SCOPE: CPT | Performed by: INTERNAL MEDICINE

## 2024-04-08 PROCEDURE — 99999 PR PBB SHADOW E&M-EST. PATIENT-LVL IV: CPT | Mod: PBBFAC,,, | Performed by: INTERNAL MEDICINE

## 2024-04-08 PROCEDURE — 3008F BODY MASS INDEX DOCD: CPT | Mod: CPTII,S$GLB,, | Performed by: INTERNAL MEDICINE

## 2024-04-08 PROCEDURE — 71046 X-RAY EXAM CHEST 2 VIEWS: CPT | Mod: TC

## 2024-04-08 PROCEDURE — 3074F SYST BP LT 130 MM HG: CPT | Mod: CPTII,S$GLB,, | Performed by: INTERNAL MEDICINE

## 2024-04-08 PROCEDURE — 99396 PREV VISIT EST AGE 40-64: CPT | Mod: S$GLB,,, | Performed by: INTERNAL MEDICINE

## 2024-04-08 PROCEDURE — 1159F MED LIST DOCD IN RCRD: CPT | Mod: CPTII,S$GLB,, | Performed by: INTERNAL MEDICINE

## 2024-04-08 PROCEDURE — 1160F RVW MEDS BY RX/DR IN RCRD: CPT | Mod: CPTII,S$GLB,, | Performed by: INTERNAL MEDICINE

## 2024-04-08 PROCEDURE — 3079F DIAST BP 80-89 MM HG: CPT | Mod: CPTII,S$GLB,, | Performed by: INTERNAL MEDICINE

## 2024-04-08 PROCEDURE — 71046 X-RAY EXAM CHEST 2 VIEWS: CPT | Mod: 26,,, | Performed by: INTERNAL MEDICINE

## 2024-04-08 RX ORDER — PREDNISONE 20 MG/1
TABLET ORAL
Qty: 12 TABLET | Refills: 0 | Status: SHIPPED | OUTPATIENT
Start: 2024-04-08 | End: 2024-04-22

## 2024-04-22 ENCOUNTER — PATIENT MESSAGE (OUTPATIENT)
Dept: INTERNAL MEDICINE | Facility: CLINIC | Age: 64
End: 2024-04-22
Payer: COMMERCIAL

## 2024-04-22 RX ORDER — PROMETHAZINE HYDROCHLORIDE AND DEXTROMETHORPHAN HYDROBROMIDE 6.25; 15 MG/5ML; MG/5ML
5 SYRUP ORAL EVERY 4 HOURS PRN
Qty: 120 ML | Refills: 0 | Status: SHIPPED | OUTPATIENT
Start: 2024-04-22

## 2024-05-13 ENCOUNTER — LAB VISIT (OUTPATIENT)
Dept: LAB | Facility: HOSPITAL | Age: 64
End: 2024-05-13
Attending: ALLERGY & IMMUNOLOGY
Payer: COMMERCIAL

## 2024-05-13 ENCOUNTER — OFFICE VISIT (OUTPATIENT)
Dept: ALLERGY | Facility: CLINIC | Age: 64
End: 2024-05-13
Payer: COMMERCIAL

## 2024-05-13 VITALS — HEIGHT: 75 IN | WEIGHT: 272.69 LBS | BODY MASS INDEX: 33.91 KG/M2

## 2024-05-13 DIAGNOSIS — H10.13 ALLERGIC CONJUNCTIVITIS, BILATERAL: ICD-10-CM

## 2024-05-13 DIAGNOSIS — R05.3 CHRONIC COUGH: ICD-10-CM

## 2024-05-13 DIAGNOSIS — J30.9 CHRONIC ALLERGIC RHINITIS: ICD-10-CM

## 2024-05-13 DIAGNOSIS — J30.9 CHRONIC ALLERGIC RHINITIS: Primary | ICD-10-CM

## 2024-05-13 PROCEDURE — 1159F MED LIST DOCD IN RCRD: CPT | Mod: CPTII,S$GLB,, | Performed by: ALLERGY & IMMUNOLOGY

## 2024-05-13 PROCEDURE — 99999 PR PBB SHADOW E&M-EST. PATIENT-LVL III: CPT | Mod: PBBFAC,,, | Performed by: ALLERGY & IMMUNOLOGY

## 2024-05-13 PROCEDURE — 36415 COLL VENOUS BLD VENIPUNCTURE: CPT | Mod: PO | Performed by: ALLERGY & IMMUNOLOGY

## 2024-05-13 PROCEDURE — 99205 OFFICE O/P NEW HI 60 MIN: CPT | Mod: S$GLB,,, | Performed by: ALLERGY & IMMUNOLOGY

## 2024-05-13 PROCEDURE — 86003 ALLG SPEC IGE CRUDE XTRC EA: CPT | Performed by: ALLERGY & IMMUNOLOGY

## 2024-05-13 PROCEDURE — 3008F BODY MASS INDEX DOCD: CPT | Mod: CPTII,S$GLB,, | Performed by: ALLERGY & IMMUNOLOGY

## 2024-05-13 PROCEDURE — 1160F RVW MEDS BY RX/DR IN RCRD: CPT | Mod: CPTII,S$GLB,, | Performed by: ALLERGY & IMMUNOLOGY

## 2024-05-13 PROCEDURE — 86003 ALLG SPEC IGE CRUDE XTRC EA: CPT | Mod: 59 | Performed by: ALLERGY & IMMUNOLOGY

## 2024-05-13 PROCEDURE — 3044F HG A1C LEVEL LT 7.0%: CPT | Mod: CPTII,S$GLB,, | Performed by: ALLERGY & IMMUNOLOGY

## 2024-05-13 NOTE — PROGRESS NOTES
Subjective:       Patient ID: Stanley Stewart is a 64 y.o. male.    Chief Complaint:  Allergies (Ongoing illness for 5 weeks)      63 yo man presents for new patient evaluation of allergic rhinitis and cough. He states he saw allergist in 2017 and was diagnosed with tree allergy (skin test positive to akhil tree, rest negative). He used Flonase in spring and has worked pretty well since then. The year 3/22 grandson came home with a bad cold. 3/25 he got ill with congestion, PND, cough but cough was dry. He started Flonase and Astelin and no help. Cough was had and made head and chest hurt. 4/8 saw PCP and told to use Flonase 2 SEN daily and Astelin 1 SEN BID. Also given steroid and slight help but then cough came back severe again. Was having pressure even teeth pain. 5/2 he was with his wife who was seeing Dr Bearden for LN issue. She asked about his cough and mentioned LPR. He has H/O reflux off and on so has Prilosec so she advised adding it as well as use both sprays 2 SEN BID and zyrtec daily. He is doing this and is much better. 5/3 he had an odd fever of 101 at night with chills and lasted 90 minutes then gone. 5/8 He realized in March he started a new multi vitamin with ingredients like herbs so wondered if that was a trigger so has stopped and gone back to prior vitamin he took. In last 4 days he has stopped the nasal sprays, still on zyrtec and Prilosec but is much better. He has no asthma or eczema. No insect or latex allergy. No ACE-I        Environmental History: see history section for home environment  Review of Systems   Constitutional:  Positive for chills and fever.   HENT:  Positive for congestion, postnasal drip, rhinorrhea, sinus pressure and sinus pain. Negative for ear pain, facial swelling, nosebleeds and sneezing.    Eyes:  Positive for discharge and itching. Negative for redness.   Respiratory:  Positive for cough and chest tightness. Negative for shortness of breath and wheezing.     Cardiovascular:  Positive for chest pain.   Skin:  Negative for color change and rash.        Objective:      Physical Exam  Vitals and nursing note reviewed.   Constitutional:       General: He is not in acute distress.     Appearance: Normal appearance. He is not ill-appearing.   HENT:      Nose: No rhinorrhea.   Eyes:      General:         Right eye: No discharge.         Left eye: No discharge.      Conjunctiva/sclera: Conjunctivae normal.   Pulmonary:      Effort: Pulmonary effort is normal. No respiratory distress.   Abdominal:      General: There is no distension.   Skin:     General: Skin is warm and dry.      Findings: No erythema or rash.   Neurological:      Mental Status: He is alert and oriented to person, place, and time.   Psychiatric:         Mood and Affect: Mood normal.         Behavior: Behavior normal.         Laboratory:   none performed   Assessment:       1. Chronic allergic rhinitis    2. Allergic conjunctivitis, bilateral    3. Chronic cough         Plan:       Cough- advised likely combination of allergic rhinitis with viral infection and LPR. Is resolved now. Continue Prilosec and zyrtec for another week or 2 and if cough stays resolved then can stop  Immunocaps O see if any other allergic triggers, if just tree then March to May take Flonase 2 SEN daily and add Astelin dn zyrtec as needed  Phone review  RTC annually or sooner if needed  I spent a total of 60 minutes on the day of the visit.  This includes face to face time and non-face to face time preparing to see the patient (eg, review of tests), obtaining and/or reviewing separately obtained history, documenting clinical information in the electronic or other health record, independently interpreting results and communicating results to the patient/family/caregiver, or care coordinator.

## 2024-05-16 LAB
A ALTERNATA IGE QN: <0.1 KU/L
A FUMIGATUS IGE QN: <0.1 KU/L
ALLERGEN CHAETOMIUM GLOBOSUM IGE: <0.1 KU/L
ALLERGEN WALNUT TREE IGE: <0.1 KU/L
ALLERGEN WHITE PINE TREE IGE: <0.1 KU/L
BAHIA GRASS IGE QN: <0.1 KU/L
BALD CYPRESS IGE QN: <0.1 KU/L
BERMUDA GRASS IGE QN: <0.1 KU/L
C HERBARUM IGE QN: 0.15 KU/L
C LUNATA IGE QN: 0.36 KU/L
CAT DANDER IGE QN: <0.1 KU/L
CHAETOMIUM GLOB. CLASS: NORMAL
COMMON RAGWEED IGE QN: <0.1 KU/L
COTTONWOOD IGE QN: <0.1 KU/L
D FARINAE IGE QN: <0.1 KU/L
D PTERONYSS IGE QN: <0.1 KU/L
DEPRECATED A ALTERNATA IGE RAST QL: NORMAL
DEPRECATED A FUMIGATUS IGE RAST QL: NORMAL
DEPRECATED BAHIA GRASS IGE RAST QL: NORMAL
DEPRECATED BALD CYPRESS IGE RAST QL: NORMAL
DEPRECATED BERMUDA GRASS IGE RAST QL: NORMAL
DEPRECATED C HERBARUM IGE RAST QL: ABNORMAL
DEPRECATED C LUNATA IGE RAST QL: ABNORMAL
DEPRECATED CAT DANDER IGE RAST QL: NORMAL
DEPRECATED COMMON RAGWEED IGE RAST QL: NORMAL
DEPRECATED COTTONWOOD IGE RAST QL: NORMAL
DEPRECATED D FARINAE IGE RAST QL: NORMAL
DEPRECATED D PTERONYSS IGE RAST QL: NORMAL
DEPRECATED DOG DANDER IGE RAST QL: NORMAL
DEPRECATED ELDER IGE RAST QL: NORMAL
DEPRECATED ENGL PLANTAIN IGE RAST QL: NORMAL
DEPRECATED HORSE DANDER IGE RAST QL: NORMAL
DEPRECATED JOHNSON GRASS IGE RAST QL: NORMAL
DEPRECATED LONDON PLANE IGE RAST QL: NORMAL
DEPRECATED MUGWORT IGE RAST QL: NORMAL
DEPRECATED P NOTATUM IGE RAST QL: NORMAL
DEPRECATED PECAN/HICK TREE IGE RAST QL: NORMAL
DEPRECATED ROACH IGE RAST QL: NORMAL
DEPRECATED S ROSTRATA IGE RAST QL: ABNORMAL
DEPRECATED SALTWORT IGE RAST QL: NORMAL
DEPRECATED SILVER BIRCH IGE RAST QL: NORMAL
DEPRECATED TIMOTHY IGE RAST QL: NORMAL
DEPRECATED WEST RAGWEED IGE RAST QL: NORMAL
DEPRECATED WHITE OAK IGE RAST QL: NORMAL
DEPRECATED WILLOW IGE RAST QL: NORMAL
DOG DANDER IGE QN: <0.1 KU/L
ELDER IGE QN: <0.1 KU/L
ENGL PLANTAIN IGE QN: <0.1 KU/L
HORSE DANDER IGE QN: <0.1 KU/L
JOHNSON GRASS IGE QN: <0.1 KU/L
LONDON PLANE IGE QN: <0.1 KU/L
MUGWORT IGE QN: <0.1 KU/L
P NOTATUM IGE QN: <0.1 KU/L
PECAN/HICK TREE IGE QN: <0.1 KU/L
ROACH IGE QN: <0.1 KU/L
S ROSTRATA IGE QN: 0.56 KU/L
SALTWORT IGE QN: <0.1 KU/L
SILVER BIRCH IGE QN: <0.1 KU/L
TIMOTHY IGE QN: <0.1 KU/L
WALNUT TREE CLASS: NORMAL
WEST RAGWEED IGE QN: 0.1 KU/L
WHITE OAK IGE QN: <0.1 KU/L
WHITE PINE CLASS: NORMAL
WILLOW IGE QN: <0.1 KU/L

## 2024-05-22 ENCOUNTER — PATIENT MESSAGE (OUTPATIENT)
Dept: ALLERGY | Facility: CLINIC | Age: 64
End: 2024-05-22
Payer: COMMERCIAL

## 2024-06-10 ENCOUNTER — PATIENT MESSAGE (OUTPATIENT)
Dept: INTERNAL MEDICINE | Facility: CLINIC | Age: 64
End: 2024-06-10
Payer: COMMERCIAL

## 2024-06-10 DIAGNOSIS — G47.00 INSOMNIA, UNSPECIFIED TYPE: ICD-10-CM

## 2024-06-10 RX ORDER — ZOLPIDEM TARTRATE 10 MG/1
10 TABLET ORAL NIGHTLY
Qty: 90 TABLET | Refills: 1 | Status: SHIPPED | OUTPATIENT
Start: 2024-06-10

## 2024-06-10 NOTE — TELEPHONE ENCOUNTER
No care due was identified.  [facility] Embedded Care Due Messages. Reference number: [id number].   6/10/2024 4:25:27 PM CDT

## 2024-07-19 ENCOUNTER — LAB VISIT (OUTPATIENT)
Dept: LAB | Facility: HOSPITAL | Age: 64
End: 2024-07-19
Attending: UROLOGY
Payer: COMMERCIAL

## 2024-07-19 DIAGNOSIS — E29.1 MALE HYPOGONADISM: ICD-10-CM

## 2024-07-19 LAB
ALBUMIN SERPL BCP-MCNC: 4.1 G/DL (ref 3.5–5.2)
ALP SERPL-CCNC: 52 U/L (ref 55–135)
ALT SERPL W/O P-5'-P-CCNC: 44 U/L (ref 10–44)
AST SERPL-CCNC: 30 U/L (ref 10–40)
BASOPHILS # BLD AUTO: 0.07 K/UL (ref 0–0.2)
BASOPHILS NFR BLD: 1.1 % (ref 0–1.9)
BILIRUB DIRECT SERPL-MCNC: 0.2 MG/DL (ref 0.1–0.3)
BILIRUB SERPL-MCNC: 0.5 MG/DL (ref 0.1–1)
CHOLEST SERPL-MCNC: 165 MG/DL (ref 120–199)
CHOLEST/HDLC SERPL: 3.2 {RATIO} (ref 2–5)
COMPLEXED PSA SERPL-MCNC: 0.35 NG/ML (ref 0–4)
DIFFERENTIAL METHOD BLD: ABNORMAL
EOSINOPHIL # BLD AUTO: 0.9 K/UL (ref 0–0.5)
EOSINOPHIL NFR BLD: 14.9 % (ref 0–8)
ERYTHROCYTE [DISTWIDTH] IN BLOOD BY AUTOMATED COUNT: 12.4 % (ref 11.5–14.5)
HCT VFR BLD AUTO: 43.2 % (ref 40–54)
HDLC SERPL-MCNC: 52 MG/DL (ref 40–75)
HDLC SERPL: 31.5 % (ref 20–50)
HGB BLD-MCNC: 14.7 G/DL (ref 14–18)
IMM GRANULOCYTES # BLD AUTO: 0.01 K/UL (ref 0–0.04)
IMM GRANULOCYTES NFR BLD AUTO: 0.2 % (ref 0–0.5)
LDLC SERPL CALC-MCNC: 85.4 MG/DL (ref 63–159)
LYMPHOCYTES # BLD AUTO: 2 K/UL (ref 1–4.8)
LYMPHOCYTES NFR BLD: 32.5 % (ref 18–48)
MCH RBC QN AUTO: 32.5 PG (ref 27–31)
MCHC RBC AUTO-ENTMCNC: 34 G/DL (ref 32–36)
MCV RBC AUTO: 95 FL (ref 82–98)
MONOCYTES # BLD AUTO: 0.6 K/UL (ref 0.3–1)
MONOCYTES NFR BLD: 9 % (ref 4–15)
NEUTROPHILS # BLD AUTO: 2.6 K/UL (ref 1.8–7.7)
NEUTROPHILS NFR BLD: 42.3 % (ref 38–73)
NONHDLC SERPL-MCNC: 113 MG/DL
NRBC BLD-RTO: 0 /100 WBC
PLATELET # BLD AUTO: 168 K/UL (ref 150–450)
PMV BLD AUTO: 12 FL (ref 9.2–12.9)
PROT SERPL-MCNC: 6.9 G/DL (ref 6–8.4)
RBC # BLD AUTO: 4.53 M/UL (ref 4.6–6.2)
TESTOST SERPL-MCNC: 300 NG/DL (ref 304–1227)
TRIGL SERPL-MCNC: 138 MG/DL (ref 30–150)
WBC # BLD AUTO: 6.12 K/UL (ref 3.9–12.7)

## 2024-07-19 PROCEDURE — 80061 LIPID PANEL: CPT | Performed by: UROLOGY

## 2024-07-19 PROCEDURE — 84153 ASSAY OF PSA TOTAL: CPT | Performed by: UROLOGY

## 2024-07-19 PROCEDURE — 36415 COLL VENOUS BLD VENIPUNCTURE: CPT | Mod: PO | Performed by: UROLOGY

## 2024-07-19 PROCEDURE — 80076 HEPATIC FUNCTION PANEL: CPT | Performed by: UROLOGY

## 2024-07-19 PROCEDURE — 85025 COMPLETE CBC W/AUTO DIFF WBC: CPT | Performed by: UROLOGY

## 2024-07-19 PROCEDURE — 84403 ASSAY OF TOTAL TESTOSTERONE: CPT | Performed by: UROLOGY

## 2024-07-25 ENCOUNTER — OFFICE VISIT (OUTPATIENT)
Dept: UROLOGY | Facility: CLINIC | Age: 64
End: 2024-07-25
Payer: COMMERCIAL

## 2024-07-25 ENCOUNTER — PATIENT MESSAGE (OUTPATIENT)
Dept: ORTHOPEDICS | Facility: CLINIC | Age: 64
End: 2024-07-25

## 2024-07-25 ENCOUNTER — OFFICE VISIT (OUTPATIENT)
Dept: ORTHOPEDICS | Facility: CLINIC | Age: 64
End: 2024-07-25
Payer: COMMERCIAL

## 2024-07-25 ENCOUNTER — PATIENT MESSAGE (OUTPATIENT)
Dept: UROLOGY | Facility: CLINIC | Age: 64
End: 2024-07-25

## 2024-07-25 ENCOUNTER — TELEPHONE (OUTPATIENT)
Dept: UROLOGY | Facility: CLINIC | Age: 64
End: 2024-07-25

## 2024-07-25 VITALS
SYSTOLIC BLOOD PRESSURE: 133 MMHG | DIASTOLIC BLOOD PRESSURE: 88 MMHG | WEIGHT: 268.94 LBS | BODY MASS INDEX: 33.44 KG/M2 | HEART RATE: 64 BPM | HEIGHT: 75 IN

## 2024-07-25 DIAGNOSIS — E78.2 MIXED HYPERLIPIDEMIA: ICD-10-CM

## 2024-07-25 DIAGNOSIS — M17.0 PRIMARY OSTEOARTHRITIS OF BOTH KNEES: Primary | ICD-10-CM

## 2024-07-25 DIAGNOSIS — E29.1 MALE HYPOGONADISM: Primary | ICD-10-CM

## 2024-07-25 DIAGNOSIS — N40.1 BPH WITH URINARY OBSTRUCTION: ICD-10-CM

## 2024-07-25 DIAGNOSIS — N52.01 ERECTILE DYSFUNCTION DUE TO ARTERIAL INSUFFICIENCY: ICD-10-CM

## 2024-07-25 DIAGNOSIS — N13.8 BPH WITH URINARY OBSTRUCTION: ICD-10-CM

## 2024-07-25 DIAGNOSIS — I10 PRIMARY HYPERTENSION: ICD-10-CM

## 2024-07-25 PROCEDURE — 3044F HG A1C LEVEL LT 7.0%: CPT | Mod: CPTII,S$GLB,, | Performed by: UROLOGY

## 2024-07-25 PROCEDURE — 99999 PR PBB SHADOW E&M-EST. PATIENT-LVL IV: CPT | Mod: PBBFAC,,, | Performed by: UROLOGY

## 2024-07-25 PROCEDURE — 1159F MED LIST DOCD IN RCRD: CPT | Mod: CPTII,S$GLB,, | Performed by: UROLOGY

## 2024-07-25 PROCEDURE — 1160F RVW MEDS BY RX/DR IN RCRD: CPT | Mod: CPTII,S$GLB,, | Performed by: UROLOGY

## 2024-07-25 PROCEDURE — 3008F BODY MASS INDEX DOCD: CPT | Mod: CPTII,S$GLB,, | Performed by: UROLOGY

## 2024-07-25 PROCEDURE — 3075F SYST BP GE 130 - 139MM HG: CPT | Mod: CPTII,S$GLB,, | Performed by: UROLOGY

## 2024-07-25 PROCEDURE — 99999 PR PBB SHADOW E&M-EST. PATIENT-LVL III: CPT | Mod: PBBFAC,,, | Performed by: PHYSICIAN ASSISTANT

## 2024-07-25 PROCEDURE — 99214 OFFICE O/P EST MOD 30 MIN: CPT | Mod: S$GLB,,, | Performed by: UROLOGY

## 2024-07-25 PROCEDURE — 3079F DIAST BP 80-89 MM HG: CPT | Mod: CPTII,S$GLB,, | Performed by: UROLOGY

## 2024-07-25 RX ORDER — TESTOSTERONE 20.25 MG/1.25G
GEL TOPICAL
Qty: 6 EACH | Refills: 1 | Status: SHIPPED | OUTPATIENT
Start: 2024-07-25

## 2024-07-25 RX ORDER — LIDOCAINE HYDROCHLORIDE 10 MG/ML
2 INJECTION INFILTRATION; PERINEURAL
Status: DISCONTINUED | OUTPATIENT
Start: 2024-07-25 | End: 2024-07-25 | Stop reason: HOSPADM

## 2024-07-25 RX ORDER — TRIAMCINOLONE ACETONIDE 40 MG/ML
40 INJECTION, SUSPENSION INTRA-ARTICULAR; INTRAMUSCULAR
Status: DISCONTINUED | OUTPATIENT
Start: 2024-07-25 | End: 2024-07-25 | Stop reason: HOSPADM

## 2024-07-25 RX ORDER — SILDENAFIL 100 MG/1
100 TABLET, FILM COATED ORAL DAILY PRN
Qty: 10 TABLET | Refills: 11 | Status: SHIPPED | OUTPATIENT
Start: 2024-07-25 | End: 2025-07-25

## 2024-07-25 RX ADMIN — LIDOCAINE HYDROCHLORIDE 2 ML: 10 INJECTION INFILTRATION; PERINEURAL at 11:07

## 2024-07-25 RX ADMIN — TRIAMCINOLONE ACETONIDE 40 MG: 40 INJECTION, SUSPENSION INTRA-ARTICULAR; INTRAMUSCULAR at 11:07

## 2024-07-25 NOTE — PROCEDURES
Large Joint Aspiration/Injection: bilateral knee    Date/Time: 7/25/2024 11:00 AM    Performed by: Michaela Fuller PA-C  Authorized by: Michaela Fuller PA-C    Consent Done?:  Yes (Verbal)  Indications:  Pain  Timeout: prior to procedure the correct patient, procedure, and site was verified    Prep: patient was prepped and draped in usual sterile fashion    Local anesthetic:  Topical anesthetic    Details:  Needle Size:  22 G  Approach:  Anterolateral  Location:  Knee  Laterality:  Bilateral  Site:  Bilateral knee  Medications (Right):  40 mg triamcinolone acetonide 40 mg/mL; 2 mL LIDOcaine HCL 10 mg/ml (1%) 10 mg/mL (1 %)  Medications (Left):  40 mg triamcinolone acetonide 40 mg/mL; 2 mL LIDOcaine HCL 10 mg/ml (1%) 10 mg/mL (1 %)  Patient tolerance:  Patient tolerated the procedure well with no immediate complications

## 2024-07-25 NOTE — TELEPHONE ENCOUNTER
Call placed to pharmacy, [facility]. Representative stated that the patient's file has been transferred to their new pharmacy company [facility] in [place], with different contact information. Number provided: [phone]. Rep at [facility] stated prescriptions can be faxed to [phone]. Prescription faxed to pharmacy. Pt notified via patient portal.

## 2024-07-25 NOTE — PROGRESS NOTES
CHIEF COMPLAINT:    Mr. Stewart is a 64 y.o. male presenting with hypogonadism.    PRESENTING ILLNESS:    Mr. Stewart is a 64 y.o. male with a history of hypogonadism.  This has been present for > 1 year.  He was on Testopel but c/o the cost.  Then switched to Aveed.   While on TRT, he has more energy and a stronger libido.  Wanted to go back on topical therapy.  Is on Androgel 1.62% using 4 pumps.  Still bothered by low libido.      He also has ED.  This has been present for > 1 year.  He has been using Cialis, but it is not effective.  He's was using levitra with good results.  However, he was switched to sildenafil due to cost.  He's using sildenafil with good results. Using 100 mg.    He has LUTS.  Nocturia x2.  He does drink wine at night.  Good FOS.  No straining.  No hematuria.  No dysuria.    REVIEW OF SYSTEMS:    Patient denies chest pain, sore throat, headache, blurred vision, fever, nausea, vomiting, chills, flank discomfort, abdominal pain, bleeding per rectum, cough, SOB, recent loss of consciousness, recent mental status changes, seizures, dizziness, or upper or lower extremity weakness.    DANIELITO  1. 2  2. 2  3. 2  4. 2  5. 3     PATIENT HISTORY:    Past Medical History:   Diagnosis Date    Allergy     BPH with urinary obstruction     Degenerative disc disease     Dysphagia     ED (erectile dysfunction)     Gout     Hyperlipidemia     Hypertension     Male hypogonadism 7/19/2012       Past Surgical History:   Procedure Laterality Date    APPENDECTOMY      arthroscopic knee surg      left X 2, right X 1    COLONOSCOPY N/A 9/28/2020    Procedure: COLONOSCOPY;  Surgeon: CAYDEN Montenegro MD;  Location: [facility] ([address] [place] [address]);  Service: Endoscopy;  Laterality: N/A;  9/25-covid [id number]-tb    ESOPHAGOGASTRODUODENOSCOPY N/A 8/8/2023    Procedure: EGD (ESOPHAGOGASTRODUODENOSCOPY);  Surgeon: Tiffany Mars MD;  Location: [facility];  Service: Endoscopy;  Laterality: N/A;    INJECTION OF ANESTHETIC AGENT  AROUND NERVE Right 2021    Procedure: BLOCK, NERVE, MEDIAL BRANCH L3,L4,L5;  Surgeon: Zachary Salas MD;  Location: [facility];  Service: Pain Management;  Laterality: Right;  1 of 2    INJECTION OF ANESTHETIC AGENT AROUND NERVE Right 10/12/2021    Procedure: BLOCK, NERVE, L3-L4-L5 MEDIAL BRANCH 2 OF 2 NEED CONSENT/ conitnue Plavix;  Surgeon: Zachary Salas MD;  Location: [facility];  Service: Pain Management;  Laterality: Right;    INTUSSUSCEPTION REPAIR      KNEE SURGERY      RADIOFREQUENCY ABLATION Right 2021    Procedure: RADIOFREQUENCY ABLATION right L3, L4, L5 NEEDS CONSENT;  Surgeon: Zachary Salas MD;  Location: [facility];  Service: Pain Management;  Laterality: Right;    TONSILLECTOMY         Family History   Problem Relation Name Age of Onset    Hyperlipidemia Father      Hypertension Father      Heart disease Father      Liver disease Mother      Heart disease Brother      Heart disease Maternal Grandmother      Heart disease Paternal Grandfather         Social History     Socioeconomic History    Marital status:     Number of children: 3   Occupational History     Employer: [employer]   Tobacco Use    Smoking status: Former     Current packs/day: 0.00     Types: Cigarettes     Quit date: 1987     Years since quittin.0    Smokeless tobacco: Never   Substance and Sexual Activity    Alcohol use: Yes     Alcohol/week: 7.0 standard drinks of alcohol     Types: 7 Glasses of wine per week    Drug use: No    Sexual activity: Yes     Partners: Female     Social Determinants of Health     Financial Resource Strain: Low Risk  (2024)    Overall Financial Resource Strain (CARDIA)     Difficulty of Paying Living Expenses: Not hard at all   Food Insecurity: No Food Insecurity (2024)    Hunger Vital Sign     Worried About Running Out of Food in the Last Year: Never true     Ran Out of Food in the Last Year: Never true   Transportation Needs: No  Transportation Needs (1/5/2024)    PRAPARE - Transportation     Lack of Transportation (Medical): No     Lack of Transportation (Non-Medical): No   Physical Activity: Insufficiently Active (1/5/2024)    Exercise Vital Sign     Days of Exercise per Week: 3 days     Minutes of Exercise per Session: 20 min   Stress: No Stress Concern Present (1/5/2024)    [employer] - Occupational Stress Questionnaire     Feeling of Stress : Not at all   Housing Stability: Low Risk  (1/5/2024)    Housing Stability Vital Sign     Unable to Pay for Housing in the Last Year: No     Number of Places Lived in the Last Year: 1     Unstable Housing in the Last Year: No       Allergies:  Doxycycline, Shellfish containing products, Tree pollen-white akhil, and Clopidogrel    Medications:    Current Outpatient Medications:     azelastine (ASTELIN) 137 mcg (0.1 %) nasal spray, 1 spray (137 mcg total) by Nasal route 2 (two) times daily., Disp: 30 mL, Rfl: 3    fluticasone propionate (FLONASE) 50 mcg/actuation nasal spray, 2 sprays (100 mcg total) by Each Nostril route once daily., Disp: 48 g, Rfl: 3    lisinopriL-hydrochlorothiazide (PRINZIDE,ZESTORETIC) 20-12.5 mg per tablet, Take 1 tablet by mouth once daily., Disp: 90 tablet, Rfl: 3    rosuvastatin (CRESTOR) 20 MG tablet, Take 1 tablet (20 mg total) by mouth once daily., Disp: 90 tablet, Rfl: 2    sildenafil (REVATIO) 20 mg Tab, Take 5 tablets by mouth 1 hour before sexual activity, Disp: 50 tablet, Rfl: 11    testosterone (ANDROGEL) 20.25 mg/1.25 gram (1.62 %) GlPm, Apply 4 pumps to shoulders daily  Dispense 3 month supply., Disp: 6 each, Rfl: 1    zolpidem (AMBIEN) 10 mg Tab, Take 1 tablet (10 mg total) by mouth every evening., Disp: 90 tablet, Rfl: 1    azithromycin (Z-JONY) 250 MG tablet, Take 250 mg by mouth. (Patient not taking: Reported on 4/8/2024), Disp: , Rfl:     ibuprofen (ADVIL,MOTRIN) 800 MG tablet, Take 800 mg by mouth every 8 (eight) hours as needed.,  Disp: , Rfl:     omeprazole (PRILOSEC) 40 MG capsule, Take 1 capsule (40 mg total) by mouth once daily., Disp: 90 capsule, Rfl: 0    promethazine-dextromethorphan (PROMETHAZINE-DM) 6.25-15 mg/5 mL Syrp, Take 5 mLs by mouth every 4 (four) hours as needed (cough)., Disp: 120 mL, Rfl: 0    Current Facility-Administered Medications:     sodium hyaluronate (EUFLEXXA) 10 mg/mL(mw 2.4 -3.6 million) injection 40 mg, 40 mg, Intra-articular, Weekly, Placido Frazier MD, 40 mg at 08/05/21 0849    PHYSICAL EXAMINATION:    The patient generally appears in good health, is appropriately interactive, and is in no apparent distress.     Eyes: anicteric sclerae, moist conjunctivae; no lid-lag; PERRLA     HENT: Atraumatic; oropharynx clear with moist mucous membranes and no mucosal ulcerations;normal hard and soft palate. No evidence of lymphadenopathy.    Neck: Trachea midline.  No thyromegaly.    Musculoskeletal: No abnormal gait.    Skin: No lesions.    Mental: Cooperative with normal affect.  Is oriented to time, place, and person.    Neuro: Grossly intact.    Chest: Normal inspiratory effort.   No accessory muscles.  No audible wheezes.  Respirations symmetric on inspiration and expiration.    Heart: Regular rhythm.      Abdomen:  Soft, non-tender. No masses or organomegaly. Bladder is not palpable. No evidence of flank discomfort. No evidence of inguinal hernia.    Genitourinary: The penis is circumcised with no evidence of plaques or induration. The urethral meatus is normal. The testes, epididymides, and cord structures are normal in size and contour bilaterally. The scrotum is normal in size and contour.    Normal anal sphincter tone. No rectal mass.    The prostate is 35 g. Normal landmarks. Lateral sulci. Median furrow intact.  No nodularity or induration. Seminal vesicles are normal.    Extremities: No clubbing, cyanosis.  2+ LE edema.    LABS:    Lab Results   Component Value Date    PSA 0.50 03/17/2017    PSA 0.47  07/18/2013    PSA 0.71 03/15/2013    PSADIAG 0.35 07/19/2024    PSADIAG 0.45 04/08/2024    PSADIAG 0.36 09/22/2023        IMPRESSION:    Encounter Diagnoses   Name Primary?    Male hypogonadism Yes    BPH with urinary obstruction     Erectile dysfunction due to arterial insufficiency     Primary hypertension     Mixed hyperlipidemia      HTN, stable  Hyperlipidemia, stable    PLAN:      1. Continue generic sildenafil for the ED (affected by above comorbidities).   Will change to 100 mg. Side effects discussed.  A new Rx was given.  Sent to [facility].  2.  Discussed options for his LUTS.  Will observe them as he's pleased with his voiding.  3.  Androgel 16/2% refilled.    4.  He can then RTC 6 months with T, PSA, CBC, hepatic panel, lipid panel.      Copy to:

## 2024-07-25 NOTE — PROGRESS NOTES
Patient ID: Stanley Stewart is a 64 y.o. male.    Chief Complaint: Injections and Pain of the Right Knee, Injections and Pain of the Left Knee, and Knee Pain      HISTORY:  Stanley Stewart is a 64 y.o. male who returns to me today for follow up of bilateral knee pain.  He was last seen by me 12/5/2023.  Today he is doing well, but notes over the last few weeks his knee have started to bother him.  He denies any new injury.  He would like to repeat injections today as they continue to provide good relief occasionally.       PMH/PSH/FamHx/SocHx:    Unchanged from prior visit.    ROS:  Constitution: Negative for chills, fever and weakness.   Respiratory: Negative for cough and shortness of breath.   Musculoskeletal: Positive for bilateral knee pain  Psychiatric/Behavioral: The patient is not nervous/anxious.       PHYSICAL EXAM:   Bilateral knee  Skin intact  No warmth or effusion  ROM 0-130  Stable to testing      ASSESSMENT/PLAN:    Stanley was seen today for injections, pain, injections, pain and knee pain.    Diagnoses and all orders for this visit:    Primary osteoarthritis of both knees  -     Large Joint Aspiration/Injection: bilateral knee      - Bilateral knee CSI performed today  - Continue rest, activity modification, HEP, occasional NSAIDS  - follow up as needed

## 2024-08-01 RX ORDER — LISINOPRIL AND HYDROCHLOROTHIAZIDE 12.5; 2 MG/1; MG/1
1 TABLET ORAL DAILY
Qty: 90 TABLET | Refills: 2 | Status: SHIPPED | OUTPATIENT
Start: 2024-08-01

## 2024-08-01 NOTE — TELEPHONE ENCOUNTER
No care due was identified.  [facility] Embedded Care Due Messages. Reference number: [id number].   8/01/2024 4:30:01 PM CDT

## 2024-08-02 NOTE — TELEPHONE ENCOUNTER
Refill Decision Note   Stanley Stewart  is requesting a refill authorization.    Brief Assessment and Rationale for Refill:   Approve       Medication Therapy Plan:          Comments:     Note composed:11:08 PM 08/01/2024

## 2024-08-03 ENCOUNTER — PATIENT MESSAGE (OUTPATIENT)
Dept: INTERNAL MEDICINE | Facility: CLINIC | Age: 64
End: 2024-08-03
Payer: COMMERCIAL

## 2024-08-05 RX ORDER — OMEPRAZOLE 40 MG/1
40 CAPSULE, DELAYED RELEASE ORAL DAILY
Qty: 90 CAPSULE | Refills: 1 | Status: SHIPPED | OUTPATIENT
Start: 2024-08-05

## 2024-09-13 ENCOUNTER — PATIENT MESSAGE (OUTPATIENT)
Dept: INTERNAL MEDICINE | Facility: CLINIC | Age: 64
End: 2024-09-13
Payer: COMMERCIAL

## 2024-09-13 DIAGNOSIS — G47.00 INSOMNIA, UNSPECIFIED TYPE: ICD-10-CM

## 2024-09-13 RX ORDER — ZOLPIDEM TARTRATE 10 MG/1
10 TABLET ORAL NIGHTLY
Qty: 90 TABLET | Refills: 3 | Status: CANCELLED | OUTPATIENT
Start: 2024-09-13

## 2024-09-13 NOTE — TELEPHONE ENCOUNTER
Refill Routing Note   Medication(s) are not appropriate for processing by [facility] for the following reason(s):        Outside of protocol    [facility] action(s):  Route               Appointments  past 12m or future 3m with PCP    Date Provider   Last Visit   4/8/2024 Kadeem Estrada MD   Next Visit   Visit date not found Kadeem Estrada MD   ED visits in past 90 days: 0        Note composed:1:36 PM 09/13/2024

## 2024-09-13 NOTE — TELEPHONE ENCOUNTER
No care due was identified.  [facility] Embedded Care Due Messages. Reference number: [id number].   9/13/2024 11:25:19 AM CDT

## 2024-09-18 ENCOUNTER — TELEPHONE (OUTPATIENT)
Dept: INTERNAL MEDICINE | Facility: CLINIC | Age: 64
End: 2024-09-18
Payer: COMMERCIAL

## 2024-09-18 NOTE — TELEPHONE ENCOUNTER
Pt sent refill request to have medication sent to a different mail order pharmacy on 09/13/2024.    Refill Request.

## 2024-09-18 NOTE — TELEPHONE ENCOUNTER
----- Message from Kate Guerrero sent at 9/18/2024 10:04 AM CDT -----  Contact: Mobile  [phone]  Patient would like to speak with you about a new medication that he would like to have, patient said that he have some new information in regards to the script.

## 2024-09-18 NOTE — TELEPHONE ENCOUNTER
Upon chart review, pt was sent a msg after he sent this mychart msg informing him that he needed a visit of some sort. An evisit link was sent in that msg thread

## 2024-09-19 ENCOUNTER — TELEPHONE (OUTPATIENT)
Dept: INTERNAL MEDICINE | Facility: CLINIC | Age: 64
End: 2024-09-19
Payer: COMMERCIAL

## 2024-09-19 DIAGNOSIS — G47.00 INSOMNIA, UNSPECIFIED TYPE: ICD-10-CM

## 2024-09-19 NOTE — TELEPHONE ENCOUNTER
----- Message from Madhuri Sims sent at 9/19/2024  8:26 AM CDT -----  Contact: Pt [phone]  Type: Returning a call    Who left a message? Caty     When did the practice call? Yesterday morning    Does patient know what this is regarding:yes    Would the patient rather a call back or a response via [facility]? Call back    Comments: Would like to continue yesterday's conversation

## 2024-09-19 NOTE — TELEPHONE ENCOUNTER
No care due was identified.  [facility] Embedded Care Due Messages. Reference number: [id number].   9/19/2024 5:59:41 PM CDT

## 2024-09-20 RX ORDER — ZOLPIDEM TARTRATE 10 MG/1
10 TABLET ORAL NIGHTLY
Qty: 90 TABLET | Refills: 0 | Status: SHIPPED | OUTPATIENT
Start: 2024-09-20

## 2024-09-20 NOTE — TELEPHONE ENCOUNTER
Refill Routing Note   Medication(s) are not appropriate for processing by [facility] for the following reason(s):        Outside of protocol    [facility] action(s):  Route               Appointments  past 12m or future 3m with PCP    Date Provider   Last Visit   4/8/2024 Kadeem Estrada MD   Next Visit   Visit date not found Kadeem Estrada MD   ED visits in past 90 days: 0        Note composed:4:35 AM 09/20/2024

## 2024-09-24 RX ORDER — ROSUVASTATIN CALCIUM 20 MG/1
20 TABLET, COATED ORAL DAILY
Qty: 90 TABLET | Refills: 2 | Status: SHIPPED | OUTPATIENT
Start: 2024-09-24

## 2024-09-24 NOTE — TELEPHONE ENCOUNTER
No care due was identified.  Health [facility] Embedded Care Due Messages. Reference number: [id number].   9/24/2024 1:08:22 PM CDT

## 2024-10-06 ENCOUNTER — PATIENT MESSAGE (OUTPATIENT)
Dept: ORTHOPEDICS | Facility: CLINIC | Age: 64
End: 2024-10-06
Payer: COMMERCIAL

## 2024-10-07 RX ORDER — MELOXICAM 15 MG/1
15 TABLET ORAL DAILY
Qty: 90 TABLET | Refills: 2 | Status: SHIPPED | OUTPATIENT
Start: 2024-10-07

## 2024-10-14 ENCOUNTER — OFFICE VISIT (OUTPATIENT)
Dept: URGENT CARE | Facility: CLINIC | Age: 64
End: 2024-10-14
Payer: COMMERCIAL

## 2024-10-14 VITALS
HEART RATE: 65 BPM | BODY MASS INDEX: 33.32 KG/M2 | OXYGEN SATURATION: 99 % | WEIGHT: 268 LBS | HEIGHT: 75 IN | SYSTOLIC BLOOD PRESSURE: 150 MMHG | RESPIRATION RATE: 20 BRPM | DIASTOLIC BLOOD PRESSURE: 95 MMHG | TEMPERATURE: 99 F

## 2024-10-14 DIAGNOSIS — J02.9 SORE THROAT: Primary | ICD-10-CM

## 2024-10-14 DIAGNOSIS — R09.81 NASAL CONGESTION: ICD-10-CM

## 2024-10-14 DIAGNOSIS — R05.9 COUGH, UNSPECIFIED TYPE: ICD-10-CM

## 2024-10-14 LAB
CTP QC/QA: YES
SARS-COV-2 AG RESP QL IA.RAPID: NEGATIVE

## 2024-10-14 PROCEDURE — 87811 SARS-COV-2 COVID19 W/OPTIC: CPT | Mod: QW,S$GLB,, | Performed by: EMERGENCY MEDICINE

## 2024-10-14 PROCEDURE — 99213 OFFICE O/P EST LOW 20 MIN: CPT | Mod: 25,S$GLB,, | Performed by: EMERGENCY MEDICINE

## 2024-10-14 PROCEDURE — 96372 THER/PROPH/DIAG INJ SC/IM: CPT | Mod: S$GLB,,, | Performed by: EMERGENCY MEDICINE

## 2024-10-14 RX ORDER — BENZONATATE 200 MG/1
200 CAPSULE ORAL 3 TIMES DAILY PRN
Qty: 20 CAPSULE | Refills: 0 | Status: SHIPPED | OUTPATIENT
Start: 2024-10-14 | End: 2024-10-24

## 2024-10-14 RX ORDER — DEXAMETHASONE SODIUM PHOSPHATE 10 MG/ML
8 INJECTION INTRAMUSCULAR; INTRAVENOUS ONCE
Status: COMPLETED | OUTPATIENT
Start: 2024-10-14 | End: 2024-10-14

## 2024-10-14 RX ORDER — AZITHROMYCIN 250 MG/1
TABLET, FILM COATED ORAL
Qty: 6 TABLET | Refills: 0 | Status: SHIPPED | OUTPATIENT
Start: 2024-10-14 | End: 2024-10-19

## 2024-10-14 RX ADMIN — DEXAMETHASONE SODIUM PHOSPHATE 8 MG: 10 INJECTION INTRAMUSCULAR; INTRAVENOUS at 09:10

## 2024-10-14 NOTE — PATIENT INSTRUCTIONS
Continue Astelin and Flonase nasal sprays.    Consider Allegra or Claritin once daily.     Take blood pressure medication upon arrival home.     Start Azithromycin (Zpak) in 2-3 days if not improving.     You must understand that you've received an Urgent Care treatment only and that you may be released before all your medical problems are known or treated. You, the patient, will arrange for follow up care as instructed.    Follow up with your PCP or specialty clinic as directed if not improved or as needed. You can call [phone] to schedule an appointment with the appropriate provider.      You, the patient, will arrange for follow up care as instructed.     If your condition worsens or fails to improve we recommend that you receive another evaluation at the ER immediately or contact your PCP to discuss your concerns.     Patient aware of treatment plan and verbalized understanding.

## 2024-10-14 NOTE — PROGRESS NOTES
"Subjective:      Patient ID: Stanley Stewart is a 64 y.o. male.    Vitals:  height is 6' 3" (1.905 m) and weight is 121.6 kg (268 lb). His oral temperature is 98.7 °F (37.1 °C). His blood pressure is 150/95 (abnormal) and his pulse is 65. His respiration is 20 and oxygen saturation is 99%.     Chief Complaint: Sore Throat    Pt states that he is having a sore throat, cough, and head congestion. Pt symptoms started two days ago. Pt was recently traveling for work and after his trip he noticed his symptoms starting. Pt has been using prescribed nasal spray to help relieve his symptoms.    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 The current episode started in the past 7 days. The problem has been unchanged. There has been no fever. The pain is at a severity of 7/10. Associated symptoms include congestion, coughing and trouble swallowing. Pertinent negatives include no abdominal pain, diarrhea, headaches or shortness of breath. The treatment provided no relief.       Constitution: Negative for chills, fatigue and fever.   HENT:  Positive for congestion, sore throat and trouble swallowing. Negative for sinus pain and sinus pressure.    Cardiovascular:  Negative for chest pain and palpitations.   Eyes:  Negative for blurred vision.   Respiratory:  Positive for cough. Negative for shortness of breath.    Gastrointestinal:  Negative for abdominal pain, nausea and diarrhea.   Genitourinary:  Negative for dysuria and urgency.   Musculoskeletal:  Negative for muscle cramps and muscle ache.   Skin:  Negative for rash and hives.   Allergic/Immunologic: Negative for hives.   Neurological:  Negative for headaches.      Objective:     Physical Exam   Constitutional: He is oriented to person, place, and time. He appears well-developed. He is cooperative.  Non-toxic appearance. He does not appear ill. No distress.   HENT:   Head: Normocephalic and atraumatic.   Ears:   Right " Ear: Hearing, tympanic membrane, external ear and ear canal normal.   Left Ear: Hearing, tympanic membrane, external ear and ear canal normal.   Nose: Rhinorrhea and congestion present. No mucosal edema or nasal deformity. No epistaxis. Right sinus exhibits no maxillary sinus tenderness and no frontal sinus tenderness. Left sinus exhibits no maxillary sinus tenderness and no frontal sinus tenderness.   Mouth/Throat: Uvula is midline, oropharynx is clear and moist and mucous membranes are normal. Mucous membranes are moist. No trismus in the jaw. Normal dentition. No uvula swelling. No oropharyngeal exudate, posterior oropharyngeal edema or posterior oropharyngeal erythema.      Comments: Mild hoarseness, handling secretions well  Eyes: Conjunctivae and lids are normal. No scleral icterus.   Neck: Trachea normal and phonation normal. Neck supple. No edema present. No erythema present. No neck rigidity present.   Cardiovascular: Normal rate, regular rhythm, normal heart sounds and normal pulses.   Pulmonary/Chest: Effort normal and breath sounds normal. No respiratory distress. He has no decreased breath sounds. He has no rhonchi.   Abdominal: Normal appearance.   Musculoskeletal: Normal range of motion.         General: No deformity. Normal range of motion.   Neurological: He is alert and oriented to person, place, and time. He exhibits normal muscle tone. Coordination normal.   Skin: Skin is warm, dry, intact, not diaphoretic and not pale.   Psychiatric: His speech is normal and behavior is normal. Judgment and thought content normal.   Nursing note and vitals reviewed.    Results for orders placed or performed in visit on 10/14/24   SARS Coronavirus 2 Antigen, POCT Manual Read    Collection Time: 10/14/24  9:12 AM   Result Value Ref Range    SARS Coronavirus 2 Antigen Negative Negative     Acceptable Yes         Assessment:     1. Sore throat    2. Nasal congestion    3. Cough, unspecified type         Plan:     Has upcoming speaking engagement - will give steroid IM to help with acute inflammation/congestion.     Wait and see Rx for Azithromycin.    BP elevated - has not taken his Lisinopril this AM but will do so when he arrives home.    Continue Flonase/Astelin.     Recheck if not improving.     Sore throat  -     SARS Coronavirus 2 Antigen, POCT Manual Read    Nasal congestion  -     dexAMETHasone injection 8 mg    Cough, unspecified type  -     benzonatate (TESSALON) 200 MG capsule; Take 1 capsule (200 mg total) by mouth 3 (three) times daily as needed for Cough.  Dispense: 20 capsule; Refill: 0    Other orders  -     azithromycin (Z-JONY) 250 MG tablet; Take 2 tablets by mouth on day 1; Take 1 tablet by mouth on days 2-5  Dispense: 6 tablet; Refill: 0      Patient Instructions   Continue Astelin and Flonase nasal sprays.    Consider Allegra or Claritin once daily.     Take blood pressure medication upon arrival home.     Start Azithromycin (Zpak) in 2-3 days if not improving.     You must understand that you've received an Urgent Care treatment only and that you may be released before all your medical problems are known or treated. You, the patient, will arrange for follow up care as instructed.    Follow up with your PCP or specialty clinic as directed if not improved or as needed. You can call [phone] to schedule an appointment with the appropriate provider.      You, the patient, will arrange for follow up care as instructed.     If your condition worsens or fails to improve we recommend that you receive another evaluation at the ER immediately or contact your PCP to discuss your concerns.     Patient aware of treatment plan and verbalized understanding.

## 2024-11-21 ENCOUNTER — OFFICE VISIT (OUTPATIENT)
Dept: SURGERY | Facility: CLINIC | Age: 64
End: 2024-11-21
Payer: COMMERCIAL

## 2024-11-21 VITALS
SYSTOLIC BLOOD PRESSURE: 161 MMHG | HEIGHT: 75 IN | RESPIRATION RATE: 16 BRPM | HEART RATE: 63 BPM | WEIGHT: 276 LBS | DIASTOLIC BLOOD PRESSURE: 77 MMHG | TEMPERATURE: 97 F | BODY MASS INDEX: 34.32 KG/M2

## 2024-11-21 DIAGNOSIS — K64.8 PROLAPSED HEMORRHOIDS: Primary | ICD-10-CM

## 2024-11-21 PROCEDURE — 99999 PR PBB SHADOW E&M-EST. PATIENT-LVL III: CPT | Mod: PBBFAC,,, | Performed by: SURGERY

## 2024-11-21 NOTE — PROGRESS NOTES
Pleasant 64-year-old gentleman whom I have seen in the past.  Patient struggles with hemorrhoids.  In August of 2023 I banded large internal hemorrhoids.  He noted he had significant improvement in his symptoms.  He was also been taking fiber.  States proximally 4-6 weeks ago he had an episode of bleeding associated with the bowel movements.  Does have occasional swelling and flaring of his hemorrhoids.  He presents today for evaluation.  He notes he would like to have banding if it would be amenable to it.    Afvss  [id number]  Soft/nd/nt  External exam does demonstrate swollen inflamed external hemorrhoids particular in the left side.  There were prolapsed internal hemorrhoids noted.  To a lesser extent there are swollen hemorrhoids on the right side particular in the right posterior position.  There were prolapsed internal hemorrhoids in the right anterior position.  Anoscopy is performed demonstrating large hemorrhoids in each of the 3 columns.      Assessment:  Large prolapsed hemorrhoids      Discussion with the patient.  He desired to proceed with banding today.  Given the size and inflammation associated with the hemorrhoids did express to that I am uncertain that the banding we will fully eliminate all the issues.  He notes he was no symptoms other than occasional bleeding.  He he feels confident with the hemorrhoids we will continue to decrease in the swelling will go down.  As such was agreeable to placing 1 internal hemorrhoid band today.    Rubber-band ligation was performed of the right anterior hemorrhoidal column.    Patient will return to clinic in 4-6 weeks.

## 2024-11-22 ENCOUNTER — PATIENT MESSAGE (OUTPATIENT)
Dept: PODIATRY | Facility: CLINIC | Age: 64
End: 2024-11-22
Payer: COMMERCIAL

## 2024-12-14 DIAGNOSIS — G47.00 INSOMNIA, UNSPECIFIED TYPE: ICD-10-CM

## 2024-12-14 NOTE — TELEPHONE ENCOUNTER
No care due was identified.  [facility] Embedded Care Due Messages. Reference number: [id number].   12/14/2024 3:21:48 PM CST

## 2024-12-14 NOTE — TELEPHONE ENCOUNTER
No care due was identified.  [facility] Embedded Care Due Messages. Reference number: [id number].   12/14/2024 2:51:28 PM CST

## 2024-12-16 RX ORDER — ZOLPIDEM TARTRATE 10 MG/1
10 TABLET ORAL NIGHTLY
Qty: 90 TABLET | Refills: 0 | Status: SHIPPED | OUTPATIENT
Start: 2024-12-16

## 2024-12-16 RX ORDER — AZELASTINE 1 MG/ML
1 SPRAY, METERED NASAL 2 TIMES DAILY
Qty: 60 ML | Refills: 1 | Status: SHIPPED | OUTPATIENT
Start: 2024-12-16

## 2024-12-16 NOTE — TELEPHONE ENCOUNTER
Refill Decision Note   Stanley Stewart  is requesting a refill authorization.    Brief Assessment and Rationale for Refill:   Approve       Medication Therapy Plan:         Comments:     Note composed:10:58 AM 12/16/2024

## 2024-12-16 NOTE — TELEPHONE ENCOUNTER
Refill Routing Note   Medication(s) are not appropriate for processing by [facility] for the following reason(s):        Outside of protocol    [facility] action(s):  Route               Appointments  past 12m or future 3m with PCP    Date Provider   Last Visit   4/8/2024 Kadeem Estrada MD   Next Visit   12/14/2024 Kadeem Estrada MD   ED visits in past 90 days: 0        Note composed:8:41 AM 12/16/2024

## 2025-01-03 ENCOUNTER — OFFICE VISIT (OUTPATIENT)
Dept: CARDIOLOGY | Facility: CLINIC | Age: 65
End: 2025-01-03
Payer: COMMERCIAL

## 2025-01-03 VITALS
HEART RATE: 57 BPM | SYSTOLIC BLOOD PRESSURE: 146 MMHG | WEIGHT: 276.69 LBS | DIASTOLIC BLOOD PRESSURE: 90 MMHG | HEIGHT: 75 IN | BODY MASS INDEX: 34.4 KG/M2

## 2025-01-03 DIAGNOSIS — M25.562 ACUTE PAIN OF BOTH KNEES: ICD-10-CM

## 2025-01-03 DIAGNOSIS — R93.1 AGATSTON CORONARY ARTERY CALCIUM SCORE GREATER THAN 400: Chronic | ICD-10-CM

## 2025-01-03 DIAGNOSIS — E78.2 MIXED HYPERLIPIDEMIA: ICD-10-CM

## 2025-01-03 DIAGNOSIS — M25.561 ACUTE PAIN OF BOTH KNEES: ICD-10-CM

## 2025-01-03 DIAGNOSIS — G89.4 CHRONIC PAIN SYNDROME: Primary | ICD-10-CM

## 2025-01-03 DIAGNOSIS — I10 PRIMARY HYPERTENSION: ICD-10-CM

## 2025-01-03 PROCEDURE — 99999 PR PBB SHADOW E&M-EST. PATIENT-LVL III: CPT | Mod: PBBFAC,,, | Performed by: INTERNAL MEDICINE

## 2025-01-03 NOTE — PROGRESS NOTES
Subjective:    Patient ID:  Stanley Stewart is a 64 y.o. male patient here for evaluation Follow-up      History of Present Illness:  Annual cardiology follow-up.  Positive risk factors hypertension, dyslipidemia, history.  Abnormal CT calcium score.  Last noninvasive cardiac assessment 2021.  Remains asymptomatic.  No angina/dyspnea/arrhythmia.  No PND orthopnea.  No edema.  No change current medications.             Review of patient's allergies indicates:   Allergen Reactions    Doxycycline      Possible allergy/ Rash     Shellfish containing products      Other reaction(s): Hives    Tree pollen-white akhil Itching and Other (See Comments)    Clopidogrel Rash       Past Medical History:   Diagnosis Date    Allergy     BPH with urinary obstruction     Degenerative disc disease     Dysphagia     ED (erectile dysfunction)     Gout     Hyperlipidemia     Hypertension     Male hypogonadism 7/19/2012     Past Surgical History:   Procedure Laterality Date    APPENDECTOMY      arthroscopic knee surg      left X 2, right X 1    COLONOSCOPY N/A 9/28/2020    Procedure: COLONOSCOPY;  Surgeon: CAYDEN Montenegro MD;  [facility]  Service: Endoscopy;  Laterality: N/A;  9/25-covid [id number]    ESOPHAGOGASTRODUODENOSCOPY N/A 8/8/2023    Procedure: EGD (ESOPHAGOGASTRODUODENOSCOPY);  Surgeon: Tiffany Mars MD;  Location: [facility] ENDO;  Service: Endoscopy;  Laterality: N/A;    INJECTION OF ANESTHETIC AGENT AROUND NERVE Right 8/24/2021    Procedure: BLOCK, NERVE, MEDIAL BRANCH L3,L4,L5;  Surgeon: Zachary Salas MD;  Location: [facility] PAIN MGT;  Service: Pain Management;  Laterality: Right;  1 of 2    INJECTION OF ANESTHETIC AGENT AROUND NERVE Right 10/12/2021    Procedure: BLOCK, NERVE, L3-L4-L5 MEDIAL BRANCH 2 OF 2 NEED CONSENT/ conitnue Plavix;  Surgeon: Zachary Salas MD;  Location: [facility] PAIN MGT;  Service: Pain Management;  Laterality: Right;    INTUSSUSCEPTION REPAIR      KNEE SURGERY       RADIOFREQUENCY ABLATION Right 2021    Procedure: RADIOFREQUENCY ABLATION right L3, L4, L5 NEEDS CONSENT;  Surgeon: Zachary Salas MD;  Location: [facility];  Service: Pain Management;  Laterality: Right;    TONSILLECTOMY       Social History     Tobacco Use    Smoking status: Former     Current packs/day: 0.00     Types: Cigarettes     Quit date: 1987     Years since quittin.4    Smokeless tobacco: Never   Substance Use Topics    Alcohol use: Yes     Alcohol/week: 7.0 standard drinks of alcohol     Types: 7 Glasses of wine per week    Drug use: No        Review of Systems:    As noted in HPI in addition      REVIEW OF SYSTEMS  Review of Systems   Constitutional: Negative for decreased appetite, diaphoresis, night sweats, weight gain and weight loss.   HENT:  Negative for nosebleeds and odynophagia.    Eyes:  Negative for double vision and photophobia.   Cardiovascular:  Negative for chest pain, claudication, cyanosis, dyspnea on exertion, irregular heartbeat, leg swelling, near-syncope, orthopnea, palpitations, paroxysmal nocturnal dyspnea and syncope.   Respiratory:  Negative for cough, hemoptysis, shortness of breath and wheezing.    Hematologic/Lymphatic: Negative for adenopathy.   Skin:  Negative for flushing, skin cancer and suspicious lesions.   Musculoskeletal:  Negative for gout, myalgias and neck pain.   Gastrointestinal:  Negative for abdominal pain, heartburn, hematemesis and hematochezia.   Genitourinary:  Negative for bladder incontinence, hesitancy and nocturia.   Neurological:  Negative for focal weakness, headaches, light-headedness and paresthesias.   Psychiatric/Behavioral:  Negative for memory loss and substance abuse.               Objective:        Vitals:    25 1120   BP: (!) 146/90   Pulse: (!) 57       Lab Results   Component Value Date    WBC 6.12 2024    HGB 14.7 2024    HCT 43.2 2024     2024    CHOL 165 2024    TRIG 138  07/19/2024    HDL 52 07/19/2024    ALT 44 07/19/2024    AST 30 07/19/2024     04/08/2024    K 4.2 04/08/2024     04/08/2024    CREATININE 1.0 04/08/2024    BUN 15 04/08/2024    CO2 26 04/08/2024    TSH 1.209 04/08/2024    PSA 0.50 03/17/2017    HGBA1C 5.3 04/08/2024        ECHOCARDIOGRAM RESULTS  Results for orders placed during the hospital encounter of 06/11/21    Echo    Interpretation Summary  · The left ventricle is normal in size with normal systolic function.  · The estimated ejection fraction is 65%.  · Normal left ventricular diastolic function.  · Mild left atrial enlargement.  · Normal right ventricular size with normal right ventricular systolic function.  · The estimated PA systolic pressure is 27 mmHg.  · Normal central venous pressure (3 mmHg).    No results found for this or any previous visit.          CURRENT/PREVIOUS VISIT EKG  Results for orders placed or performed in visit on 01/08/24   IN OFFICE EKG 12-LEAD (to [place])    Collection Time: 01/08/24  8:56 AM    Narrative    Test Reason : R93.1,I10,E78.2,    Vent. Rate : 066 BPM     Atrial Rate : 066 BPM     P-R Int : 176 ms          QRS Dur : 080 ms      QT Int : 380 ms       P-R-T Axes : 050 048 052 degrees     QTc Int : 398 ms    Normal sinus rhythm  Normal ECG  When compared with ECG of 23-JUN-2022 10:33,  No significant change was found  Confirmed by Carlos Moraes MD ([id number]) on 1/8/2024 9:38:38 AM    Referred By:             Confirmed By:Carlos Moraes MD     No valid procedures specified.   Results for orders placed during the hospital encounter of 06/11/21    Nuclear Stress Test    Interpretation Summary    The exercise capacity was average.    The patient reported no chest pain during the stress test.    The EKG portion of this study is negative for ischemia.    The blood pressure response to exercise was hypertensive.    There were no arrhythmias during stress.    The nuclear portion of this study will be reported separately.    No valid  procedures specified.    PHYSICAL EXAM  GENERAL: well built, well nourished, well-developed in no apparent distress alert and oriented.   HEENT: Normocephalic. Pupils normal and conjunctivae normal.  Mucous membranes normal, no cyanosis or icterus, trachea central,no pallor or icterus is noted..   NECK: No JVD. No bruit..   THYROID: Thyroid not enlarged. No nodules present..   CARDIAC:  Normal S1-S2.  No murmur rub click or gallop.  PMI nondisplaced.    LUNGS: Clear to auscultation. No wheezing or rhonchi..   ABDOMEN: Soft no masses or organomegaly.  No abdomen pulsations or bruits.  Normal bowel sounds. No pulsations and no masses felt, No guarding or rebound.   URINARY: No hannah catheter   EXTREMITIES: No cyanosis, clubbing or edema noted at this time., no calf tenderness bilaterally.   PERIPHERAL VASCULAR SYSTEM: Good palpable distal pulses.  2+ femoral, popliteal and pedal pulses.  No bruits    CENTRAL NERVOUS SYSTEM: No focal motor or sensory deficits noted.   SKIN: Skin without lesions, moist, well perfused.   MUSCLE STRENGTH & TONE: No noteable weakness, atrophy or abnormal movement    I HAVE REVIEWED :    The vital signs, nurses notes, and all the pertinent radiology and labs.         Current Outpatient Medications   Medication Instructions    azelastine (ASTELIN) 137 mcg, Nasal, 2 times daily    fluticasone propionate (FLONASE) 100 mcg, Each Nostril, Daily    lisinopriL-hydrochlorothiazide (PRINZIDE,ZESTORETIC) 20-12.5 mg per tablet 1 tablet, Oral, Daily    meloxicam (MOBIC) 15 mg, Oral, Daily    omeprazole (PRILOSEC) 40 mg, Oral, Daily    promethazine-dextromethorphan (PROMETHAZINE-DM) 6.25-15 mg/5 mL Syrp 5 mLs, Oral, Every 4 hours PRN    rosuvastatin (CRESTOR) 20 mg, Oral, Daily    sildenafiL (VIAGRA) 100 mg, Oral, Daily PRN, Dispense generic    testosterone (ANDROGEL) 20.25 mg/1.25 gram (1.62 %) GlPm Apply 4 pumps to shoulders daily    Dispense 3 month supply.    zolpidem (AMBIEN) 10 mg, Oral, Nightly           Assessment:   Abnormal CT calcium score,  noninvasive cardiac assessment 2021.     Hypertension, dyslipidemia, very remote past tobacco use, positive family history.        Plan:   Lipids stable 07/19/2024.  Follow up stress echo, call results of normal        No follow-ups on file.

## 2025-01-06 ENCOUNTER — HOSPITAL ENCOUNTER (OUTPATIENT)
Dept: CARDIOLOGY | Facility: HOSPITAL | Age: 65
Discharge: HOME | End: 2025-01-06
Attending: INTERNAL MEDICINE
Payer: COMMERCIAL

## 2025-01-06 VITALS — BODY MASS INDEX: 34.32 KG/M2 | HEIGHT: 75 IN | WEIGHT: 276 LBS

## 2025-01-06 DIAGNOSIS — G89.4 CHRONIC PAIN SYNDROME: ICD-10-CM

## 2025-01-06 DIAGNOSIS — R93.1 AGATSTON CORONARY ARTERY CALCIUM SCORE GREATER THAN 400: Chronic | ICD-10-CM

## 2025-01-06 LAB
ASCENDING AORTA: 3.28 CM
AV INDEX (PROSTH): 0.71
AV MEAN GRADIENT: 5.3 MMHG
AV PEAK GRADIENT: 9 MMHG
AV REGURGITATION PRESSURE HALF TIME: 911 MS
AV VALVE AREA BY VELOCITY RATIO: 2.5 CM²
AV VALVE AREA: 2.5 CM²
AV VELOCITY RATIO: 0.73
BSA FOR ECHO PROCEDURE: 2.57 M2
CV ECHO LV RWT: 0.51 CM
CV STRESS BASE HR: 59 BPM
DIASTOLIC BLOOD PRESSURE: 92 MMHG
DOP CALC AO PEAK VEL: 1.5 M/S
DOP CALC AO VTI: 35 CM
DOP CALC LVOT AREA: 3.5 CM2
DOP CALC LVOT DIAMETER: 2.1 CM
DOP CALC LVOT PEAK VEL: 1.1 M/S
DOP CALC LVOT STROKE VOLUME: 86.2 CM3
DOP CALCLVOT PEAK VEL VTI: 24.9 CM
E WAVE DECELERATION TIME: 280.4 MSEC
E/A RATIO: 1.11
E/E' RATIO: 9.76 M/S
ECHO LV POSTERIOR WALL: 1.2 CM (ref 0.6–1.1)
FRACTIONAL SHORTENING: 25.5 % (ref 28–44)
INTERVENTRICULAR SEPTUM: 1.4 CM (ref 0.6–1.1)
IVRT: 102.76 MSEC
LEFT ATRIUM AREA SYSTOLIC (APICAL 2 CHAMBER): 22.56 CM2
LEFT ATRIUM AREA SYSTOLIC (APICAL 4 CHAMBER): 22.62 CM2
LEFT ATRIUM SIZE: 5.23 CM
LEFT ATRIUM VOLUME INDEX MOD: 27.5 ML/M2
LEFT ATRIUM VOLUME MOD: 69.29 ML
LEFT INTERNAL DIMENSION IN SYSTOLE: 3.5 CM (ref 2.1–4)
LEFT VENTRICLE DIASTOLIC VOLUME INDEX: 41.24 ML/M2
LEFT VENTRICLE DIASTOLIC VOLUME: 103.93 ML
LEFT VENTRICLE END SYSTOLIC VOLUME APICAL 2 CHAMBER: 70.82 ML
LEFT VENTRICLE END SYSTOLIC VOLUME APICAL 4 CHAMBER: 62.93 ML
LEFT VENTRICLE MASS INDEX: 94.4 G/M2
LEFT VENTRICLE SYSTOLIC VOLUME INDEX: 20.8 ML/M2
LEFT VENTRICLE SYSTOLIC VOLUME: 52.44 ML
LEFT VENTRICULAR INTERNAL DIMENSION IN DIASTOLE: 4.7 CM (ref 3.5–6)
LEFT VENTRICULAR MASS: 237.9 G
LV LATERAL E/E' RATIO: 8.3 M/S
LV SEPTAL E/E' RATIO: 11.86 M/S
LVED V (TEICH): 103.93 ML
LVES V (TEICH): 52.44 ML
LVOT MG: 2.25 MMHG
LVOT MV: 0.69 CM/S
MV PEAK A VEL: 0.75 M/S
MV PEAK E VEL: 0.83 M/S
MV STENOSIS PRESSURE HALF TIME: 81.32 MS
MV VALVE AREA P 1/2 METHOD: 2.71 CM2
OHS CV CPX 1 MINUTE RECOVERY HEART RATE: 109 BPM
OHS CV CPX 85 PERCENT MAX PREDICTED HEART RATE MALE: 133
OHS CV CPX ESTIMATED METS: 11
OHS CV CPX MAX PREDICTED HEART RATE: 156
OHS CV CPX PATIENT IS FEMALE: 0
OHS CV CPX PATIENT IS MALE: 1
OHS CV CPX PEAK DIASTOLIC BLOOD PRESSURE: 121 MMHG
OHS CV CPX PEAK HEAR RATE: 142 BPM
OHS CV CPX PEAK RATE PRESSURE PRODUCT: ABNORMAL
OHS CV CPX PEAK SYSTOLIC BLOOD PRESSURE: 219 MMHG
OHS CV CPX PERCENT MAX PREDICTED HEART RATE ACHIEVED: 91
OHS CV CPX RATE PRESSURE PRODUCT PRESENTING: 9027
PISA AR MAX VEL: 3.81 M/S
PISA MRMAX VEL: 5.7 M/S
PISA TR MAX VEL: 2.45 M/S
PULM VEIN S/D RATIO: 1.08
PV PEAK D VEL: 0.51 M/S
PV PEAK S VEL: 0.55 M/S
RA PRESSURE ESTIMATED: 3 MMHG
RV TB RVSP: 5 MMHG
SINUS: 3.73 CM
STJ: 3.08 CM
STRESS ECHO POST EXERCISE DUR MIN: 6 MINUTES
STRESS ECHO POST EXERCISE DUR SEC: 40 SECONDS
SYSTOLIC BLOOD PRESSURE: 153 MMHG
TDI LATERAL: 0.1 M/S
TDI SEPTAL: 0.07 M/S
TDI: 0.09 M/S
TR MAX PG: 24 MMHG
TRICUSPID ANNULAR PLANE SYSTOLIC EXCURSION: 2.05 CM
TV REST PULMONARY ARTERY PRESSURE: 27 MMHG
Z-SCORE OF LEFT VENTRICULAR DIMENSION IN END DIASTOLE: -11.39
Z-SCORE OF LEFT VENTRICULAR DIMENSION IN END SYSTOLE: -7.19

## 2025-01-06 PROCEDURE — 93351 STRESS TTE COMPLETE: CPT | Mod: 26,,, | Performed by: INTERNAL MEDICINE

## 2025-01-06 PROCEDURE — 93351 STRESS TTE COMPLETE: CPT | Mod: PO

## 2025-01-08 ENCOUNTER — OFFICE VISIT (OUTPATIENT)
Dept: ORTHOPEDICS | Facility: CLINIC | Age: 65
End: 2025-01-08
Payer: COMMERCIAL

## 2025-01-08 ENCOUNTER — PATIENT MESSAGE (OUTPATIENT)
Dept: ORTHOPEDICS | Facility: CLINIC | Age: 65
End: 2025-01-08
Payer: COMMERCIAL

## 2025-01-08 ENCOUNTER — HOSPITAL ENCOUNTER (OUTPATIENT)
Dept: RADIOLOGY | Facility: HOSPITAL | Age: 65
Discharge: HOME | End: 2025-01-08
Attending: ORTHOPAEDIC SURGERY
Payer: COMMERCIAL

## 2025-01-08 VITALS — BODY MASS INDEX: 34.32 KG/M2 | WEIGHT: 276 LBS | HEIGHT: 75 IN

## 2025-01-08 DIAGNOSIS — M17.0 PRIMARY OSTEOARTHRITIS OF BOTH KNEES: Primary | ICD-10-CM

## 2025-01-08 DIAGNOSIS — M17.0 PRIMARY OSTEOARTHRITIS OF BOTH KNEES: ICD-10-CM

## 2025-01-08 DIAGNOSIS — M17.0 OSTEOARTHRITIS OF BOTH KNEES, UNSPECIFIED OSTEOARTHRITIS TYPE: Primary | ICD-10-CM

## 2025-01-08 PROCEDURE — 1159F MED LIST DOCD IN RCRD: CPT | Mod: CPTII,S$GLB,, | Performed by: ORTHOPAEDIC SURGERY

## 2025-01-08 PROCEDURE — 99999 PR PBB SHADOW E&M-EST. PATIENT-LVL III: CPT | Mod: PBBFAC,,, | Performed by: ORTHOPAEDIC SURGERY

## 2025-01-08 PROCEDURE — 73562 X-RAY EXAM OF KNEE 3: CPT | Mod: TC,50,PO

## 2025-01-08 PROCEDURE — 99204 OFFICE O/P NEW MOD 45 MIN: CPT | Mod: 25,S$GLB,, | Performed by: ORTHOPAEDIC SURGERY

## 2025-01-08 PROCEDURE — 20610 DRAIN/INJ JOINT/BURSA W/O US: CPT | Mod: 50,S$GLB,, | Performed by: ORTHOPAEDIC SURGERY

## 2025-01-08 PROCEDURE — 73562 X-RAY EXAM OF KNEE 3: CPT | Mod: 26,50,, | Performed by: RADIOLOGY

## 2025-01-08 PROCEDURE — 3008F BODY MASS INDEX DOCD: CPT | Mod: CPTII,S$GLB,, | Performed by: ORTHOPAEDIC SURGERY

## 2025-01-08 RX ORDER — TRIAMCINOLONE ACETONIDE 40 MG/ML
40 INJECTION, SUSPENSION INTRA-ARTICULAR; INTRAMUSCULAR
Status: DISCONTINUED | OUTPATIENT
Start: 2025-01-08 | End: 2025-01-08 | Stop reason: HOSPADM

## 2025-01-08 RX ADMIN — TRIAMCINOLONE ACETONIDE 40 MG: 40 INJECTION, SUSPENSION INTRA-ARTICULAR; INTRAMUSCULAR at 11:01

## 2025-01-08 NOTE — PROCEDURES
Large Joint Aspiration/Injection: bilateral knee    Date/Time: 1/8/2025 11:00 AM    Performed by: Javed Trejo MD  Authorized by: Javed Trejo MD    Consent Done?:  Yes (Verbal)  Timeout: prior to procedure the correct patient, procedure, and site was verified    Prep: patient was prepped and draped in usual sterile fashion      Details:  Needle Size:  21 G  Approach:  Anterolateral  Location:  Knee  Laterality:  Bilateral  Site:  Bilateral knee  Medications (Right):  40 mg triamcinolone acetonide 40 mg/mL  Medications (Left):  40 mg triamcinolone acetonide 40 mg/mL  Patient tolerance:  Patient tolerated the procedure well with no immediate complications

## 2025-01-08 NOTE — PROGRESS NOTES
64 years old bilateral knee pain for 20 years.  No trauma.  Aching pain 5 on good days 7 on bad days.  Made worse with activity relieved with rest.  Patient reports that he has been receiving Kenalog injections into both of his knees for the past 10 years 3-4 times per year    Past history significant for left knee arthroscopy x2, right knee arthroscopy x1     Exam shows tenderness the joint line no signs infection well-healed arthroscopic portal sites tenderness medially extensor mechanism functioning well     X-rays show degenerative changes mostly involving the medial compartment     Assessment:  Bilateral knee arthrosis     Plan: Kenalog injection bilateral knees, encourage strengthening and weight loss, follow-up as needed

## 2025-01-13 ENCOUNTER — LAB VISIT (OUTPATIENT)
Dept: LAB | Facility: HOSPITAL | Age: 65
End: 2025-01-13
Payer: COMMERCIAL

## 2025-01-13 ENCOUNTER — OFFICE VISIT (OUTPATIENT)
Dept: FAMILY MEDICINE | Facility: CLINIC | Age: 65
End: 2025-01-13
Payer: COMMERCIAL

## 2025-01-13 VITALS
DIASTOLIC BLOOD PRESSURE: 82 MMHG | WEIGHT: 273.56 LBS | OXYGEN SATURATION: 97 % | HEART RATE: 62 BPM | SYSTOLIC BLOOD PRESSURE: 142 MMHG | BODY MASS INDEX: 34.2 KG/M2

## 2025-01-13 DIAGNOSIS — N40.1 BPH WITH URINARY OBSTRUCTION: ICD-10-CM

## 2025-01-13 DIAGNOSIS — H61.22 IMPACTED CERUMEN OF LEFT EAR: ICD-10-CM

## 2025-01-13 DIAGNOSIS — Z00.00 ENCOUNTER FOR GENERAL ADULT MEDICAL EXAMINATION W/O ABNORMAL FINDINGS: ICD-10-CM

## 2025-01-13 DIAGNOSIS — E78.2 MIXED HYPERLIPIDEMIA: ICD-10-CM

## 2025-01-13 DIAGNOSIS — N13.8 BPH WITH URINARY OBSTRUCTION: ICD-10-CM

## 2025-01-13 DIAGNOSIS — I10 PRIMARY HYPERTENSION: ICD-10-CM

## 2025-01-13 DIAGNOSIS — R93.1 AGATSTON CORONARY ARTERY CALCIUM SCORE GREATER THAN 400: Chronic | ICD-10-CM

## 2025-01-13 DIAGNOSIS — Z00.00 ENCOUNTER FOR GENERAL ADULT MEDICAL EXAMINATION W/O ABNORMAL FINDINGS: Primary | ICD-10-CM

## 2025-01-13 PROBLEM — M25.562 ACUTE PAIN OF BOTH KNEES: Status: RESOLVED | Noted: 2021-09-29 | Resolved: 2025-01-13

## 2025-01-13 PROBLEM — M54.50 CHRONIC RIGHT-SIDED LOW BACK PAIN WITHOUT SCIATICA: Status: RESOLVED | Noted: 2017-12-11 | Resolved: 2025-01-13

## 2025-01-13 PROBLEM — M25.561 ACUTE PAIN OF BOTH KNEES: Status: RESOLVED | Noted: 2021-09-29 | Resolved: 2025-01-13

## 2025-01-13 PROBLEM — G89.29 CHRONIC PAIN: Status: RESOLVED | Noted: 2017-08-23 | Resolved: 2025-01-13

## 2025-01-13 PROBLEM — M62.81 MUSCLE WEAKNESS OF LOWER EXTREMITY: Status: RESOLVED | Noted: 2021-09-29 | Resolved: 2025-01-13

## 2025-01-13 PROBLEM — G89.29 CHRONIC RIGHT-SIDED LOW BACK PAIN WITHOUT SCIATICA: Status: RESOLVED | Noted: 2017-12-11 | Resolved: 2025-01-13

## 2025-01-13 PROBLEM — R13.10 DYSPHAGIA: Status: RESOLVED | Noted: 2017-04-21 | Resolved: 2025-01-13

## 2025-01-13 LAB
ALBUMIN SERPL BCP-MCNC: 4.5 G/DL (ref 3.5–5.2)
ALP SERPL-CCNC: 55 U/L (ref 40–150)
ALT SERPL W/O P-5'-P-CCNC: 41 U/L (ref 10–44)
ANION GAP SERPL CALC-SCNC: 10 MMOL/L (ref 8–16)
AST SERPL-CCNC: 27 U/L (ref 10–40)
BASOPHILS # BLD AUTO: 0.07 K/UL (ref 0–0.2)
BASOPHILS NFR BLD: 0.9 % (ref 0–1.9)
BILIRUB SERPL-MCNC: 0.6 MG/DL (ref 0.1–1)
BUN SERPL-MCNC: 25 MG/DL (ref 8–23)
CALCIUM SERPL-MCNC: 10.2 MG/DL (ref 8.7–10.5)
CHLORIDE SERPL-SCNC: 105 MMOL/L (ref 95–110)
CHOLEST SERPL-MCNC: 178 MG/DL (ref 120–199)
CHOLEST/HDLC SERPL: 3.1 {RATIO} (ref 2–5)
CO2 SERPL-SCNC: 26 MMOL/L (ref 23–29)
COMPLEXED PSA SERPL-MCNC: 0.38 NG/ML (ref 0–4)
CREAT SERPL-MCNC: 1 MG/DL (ref 0.5–1.4)
DIFFERENTIAL METHOD BLD: ABNORMAL
EOSINOPHIL # BLD AUTO: 0.3 K/UL (ref 0–0.5)
EOSINOPHIL NFR BLD: 3.3 % (ref 0–8)
ERYTHROCYTE [DISTWIDTH] IN BLOOD BY AUTOMATED COUNT: 11.7 % (ref 11.5–14.5)
EST. GFR  (NO RACE VARIABLE): >60 ML/MIN/1.73 M^2
ESTIMATED AVG GLUCOSE: 103 MG/DL (ref 68–131)
GLUCOSE SERPL-MCNC: 94 MG/DL (ref 70–110)
HBA1C MFR BLD: 5.2 % (ref 4–5.6)
HCT VFR BLD AUTO: 48.1 % (ref 40–54)
HDLC SERPL-MCNC: 57 MG/DL (ref 40–75)
HDLC SERPL: 32 % (ref 20–50)
HGB BLD-MCNC: 15.6 G/DL (ref 14–18)
IMM GRANULOCYTES # BLD AUTO: 0.03 K/UL (ref 0–0.04)
IMM GRANULOCYTES NFR BLD AUTO: 0.4 % (ref 0–0.5)
LDLC SERPL CALC-MCNC: 93.4 MG/DL (ref 63–159)
LYMPHOCYTES # BLD AUTO: 2 K/UL (ref 1–4.8)
LYMPHOCYTES NFR BLD: 25.8 % (ref 18–48)
MCH RBC QN AUTO: 31.8 PG (ref 27–31)
MCHC RBC AUTO-ENTMCNC: 32.4 G/DL (ref 32–36)
MCV RBC AUTO: 98 FL (ref 82–98)
MONOCYTES # BLD AUTO: 0.8 K/UL (ref 0.3–1)
MONOCYTES NFR BLD: 9.5 % (ref 4–15)
NEUTROPHILS # BLD AUTO: 4.8 K/UL (ref 1.8–7.7)
NEUTROPHILS NFR BLD: 60.1 % (ref 38–73)
NONHDLC SERPL-MCNC: 121 MG/DL
NRBC BLD-RTO: 0 /100 WBC
PLATELET # BLD AUTO: 222 K/UL (ref 150–450)
PMV BLD AUTO: 11.5 FL (ref 9.2–12.9)
POTASSIUM SERPL-SCNC: 4.7 MMOL/L (ref 3.5–5.1)
PROT SERPL-MCNC: 7.9 G/DL (ref 6–8.4)
RBC # BLD AUTO: 4.91 M/UL (ref 4.6–6.2)
SODIUM SERPL-SCNC: 141 MMOL/L (ref 136–145)
TRIGL SERPL-MCNC: 138 MG/DL (ref 30–150)
TSH SERPL DL<=0.005 MIU/L-ACNC: 1.29 UIU/ML (ref 0.4–4)
WBC # BLD AUTO: 7.9 K/UL (ref 3.9–12.7)

## 2025-01-13 PROCEDURE — 36415 COLL VENOUS BLD VENIPUNCTURE: CPT | Mod: PO

## 2025-01-13 PROCEDURE — 99999 PR PBB SHADOW E&M-EST. PATIENT-LVL IV: CPT | Mod: PBBFAC,,,

## 2025-01-13 PROCEDURE — 80053 COMPREHEN METABOLIC PANEL: CPT

## 2025-01-13 PROCEDURE — 83036 HEMOGLOBIN GLYCOSYLATED A1C: CPT

## 2025-01-13 PROCEDURE — 80061 LIPID PANEL: CPT

## 2025-01-13 PROCEDURE — 1160F RVW MEDS BY RX/DR IN RCRD: CPT | Mod: CPTII,S$GLB,,

## 2025-01-13 PROCEDURE — 84153 ASSAY OF PSA TOTAL: CPT

## 2025-01-13 PROCEDURE — 84443 ASSAY THYROID STIM HORMONE: CPT

## 2025-01-13 PROCEDURE — 99396 PREV VISIT EST AGE 40-64: CPT | Mod: S$GLB,,,

## 2025-01-13 PROCEDURE — 3077F SYST BP >= 140 MM HG: CPT | Mod: CPTII,S$GLB,,

## 2025-01-13 PROCEDURE — 3079F DIAST BP 80-89 MM HG: CPT | Mod: CPTII,S$GLB,,

## 2025-01-13 PROCEDURE — 3008F BODY MASS INDEX DOCD: CPT | Mod: CPTII,S$GLB,,

## 2025-01-13 PROCEDURE — 1159F MED LIST DOCD IN RCRD: CPT | Mod: CPTII,S$GLB,,

## 2025-01-13 PROCEDURE — 85025 COMPLETE CBC W/AUTO DIFF WBC: CPT

## 2025-01-13 RX ORDER — BENZONATATE 200 MG/1
200 CAPSULE ORAL
COMMUNITY
Start: 2025-01-09

## 2025-01-13 RX ORDER — LISINOPRIL AND HYDROCHLOROTHIAZIDE 20; 25 MG/1; MG/1
1 TABLET ORAL DAILY
Qty: 30 TABLET | Refills: 1 | Status: SHIPPED | OUTPATIENT
Start: 2025-01-13 | End: 2026-01-13

## 2025-01-13 NOTE — PROGRESS NOTES
Patient ID: Stanley Stewart is a 64 y.o. male.    Chief Complaint: Establish Care    History of Present Illness    CHIEF COMPLAINT:  Stanley presents today for follow-up.    CARDIOVASCULAR:  He reports concern about recent elevated home blood pressure readings ranging in 140s-160s systolic using [employer] device. He has an elevated calcium score over 400 and follows with cardiologist Dr. Dillon annually. Recent stress echocardiogram showed good results.    ENT:  On p.e. left ear blockage with associated difficulty hearing, noting right is his primary hearing side with decreased auditory acuity.    GASTROINTESTINAL:  Upper endoscopy in 2023 revealed swallowing issues requiring esophageal dilation. He takes Prilosec for esophageal issues with increased usage recently, but denies current swallowing problems. Colonoscopy in 2020 revealed diverticulosis with follow-up recommended in 10 years.    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    MEDICATIONS:  Current medications include Astalyn nasal spray, Tessalon Pearls for cough, Flonase nasal spray, Lisinopril-HCTZ 20/12.5, Meloxicam 15mg PRN, Rosuvastatin 20mg, Viagra PRN, Testosterone Androgel, and Zolpidem 10mg nightly.    SURGICAL HISTORY:  History includes vasectomy, tonsillectomy, small intestine surgery for intussusception at 6 months and 54 years of age, arthroscopic knee procedures, and two radiofrequency nerve ablation procedures on back over a four-year period.    SOCIAL HISTORY:  Former smoker, quit in 1987. Consumes about seven drinks per week, mostly red wine.    FAMILY HISTORY:  Mother had chronic inactive hepatitis AB. Father had heart disease, alcohol concerns, high cholesterol, and hypertension.     ALLERGIES:  Allergic to doxycycline, shellfish, tree pollen, and clopidogrel.      ROS:  Eyes: denies vision changes  ENT: reports  hearing loss, denies difficulty swallowing  Musculoskeletal: reports back pain         Patient Active Problem List   Diagnosis    Male hypogonadism    Primary hypertension    Mixed hyperlipidemia    Erectile dysfunction due to arterial insufficiency    Lumbar spondylosis    BPH with urinary obstruction    Agatston coronary artery calcium score greater than 400    DDD (degenerative disc disease), lumbar    Primary osteoarthritis of both knees       Current Outpatient Medications on File Prior to Visit   Medication Sig Dispense Refill    azelastine (ASTELIN) 137 mcg (0.1 %) nasal spray 1 spray (137 mcg total) by Nasal route 2 (two) times daily. 60 mL 1    benzonatate (TESSALON) 200 MG capsule Take 200 mg by mouth.      fluticasone propionate (FLONASE) 50 mcg/actuation nasal spray 2 sprays (100 mcg total) by Each Nostril route once daily. 48 g 3    meloxicam (MOBIC) 15 MG tablet Take 1 tablet (15 mg total) by mouth once daily. 90 tablet 2    omeprazole (PRILOSEC) 40 MG capsule Take 1 capsule (40 mg total) by mouth once daily. 90 capsule 1    rosuvastatin (CRESTOR) 20 MG tablet Take 1 tablet (20 mg total) by mouth once daily. 90 tablet 2    sildenafiL (VIAGRA) 100 MG tablet Take 1 tablet (100 mg total) by mouth daily as needed for Erectile Dysfunction. Dispense generic 10 tablet 11    testosterone (ANDROGEL) 20.25 mg/1.25 gram (1.62 %) GlPm Apply 4 pumps to shoulders daily    Dispense 3 month supply. 6 each 1    zolpidem (AMBIEN) 10 mg Tab Take 1 tablet (10 mg total) by mouth every evening. 90 tablet 0    [DISCONTINUED] lisinopriL-hydrochlorothiazide (PRINZIDE,ZESTORETIC) 20-12.5 mg per tablet Take 1 tablet by mouth once daily. 90 tablet 2    [DISCONTINUED] promethazine-dextromethorphan (PROMETHAZINE-DM) 6.25-15 mg/5 mL Syrp Take 5 mLs by mouth every 4 (four) hours as needed (cough). (Patient not taking: Reported on 1/13/2025) 120 mL 0     Current Facility-Administered Medications on File Prior to Visit   Medication  Dose Route Frequency Provider Last Rate Last Admin    sodium hyaluronate (EUFLEXXA) 10 mg/mL(mw 2.4 -3.6 million) injection 40 mg  40 mg Intra-articular Weekly Placido Frazier MD   40 mg at 08/05/21 0849       Past Medical History:   Diagnosis Date    Allergy     BPH with urinary obstruction     Degenerative disc disease     Dysphagia     ED (erectile dysfunction)     Gout     Hyperlipidemia     Hypertension     Male hypogonadism 7/19/2012       Past Surgical History:   Procedure Laterality Date    APPENDECTOMY      arthroscopic knee surg      left X 2, right X 1    COLON SURGERY  09-    See small intestine surgery. Also had this surgery when 6 mo    COLONOSCOPY N/A 09/28/2020    Procedure: COLONOSCOPY;  Surgeon: CAYDEN Montenegro MD;  Location: [facility] ([address] [place] [address]);  Service: Endoscopy;  Laterality: N/A;  9/25-covid [place]    ESOPHAGOGASTRODUODENOSCOPY N/A 08/08/2023    Procedure: EGD (ESOPHAGOGASTRODUODENOSCOPY);  Surgeon: Tiffany Mars MD;  Location: [facility];  Service: Endoscopy;  Laterality: N/A;    EYE SURGERY  02-    Lasik PRK    FUSION OF LUMBAR SPINE BY ANTERIOR APPROACH      unknown approach;'L5-S1 fusion    INJECTION OF ANESTHETIC AGENT AROUND NERVE Right 08/24/2021    Procedure: BLOCK, NERVE, MEDIAL BRANCH L3,L4,L5;  Surgeon: Zachary Salas MD;  Location: [facility];  Service: Pain Management;  Laterality: Right;  1 of 2    INJECTION OF ANESTHETIC AGENT AROUND NERVE Right 10/12/2021    Procedure: BLOCK, NERVE, L3-L4-L5 MEDIAL BRANCH 2 OF 2 NEED CONSENT/ conitnue Plavix;  Surgeon: Zachary Salas MD;  Location: [facility];  Service: Pain Management;  Laterality: Right;    INTUSSUSCEPTION REPAIR      KNEE SURGERY      RADIOFREQUENCY ABLATION Right 12/08/2021    Procedure: RADIOFREQUENCY ABLATION right L3, L4, L5 NEEDS CONSENT;  Surgeon: Zachary Salas MD;  Location: [facility];  Service: Pain Management;  Laterality: Right;    SMALL INTESTINE SURGERY   2014    Intusseption. Also had this sx when 6 mo old    TONSILLECTOMY      VASECTOMY  1991        Family History   Problem Relation Name Age of Onset    Liver disease Mother          IV stick Hepatitis    Hyperlipidemia Father Stanley     Hypertension Father Stanley     Heart disease Father Stanley     Alcohol abuse Father Stanley     Heart disease Brother      Alcohol abuse Brother Sang     Heart disease Brother Jonathan         2013 - Stent ADCA    Heart disease Maternal Grandmother Jade     Hyperlipidemia Maternal Grandmother Jade     Alcohol abuse Maternal Grandfather Lino Franklin Redjimmy     Cancer Maternal Grandfather Lino Franklin Redjimmy         Jaw/Neck/Throat    Heart disease Maternal Grandfather Lino Trujillo         1975 - caused prison    Heart disease Paternal Grandfather Manuel MATTHEW Pat Sr     Cancer Paternal Grandfather Manuel CASTRO Pat Sr         Kidney    COPD Paternal Grandfather Manuel CASTRO Pat Sr     Alcohol abuse Paternal Uncle Manuel MATTHEW Pat Jr     Prostate cancer Neg Hx      Colon cancer Neg Hx         Social History     Socioeconomic History    Marital status:     Number of children: 3   Occupational History     Employer: [employer]   Tobacco Use    Smoking status: Former     Current packs/day: 0.00     Average packs/day: 1 pack/day for 10.0 years (10.0 ttl pk-yrs)     Types: Cigarettes     Quit date: 1987     Years since quittin.5    Smokeless tobacco: Never    Tobacco comments:     Began smoking about 15/16 old, then quit  around    Substance and Sexual Activity    Alcohol use: Yes     Alcohol/week: 7.0 standard drinks of alcohol     Types: 7 Glasses of wine per week    Drug use: No    Sexual activity: Yes     Partners: Female     Comment: I had a vasectomy in      Social Drivers of Health     Financial Resource Strain: Low Risk  (2025)    Overall Financial Resource Strain (CARDIA)     Difficulty of Paying Living Expenses: Not hard at all   Food  Insecurity: No Food Insecurity (1/8/2025)    Hunger Vital Sign     Worried About Running Out of Food in the Last Year: Never true     Ran Out of Food in the Last Year: Never true   Transportation Needs: No Transportation Needs (1/5/2024)    PRAPARE - Transportation     Lack of Transportation (Medical): No     Lack of Transportation (Non-Medical): No   Physical Activity: Insufficiently Active (1/8/2025)    Exercise Vital Sign     Days of Exercise per Week: 3 days     Minutes of Exercise per Session: 30 min   Stress: No Stress Concern Present (1/8/2025)    [employer] [place] [employer] - Occupational Stress Questionnaire     Feeling of Stress : Not at all   Housing Stability: Low Risk  (1/5/2024)    Housing Stability Vital Sign     Unable to Pay for Housing in the Last Year: No     Number of Places Lived in the Last Year: 1     Unstable Housing in the Last Year: No       Review of patient's allergies indicates:   Allergen Reactions    Doxycycline      Possible allergy/ Rash     Shellfish containing products      Other reaction(s): Hives    Tree pollen-white akhil Itching and Other (See Comments)    Clopidogrel Rash        Health Maintenance Due   Topic Date Due    HIV Screening  Never done    Pneumococcal Vaccines (Age 50+) (1 of 1 - PCV) Never done    RSV Vaccine (Age 60+ and Pregnant patients) (1 - Risk 60-74 years 1-dose series) Never done    Influenza Vaccine (1) 09/01/2024    COVID-19 Vaccine (4 - 2024-25 season) 09/01/2024       Objective     Vitals:    01/13/25 1018   BP: (!) 142/82   Pulse: 62      Body mass index is 34.2 kg/m².     Physical Exam  Vitals and nursing note reviewed.   Constitutional:       General: He is not in acute distress.     Appearance: Normal appearance. He is not ill-appearing.   HENT:      Head: Normocephalic and atraumatic.      Right Ear: Tympanic membrane, ear canal and external ear normal. There is no impacted cerumen.      Left Ear: There is impacted cerumen.      Nose:  Nose normal.      Mouth/Throat:      Mouth: Mucous membranes are moist.   Eyes:      Extraocular Movements: Extraocular movements intact.   Cardiovascular:      Rate and Rhythm: Normal rate and regular rhythm.      Pulses: Normal pulses.      Heart sounds: Normal heart sounds. No murmur heard.     No friction rub. No gallop.   Pulmonary:      Effort: Pulmonary effort is normal. No respiratory distress.      Breath sounds: Normal breath sounds. No stridor. No wheezing, rhonchi or rales.   Abdominal:      General: Bowel sounds are normal. There is no distension.      Palpations: Abdomen is soft.      Tenderness: There is no abdominal tenderness. There is no guarding.   Musculoskeletal:      Cervical back: Neck supple.      Right lower leg: No edema.      Left lower leg: No edema.   Lymphadenopathy:      Cervical: No cervical adenopathy.   Skin:     General: Skin is warm and dry.   Neurological:      Mental Status: He is alert and oriented to person, place, and time.   Psychiatric:         Mood and Affect: Mood normal.         Behavior: Behavior normal.       Assessment & Plan    Increased lisinopril/HCTZ from 20/12.5 mg to 20/25 mg due to elevated home BP readings  Considered recent viral illness and Mucinex use as potential contributors to BP elevation  Reviewed patient's history of elevated calcium score and annual cardiology follow-ups  Noted stable cholesterol levels on current rosuvastatin regimen  Identified left ear canal obstruction, limiting visualization of tympanic membrane    HYPERTENSION:  - Discussed importance of maintaining BP below 140/90 mmHg, with ideal target of 130/80 mmHg for patient's age.  - Explained potential for increased urination with higher HCTZ dose.  - Recommend implementing weight loss measures for improved BP control.  - Stanley to limit salt intake to less than 2 g daily.  - Increased lisinopril/HCTZ from 20/12.5 mg to 20/25 mg daily; [facility] supply at [facility].  -  Follow up in 2 weeks to assess BP response to medication change.  - Message through patient portal in 2 weeks with home BP readings.    CERUMEN IMPACTION:  - Discussed proper ear care, advising against Q-tip use deep in ear canal.  - Referred to ENT for left earwax removal.    WEIGHT MANAGEMENT:  - Stanley to continue regular bicycle riding 3-4 days per week as tolerated.    MEDICATIONS/SUPPLEMENTS:  - Continued rosuvastatin 20 mg daily, Prilosec daily, Flonase nasal spray as directed, Azelastine nasal spray as needed, meloxicam 15 mg as needed with food, Viagra as needed, and zolpidem 10 mg nightly.  - Contact office in 2-3 weeks for zolpidem refill.    LABS:  - Ordered CBC, lipid profile, and PSA.         Encounter for general adult medical examination w/o abnormal findings  -     Comprehensive Metabolic Panel; Future; Expected date: 01/13/2025  -     TSH; Future; Expected date: 01/13/2025  -     Hemoglobin A1C; Future; Expected date: 01/13/2025  -     Lipid Panel; Future; Expected date: 01/13/2025  -     CBC Auto Differential; Future; Expected date: 01/13/2025    Primary hypertension  -     TSH; Future; Expected date: 01/13/2025  -     Hemoglobin A1C; Future; Expected date: 01/13/2025  -     Lipid Panel; Future; Expected date: 01/13/2025  -     lisinopriL-hydrochlorothiazide (PRINZIDE,ZESTORETIC) 20-25 mg Tab; Take 1 tablet by mouth once daily.  Dispense: 30 tablet; Refill: 1    Mixed hyperlipidemia    BPH with urinary obstruction  -     PSA, SCREENING; Future; Expected date: 01/13/2025    Agatston coronary artery calcium score greater than 400    Impacted cerumen of left ear  -     Ambulatory referral/consult to ENT; Future; Expected date: 01/20/2025        Follow up in about 1 year (around 1/13/2026), or if symptoms worsen or fail to improve.    This note was generated with the assistance of ambient listening technology. Verbal consent was obtained by the patient and accompanying visitor(s) for the recording of  patient appointment to facilitate this note. I attest to having reviewed and edited the generated note for accuracy, though some syntax or spelling errors may persist. Please contact the author of this note for any clarification.        Arely Nicholson MD  01/13/2025

## 2025-01-31 ENCOUNTER — PATIENT MESSAGE (OUTPATIENT)
Dept: FAMILY MEDICINE | Facility: CLINIC | Age: 65
End: 2025-01-31
Payer: COMMERCIAL

## 2025-02-07 ENCOUNTER — OFFICE VISIT (OUTPATIENT)
Dept: FAMILY MEDICINE | Facility: CLINIC | Age: 65
End: 2025-02-07
Payer: COMMERCIAL

## 2025-02-07 DIAGNOSIS — R11.0 NAUSEA: ICD-10-CM

## 2025-02-07 DIAGNOSIS — M17.0 PRIMARY OSTEOARTHRITIS OF BOTH KNEES: ICD-10-CM

## 2025-02-07 DIAGNOSIS — E78.2 MIXED HYPERLIPIDEMIA: ICD-10-CM

## 2025-02-07 DIAGNOSIS — Z87.898 HISTORY OF APNEA: ICD-10-CM

## 2025-02-07 DIAGNOSIS — E66.811 CLASS 1 OBESITY WITH ALVEOLAR HYPOVENTILATION, SERIOUS COMORBIDITY, AND BODY MASS INDEX (BMI) OF 34.0 TO 34.9 IN ADULT: ICD-10-CM

## 2025-02-07 DIAGNOSIS — I10 PRIMARY HYPERTENSION: Primary | ICD-10-CM

## 2025-02-07 DIAGNOSIS — E66.2 CLASS 1 OBESITY WITH ALVEOLAR HYPOVENTILATION, SERIOUS COMORBIDITY, AND BODY MASS INDEX (BMI) OF 34.0 TO 34.9 IN ADULT: ICD-10-CM

## 2025-02-07 PROCEDURE — 98006 SYNCH AUDIO-VIDEO EST MOD 30: CPT | Mod: 95,,,

## 2025-02-07 PROCEDURE — 4010F ACE/ARB THERAPY RXD/TAKEN: CPT | Mod: CPTII,95,,

## 2025-02-07 PROCEDURE — 3044F HG A1C LEVEL LT 7.0%: CPT | Mod: CPTII,95,,

## 2025-02-07 RX ORDER — ONDANSETRON 4 MG/1
4 TABLET, ORALLY DISINTEGRATING ORAL 2 TIMES DAILY PRN
Qty: 30 TABLET | Refills: 0 | Status: SHIPPED | OUTPATIENT
Start: 2025-02-07

## 2025-02-07 RX ORDER — LISINOPRIL AND HYDROCHLOROTHIAZIDE 20; 25 MG/1; MG/1
1 TABLET ORAL DAILY
Qty: 90 TABLET | Refills: 2 | Status: SHIPPED | OUTPATIENT
Start: 2025-02-07 | End: 2026-02-07

## 2025-02-07 NOTE — PROGRESS NOTES
The patient location is: [place]  The chief complaint leading to consultation is: HYPERTENSION, obesity bmi 34.2, DYSLIPIDEMIA    Visit type: audiovisual    Notes:  Pleasant 64-year-old male with past medical history of hypertension, hyperlipidemia, osteoarthritis of both knees, history of sleep apnea, coronary artery calcium score greater than 400 presenting to tele video to discuss blood pressure and weight management.  Patient reports his morning average blood pressures before medication is 146/86 and in the afternoon after medication 128/77.  He reports he is tolerating current medication well and requests 90 day refill.    Patient's current BMI is 34.2 which is consistent with obesity diagnosis.  Due to patient's medical comorbidities including but not limited to medication controlled hypertension, medication controlled hyperlipidemia, ongoing osteoarthritis of both knees of which patient receives ongoing orthopedic care, and history of apnea improved with weight loss.  We discuss he would be a good candidate for a GLP1. He denies family history of MEN2 and medullary thyroid carcinoma. Elects to start GLP-1 agonist-1. We discussed side effects, risk versus benefits, and patient reports understanding.     Hypertension  This is a chronic problem. The current episode started more than 1 year ago. The problem has been gradually improving since onset. The problem is controlled. Pertinent negatives include no anxiety, blurred vision, chest pain, headaches, malaise/fatigue, neck pain, orthopnea, palpitations, peripheral edema, PND, shortness of breath or sweats. There are no associated agents to hypertension. Risk factors for coronary artery disease include obesity, dyslipidemia and male gender. Past treatments include diuretics, ACE inhibitors and lifestyle changes. The current treatment provides moderate improvement. There are no compliance problems.          Review of Systems   Constitutional:  Negative for  malaise/fatigue.   Eyes:  Negative for blurred vision.   Respiratory:  Negative for shortness of breath. Apnea: tirzepatide.   Cardiovascular:  Negative for chest pain, palpitations, orthopnea and PND.   Musculoskeletal:  Negative for neck pain.   Neurological:  Negative for headaches.       Objective:  Physical Exam  Constitutional:       Appearance: Normal appearance. Pleasant male.   Pulmonary:      Effort: Pulmonary effort is normal. No respiratory distress.   Neurological:      Mental Status: He is alert and oriented to person, place, and time.   Psychiatric:         Attention and Perception: Attention normal.         Mood and Affect: Mood normal. Mood is not anxious.         Behavior: Behavior normal.     Plan:  1. Primary hypertension  -     lisinopriL-hydrochlorothiazide (PRINZIDE,ZESTORETIC) 20-25 mg Tab; Take 1 tablet by mouth once daily.  Dispense: 90 tablet; Refill: 2  -     tirzepatide 2.5 mg/0.5 mL PnIj; Inject 2.5 mg into the skin every 7 days.  Dispense: 3 mL; Refill: 1    2. Mixed hyperlipidemia  -     tirzepatide 2.5 mg/0.5 mL PnIj; Inject 2.5 mg into the skin every 7 days.  Dispense: 3 mL; Refill: 1    3. Class 1 obesity with alveolar hypoventilation, serious comorbidity, and body mass index (BMI) of 34.0 to 34.9 in adult  -     tirzepatide 2.5 mg/0.5 mL PnIj; Inject 2.5 mg into the skin every 7 days.  Dispense: 3 mL; Refill: 1    4. Primary osteoarthritis of both knees    5. Nausea    Other orders  -     ondansetron (ZOFRAN-ODT) 4 MG TbDL; Take 1 tablet (4 mg total) by mouth 2 (two) times daily as needed (nausea).  Dispense: 30 tablet; Refill: 0        Follow up in about 4 weeks (around 3/7/2025), or if symptoms worsen or fail to improve.      Face to Face time with patient: 13 minutes  21 minutes of total time spent on the encounter, which includes face to face time and non-face to face time preparing to see the patient (eg, review of tests), Obtaining and/or reviewing separately obtained  history, Documenting clinical information in the electronic or other health record, Independently interpreting results (not separately reported) and communicating results to the patient/family/caregiver, or Care coordination (not separately reported).     Each patient to whom he or she provides medical services by telemedicine is:  (1) informed of the relationship between the physician and patient and the respective role of any other health care provider with respect to management of the patient; and (2) notified that he or she may decline to receive medical services by telemedicine and may withdraw from such care at any time.

## 2025-02-14 ENCOUNTER — OFFICE VISIT (OUTPATIENT)
Dept: OPTOMETRY | Facility: CLINIC | Age: 65
End: 2025-02-14
Payer: COMMERCIAL

## 2025-02-14 DIAGNOSIS — Z98.890 HISTORY OF LASER REFRACTIVE SURGERY: ICD-10-CM

## 2025-02-14 DIAGNOSIS — H52.4 MYOPIA OF BOTH EYES WITH ASTIGMATISM AND PRESBYOPIA: ICD-10-CM

## 2025-02-14 DIAGNOSIS — H35.433 PAVING STONE RETINAL DEGENERATION OF BOTH EYES: Primary | ICD-10-CM

## 2025-02-14 DIAGNOSIS — H52.13 MYOPIA OF BOTH EYES WITH ASTIGMATISM AND PRESBYOPIA: ICD-10-CM

## 2025-02-14 DIAGNOSIS — H52.203 MYOPIA OF BOTH EYES WITH ASTIGMATISM AND PRESBYOPIA: ICD-10-CM

## 2025-02-14 DIAGNOSIS — H25.13 NUCLEAR SCLEROSIS OF BOTH EYES: ICD-10-CM

## 2025-02-14 PROCEDURE — 99999 PR PBB SHADOW E&M-EST. PATIENT-LVL III: CPT | Mod: PBBFAC,,, | Performed by: OPTOMETRIST

## 2025-02-14 NOTE — PROGRESS NOTES
HPI     Annual Exam     Additional comments: New patient            Comments    Pt states : NP here for dle /here to establish care , EUGENIO 1 year ago My   Eye    Pt feels vision is stable w specs   LASIK /PRK 1997 ( [place] ) Cobble stone degeneration hsty being watched for   years   No gtts used   Denies F/F   HTN being controlled w meds             Last edited by Nikhil Velarde on 2/14/2025  9:36 AM.            Assessment /Plan     For exam results, see Encounter Report.    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 Happy with specs, cont with current Rx  3. Prev Lasik OU, Brint  4. Educated pt on presence of cataracts and effects on vision. No surgery at this time. Recheck in one year.

## 2025-03-06 ENCOUNTER — PATIENT MESSAGE (OUTPATIENT)
Dept: FAMILY MEDICINE | Facility: CLINIC | Age: 65
End: 2025-03-06
Payer: COMMERCIAL

## 2025-03-06 DIAGNOSIS — G47.00 INSOMNIA, UNSPECIFIED TYPE: ICD-10-CM

## 2025-03-06 RX ORDER — ZOLPIDEM TARTRATE 10 MG/1
10 TABLET ORAL NIGHTLY
Qty: 90 TABLET | Refills: 0 | Status: SHIPPED | OUTPATIENT
Start: 2025-03-06

## 2025-03-10 ENCOUNTER — PATIENT MESSAGE (OUTPATIENT)
Dept: ADMINISTRATIVE | Facility: HOSPITAL | Age: 65
End: 2025-03-10
Payer: COMMERCIAL

## 2025-03-13 ENCOUNTER — PATIENT MESSAGE (OUTPATIENT)
Dept: FAMILY MEDICINE | Facility: CLINIC | Age: 65
End: 2025-03-13
Payer: COMMERCIAL

## 2025-03-14 NOTE — TELEPHONE ENCOUNTER
Refill Routing Note   Medication(s) are not appropriate for processing by [facility] for the following reason(s):        Non-participating provider    [facility] action(s):  Route             Appointments  past 12m or future 3m with PCP    Date Provider   Last Visit   2/7/2025 Arely Nicholson MD   Next Visit   Visit date not found Arely Nicholosn MD   ED visits in past 90 days: 0        Note composed:12:07 PM 03/14/2025

## 2025-03-15 RX ORDER — OMEPRAZOLE 40 MG/1
40 CAPSULE, DELAYED RELEASE ORAL DAILY
Qty: 90 CAPSULE | Refills: 1 | Status: SHIPPED | OUTPATIENT
Start: 2025-03-15

## 2025-03-17 ENCOUNTER — OFFICE VISIT (OUTPATIENT)
Dept: OPHTHALMOLOGY | Facility: CLINIC | Age: 65
End: 2025-03-17
Payer: COMMERCIAL

## 2025-03-17 ENCOUNTER — PATIENT OUTREACH (OUTPATIENT)
Dept: ADMINISTRATIVE | Facility: HOSPITAL | Age: 65
End: 2025-03-17
Payer: COMMERCIAL

## 2025-03-17 VITALS — DIASTOLIC BLOOD PRESSURE: 76 MMHG | SYSTOLIC BLOOD PRESSURE: 126 MMHG

## 2025-03-17 DIAGNOSIS — H35.433 PAVING STONE RETINAL DEGENERATION OF BOTH EYES: Primary | ICD-10-CM

## 2025-03-17 DIAGNOSIS — H35.433 PAVING STONE DEGENERATION OF BOTH RETINAS: ICD-10-CM

## 2025-03-17 PROCEDURE — 4010F ACE/ARB THERAPY RXD/TAKEN: CPT | Mod: CPTII,S$GLB,, | Performed by: OPHTHALMOLOGY

## 2025-03-17 PROCEDURE — 99999 PR PBB SHADOW E&M-EST. PATIENT-LVL III: CPT | Mod: PBBFAC,,, | Performed by: OPHTHALMOLOGY

## 2025-03-17 PROCEDURE — 1159F MED LIST DOCD IN RCRD: CPT | Mod: CPTII,S$GLB,, | Performed by: OPHTHALMOLOGY

## 2025-03-17 PROCEDURE — 92134 CPTRZ OPH DX IMG PST SGM RTA: CPT | Mod: S$GLB,,, | Performed by: OPHTHALMOLOGY

## 2025-03-17 PROCEDURE — 3044F HG A1C LEVEL LT 7.0%: CPT | Mod: CPTII,S$GLB,, | Performed by: OPHTHALMOLOGY

## 2025-03-17 PROCEDURE — 1160F RVW MEDS BY RX/DR IN RCRD: CPT | Mod: CPTII,S$GLB,, | Performed by: OPHTHALMOLOGY

## 2025-03-17 PROCEDURE — 92004 COMPRE OPH EXAM NEW PT 1/>: CPT | Mod: S$GLB,,, | Performed by: OPHTHALMOLOGY

## 2025-03-17 NOTE — PROGRESS NOTES
HPI     Concerns About Ocular Health     Additional comments: Cons per Dr. Osei- Paving stone degeneration ou           Comments    VA stable ou, no pain ou and denies floaters or flashes ou. H/o Lasik ou   97', no other sx, injury laser or injection. Fmhx of AMD wet and dry   maternal grandmother.            Last edited by Bernie Quan on 3/17/2025  9:31 AM.        Former [facility]       OCT - no ME OU  Attached hyaloid  No drusen      A/P    Cobblestone degeneration  No lattice or other retinal detachment provoking pathology    Reassured    2. Family h/o AMD on mom's side  No drusen.  Low risk at this time    3. Early NS OU  S/p LASIK with Brint late 90's      Continue yearly with [place] PRN

## 2025-03-17 NOTE — PROGRESS NOTES
Population Health Chart Review & Patient Outreach Details      Additional [facility] Health Notes:               Updates Requested / Reviewed:      Updated Care Coordination Note         Health Maintenance Topics Overdue:      VB Score: 1     Uncontrolled BP    Influenza Vaccine  Pneumonia Vaccine  RSV Vaccine                  Health Maintenance Topic(s) Outreach Outcomes & Actions Taken:    Blood Pressure - Outreach Outcomes & Actions Taken  : Remote Blood Pressure Reading Captured

## 2025-03-24 ENCOUNTER — PATIENT MESSAGE (OUTPATIENT)
Dept: FAMILY MEDICINE | Facility: CLINIC | Age: 65
End: 2025-03-24
Payer: COMMERCIAL

## 2025-04-16 ENCOUNTER — PATIENT MESSAGE (OUTPATIENT)
Dept: FAMILY MEDICINE | Facility: CLINIC | Age: 65
End: 2025-04-16
Payer: COMMERCIAL

## 2025-04-17 ENCOUNTER — PATIENT MESSAGE (OUTPATIENT)
Dept: FAMILY MEDICINE | Facility: CLINIC | Age: 65
End: 2025-04-17
Payer: COMMERCIAL

## 2025-05-18 ENCOUNTER — PATIENT MESSAGE (OUTPATIENT)
Dept: UROLOGY | Facility: CLINIC | Age: 65
End: 2025-05-18
Payer: COMMERCIAL

## 2025-05-30 ENCOUNTER — PATIENT MESSAGE (OUTPATIENT)
Dept: ORTHOPEDICS | Facility: CLINIC | Age: 65
End: 2025-05-30
Payer: COMMERCIAL

## 2025-05-30 RX ORDER — MELOXICAM 15 MG/1
15 TABLET ORAL DAILY
Qty: 90 TABLET | Refills: 2 | Status: SHIPPED | OUTPATIENT
Start: 2025-05-30

## 2025-06-03 DIAGNOSIS — G47.00 INSOMNIA, UNSPECIFIED TYPE: ICD-10-CM

## 2025-06-03 RX ORDER — ZOLPIDEM TARTRATE 10 MG/1
10 TABLET ORAL NIGHTLY
Qty: 90 TABLET | Refills: 0 | Status: SHIPPED | OUTPATIENT
Start: 2025-06-03

## 2025-06-17 ENCOUNTER — PATIENT MESSAGE (OUTPATIENT)
Dept: ORTHOPEDICS | Facility: CLINIC | Age: 65
End: 2025-06-17
Payer: COMMERCIAL

## 2025-06-17 DIAGNOSIS — M17.0 OSTEOARTHRITIS OF BOTH KNEES, UNSPECIFIED OSTEOARTHRITIS TYPE: Primary | ICD-10-CM

## 2025-06-18 ENCOUNTER — PATIENT MESSAGE (OUTPATIENT)
Dept: ORTHOPEDICS | Facility: CLINIC | Age: 65
End: 2025-06-18
Payer: COMMERCIAL

## 2025-06-19 DIAGNOSIS — M25.579 ANKLE PAIN, UNSPECIFIED CHRONICITY, UNSPECIFIED LATERALITY: Primary | ICD-10-CM

## 2025-06-24 ENCOUNTER — HOSPITAL ENCOUNTER (OUTPATIENT)
Dept: RADIOLOGY | Facility: HOSPITAL | Age: 65
Discharge: HOME | End: 2025-06-24
Attending: PHYSICIAN ASSISTANT
Payer: COMMERCIAL

## 2025-06-24 ENCOUNTER — OFFICE VISIT (OUTPATIENT)
Dept: UROLOGY | Facility: CLINIC | Age: 65
End: 2025-06-24
Payer: COMMERCIAL

## 2025-06-24 ENCOUNTER — OFFICE VISIT (OUTPATIENT)
Dept: ORTHOPEDICS | Facility: CLINIC | Age: 65
End: 2025-06-24
Payer: COMMERCIAL

## 2025-06-24 VITALS — HEIGHT: 77 IN | BODY MASS INDEX: 32.41 KG/M2 | WEIGHT: 274.5 LBS

## 2025-06-24 DIAGNOSIS — Z12.5 PROSTATE CANCER SCREENING: ICD-10-CM

## 2025-06-24 DIAGNOSIS — M19.072 PRIMARY OSTEOARTHRITIS OF LEFT ANKLE: ICD-10-CM

## 2025-06-24 DIAGNOSIS — N52.01 ERECTILE DYSFUNCTION DUE TO ARTERIAL INSUFFICIENCY: Primary | ICD-10-CM

## 2025-06-24 DIAGNOSIS — N40.1 BPH WITH URINARY OBSTRUCTION: ICD-10-CM

## 2025-06-24 DIAGNOSIS — M25.579 ANKLE PAIN, UNSPECIFIED CHRONICITY, UNSPECIFIED LATERALITY: ICD-10-CM

## 2025-06-24 DIAGNOSIS — M25.872 ANKLE IMPINGEMENT SYNDROME, LEFT: ICD-10-CM

## 2025-06-24 DIAGNOSIS — E29.1 MALE HYPOGONADISM: ICD-10-CM

## 2025-06-24 DIAGNOSIS — N13.8 BPH WITH URINARY OBSTRUCTION: ICD-10-CM

## 2025-06-24 DIAGNOSIS — M17.0 PRIMARY OSTEOARTHRITIS OF BOTH KNEES: Primary | ICD-10-CM

## 2025-06-24 PROCEDURE — 99214 OFFICE O/P EST MOD 30 MIN: CPT | Mod: S$GLB,,, | Performed by: STUDENT IN AN ORGANIZED HEALTH CARE EDUCATION/TRAINING PROGRAM

## 2025-06-24 PROCEDURE — 99999 PR PBB SHADOW E&M-EST. PATIENT-LVL III: CPT | Mod: PBBFAC,,, | Performed by: STUDENT IN AN ORGANIZED HEALTH CARE EDUCATION/TRAINING PROGRAM

## 2025-06-24 PROCEDURE — 73610 X-RAY EXAM OF ANKLE: CPT | Mod: 26,LT,, | Performed by: RADIOLOGY

## 2025-06-24 PROCEDURE — 73610 X-RAY EXAM OF ANKLE: CPT | Mod: TC,PO,LT

## 2025-06-24 PROCEDURE — 3288F FALL RISK ASSESSMENT DOCD: CPT | Mod: CPTII,S$GLB,, | Performed by: STUDENT IN AN ORGANIZED HEALTH CARE EDUCATION/TRAINING PROGRAM

## 2025-06-24 PROCEDURE — 3044F HG A1C LEVEL LT 7.0%: CPT | Mod: CPTII,S$GLB,, | Performed by: STUDENT IN AN ORGANIZED HEALTH CARE EDUCATION/TRAINING PROGRAM

## 2025-06-24 PROCEDURE — 73630 X-RAY EXAM OF FOOT: CPT | Mod: TC,PO,LT

## 2025-06-24 PROCEDURE — 20610 DRAIN/INJ JOINT/BURSA W/O US: CPT | Mod: 50,S$GLB,, | Performed by: PHYSICIAN ASSISTANT

## 2025-06-24 PROCEDURE — 3288F FALL RISK ASSESSMENT DOCD: CPT | Mod: CPTII,S$GLB,, | Performed by: PHYSICIAN ASSISTANT

## 2025-06-24 PROCEDURE — 4010F ACE/ARB THERAPY RXD/TAKEN: CPT | Mod: CPTII,S$GLB,, | Performed by: PHYSICIAN ASSISTANT

## 2025-06-24 PROCEDURE — 4010F ACE/ARB THERAPY RXD/TAKEN: CPT | Mod: CPTII,S$GLB,, | Performed by: STUDENT IN AN ORGANIZED HEALTH CARE EDUCATION/TRAINING PROGRAM

## 2025-06-24 PROCEDURE — 1160F RVW MEDS BY RX/DR IN RCRD: CPT | Mod: CPTII,S$GLB,, | Performed by: PHYSICIAN ASSISTANT

## 2025-06-24 PROCEDURE — 3044F HG A1C LEVEL LT 7.0%: CPT | Mod: CPTII,S$GLB,, | Performed by: PHYSICIAN ASSISTANT

## 2025-06-24 PROCEDURE — 1101F PT FALLS ASSESS-DOCD LE1/YR: CPT | Mod: CPTII,S$GLB,, | Performed by: STUDENT IN AN ORGANIZED HEALTH CARE EDUCATION/TRAINING PROGRAM

## 2025-06-24 PROCEDURE — 1159F MED LIST DOCD IN RCRD: CPT | Mod: CPTII,S$GLB,, | Performed by: PHYSICIAN ASSISTANT

## 2025-06-24 PROCEDURE — 99214 OFFICE O/P EST MOD 30 MIN: CPT | Mod: 25,S$GLB,, | Performed by: PHYSICIAN ASSISTANT

## 2025-06-24 PROCEDURE — 99999 PR PBB SHADOW E&M-EST. PATIENT-LVL III: CPT | Mod: PBBFAC,,, | Performed by: PHYSICIAN ASSISTANT

## 2025-06-24 PROCEDURE — 1101F PT FALLS ASSESS-DOCD LE1/YR: CPT | Mod: CPTII,S$GLB,, | Performed by: PHYSICIAN ASSISTANT

## 2025-06-24 PROCEDURE — 3008F BODY MASS INDEX DOCD: CPT | Mod: CPTII,S$GLB,, | Performed by: STUDENT IN AN ORGANIZED HEALTH CARE EDUCATION/TRAINING PROGRAM

## 2025-06-24 PROCEDURE — 73630 X-RAY EXAM OF FOOT: CPT | Mod: 26,LT,, | Performed by: RADIOLOGY

## 2025-06-24 RX ORDER — LIDOCAINE HYDROCHLORIDE 10 MG/ML
2 INJECTION, SOLUTION INFILTRATION; PERINEURAL
Status: DISCONTINUED | OUTPATIENT
Start: 2025-06-24 | End: 2025-06-24 | Stop reason: HOSPADM

## 2025-06-24 RX ORDER — TRIAMCINOLONE ACETONIDE 40 MG/ML
40 INJECTION, SUSPENSION INTRA-ARTICULAR; INTRAMUSCULAR
Status: DISCONTINUED | OUTPATIENT
Start: 2025-06-24 | End: 2025-06-24 | Stop reason: HOSPADM

## 2025-06-24 RX ADMIN — LIDOCAINE HYDROCHLORIDE 2 ML: 10 INJECTION, SOLUTION INFILTRATION; PERINEURAL at 11:06

## 2025-06-24 RX ADMIN — TRIAMCINOLONE ACETONIDE 40 MG: 40 INJECTION, SUSPENSION INTRA-ARTICULAR; INTRAMUSCULAR at 11:06

## 2025-06-24 NOTE — PROGRESS NOTES
[facility]   Clinic Note    Subjective:     Chief Complaint: Establish Care      History of Present Illness    CHIEF COMPLAINT:  Stanley presents today for follow-up    ERECTILE DYSFUNCTION:  He has mild erectile dysfunction managed with sildenafil as needed. He notes sildenafil is effective when needed.     TESTOSTERONE DEFICIENCY:  He has a history of testosterone deficiency with multiple treatment regimens. He previously used testosterone pellets, which were most effective but became cost-prohibitive. He is currently not taking AndroGel as he found it ineffective with no significant changes noted during prior treatment. He endorses overall feeling well, acknowledging age-related limitations but not experiencing significant symptoms. He notes reduced energy compared to younger years     GENITOURINARY:  He reports mild urinary symptoms with nocturia, getting up twice on average per night at home. He describes good urinary stream with no significant voiding difficulties. He reports historically normal PSA levels, consistently well within the normal range.  IPSS 5/2    FAMILY HISTORY:  Paternal grandfather was diagnosed with bladder cancer in his 90s. No known family history of prostate cancer.        IPSS Questionnaire (AUA-SS):  1) Incomplete Emptying 1 - Less than 1 time in 5   2) Frequency 1 - Less than 1 time in 5   3) Intermittency 1 - Less than 1 time in 5   4) Urgency 0 - Not at all   5) Weak Stream  0 - Not at all   6) Straining 0 - Not at all   7) Nocturia 2 - 2 times   Total score:   5   Quality of Life:  2 - Mostly Satisfied      Past medical, family, surgical and social history reviewed as documented in chart with pertinent positive medical, family, surgical and social history detailed in HPI.    A review systems was conducted with pertinent positive and negative findings documented in HPI.    Objective:     Estimated body mass index is 32.55 kg/m² as calculated from the following:    Height as of  "this encounter: 6' 5" (1.956 m).    Weight as of this encounter: 124.5 kg (274 lb 7.6 oz).    Vital Signs (Most Recent)     General: No acute distress, well developed.   Head: Normocephalic, atraumatic  CV: no cyanosis  Lungs: normal respiratory effort, no respiratory distress    Labs reviewed below:  Lab Results   Component Value Date    BUN 25 (H) 01/13/2025    CREATININE 1.0 01/13/2025    WBC 7.90 01/13/2025    HGB 15.6 01/13/2025    HCT 48.1 01/13/2025     01/13/2025    AST 27 01/13/2025    ALT 41 01/13/2025    ALKPHOS 55 01/13/2025    ALBUMIN 4.5 01/13/2025    HGBA1C 5.2 01/13/2025      PSA trend reviewed below:  Lab Results   Component Value Date    PSA 0.38 01/13/2025    PSA 0.50 03/17/2017    PSA 0.47 07/18/2013    PSA 0.71 03/15/2013    PSA 0.79 07/12/2012    PSA 0.70 04/11/2012    PSA 0.72 11/29/2011    PSA 0.44 09/26/2011    PSA 0.47 06/15/2011    PSA 0.47 03/24/2011    PSADIAG 0.35 07/19/2024    PSADIAG 0.45 04/08/2024    PSADIAG 0.36 09/22/2023    PSADIAG 0.67 02/28/2022    PSADIAG 0.40 07/27/2021    PSADIAG 0.49 01/04/2021    PSADIAG 0.36 08/28/2020    PSADIAG 0.44 02/28/2020    PSADIAG 0.43 10/30/2019    PSADIAG 0.39 06/28/2019        Lab Results   Component Value Date    NITRITE Negative 04/08/2024      Assessment:     1. Erectile dysfunction due to arterial insufficiency    2. BPH with urinary obstruction    3. Prostate cancer screening    4. Male hypogonadism      Plan:     Assessment & Plan    - Reviewed history of testosterone therapy, including pellets, Aveed, and topical treatments.  - Noted current discontinuation of AndroGel due to lack of perceived benefit.  - Considered report of feeling generally well without current testosterone therapy.  - Assessed PSA, which have consistently been WNL.    - Continue Sildenafil for erectile dysfunction as needed.    - Follow up in 1 year for routine follow-up and to review any new concerns.          Portions of this note were generated by " DeepScribe and Fluency Direct dictation services    Jaydon Butcher MD

## 2025-06-24 NOTE — PROGRESS NOTES
Patient ID: Stanley Stewart is a 65 y.o. male.    Chief Complaint: Pain and Swelling of the Left Ankle      HISTORY:  Stanley Stewart is a 65 y.o. male who returns to me today for follow up of bilateral knee pain.  He was last seen by me 7/25/2024.  Since then he saw an another physician in our clinic and had injections in both knee in January.  This provided good relief. He would like to repeat them today as symptoms are starting to return.   He also mentions new onset ankle pain that started a few weeks ago.  He notes pain along medial aspect and across the front with some radiation up the leg.  This is worse with prolonged standing or walking.  He states that he felt better with some rest and took a meloxicam last week.  Symptoms are mostly resolved in the ankle. He denies any prior injury problems with his ankle.    PMH/PSH/FamHx/SocHx:    Unchanged from prior visit.    ROS:  Constitution: Negative for chills, fever and weakness.   Respiratory: Negative for cough and shortness of breath.   Musculoskeletal: Positive for left ankle, bilateral knee   Psychiatric/Behavioral: The patient is not nervous/anxious.       PHYSICAL EXAM:   Bilateral knee  Skin intact  No effusion or warmth  ROM 0-130  TTP medial joint line    Left ankle  Mild diffuse ankle swelling  Neutral ankle, no deformity noted  There is tenderness most significantly to anteromedial ankle joint line  No pain or TTP to lateral ankle or sinus tarsi  Mild TTP along posterior tibial tendon  Able to perform single limb heel raise without pain      IMAGING: Standing X-rays of the left foot and ankle, personally reviewed by me, demonstrate well maintained joint space.  Mild anterior tibial osteophyte noted. Calcaneal enthesophyte at Achilles insertion and plantar fascia origin.  No fracture or dislocation.     Previous x-rays of bilateral knee show mild degenerative changes    ASSESSMENT/PLAN:    Stanley was seen today for pain and swelling.    Diagnoses and all  orders for this visit:    Primary osteoarthritis of both knees  -     Large Joint Aspiration/Injection: bilateral knee    Primary osteoarthritis of left ankle    Ankle impingement syndrome, left        - Repeat bilateral knee CSI performed today  - Ankle is improving- discussed continuing rest, resuming low impact exercise as tolerated and meloxicam if needed.  Can consider ankle injection if symptoms return/worsen.  - Follow up if symptoms worsen or fail to improve    If there are any questions prior to this, the patient was instructed to contact the office.

## 2025-06-24 NOTE — PROCEDURES
Large Joint Aspiration/Injection: bilateral knee    Date/Time: 6/24/2025 11:00 AM    Performed by: Michaela Fuller PA-C  Authorized by: Michaela Fuller PA-C    Consent Done?:  Yes (Verbal)  Indications:  Pain  Timeout: prior to procedure the correct patient, procedure, and site was verified    Prep: patient was prepped and draped in usual sterile fashion    Local anesthetic:  Topical anesthetic    Details:  Needle Size:  22 G  Approach:  Anterolateral  Location:  Knee  Laterality:  Bilateral  Site:  Bilateral knee  Medications (Right):  40 mg triamcinolone acetonide 40 mg/mL; 2 mL LIDOcaine HCL 10 mg/ml (1%) 10 mg/mL (1 %)  Medications (Left):  40 mg triamcinolone acetonide 40 mg/mL; 2 mL LIDOcaine HCL 10 mg/ml (1%) 10 mg/mL (1 %)  Patient tolerance:  Patient tolerated the procedure well with no immediate complications

## 2025-07-16 ENCOUNTER — CLINICAL SUPPORT (OUTPATIENT)
Dept: REHABILITATION | Facility: HOSPITAL | Age: 65
End: 2025-07-16
Payer: COMMERCIAL

## 2025-07-16 DIAGNOSIS — R29.898 DECREASED ROM OF NECK: Primary | ICD-10-CM

## 2025-07-16 PROCEDURE — 97161 PT EVAL LOW COMPLEX 20 MIN: CPT | Mod: PO

## 2025-07-16 PROCEDURE — 97140 MANUAL THERAPY 1/> REGIONS: CPT | Mod: PO

## 2025-07-16 PROCEDURE — 97530 THERAPEUTIC ACTIVITIES: CPT | Mod: PO

## 2025-07-16 NOTE — PROGRESS NOTES
Outpatient Rehab    Physical Therapy Evaluation    Patient Name: Stanley Stewart  MRN: [id number]  YOB: 1960  Encounter Date: 7/16/2025    Therapy Diagnosis:   Encounter Diagnosis   Name Primary?    Decreased ROM of neck Yes     Physician: Self, Aaareferral    Physician Orders: Eval and Treat  Medical Diagnosis: Neck pain  Surgical Diagnosis: Not applicable for this Episode   Surgical Date: Not applicable for this Episode  Days Since Last Surgery: Not applicable for this Episode    Visit # / Visits Authorized:  1 / 1  Insurance Authorization Period: 7/9/2025 to 12/31/2025  Date of Evaluation: 7/16/2025  Plan of Care Certification: 7/16/2025 to 9/10/2025     Time In: 0700   Time Out: 0755  Total Time (in minutes): 55   Total Billable Time (in minutes): 55    Intake Outcome Measure for FOTO Survey    Therapist reviewed FOTO scores for Stanley Stewart on 7/16/2025.   FOTO report - see Media section or FOTO account episode details.     Intake Score: 44%    Precautions:       Subjective   History of Present Illness  Stanley is a 65 y.o. male who reports to physical therapy with a chief concern of neck pain.                 History of Present Condition/Illness: 6 week onset of neck pain without a DOMINGO. Pain is located near patient's right scalenes. Patient described pain and starting at the lateral base of skull and extending distally to right upper trap. Sleeps on his stomach and has a hard time getting to sleep without ibuprofen. 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     Pain     Patient reports a current pain level of 0/10. Pain at best is reported as 0/10. Pain at worst is reported as 8/10.   Location: R scalene  Clinical Progression (since onset): Worsening  Pain Qualities: Radiating, Tightness, Sharp, Discomfort  Pain-Relieving Factors: Rest, Medications - over-the-counter  Pain-Aggravating Factors: Head movements           Past Medical History/Physical Systems Review:   Stanley Stewart  has a past  medical history of Allergy, BPH with urinary obstruction, Degenerative disc disease, Dysphagia, ED (erectile dysfunction), Gout, Hyperlipidemia, Hypertension, and Male hypogonadism.    Stanley Stewart  has a past surgical history that includes Appendectomy; arthroscopic knee surg; Knee surgery; Intussusception repair; Tonsillectomy; Colonoscopy (N/A, 09/28/2020); Injection of anesthetic agent around nerve (Right, 08/24/2021); Injection of anesthetic agent around nerve (Right, 10/12/2021); Radiofrequency ablation (Right, 12/08/2021); Esophagogastroduodenoscopy (N/A, 08/08/2023); Eye surgery (02-); Small intestine surgery (09-); Colon surgery (09-); Vasectomy (11-); and Fusion of lumbar spine by anterior approach.    Stanley has a current medication list which includes the following prescription(s): azelastine, benzonatate, fluticasone propionate, lisinopril-hydrochlorothiazide, meloxicam, omeprazole, ondansetron, rosuvastatin, sildenafil, and zolpidem, and the following Facility-Administered Medications: sodium hyaluronate (euflexxa).    Review of patient's allergies indicates:   Allergen Reactions    Doxycycline      Possible allergy/ Rash     Shellfish containing products      Other reaction(s): Hives    Tree pollen-white akhil Itching and Other (See Comments)    Clopidogrel Rash        Objective      Cervical Thoracic Sensation  Right Cervical/Thoracic Sensation  Intact: Light Touch       Left Cervical/Thoracic Sensation  Intact: Light Touch                Spinal Mobility  Hypomobile: Cervical and Thoracic       Spinal Muscle Palpation  Right Spinal Muscle Palpation  Abnormal: Cervical/Thoracic  Right Cervical/Thoracic Muscle Palpation Observations: scalenes, upper trap                   Subcranial Range of Motion   Active Restricted? Passive Restricted? Pain   Flexion         Protraction         Retraction           Cervical Range of Motion   Active (deg) Passive (deg) Pain   Flexion 40        Extension 25   Yes   Right Lateral Flexion 15   Yes   Right Rotation 50   Yes   Left Lateral Flexion 25   Yes   Left Rotation 65                   Shoulder Strength - Planes of Motion   Right Strength Right Pain Left Strength Left  Pain   Flexion 4   4     Extension 4   4     ABduction 4   4     ADduction 4   4     Horizontal ABduction 4   4     Horizontal ADduction 4   4     Internal Rotation 0° 4   4     Internal Rotation 90° 4   4     External Rotation 0° 4   4     External Rotation 90° 4   4         Shoulder Strength - Scapular Stabilizing Muscles   Right Strength Right Pain Left Strength Left  Pain   Lower Trapezius 4   4     Middle Trapezius 4   4     Rhomboids 4   4                    Treatment:  Manual Therapy  MT 1: Dry needling to R UT  MT 2: STM to R cervical musculature  Therapeutic Activity  TA 1: home program (UT/LS/scalene stretch and scap retractions)    Time Entry(in minutes):  PT Evaluation (Low) Time Entry: 30  Manual Therapy Time Entry: 15  Therapeutic Activity Time Entry: 10    Assessment & Plan   Assessment  Stanley presents with a condition of Low complexity.   Presentation of Symptoms: Stable       Functional Limitations: Activity tolerance, Completing self-care activities, Completing work/school activities, Driving, Disrupted sleep pattern, Functional mobility, Pain when reaching, Pain with ADLs/IADLs, Participating in leisure activities, Performing household chores, Proprioception, Range of motion, Reaching  Impairments: Abnormal or restricted range of motion, Abnormal muscle tone, Abnormal muscle firing, Activity intolerance, Pain with functional activity, Lack of appropriate home exercise program, Impaired physical strength  Personal Factors Affecting Prognosis: Pain    Patient Goal for Therapy (PT): to get rid of pain  Prognosis: Good  Assessment Details: Patient presents with s/sx consistent with cervical arthralgia with active mobility dysfunction. See above for current functional  limitations and impairments. Patient would benefit from skilled PT services to restore function and restore to PLOF.     Plan  From a physical therapy perspective, the patient would benefit from: Skilled Rehab Services    Planned therapy interventions include: Therapeutic exercise, Therapeutic activities, Neuromuscular re-education, Manual therapy, Aquatic therapy, and Other (Comment). Dry Needling  Planned modalities to include: Electrical stimulation - passive/unattended, Electrical stimulation - attended, Cryotherapy (cold pack), Thermotherapy (hot pack), and Vasopneumatic pump.        Visit Frequency: 1 times Per Week for 8 Weeks.       This plan was discussed with Patient.   Discussion participants: Agreed Upon Plan of Care             The patient's spiritual, cultural, and educational needs were considered, and the patient is agreeable to the plan of care and goals.           Goals:   Active       1. STG        Patient to demonstrate HEP compliance to maximize therapeutic potential.        Start:  07/16/25    Expected End:  08/13/25            Patient to improve cervical rom in all directions by 10 deg       Start:  07/16/25    Expected End:  08/13/25               2. LTG       Pt to achieve <10% limitation as measured by the FOTO to demonstrate decreased low back pain disability.        Start:  07/16/25    Expected End:  09/10/25            Patient to restore full pain-free cervical rom       Start:  07/16/25    Expected End:  09/10/25            Patient to deny pain with sleeping and ADLs       Start:  07/16/25    Expected End:  09/10/25                Jin Matias, PT, DPT

## 2025-07-23 ENCOUNTER — CLINICAL SUPPORT (OUTPATIENT)
Dept: REHABILITATION | Facility: HOSPITAL | Age: 65
End: 2025-07-23
Payer: COMMERCIAL

## 2025-07-23 DIAGNOSIS — R29.898 DECREASED ROM OF NECK: Primary | ICD-10-CM

## 2025-07-23 PROCEDURE — 97140 MANUAL THERAPY 1/> REGIONS: CPT | Mod: PO

## 2025-07-23 PROCEDURE — 97530 THERAPEUTIC ACTIVITIES: CPT | Mod: PO

## 2025-07-23 PROCEDURE — 97112 NEUROMUSCULAR REEDUCATION: CPT | Mod: PO

## 2025-07-23 NOTE — PROGRESS NOTES
Outpatient Rehab    Physical Therapy Visit    Patient Name: Stanley Stewart  MRN: [id number]  YOB: 1960  Encounter Date: 7/23/2025    Therapy Diagnosis:   Encounter Diagnosis   Name Primary?    Decreased ROM of neck Yes     Physician: Self, Aaareferral    Physician Orders: Eval and Treat  Medical Diagnosis: Neck pain  Surgical Diagnosis: Not applicable for this Episode   Surgical Date: Not applicable for this Episode  Days Since Last Surgery: Not applicable for this Episode    Visit # / Visits Authorized:  1 / 10  Insurance Authorization Period: 7/9/2025 to 12/31/2025  Date of Evaluation: 7/16/2025  Plan of Care Certification: 7/16/2025 to 9/10/2025      PT/PTA:     Number of PTA visits since last PT visit:   Time In: 0700   Time Out: 0740  Total Time (in minutes): 40   Total Billable Time (in minutes): 40    FOTO:  Intake Score (%): 44  Survey Score 2 (%): Not applicable for this Episode  Survey Score 3 (%): Not applicable for this Episode    Precautions:         Subjective   ~60% improvement in s/sx.  Pain reported as 2/10. R UT with R cervical rotation    Objective            Treatment:  Therapeutic Exercise  TE 1: UT/LS stretch 3x30s ea  Manual Therapy  MT 1: Dry needling to R UT  MT 2: STM to R cervical musculature  Balance/Neuromuscular Re-Education  NMR 1: Scap retractions 20x5s  NMR 2: Chin tucks 20x5s  NMR 3: RTB no money 20x5s  Therapeutic Activity  TA 1: home program update (UT/LS/scalene stretch, no money,chin tuck and scap retractions)    Time Entry(in minutes):  Manual Therapy Time Entry: 10  Neuromuscular Re-Education Time Entry: 19  Therapeutic Activity Time Entry: 10  Therapeutic Exercise Time Entry: 10    Assessment & Plan   Assessment: Patient presents with a 60% improvement in overall presentation since his last session. Today's session ended early per patient's request. Was able to incorporate light postural musculature activation without pain.   Evaluation/Treatment Tolerance: Patient  tolerated treatment well    The patient will continue to benefit from skilled outpatient physical therapy in order to address the deficits listed in the problem list on the initial evaluation, provide patient and family education, and maximize the patients level of independence in the home and community environments.     The patient's spiritual, cultural, and educational needs were considered, and the patient is agreeable to the plan of care and goals.           Plan: Progress as tolerated.    Goals:   Active       1. STG        Patient to demonstrate HEP compliance to maximize therapeutic potential.        Start:  07/16/25    Expected End:  08/13/25            Patient to improve cervical rom in all directions by 10 deg       Start:  07/16/25    Expected End:  08/13/25               2. LTG       Pt to achieve <10% limitation as measured by the FOTO to demonstrate decreased low back pain disability.        Start:  07/16/25    Expected End:  09/10/25            Patient to restore full pain-free cervical rom       Start:  07/16/25    Expected End:  09/10/25            Patient to deny pain with sleeping and ADLs       Start:  07/16/25    Expected End:  09/10/25                Jin Matias, PT, DPT

## 2025-07-30 ENCOUNTER — CLINICAL SUPPORT (OUTPATIENT)
Dept: REHABILITATION | Facility: HOSPITAL | Age: 65
End: 2025-07-30
Payer: COMMERCIAL

## 2025-07-30 DIAGNOSIS — R29.898 DECREASED ROM OF NECK: Primary | ICD-10-CM

## 2025-07-30 PROCEDURE — 97140 MANUAL THERAPY 1/> REGIONS: CPT | Mod: PO

## 2025-07-30 PROCEDURE — 97530 THERAPEUTIC ACTIVITIES: CPT | Mod: PO

## 2025-07-30 PROCEDURE — 97112 NEUROMUSCULAR REEDUCATION: CPT | Mod: PO

## 2025-07-30 NOTE — PROGRESS NOTES
Outpatient Rehab    Physical Therapy Visit    Patient Name: Stanley Stewart  MRN: [id number]  YOB: 1960  Encounter Date: 7/30/2025    Therapy Diagnosis:   Encounter Diagnosis   Name Primary?    Decreased ROM of neck Yes     Physician: Self, Aaareferral    Physician Orders: Eval and Treat  Medical Diagnosis: Neck pain  Surgical Diagnosis: Not applicable for this Episode   Surgical Date: Not applicable for this Episode  Days Since Last Surgery: Not applicable for this Episode    Visit # / Visits Authorized:  2 / 10  Insurance Authorization Period: 7/9/2025 to 12/31/2025  Date of Evaluation: 7/16/2025  Plan of Care Certification: 7/16/2025 to 9/10/2025      PT/PTA:     Number of PTA visits since last PT visit:   Time In: 0700   Time Out: 0740  Total Time (in minutes): 40   Total Billable Time (in minutes): 40    FOTO:  Intake Score (%): 44  Survey Score 2 (%): Not applicable for this Episode  Survey Score 3 (%): Not applicable for this Episode    Precautions:         Subjective   ~80% improvement in s/sx.  Pain reported as 2/10. R UT with R cervical rotation    Objective            Treatment:  Therapeutic Exercise  TE 1: R side only UT/LS stretch 3x30s ea  Manual Therapy  MT 1: Dry needling to R UT  MT 2: STM to R cervical musculature  Balance/Neuromuscular Re-Education  NMR 1: Scap retractions 20x5s  NMR 2: Chin tucks 20x5s  NMR 3: RTB no money 3x15  NMR 4: RTB T 3x15  NMR 5: GTB row/low row 3x15  Therapeutic Activity  TA 1: home program update (UT/LS/scalene stretch, no money,chin tuck, no money, T )    Time Entry(in minutes):  Manual Therapy Time Entry: 10  Neuromuscular Re-Education Time Entry: 15  Therapeutic Activity Time Entry: 10  Therapeutic Exercise Time Entry: 5    Assessment & Plan   Assessment: Patient presents with a 80% improvement in overall presentation since his last session. Progressed scapular strengthening without complication. Will continue to load as tolerated   Evaluation/Treatment  Tolerance: Patient tolerated treatment well    The patient will continue to benefit from skilled outpatient physical therapy in order to address the deficits listed in the problem list on the initial evaluation, provide patient and family education, and maximize the patients level of independence in the home and community environments.     The patient's spiritual, cultural, and educational needs were considered, and the patient is agreeable to the plan of care and goals.           Plan: Progress as tolerated.    Goals:   Active       1. STG        Patient to demonstrate HEP compliance to maximize therapeutic potential.        Start:  07/16/25    Expected End:  08/13/25            Patient to improve cervical rom in all directions by 10 deg       Start:  07/16/25    Expected End:  08/13/25               2. LTG       Pt to achieve <10% limitation as measured by the FOTO to demonstrate decreased low back pain disability.        Start:  07/16/25    Expected End:  09/10/25            Patient to restore full pain-free cervical rom       Start:  07/16/25    Expected End:  09/10/25            Patient to deny pain with sleeping and ADLs       Start:  07/16/25    Expected End:  09/10/25                Jin Matias, PT, DPT

## 2025-08-03 ENCOUNTER — PATIENT MESSAGE (OUTPATIENT)
Dept: FAMILY MEDICINE | Facility: CLINIC | Age: 65
End: 2025-08-03
Payer: COMMERCIAL

## 2025-08-03 RX ORDER — ROSUVASTATIN CALCIUM 20 MG/1
20 TABLET, COATED ORAL DAILY
Qty: 90 TABLET | Refills: 2 | Status: CANCELLED | OUTPATIENT
Start: 2025-08-03

## 2025-08-04 RX ORDER — ROSUVASTATIN CALCIUM 20 MG/1
20 TABLET, COATED ORAL DAILY
Qty: 90 TABLET | Refills: 2 | Status: SHIPPED | OUTPATIENT
Start: 2025-08-04

## 2025-08-04 NOTE — TELEPHONE ENCOUNTER
Refill Routing Note   Medication(s) are not appropriate for processing by [facility] for the following reason(s):        Non-participating provider    [facility] action(s):  Route               Appointments  past 12m or future 3m with PCP    Date Provider   Last Visit   2/7/2025 Arely Nicholson MD   Next Visit   Visit date not found Arely Nicholson MD   ED visits in past 90 days: 0        Note composed:3:10 PM 08/04/2025

## 2025-08-06 ENCOUNTER — CLINICAL SUPPORT (OUTPATIENT)
Dept: REHABILITATION | Facility: HOSPITAL | Age: 65
End: 2025-08-06
Payer: COMMERCIAL

## 2025-08-06 DIAGNOSIS — R29.898 DECREASED ROM OF NECK: Primary | ICD-10-CM

## 2025-08-06 PROCEDURE — 97112 NEUROMUSCULAR REEDUCATION: CPT | Mod: PO

## 2025-08-06 PROCEDURE — 97110 THERAPEUTIC EXERCISES: CPT | Mod: PO

## 2025-08-06 PROCEDURE — 97140 MANUAL THERAPY 1/> REGIONS: CPT | Mod: PO

## 2025-08-06 PROCEDURE — 97530 THERAPEUTIC ACTIVITIES: CPT | Mod: PO

## 2025-08-06 NOTE — PROGRESS NOTES
Outpatient Rehab    Physical Therapy Visit    Patient Name: Stanley Stewart  MRN: [id number]  YOB: 1960  Encounter Date: 8/6/2025    Therapy Diagnosis:   Encounter Diagnosis   Name Primary?    Decreased ROM of neck Yes     Physician: Self, Aaareferral    Physician Orders: Eval and Treat  Medical Diagnosis: Neck pain  Surgical Diagnosis: Not applicable for this Episode   Surgical Date: Not applicable for this Episode  Days Since Last Surgery: Not applicable for this Episode    Visit # / Visits Authorized:  3 / 10  Insurance Authorization Period: 7/9/2025 to 12/31/2025  Date of Evaluation: 7/16/2025  Plan of Care Certification: 7/16/2025 to 9/10/2025      PT/PTA:     Number of PTA visits since last PT visit:   Time In: 0700   Time Out: 0800  Total Time (in minutes): 60   Total Billable Time (in minutes): 60    FOTO:  Intake Score (%): 44  Survey Score 2 (%): Not applicable for this Episode  Survey Score 3 (%): Not applicable for this Episode    Precautions:         Subjective   Still an overall improvement in sx-does still having a dull ache in the R side of his neck..  Pain reported as 2/10. R scalene with levator stretching to L    Objective            Treatment:  Therapeutic Exercise  TE 1: R side only UT/LS stretch 3x30s ea  Manual Therapy  MT 1: Dry needling to R UT -NP  MT 2: STM to R cervical musculature  MT 3: cervical traction  Balance/Neuromuscular Re-Education  NMR 1: Scap retractions 20x5s  NMR 2: Chin tucks 20x5s  NMR 3: GTB no money 3x15  NMR 4: GTB T 3x15  NMR 5: GTB row/low row 3x15  Therapeutic Activity  TA 1: home program update (UT/LS/scalene stretch, no money,chin tuck, no money, T )-NP  TA 2: home program for knee pain (sciatic n glide, LAQ, SLR, sidelying hip abd, bridge)    Time Entry(in minutes):  Manual Therapy Time Entry: 15  Neuromuscular Re-Education Time Entry: 25  Therapeutic Activity Time Entry: 10  Therapeutic Exercise Time Entry: 10    Assessment & Plan   Assessment:  Patient's tolerance to right LS stretch position improved following manual therapy. Prescribed a home program to treat right knee pain. Was able to progress resistance on upper quarter strengthening.   Evaluation/Treatment Tolerance: Patient tolerated treatment well    The patient will continue to benefit from skilled outpatient physical therapy in order to address the deficits listed in the problem list on the initial evaluation, provide patient and family education, and maximize the patients level of independence in the home and community environments.     The patient's spiritual, cultural, and educational needs were considered, and the patient is agreeable to the plan of care and goals.           Plan: Progress as tolerated.    Goals:   Active       1. STG        Patient to demonstrate HEP compliance to maximize therapeutic potential.        Start:  07/16/25    Expected End:  08/13/25            Patient to improve cervical rom in all directions by 10 deg       Start:  07/16/25    Expected End:  08/13/25               2. LTG       Pt to achieve <10% limitation as measured by the FOTO to demonstrate decreased low back pain disability.        Start:  07/16/25    Expected End:  09/10/25            Patient to restore full pain-free cervical rom       Start:  07/16/25    Expected End:  09/10/25            Patient to deny pain with sleeping and ADLs       Start:  07/16/25    Expected End:  09/10/25                Jin Matias, PT, DPT

## 2025-08-13 ENCOUNTER — CLINICAL SUPPORT (OUTPATIENT)
Dept: REHABILITATION | Facility: HOSPITAL | Age: 65
End: 2025-08-13
Payer: COMMERCIAL

## 2025-08-13 DIAGNOSIS — R29.898 DECREASED ROM OF NECK: Primary | ICD-10-CM

## 2025-08-13 PROCEDURE — 97140 MANUAL THERAPY 1/> REGIONS: CPT | Mod: PO

## 2025-08-13 PROCEDURE — 97112 NEUROMUSCULAR REEDUCATION: CPT | Mod: PO

## 2025-08-29 DIAGNOSIS — G47.00 INSOMNIA, UNSPECIFIED TYPE: ICD-10-CM

## 2025-09-02 RX ORDER — ZOLPIDEM TARTRATE 10 MG/1
10 TABLET ORAL NIGHTLY
Qty: 90 TABLET | Refills: 0 | Status: SHIPPED | OUTPATIENT
Start: 2025-09-02

## (undated) DEVICE — DRESSING LEUKOPLAST FLEX 1X3IN

## (undated) DEVICE — BANDAGE ADHESIVE